# Patient Record
Sex: FEMALE | Race: WHITE | NOT HISPANIC OR LATINO | Employment: OTHER | URBAN - METROPOLITAN AREA
[De-identification: names, ages, dates, MRNs, and addresses within clinical notes are randomized per-mention and may not be internally consistent; named-entity substitution may affect disease eponyms.]

---

## 2017-02-21 ENCOUNTER — OFFICE VISIT (OUTPATIENT)
Dept: ORTHOPEDIC SURGERY | Facility: CLINIC | Age: 62
End: 2017-02-21

## 2017-02-21 DIAGNOSIS — R52 PAIN: Primary | ICD-10-CM

## 2017-02-21 DIAGNOSIS — M17.11 PRIMARY OSTEOARTHRITIS OF RIGHT KNEE: ICD-10-CM

## 2017-02-21 DIAGNOSIS — M17.12 PRIMARY OSTEOARTHRITIS OF LEFT KNEE: ICD-10-CM

## 2017-02-21 PROCEDURE — 99214 OFFICE O/P EST MOD 30 MIN: CPT | Performed by: ORTHOPAEDIC SURGERY

## 2017-02-21 PROCEDURE — 20610 DRAIN/INJ JOINT/BURSA W/O US: CPT | Performed by: ORTHOPAEDIC SURGERY

## 2017-02-21 PROCEDURE — 73562 X-RAY EXAM OF KNEE 3: CPT | Performed by: ORTHOPAEDIC SURGERY

## 2017-02-21 RX ORDER — ERGOCALCIFEROL 1.25 MG/1
CAPSULE ORAL
Refills: 0 | Status: ON HOLD | COMMUNITY
Start: 2017-02-18 | End: 2018-04-11

## 2017-02-21 RX ORDER — ALLOPURINOL 100 MG/1
TABLET ORAL
Refills: 0 | COMMUNITY
Start: 2017-02-18 | End: 2017-05-23

## 2017-02-21 RX ORDER — TRAMADOL HYDROCHLORIDE 50 MG/1
TABLET ORAL
Refills: 0 | Status: ON HOLD | COMMUNITY
Start: 2017-02-10 | End: 2018-04-11

## 2017-02-21 RX ADMIN — TRIAMCINOLONE ACETONIDE 80 MG: 40 INJECTION, SUSPENSION INTRA-ARTICULAR; INTRAMUSCULAR at 16:21

## 2017-02-21 RX ADMIN — BUPIVACAINE HYDROCHLORIDE 4 ML: 5 INJECTION, SOLUTION EPIDURAL; INTRACAUDAL at 16:21

## 2017-02-21 RX ADMIN — LIDOCAINE HYDROCHLORIDE 4 ML: 10 INJECTION, SOLUTION INFILTRATION; PERINEURAL at 16:21

## 2017-02-21 NOTE — PROGRESS NOTES
Subjective:     Patient ID: Nitish Barfield is a 61 y.o. female.    Chief Complaint: Follow-up bilateral knee pain, DJD     History of Present Illness  Nitish Barfield returns to clinic today states that both her knees been causing issues for her recently but her left knee has been more predominant over the last 3 months, she did have prior arthroscopy of the left knee back in 2008.  Has noted that her pain is primarily exacerbated with twisting and deep flexion activities, localized both medial and lateral joint lines left knee as well as anteriorly with stair climbing.  Rates pain as a 9 out of 10, aching in nature, occasional sharp pain.  Mild associated swelling.  Mild improvement with rest, and anti-inflammatory medications.    States the right knee pain has been increasing over last 3-4 weeks, rates as a 7 out of 10, aching in nature.  Exacerbated with prolonged weightbearing and walking as well as stair climbing, minimal improvement with rest and activity modification.  Denies any nallely locking or catching, denies any numbness or tingling bilateral lower extremities.    She has recently been started on methotrexate by her rheumatologist.  Social History     Occupational History   • Not on file.     Social History Main Topics   • Smoking status: Never Smoker   • Smokeless tobacco: Not on file   • Alcohol use Yes   • Drug use: No   • Sexual activity: Not on file      Past Medical History   Diagnosis Date   • Allergy    • Anxiety    • Arthritis    • Esophageal reflux    • Hypertension    • Hypertension      Past Surgical History   Procedure Laterality Date   • Hysterectomy     • Colon surgery     • Knee surgery         Family History   Problem Relation Age of Onset   • Cancer Mother    • Other Father      cardiac failure         Review of Systems   Constitutional: Negative for chills, diaphoresis, fever and unexpected weight change.   HENT: Negative for hearing loss, nosebleeds, sore throat and tinnitus.    Eyes:  Negative for pain and visual disturbance.   Respiratory: Negative for cough, shortness of breath and wheezing.    Cardiovascular: Negative for chest pain and palpitations.   Gastrointestinal: Negative for abdominal pain, diarrhea, nausea and vomiting.   Endocrine: Negative for cold intolerance, heat intolerance and polydipsia.   Genitourinary: Negative for difficulty urinating, dysuria and hematuria.   Musculoskeletal: Positive for arthralgias. Negative for joint swelling and myalgias.   Skin: Negative for rash and wound.   Allergic/Immunologic: Negative for environmental allergies.   Neurological: Negative for dizziness, syncope and numbness.   Hematological: Does not bruise/bleed easily.   Psychiatric/Behavioral: Negative for dysphoric mood and sleep disturbance. The patient is not nervous/anxious.            Objective:  There were no vitals filed for this visit.  There were no vitals filed for this visit.  There is no height or weight on file to calculate BMI.  General: No acute distress.  Resp: normal respiratory effort  Skin: no rashes or wounds; normal turgor  Psych: mood and affect appropriate; recent and remote memory intact       Ortho Exam    Right knee-active range of motion 5-120°, 4+ out of 5 strength on flexion and extension, mild effusion, maximal tenderness palpation along medial joint line and medial patellar facet, positive external compression test, positive [place] exam with pain, no click.  Grade 1A Lachman, negative anterior posterior drawer, stable to varus and valgus stress at 5 and 30°.    Left knee-active range of motion 3-120°, 4+ out of 5 strength on flexion and extension, moderate effusion noted, maximal tenderness palpation along medial and lateral joint lines, moderate tenderness along medial lateral patellar facets with positive active patellar compression test, negative patellar apprehension test.  Negative Mis exam.  Grade 1A Lachman, negative anterior posterior drawer, stable  to varus and valgus stress at 3 and 30 degrees. No left hip pain on logroll extensor exam, negative straight leg raise left lower extremity.  Imaging:  Left Knee X-Ray  Indication: Pain    AP, Lateral, and [place] views    Findings:  No fracture  No bony lesion  Normal soft tissues  Advanced tricompartmental osteoarthritis bilateral knees, primarily noted in the patellofemoral compartment as well as medial compartment on left knee with osteophyte along superior and inferior patella as well as medial tibial plateau.  No evidence of intra-articular loose body.  Overall varus alignment noted.    Compared to Prior office x-rays  Assessment:       1. Pain    2. Primary osteoarthritis of right knee    3. Primary osteoarthritis of left knee          Plan:  ARTEMIO query complete.        Discussed treatment options at length with patient today.  She does not want proceed with any type of surgery at this point time.  She has had moderate improvement with prior cortisone injections.  We also discussed possibility of total knee arthroplasty as well as viscus supplement injections.  We will 6 cortisone injections this time, she is going to continue with her initial trial methotrexate to evaluate for any improvement with this.    Nitish Barfield was in agreement with plan and had all questions answered.     Orders:  Orders Placed This Encounter   Procedures   • Large Joint Arthrocentesis   • XR Knee 3+ View With [place] Left       Medications:  No orders of the defined types were placed in this encounter.      Followup:  Return in about 3 months (around 5/21/2017).          Dragon transcription disclaimer     Much of this encounter note is an electronic transcription/translation of spoken language to printed text. The electronic translation of spoken language may permit erroneous, or at times, nonsensical words or phrases to be inadvertently transcribed. Although I have reviewed the note for such errors, some may still exist.  Large  Joint Arthrocentesis  Date/Time: 2/21/2017 4:21 PM  Consent given by: patient  Site marked: site marked  Timeout: Immediately prior to procedure a time out was called to verify the correct patient, procedure, equipment, support staff and site/side marked as required   Supporting Documentation  Indications: pain   Procedure Details  Location: knee - Knee joint: bilateral.  Preparation: Patient was prepped and draped in the usual sterile fashion  Needle size: 22 G  Approach: superior  Medications administered: 4 mL bupivacaine (PF) 0.5 %; 4 mL lidocaine 1 %; 80 mg triamcinolone acetonide 40 MG/ML  Patient tolerance: patient tolerated the procedure well with no immediate complications

## 2017-02-27 RX ORDER — LIDOCAINE HYDROCHLORIDE 10 MG/ML
4 INJECTION, SOLUTION INFILTRATION; PERINEURAL
Status: COMPLETED | OUTPATIENT
Start: 2017-02-21 | End: 2017-02-21

## 2017-02-27 RX ORDER — TRIAMCINOLONE ACETONIDE 40 MG/ML
80 INJECTION, SUSPENSION INTRA-ARTICULAR; INTRAMUSCULAR
Status: COMPLETED | OUTPATIENT
Start: 2017-02-21 | End: 2017-02-21

## 2017-02-27 RX ORDER — BUPIVACAINE HYDROCHLORIDE 5 MG/ML
4 INJECTION, SOLUTION EPIDURAL; INTRACAUDAL
Status: COMPLETED | OUTPATIENT
Start: 2017-02-21 | End: 2017-02-21

## 2017-05-23 ENCOUNTER — OFFICE VISIT (OUTPATIENT)
Dept: ORTHOPEDIC SURGERY | Facility: CLINIC | Age: 62
End: 2017-05-23

## 2017-05-23 DIAGNOSIS — M17.11 PRIMARY OSTEOARTHRITIS OF RIGHT KNEE: Primary | ICD-10-CM

## 2017-05-23 DIAGNOSIS — M17.12 PRIMARY OSTEOARTHRITIS OF LEFT KNEE: ICD-10-CM

## 2017-05-23 PROCEDURE — 20610 DRAIN/INJ JOINT/BURSA W/O US: CPT | Performed by: ORTHOPAEDIC SURGERY

## 2017-05-23 PROCEDURE — 99213 OFFICE O/P EST LOW 20 MIN: CPT | Performed by: ORTHOPAEDIC SURGERY

## 2017-05-23 RX ORDER — SULFASALAZINE 500 MG/1
TABLET ORAL
Refills: 0 | COMMUNITY
Start: 2017-05-09 | End: 2017-08-23

## 2017-05-23 RX ADMIN — TRIAMCINOLONE ACETONIDE 80 MG: 40 INJECTION, SUSPENSION INTRA-ARTICULAR; INTRAMUSCULAR at 15:47

## 2017-05-23 RX ADMIN — LIDOCAINE HYDROCHLORIDE 4 ML: 10 INJECTION, SOLUTION EPIDURAL; INFILTRATION; INTRACAUDAL; PERINEURAL at 15:47

## 2017-05-23 RX ADMIN — BUPIVACAINE HYDROCHLORIDE 4 ML: 5 INJECTION, SOLUTION EPIDURAL; INTRACAUDAL at 15:47

## 2017-05-30 RX ORDER — TRIAMCINOLONE ACETONIDE 40 MG/ML
80 INJECTION, SUSPENSION INTRA-ARTICULAR; INTRAMUSCULAR
Status: COMPLETED | OUTPATIENT
Start: 2017-05-23 | End: 2017-05-23

## 2017-05-30 RX ORDER — BUPIVACAINE HYDROCHLORIDE 5 MG/ML
4 INJECTION, SOLUTION EPIDURAL; INTRACAUDAL
Status: COMPLETED | OUTPATIENT
Start: 2017-05-23 | End: 2017-05-23

## 2017-05-30 RX ORDER — LIDOCAINE HYDROCHLORIDE 10 MG/ML
4 INJECTION, SOLUTION EPIDURAL; INFILTRATION; INTRACAUDAL; PERINEURAL
Status: COMPLETED | OUTPATIENT
Start: 2017-05-23 | End: 2017-05-23

## 2017-08-23 ENCOUNTER — OFFICE VISIT (OUTPATIENT)
Dept: ORTHOPEDIC SURGERY | Facility: CLINIC | Age: 62
End: 2017-08-23

## 2017-08-23 DIAGNOSIS — M17.12 PRIMARY OSTEOARTHRITIS OF LEFT KNEE: ICD-10-CM

## 2017-08-23 DIAGNOSIS — M17.11 PRIMARY OSTEOARTHRITIS OF RIGHT KNEE: Primary | ICD-10-CM

## 2017-08-23 PROCEDURE — 99213 OFFICE O/P EST LOW 20 MIN: CPT | Performed by: ORTHOPAEDIC SURGERY

## 2017-08-23 PROCEDURE — 20610 DRAIN/INJ JOINT/BURSA W/O US: CPT | Performed by: ORTHOPAEDIC SURGERY

## 2017-08-23 RX ORDER — AZILSARTAN KAMEDOXOMIL 80 MG/1
80 TABLET ORAL EVERY MORNING
Refills: 3 | COMMUNITY
Start: 2017-07-08

## 2017-08-23 RX ORDER — CHLORTHALIDONE 25 MG/1
TABLET ORAL
Refills: 3 | COMMUNITY
Start: 2017-06-07 | End: 2017-08-23

## 2017-08-23 RX ORDER — ALLOPURINOL 100 MG/1
TABLET ORAL
Refills: 0 | COMMUNITY
Start: 2017-06-19 | End: 2018-04-13 | Stop reason: HOSPADM

## 2017-08-23 RX ORDER — LEFLUNOMIDE 20 MG/1
TABLET ORAL
Refills: 0 | Status: ON HOLD | COMMUNITY
Start: 2017-08-12 | End: 2018-04-11

## 2017-08-23 RX ORDER — DIFLUNISAL 500 MG/1
500 TABLET, FILM COATED ORAL EVERY 12 HOURS SCHEDULED
Refills: 0 | Status: ON HOLD | COMMUNITY
Start: 2017-08-21 | End: 2018-04-11

## 2017-08-23 RX ADMIN — TRIAMCINOLONE ACETONIDE 80 MG: 40 INJECTION, SUSPENSION INTRA-ARTICULAR; INTRAMUSCULAR at 15:59

## 2017-08-23 RX ADMIN — BUPIVACAINE HYDROCHLORIDE 4 ML: 5 INJECTION, SOLUTION EPIDURAL; INTRACAUDAL at 15:59

## 2017-08-23 RX ADMIN — LIDOCAINE HYDROCHLORIDE 4 ML: 10 INJECTION, SOLUTION INFILTRATION; PERINEURAL at 15:59

## 2017-09-04 RX ORDER — BUPIVACAINE HYDROCHLORIDE 5 MG/ML
4 INJECTION, SOLUTION EPIDURAL; INTRACAUDAL
Status: COMPLETED | OUTPATIENT
Start: 2017-08-23 | End: 2017-08-23

## 2017-09-04 RX ORDER — LIDOCAINE HYDROCHLORIDE 10 MG/ML
4 INJECTION, SOLUTION INFILTRATION; PERINEURAL
Status: COMPLETED | OUTPATIENT
Start: 2017-08-23 | End: 2017-08-23

## 2017-09-04 RX ORDER — TRIAMCINOLONE ACETONIDE 40 MG/ML
80 INJECTION, SUSPENSION INTRA-ARTICULAR; INTRAMUSCULAR
Status: COMPLETED | OUTPATIENT
Start: 2017-08-23 | End: 2017-08-23

## 2017-11-17 ENCOUNTER — OFFICE VISIT (OUTPATIENT)
Dept: ORTHOPEDIC SURGERY | Facility: CLINIC | Age: 62
End: 2017-11-17

## 2017-11-17 DIAGNOSIS — M17.12 PRIMARY OSTEOARTHRITIS OF LEFT KNEE: ICD-10-CM

## 2017-11-17 DIAGNOSIS — M17.11 PRIMARY OSTEOARTHRITIS OF RIGHT KNEE: Primary | ICD-10-CM

## 2017-11-17 PROCEDURE — 20610 DRAIN/INJ JOINT/BURSA W/O US: CPT | Performed by: ORTHOPAEDIC SURGERY

## 2017-11-17 PROCEDURE — 99213 OFFICE O/P EST LOW 20 MIN: CPT | Performed by: ORTHOPAEDIC SURGERY

## 2017-11-17 RX ORDER — LIDOCAINE HYDROCHLORIDE 10 MG/ML
4 INJECTION, SOLUTION EPIDURAL; INFILTRATION; INTRACAUDAL; PERINEURAL
Status: COMPLETED | OUTPATIENT
Start: 2017-11-17 | End: 2017-11-17

## 2017-11-17 RX ORDER — TRIAMCINOLONE ACETONIDE 40 MG/ML
80 INJECTION, SUSPENSION INTRA-ARTICULAR; INTRAMUSCULAR
Status: COMPLETED | OUTPATIENT
Start: 2017-11-17 | End: 2017-11-17

## 2017-11-17 RX ORDER — BUPIVACAINE HYDROCHLORIDE 5 MG/ML
4 INJECTION, SOLUTION EPIDURAL; INTRACAUDAL
Status: COMPLETED | OUTPATIENT
Start: 2017-11-17 | End: 2017-11-17

## 2017-11-17 RX ADMIN — LIDOCAINE HYDROCHLORIDE 4 ML: 10 INJECTION, SOLUTION EPIDURAL; INFILTRATION; INTRACAUDAL; PERINEURAL at 08:39

## 2017-11-17 RX ADMIN — BUPIVACAINE HYDROCHLORIDE 4 ML: 5 INJECTION, SOLUTION EPIDURAL; INTRACAUDAL at 08:39

## 2017-11-17 RX ADMIN — TRIAMCINOLONE ACETONIDE 80 MG: 40 INJECTION, SUSPENSION INTRA-ARTICULAR; INTRAMUSCULAR at 08:39

## 2017-11-17 NOTE — PROGRESS NOTES
Subjective:     Patient ID: Nitish Barfield is a 62 y.o. female.    Chief Complaint  Follow-up bilateral knee pain, DJD  History of Present Illness  Nitish Barfield returns to clinic today for evaluation of bilateral knees, states that injections and continue work well but wear off after approximately 2-1/2 months, notes residual pain now over anterior medial joint line bilateral knee, left worse than right, aching in nature, rates pain as 8-9 out of 10.  She has not had as much swelling over the last 3-4 weeks.  Mild improvement with rest and soft tissue massage as well as over-the-counter anti-inflammatory medications.  Denies numbness or tingling, denies any significant radiation of pain.     Social History     Occupational History   • Not on file.     Social History Main Topics   • Smoking status: Never Smoker   • Smokeless tobacco: Not on file   • Alcohol use Yes   • Drug use: No   • Sexual activity: Not on file      Past Medical History:   Diagnosis Date   • Allergy    • Anxiety    • Arthritis    • Esophageal reflux    • Hypertension    • Hypertension      Past Surgical History:   Procedure Laterality Date   • COLON SURGERY     • HYSTERECTOMY     • KNEE SURGERY         Family History   Problem Relation Age of Onset   • Cancer Mother    • Other Father      cardiac failure         Review of Systems   Constitutional: Negative for chills, diaphoresis, fever and unexpected weight change.   HENT: Negative for hearing loss, nosebleeds, sore throat and tinnitus.    Eyes: Negative for pain and visual disturbance.   Respiratory: Negative for cough, shortness of breath and wheezing.    Cardiovascular: Negative for chest pain and palpitations.   Gastrointestinal: Negative for abdominal pain, diarrhea, nausea and vomiting.   Endocrine: Negative for cold intolerance, heat intolerance and polydipsia.   Genitourinary: Negative for difficulty urinating, dysuria and hematuria.   Musculoskeletal: Positive for arthralgias. Negative for  joint swelling and myalgias.   Skin: Negative for rash and wound.   Allergic/Immunologic: Negative for environmental allergies.   Neurological: Negative for dizziness, syncope and numbness.   Hematological: Does not bruise/bleed easily.   Psychiatric/Behavioral: Negative for dysphoric mood and sleep disturbance. The patient is not nervous/anxious.    All other systems reviewed and are negative.          Objective:  There were no vitals filed for this visit.  There were no vitals filed for this visit.  There is no height or weight on file to calculate BMI.  General: No acute distress.  Resp: normal respiratory effort  Skin: no rashes or wounds; normal turgor  Psych: mood and affect appropriate; recent and remote memory intact         Ortho Exam    Right knee-active range of motion 3-120°, 4+ out of 5 strength on flexion and extension, moderate effusion, maximal tenderness palpation along medial joint line and medial patellar facet, positive active patellar compression test, positive Mis exam with pain, no click. Stable to varus and valgus stress at 5 and 30°.      Left knee-active range of motion 3-120°, 4+ out of 5 strength on flexion and extension, moderate effusion noted, maximal tenderness palpation along medial and lateral joint lines, moderate tenderness along medial lateral patellar facets with positive active patellar compression test. Negative Mis exam. Stable to varus and valgus stress at 3 and 30 degrees.   Imaging:    Assessment:       1. Primary osteoarthritis of right knee    2. Primary osteoarthritis of left knee          Plan:  Large Joint Arthrocentesis  Date/Time: 11/17/2017 8:39 AM  Consent given by: patient  Site marked: site marked  Timeout: Immediately prior to procedure a time out was called to verify the correct patient, procedure, equipment, support staff and site/side marked as required   Supporting Documentation  Indications: pain   Procedure Details  Location: knee - Knee joint:  BILATERAL KNEE.  Preparation: Patient was prepped and draped in the usual sterile fashion  Needle size: 22 G  Approach: superior  Medications administered: 4 mL lidocaine PF 1% 1 %; 4 mL bupivacaine (PF) 0.5 %; 80 mg triamcinolone acetonide 40 MG/ML  Patient tolerance: patient tolerated the procedure well with no immediate complications          ARTEMIO query complete.          Discussed treatment options at length with patient at today's visit. Reviewed options for viscous supplement injections, repeat cortisone injections, and total knee arthroplasty as well as physical therapy.  Patient will continue work on home exercises and weight loss at home.  Given prior success she decides today she would like to proceed with repeat intra-articular cortisone injections.    Nitish Barfield was in agreement with plan and had all questions answered.     Orders:  Orders Placed This Encounter   Procedures   • Large Joint Arthrocentesis       Medications:  No orders of the defined types were placed in this encounter.      Followup:  Return in about 3 months (around 2/17/2018).    Nitish was seen today for pain, follow-up, pain and follow-up.    Diagnoses and all orders for this visit:    Primary osteoarthritis of right knee    Primary osteoarthritis of left knee    Other orders  -     Large Joint Arthrocentesis        Dragon transcription disclaimer     Much of this encounter note is an electronic transcription/translation of spoken language to printed text. The electronic translation of spoken language may permit erroneous, or at times, nonsensical words or phrases to be inadvertently transcribed. Although I have reviewed the note for such errors, some may still exist.

## 2018-02-16 ENCOUNTER — OFFICE VISIT (OUTPATIENT)
Dept: ORTHOPEDIC SURGERY | Facility: CLINIC | Age: 63
End: 2018-02-16

## 2018-02-16 DIAGNOSIS — M17.11 PRIMARY OSTEOARTHRITIS OF RIGHT KNEE: Primary | ICD-10-CM

## 2018-02-16 DIAGNOSIS — M17.12 PRIMARY OSTEOARTHRITIS OF LEFT KNEE: ICD-10-CM

## 2018-02-16 PROCEDURE — 99214 OFFICE O/P EST MOD 30 MIN: CPT | Performed by: ORTHOPAEDIC SURGERY

## 2018-02-16 PROCEDURE — 20610 DRAIN/INJ JOINT/BURSA W/O US: CPT | Performed by: ORTHOPAEDIC SURGERY

## 2018-02-16 RX ORDER — CHLORTHALIDONE 25 MG/1
25 TABLET ORAL EVERY MORNING
Refills: 0 | COMMUNITY
Start: 2018-01-21 | End: 2018-10-02 | Stop reason: ALTCHOICE

## 2018-02-16 RX ADMIN — TRIAMCINOLONE ACETONIDE 80 MG: 40 INJECTION, SUSPENSION INTRA-ARTICULAR; INTRAMUSCULAR at 08:38

## 2018-02-16 RX ADMIN — LIDOCAINE HYDROCHLORIDE 8 ML: 10 INJECTION, SOLUTION EPIDURAL; INFILTRATION; INTRACAUDAL; PERINEURAL at 08:38

## 2018-02-16 NOTE — PROGRESS NOTES
Subjective:     Patient ID: Nitish Barfield is a 62 y.o. female.    Chief Complaint:  Follow-up bilateral knee pain, DJD  History of Present Illness  Nitish Barfield returns to clinic today for evaluation of bilateral knee pain, states that most recent injections did help her particularly in the right knee for least 2-1/2 months, but only noted about 4-6 weeks of improvement for her left knee following injections in November.  She does note approximately 90% relief of the pain with the injections but only for limited period of time.  Has had significant recurrence of pain at this point time particularly on the left knee greater than the right, rates pain as a 9 out of 10 on the left and a 7 out of 10 on the right, localizes to the medial and anterior aspects of both knees.  Moderate associated swelling, minimal improvement with rest and anti-inflammatory medications, exacerbation with prolonged weightbearing, walking, standing, stair climbing activities with associated crepitus on range of motion noted.  Denies any significant radiation of pain, denies numbness or tingling.     Social History     Occupational History   • Not on file.     Social History Main Topics   • Smoking status: Never Smoker   • Smokeless tobacco: Not on file   • Alcohol use Yes   • Drug use: No   • Sexual activity: Not on file      Past Medical History:   Diagnosis Date   • Allergy    • Anxiety    • Arthritis    • Esophageal reflux    • Hypertension    • Hypertension      Past Surgical History:   Procedure Laterality Date   • COLON SURGERY     • HYSTERECTOMY     • KNEE SURGERY         Family History   Problem Relation Age of Onset   • Cancer Mother    • Other Father      cardiac failure         Review of Systems   Constitutional: Negative for chills, diaphoresis, fever and unexpected weight change.   HENT: Negative for hearing loss, nosebleeds, sore throat and tinnitus.    Eyes: Negative for pain and visual disturbance.   Respiratory: Negative for  cough, shortness of breath and wheezing.    Cardiovascular: Negative for chest pain and palpitations.   Gastrointestinal: Negative for abdominal pain, diarrhea, nausea and vomiting.   Endocrine: Negative for cold intolerance, heat intolerance and polydipsia.   Genitourinary: Negative for difficulty urinating, dysuria and hematuria.   Musculoskeletal: Positive for arthralgias and joint swelling. Negative for myalgias.   Skin: Negative for rash and wound.   Allergic/Immunologic: Negative for environmental allergies.   Neurological: Positive for numbness. Negative for dizziness and syncope.   Hematological: Does not bruise/bleed easily.   Psychiatric/Behavioral: Negative for dysphoric mood and sleep disturbance. The patient is not nervous/anxious.    All other systems reviewed and are negative.          Objective:  There were no vitals filed for this visit.  There were no vitals filed for this visit.  There is no height or weight on file to calculate BMI.  General: No acute distress.  Resp: normal respiratory effort  Skin: no rashes or wounds; normal turgor  Psych: mood and affect appropriate; recent and remote memory intact         Ortho Exam    Right knee-active range of motion 3-125°, 4+ out of 5 strength on flexion and extension, moderate effusion, maximal tenderness palpation along medial joint line and medial patellar facet, positive active patellar compression test, positive Mis exam with pain, no click. Stable to varus and valgus stress at 5 and 30°.      Left knee-active range of motion 5-120°, 4+ out of 5 strength on flexion and extension, moderate effusion noted, maximal tenderness palpation along medial and lateral joint lines, moderate tenderness along medial lateral patellar facets with positive active patellar compression test. Negative Mis exam. Stable to varus and valgus stress at 3 and 30 degrees.     Imaging:    Assessment:       1. Primary osteoarthritis of right knee    2. Primary  osteoarthritis of left knee          Plan:  ARTEMIO query complete.          Discussed treatment options at length with patient at today's visit.  Given significant recurrence of pain at this point time in the acute nature of her pain at today's visit, she elected to proceed with repeat intra-articular injections today.  However she does want plan on proceeding with total knee arthroplasty on the left side given the significant increase in her pain as well as decreasing efficacy of intra-articular injections.  She has failed other conservative treatment options including but not limited to rest, activity modification, home exercise program, weight loss through dieting, over-the-counter sleeve, and intra-articular injections.    I reviewed anatomy and a model of a total knee arthroplasty, as well as typical postoperative recovery of 6-12 months before maxiaml recovery, and possible need for rehabilitation stay after hospitalization. We also discussed risks, benefits, and alternatives of procedure with risks including but not limited to neurovascular damage, bleeding, infection, malalignment, chronic pian, failure of implants, osteolysis, loosening of implants, loss of motion, weakness, stiffness, instability, DVT, pulmonary embolus, death, stroke, complex regional pain syndrome, myocardial infarction, and need for additional procedures. Patient understood all these and had all questions answered before consenting to the procedure. No guarantees were given in regards to results from the surgery. We will have patient medically optimized by their primary care physician and plan on proceeding with surgery at next available date.     Patient denies any history of DVT or pulmonary embolus, denies any history of cardiac issues.    Nitish Barfield was in agreement with plan and had all questions answered.     Orders:  Orders Placed This Encounter   Procedures   • Large Joint Arthrocentesis   • MRSA Screen Culture - Swab, Parvezes    • XR chest 2 vw   • Basic metabolic panel   • Protime-INR   • APTT   • Urinalysis With / Culture If Indicated - Urine, Clean Catch   • Consult Primary Care Physician for Medical Clearance   • Follow anesthesia standing orders.   • Orthopedic Discharge Planning for PT & Case Management - Inpatient With Post-Op Day 2 or 3 Discharge   • Provide instructions to patient regarding NPO status   • Clorhexidine skin prep   • ECG 12 Lead   • Type and screen   • CBC and Differential       Medications:  No orders of the defined types were placed in this encounter.      Followup:  No Follow-up on file.    Nitish was seen today for follow-up, numbness, edema, pain, follow-up, numbness, edema and pain.    Diagnoses and all orders for this visit:    Primary osteoarthritis of right knee  -     Large Joint Arthrocentesis    Primary osteoarthritis of left knee  -     Large Joint Arthrocentesis  -     Case Request; Standing  -     Consult Primary Care Physician for Medical Clearance  -     CBC and Differential; Future  -     Basic metabolic panel; Future  -     Protime-INR; Future  -     APTT; Future  -     MRSA Screen Culture - Swab, Nares  -     Type and screen; Future  -     Urinalysis With / Culture If Indicated - Urine, Clean Catch; Future  -     ECG 12 Lead; Future  -     XR chest 2 vw; Future  -     acetaminophen (TYLENOL) tablet 975 mg; Take 3 tablets by mouth 1 (One) Time.  -     meloxicam (MOBIC) tablet 15 mg; Take 2 tablets by mouth 1 (One) Time.  -     pregabalin (LYRICA) capsule 150 mg; Take 6 capsules by mouth 1 (One) Time.  -     oxyCODONE (oxyCONTIN) 12 hr tablet 10 mg; Take 1 tablet by mouth 1 (One) Time.  -     bupivacaine liposome (EXPAREL) 1.3 % injection 266 mg; Inject 20 mL as directed 1 (One) Time.  -     pantoprazole (PROTONIX) EC tablet 40 mg; Take 2 tablets by mouth 1 (One) Time.  -     Case Request    Other orders  -     Inpatient Admission; Standing  -     Follow anesthesia standing orders.  -      Orthopedic Discharge Planning for PT & Case Management - Inpatient With Post-Op Day 2 or 3 Discharge  -     Provide instructions to patient regarding NPO status  -     Clorhexidine skin prep  -     Follow anesthesia standing orders.; Standing  -     Verify NPO Status; Standing  -     SCD (sequential compression device)- to be placed on patient in Pre-op; Standing  -     Clip operative site; Standing  -     Obtain informed consent (if not collected inpatient or PAT); Standing  -     Type & Screen; Standing  -     Inpatient Consult to Hospitalist; Standing        Dictated utilizing Xeroon dictation   Large Joint Arthrocentesis  Date/Time: 2/16/2018 8:38 AM  Consent given by: patient  Site marked: site marked  Timeout: Immediately prior to procedure a time out was called to verify the correct patient, procedure, equipment, support staff and site/side marked as required   Supporting Documentation  Indications: pain   Procedure Details  Location: knee - Knee joint: bilateral.  Preparation: Patient was prepped and draped in the usual sterile fashion  Needle size: 22 G  Approach: anterolateral  Medications administered: 8 mL lidocaine PF 1% 1 %; 80 mg triamcinolone acetonide 40 MG/ML  Patient tolerance: patient tolerated the procedure well with no immediate complications

## 2018-02-21 RX ORDER — LIDOCAINE HYDROCHLORIDE 10 MG/ML
8 INJECTION, SOLUTION EPIDURAL; INFILTRATION; INTRACAUDAL; PERINEURAL
Status: COMPLETED | OUTPATIENT
Start: 2018-02-16 | End: 2018-02-16

## 2018-02-21 RX ORDER — PREGABALIN 25 MG/1
150 CAPSULE ORAL ONCE
Status: CANCELLED | OUTPATIENT
Start: 2018-02-21 | End: 2018-02-21

## 2018-02-21 RX ORDER — MELOXICAM 7.5 MG/1
15 TABLET ORAL ONCE
Status: CANCELLED | OUTPATIENT
Start: 2018-02-21 | End: 2018-02-21

## 2018-02-21 RX ORDER — OXYCODONE HCL 10 MG/1
10 TABLET, FILM COATED, EXTENDED RELEASE ORAL ONCE
Status: CANCELLED | OUTPATIENT
Start: 2018-02-21 | End: 2018-02-21

## 2018-02-21 RX ORDER — TRIAMCINOLONE ACETONIDE 40 MG/ML
80 INJECTION, SUSPENSION INTRA-ARTICULAR; INTRAMUSCULAR
Status: COMPLETED | OUTPATIENT
Start: 2018-02-16 | End: 2018-02-16

## 2018-02-21 RX ORDER — PANTOPRAZOLE SODIUM 20 MG/1
40 TABLET, DELAYED RELEASE ORAL ONCE
Status: CANCELLED | OUTPATIENT
Start: 2018-02-21 | End: 2018-02-21

## 2018-02-21 RX ORDER — ACETAMINOPHEN 325 MG/1
1000 TABLET ORAL ONCE
Status: CANCELLED | OUTPATIENT
Start: 2018-02-21 | End: 2018-02-21

## 2018-03-12 ENCOUNTER — PREP FOR SURGERY (OUTPATIENT)
Dept: OTHER | Facility: HOSPITAL | Age: 63
End: 2018-03-12

## 2018-03-12 DIAGNOSIS — M17.12 PRIMARY OSTEOARTHRITIS OF LEFT KNEE: Primary | ICD-10-CM

## 2018-03-30 ENCOUNTER — OFFICE VISIT (OUTPATIENT)
Dept: ORTHOPEDIC SURGERY | Facility: CLINIC | Age: 63
End: 2018-03-30

## 2018-03-30 DIAGNOSIS — M17.12 PRIMARY OSTEOARTHRITIS OF LEFT KNEE: Primary | ICD-10-CM

## 2018-03-30 PROCEDURE — S0260 H&P FOR SURGERY: HCPCS | Performed by: ORTHOPAEDIC SURGERY

## 2018-03-30 PROCEDURE — 73562 X-RAY EXAM OF KNEE 3: CPT | Performed by: ORTHOPAEDIC SURGERY

## 2018-03-30 RX ORDER — SILVER SULFADIAZINE 1 %
CREAM (GRAM) TOPICAL
Refills: 0 | Status: ON HOLD | COMMUNITY
Start: 2018-03-28 | End: 2018-04-11

## 2018-03-30 RX ORDER — LEFLUNOMIDE 10 MG/1
10 TABLET ORAL EVERY MORNING
Refills: 0 | COMMUNITY
Start: 2018-03-13 | End: 2018-04-13 | Stop reason: HOSPADM

## 2018-03-30 ASSESSMENT — KOOS JR
KOOS JR SCORE: 24.875
KOOS JR SCORE: 24

## 2018-04-03 ENCOUNTER — HOSPITAL ENCOUNTER (OUTPATIENT)
Dept: GENERAL RADIOLOGY | Facility: HOSPITAL | Age: 63
Discharge: HOME | End: 2018-04-03
Admitting: ORTHOPAEDIC SURGERY

## 2018-04-03 ENCOUNTER — APPOINTMENT (OUTPATIENT)
Dept: PREADMISSION TESTING | Facility: HOSPITAL | Age: 63
End: 2018-04-03

## 2018-04-03 VITALS
OXYGEN SATURATION: 96 % | SYSTOLIC BLOOD PRESSURE: 149 MMHG | RESPIRATION RATE: 16 BRPM | HEART RATE: 88 BPM | WEIGHT: 243.3 LBS | BODY MASS INDEX: 36.87 KG/M2 | DIASTOLIC BLOOD PRESSURE: 69 MMHG | HEIGHT: 68 IN

## 2018-04-03 DIAGNOSIS — M17.11 PRIMARY OSTEOARTHRITIS OF RIGHT KNEE: Primary | ICD-10-CM

## 2018-04-03 DIAGNOSIS — M17.12 PRIMARY OSTEOARTHRITIS OF LEFT KNEE: ICD-10-CM

## 2018-04-03 LAB
ABO GROUP BLD: NORMAL
ABO GROUP BLD: NORMAL
ANION GAP SERPL CALCULATED.3IONS-SCNC: 11.6 MMOL/L
APTT PPP: 30.8 SECONDS (ref 24.3–38.1)
BACTERIA UR QL AUTO: ABNORMAL /HPF
BASOPHILS # BLD AUTO: 0.07 10*3/MM3 (ref 0–0.2)
BASOPHILS NFR BLD AUTO: 0.7 % (ref 0–2)
BILIRUB UR QL STRIP: NEGATIVE
BLD GP AB SCN SERPL QL: NEGATIVE
BUN BLD-MCNC: 10 MG/DL (ref 8–23)
BUN/CREAT SERPL: 10 (ref 7–25)
CALCIUM SPEC-SCNC: 8.8 MG/DL (ref 8.8–10.5)
CHLORIDE SERPL-SCNC: 99 MMOL/L (ref 98–107)
CLARITY UR: CLEAR
CO2 SERPL-SCNC: 31.4 MMOL/L (ref 22–29)
COLOR UR: YELLOW
CREAT BLD-MCNC: 1 MG/DL (ref 0.57–1)
DEPRECATED RDW RBC AUTO: 55.8 FL (ref 37–54)
EOSINOPHIL # BLD AUTO: 0.23 10*3/MM3 (ref 0.1–0.3)
EOSINOPHIL NFR BLD AUTO: 2.4 % (ref 0–4)
ERYTHROCYTE [DISTWIDTH] IN BLOOD BY AUTOMATED COUNT: 19 % (ref 11.5–14.5)
GFR SERPL CREATININE-BSD FRML MDRD: 56 ML/MIN/1.73
GLUCOSE BLD-MCNC: 91 MG/DL (ref 65–99)
GLUCOSE UR STRIP-MCNC: NEGATIVE MG/DL
HCT VFR BLD AUTO: 37.2 % (ref 37–47)
HGB BLD-MCNC: 11.3 G/DL (ref 12–16)
HGB UR QL STRIP.AUTO: NEGATIVE
HYALINE CASTS UR QL AUTO: ABNORMAL /LPF
IMM GRANULOCYTES # BLD: 0.03 10*3/MM3 (ref 0–0.03)
IMM GRANULOCYTES NFR BLD: 0.3 % (ref 0–0.5)
INR PPP: 1.12 (ref 0.9–1.1)
KETONES UR QL STRIP: NEGATIVE
LEUKOCYTE ESTERASE UR QL STRIP.AUTO: ABNORMAL
LYMPHOCYTES # BLD AUTO: 1.78 10*3/MM3 (ref 0.6–4.8)
LYMPHOCYTES NFR BLD AUTO: 18.5 % (ref 20–45)
MCH RBC QN AUTO: 25.1 PG (ref 27–31)
MCHC RBC AUTO-ENTMCNC: 30.4 G/DL (ref 31–37)
MCV RBC AUTO: 82.5 FL (ref 81–99)
MONOCYTES # BLD AUTO: 1.07 10*3/MM3 (ref 0–1)
MONOCYTES NFR BLD AUTO: 11.1 % (ref 3–8)
NEUTROPHILS # BLD AUTO: 6.42 10*3/MM3 (ref 1.5–8.3)
NEUTROPHILS NFR BLD AUTO: 67 % (ref 45–70)
NITRITE UR QL STRIP: NEGATIVE
NRBC BLD MANUAL-RTO: 0 /100 WBC (ref 0–0)
PH UR STRIP.AUTO: 5.5 [PH] (ref 4.5–8)
PLATELET # BLD AUTO: 500 10*3/MM3 (ref 140–500)
PMV BLD AUTO: 9.5 FL (ref 7.4–10.4)
POTASSIUM BLD-SCNC: 2.7 MMOL/L (ref 3.5–5.2)
PROT UR QL STRIP: NEGATIVE
PROTHROMBIN TIME: 14.5 SECONDS (ref 12.1–15)
RBC # BLD AUTO: 4.51 10*6/MM3 (ref 4.2–5.4)
RBC # UR: ABNORMAL /HPF
REF LAB TEST METHOD: ABNORMAL
RH BLD: POSITIVE
RH BLD: POSITIVE
SODIUM BLD-SCNC: 142 MMOL/L (ref 136–145)
SP GR UR STRIP: 1.02 (ref 1–1.03)
SQUAMOUS #/AREA URNS HPF: ABNORMAL /HPF
T&S EXPIRATION DATE: NORMAL
UROBILINOGEN UR QL STRIP: ABNORMAL
WBC NRBC COR # BLD: 9.6 10*3/MM3 (ref 4.8–10.8)
WBC UR QL AUTO: ABNORMAL /HPF

## 2018-04-03 PROCEDURE — 81001 URINALYSIS AUTO W/SCOPE: CPT | Performed by: ORTHOPAEDIC SURGERY

## 2018-04-03 PROCEDURE — 87086 URINE CULTURE/COLONY COUNT: CPT | Performed by: ORTHOPAEDIC SURGERY

## 2018-04-03 PROCEDURE — 87081 CULTURE SCREEN ONLY: CPT | Performed by: ORTHOPAEDIC SURGERY

## 2018-04-03 PROCEDURE — 36415 COLL VENOUS BLD VENIPUNCTURE: CPT

## 2018-04-03 PROCEDURE — 86901 BLOOD TYPING SEROLOGIC RH(D): CPT

## 2018-04-03 PROCEDURE — 86850 RBC ANTIBODY SCREEN: CPT | Performed by: ORTHOPAEDIC SURGERY

## 2018-04-03 PROCEDURE — 85730 THROMBOPLASTIN TIME PARTIAL: CPT | Performed by: ORTHOPAEDIC SURGERY

## 2018-04-03 PROCEDURE — 85610 PROTHROMBIN TIME: CPT | Performed by: ORTHOPAEDIC SURGERY

## 2018-04-03 PROCEDURE — 93010 ELECTROCARDIOGRAM REPORT: CPT | Performed by: INTERNAL MEDICINE

## 2018-04-03 PROCEDURE — 71046 X-RAY EXAM CHEST 2 VIEWS: CPT

## 2018-04-03 PROCEDURE — 86900 BLOOD TYPING SEROLOGIC ABO: CPT

## 2018-04-03 PROCEDURE — 86900 BLOOD TYPING SEROLOGIC ABO: CPT | Performed by: ORTHOPAEDIC SURGERY

## 2018-04-03 PROCEDURE — 93005 ELECTROCARDIOGRAM TRACING: CPT

## 2018-04-03 PROCEDURE — 80048 BASIC METABOLIC PNL TOTAL CA: CPT | Performed by: ORTHOPAEDIC SURGERY

## 2018-04-03 PROCEDURE — 86901 BLOOD TYPING SEROLOGIC RH(D): CPT | Performed by: ORTHOPAEDIC SURGERY

## 2018-04-03 PROCEDURE — 85025 COMPLETE CBC W/AUTO DIFF WBC: CPT | Performed by: ORTHOPAEDIC SURGERY

## 2018-04-03 RX ORDER — ALPRAZOLAM 0.5 MG/1
0.5 TABLET ORAL 3 TIMES DAILY PRN
Status: ON HOLD | COMMUNITY
End: 2018-04-11

## 2018-04-03 RX ORDER — ERGOCALCIFEROL 1.25 MG/1
50000 CAPSULE ORAL
COMMUNITY

## 2018-04-03 RX ORDER — TRAMADOL HYDROCHLORIDE 50 MG/1
50 TABLET ORAL EVERY 6 HOURS PRN
COMMUNITY
End: 2018-07-06 | Stop reason: ALTCHOICE

## 2018-04-03 RX ORDER — LANSOPRAZOLE 30 MG/1
30 CAPSULE, DELAYED RELEASE ORAL EVERY EVENING
Status: ON HOLD | COMMUNITY
End: 2018-04-11

## 2018-04-03 RX ORDER — BUSPIRONE HYDROCHLORIDE 10 MG/1
20 TABLET ORAL 2 TIMES DAILY
Status: ON HOLD | COMMUNITY
End: 2018-04-11

## 2018-04-03 RX ORDER — NEBIVOLOL 10 MG/1
10 TABLET ORAL EVERY MORNING
Status: ON HOLD | COMMUNITY
End: 2018-04-11

## 2018-04-03 RX ORDER — ALLOPURINOL 300 MG/1
300 TABLET ORAL NIGHTLY
COMMUNITY
End: 2018-10-02 | Stop reason: ALTCHOICE

## 2018-04-03 NOTE — PAT
PT. HERE FOR PAT VISIT/JOINT CAMP CLASS. LABS, EKG, CXR COMPLETE. PT. HAS RECENTLY SEEN PCP, DR. DARLEEN HARPER, AWAITING LAB RESULTS FOR CLEARANCE.

## 2018-04-04 ENCOUNTER — ANESTHESIA EVENT (OUTPATIENT)
Dept: PERIOP | Facility: HOSPITAL | Age: 63
End: 2018-04-04

## 2018-04-04 LAB — BACTERIA SPEC AEROBE CULT: NO GROWTH

## 2018-04-05 LAB
MRSA SPEC QL CULT: ABNORMAL
MRSA SPEC QL CULT: ABNORMAL

## 2018-04-10 ENCOUNTER — LAB (OUTPATIENT)
Dept: LAB | Facility: HOSPITAL | Age: 63
End: 2018-04-10
Attending: ORTHOPAEDIC SURGERY

## 2018-04-10 DIAGNOSIS — M17.12 PRIMARY OSTEOARTHRITIS OF LEFT KNEE: ICD-10-CM

## 2018-04-10 LAB — POTASSIUM BLD-SCNC: 3.5 MMOL/L (ref 3.5–5.2)

## 2018-04-10 PROCEDURE — 36415 COLL VENOUS BLD VENIPUNCTURE: CPT

## 2018-04-10 PROCEDURE — 84132 ASSAY OF SERUM POTASSIUM: CPT

## 2018-04-11 ENCOUNTER — APPOINTMENT (OUTPATIENT)
Dept: GENERAL RADIOLOGY | Facility: HOSPITAL | Age: 63
End: 2018-04-11

## 2018-04-11 ENCOUNTER — HOSPITAL ENCOUNTER (INPATIENT)
Facility: HOSPITAL | Age: 63
LOS: 2 days | Discharge: HOME HEALTH | End: 2018-04-13
Attending: ORTHOPAEDIC SURGERY | Admitting: ORTHOPAEDIC SURGERY

## 2018-04-11 ENCOUNTER — ANESTHESIA (OUTPATIENT)
Dept: PERIOP | Facility: HOSPITAL | Age: 63
End: 2018-04-11

## 2018-04-11 DIAGNOSIS — Z96.651 STATUS POST TOTAL RIGHT KNEE REPLACEMENT: Primary | ICD-10-CM

## 2018-04-11 DIAGNOSIS — M17.12 PRIMARY OSTEOARTHRITIS OF LEFT KNEE: ICD-10-CM

## 2018-04-11 LAB
GLUCOSE BLDC GLUCOMTR-MCNC: 92 MG/DL (ref 70–130)
POTASSIUM BLD-SCNC: 3.7 MMOL/L (ref 3.5–5.2)

## 2018-04-11 PROCEDURE — 27447 TOTAL KNEE ARTHROPLASTY: CPT | Performed by: SPECIALIST/TECHNOLOGIST, OTHER

## 2018-04-11 PROCEDURE — 82962 GLUCOSE BLOOD TEST: CPT

## 2018-04-11 PROCEDURE — 94799 UNLISTED PULMONARY SVC/PX: CPT

## 2018-04-11 PROCEDURE — C1776 JOINT DEVICE (IMPLANTABLE): HCPCS | Performed by: ORTHOPAEDIC SURGERY

## 2018-04-11 PROCEDURE — 25010000002 ROPIVACAINE PER 1 MG: Performed by: NURSE ANESTHETIST, CERTIFIED REGISTERED

## 2018-04-11 PROCEDURE — 27447 TOTAL KNEE ARTHROPLASTY: CPT | Performed by: ORTHOPAEDIC SURGERY

## 2018-04-11 PROCEDURE — 25010000002 VANCOMYCIN 750 MG RECONSTITUTED SOLUTION 1 EACH VIAL: Performed by: ORTHOPAEDIC SURGERY

## 2018-04-11 PROCEDURE — L1830 KO IMMOB CANVAS LONG PRE OTS: HCPCS | Performed by: ORTHOPAEDIC SURGERY

## 2018-04-11 PROCEDURE — 25010000002 ONDANSETRON PER 1 MG: Performed by: NURSE ANESTHETIST, CERTIFIED REGISTERED

## 2018-04-11 PROCEDURE — C1713 ANCHOR/SCREW BN/BN,TIS/BN: HCPCS | Performed by: ORTHOPAEDIC SURGERY

## 2018-04-11 PROCEDURE — 25010000002 MIDAZOLAM PER 1 MG: Performed by: NURSE ANESTHETIST, CERTIFIED REGISTERED

## 2018-04-11 PROCEDURE — 0396T: CPT | Performed by: ORTHOPAEDIC SURGERY

## 2018-04-11 PROCEDURE — 84132 ASSAY OF SERUM POTASSIUM: CPT | Performed by: NURSE ANESTHETIST, CERTIFIED REGISTERED

## 2018-04-11 PROCEDURE — 0SRD0JZ REPLACEMENT OF LEFT KNEE JOINT WITH SYNTHETIC SUBSTITUTE, OPEN APPROACH: ICD-10-PCS | Performed by: ORTHOPAEDIC SURGERY

## 2018-04-11 PROCEDURE — 25010000002 FENTANYL CITRATE (PF) 100 MCG/2ML SOLUTION: Performed by: NURSE ANESTHETIST, CERTIFIED REGISTERED

## 2018-04-11 PROCEDURE — 25010000002 VANCOMYCIN PER 500 MG: Performed by: ORTHOPAEDIC SURGERY

## 2018-04-11 PROCEDURE — 99252 IP/OBS CONSLTJ NEW/EST SF 35: CPT | Performed by: HOSPITALIST

## 2018-04-11 PROCEDURE — 25010000002 PROPOFOL 10 MG/ML EMULSION: Performed by: NURSE ANESTHETIST, CERTIFIED REGISTERED

## 2018-04-11 PROCEDURE — 73560 X-RAY EXAM OF KNEE 1 OR 2: CPT

## 2018-04-11 DEVICE — CAP TOTL KN CMT PREMIUM: Type: IMPLANTABLE DEVICE | Site: PATELLA | Status: FUNCTIONAL

## 2018-04-11 DEVICE — CAP GUIDE PSI/SIGNATURE/I-ASSIST: Type: IMPLANTABLE DEVICE | Site: PATELLA | Status: FUNCTIONAL

## 2018-04-11 DEVICE — STEM TIB/KN PERSONA CMT 5D SZE LT: Type: IMPLANTABLE DEVICE | Site: KNEE | Status: FUNCTIONAL

## 2018-04-11 DEVICE — COMP FEM/KN PERSONA CR CMT COCR STD SZ8 LT: Type: IMPLANTABLE DEVICE | Site: KNEE | Status: FUNCTIONAL

## 2018-04-11 DEVICE — IMPLANTABLE DEVICE: Type: IMPLANTABLE DEVICE | Site: KNEE | Status: FUNCTIONAL

## 2018-04-11 DEVICE — IMPLANTABLE DEVICE: Type: IMPLANTABLE DEVICE | Site: PATELLA | Status: FUNCTIONAL

## 2018-04-11 DEVICE — ART/SRF KN PERSONA/VE PS EF 8TO11 11MM LT: Type: IMPLANTABLE DEVICE | Site: KNEE | Status: FUNCTIONAL

## 2018-04-11 DEVICE — CAP BEAR KN VE UPCHRG: Type: IMPLANTABLE DEVICE | Status: FUNCTIONAL

## 2018-04-11 RX ORDER — ONDANSETRON 2 MG/ML
4 INJECTION INTRAMUSCULAR; INTRAVENOUS ONCE AS NEEDED
Status: COMPLETED | OUTPATIENT
Start: 2018-04-11 | End: 2018-04-11

## 2018-04-11 RX ORDER — FENTANYL CITRATE 50 UG/ML
50 INJECTION, SOLUTION INTRAMUSCULAR; INTRAVENOUS
Status: DISCONTINUED | OUTPATIENT
Start: 2018-04-11 | End: 2018-04-11 | Stop reason: HOSPADM

## 2018-04-11 RX ORDER — MORPHINE SULFATE 10 MG/ML
4 INJECTION INTRAMUSCULAR; INTRAVENOUS; SUBCUTANEOUS
Status: DISCONTINUED | OUTPATIENT
Start: 2018-04-11 | End: 2018-04-12

## 2018-04-11 RX ORDER — SODIUM CHLORIDE 9 MG/ML
100 INJECTION, SOLUTION INTRAVENOUS CONTINUOUS
Status: DISCONTINUED | OUTPATIENT
Start: 2018-04-11 | End: 2018-04-11

## 2018-04-11 RX ORDER — ONDANSETRON 2 MG/ML
4 INJECTION INTRAMUSCULAR; INTRAVENOUS EVERY 6 HOURS PRN
Status: DISCONTINUED | OUTPATIENT
Start: 2018-04-11 | End: 2018-04-13 | Stop reason: HOSPADM

## 2018-04-11 RX ORDER — SODIUM CHLORIDE 9 MG/ML
40 INJECTION, SOLUTION INTRAVENOUS AS NEEDED
Status: DISCONTINUED | OUTPATIENT
Start: 2018-04-11 | End: 2018-04-11 | Stop reason: HOSPADM

## 2018-04-11 RX ORDER — FAMOTIDINE 10 MG/ML
20 INJECTION, SOLUTION INTRAVENOUS
Status: DISCONTINUED | OUTPATIENT
Start: 2018-04-11 | End: 2018-04-11 | Stop reason: HOSPADM

## 2018-04-11 RX ORDER — ONDANSETRON 4 MG/1
4 TABLET, FILM COATED ORAL EVERY 6 HOURS PRN
Status: DISCONTINUED | OUTPATIENT
Start: 2018-04-11 | End: 2018-04-13 | Stop reason: HOSPADM

## 2018-04-11 RX ORDER — BUPIVACAINE HYDROCHLORIDE 5 MG/ML
INJECTION, SOLUTION EPIDURAL; INTRACAUDAL AS NEEDED
Status: DISCONTINUED | OUTPATIENT
Start: 2018-04-11 | End: 2018-04-11 | Stop reason: SURG

## 2018-04-11 RX ORDER — ONDANSETRON 2 MG/ML
4 INJECTION INTRAMUSCULAR; INTRAVENOUS ONCE AS NEEDED
Status: DISCONTINUED | OUTPATIENT
Start: 2018-04-11 | End: 2018-04-11 | Stop reason: HOSPADM

## 2018-04-11 RX ORDER — OXYCODONE HYDROCHLORIDE 5 MG/1
10 TABLET ORAL EVERY 4 HOURS PRN
Status: DISCONTINUED | OUTPATIENT
Start: 2018-04-11 | End: 2018-04-13 | Stop reason: HOSPADM

## 2018-04-11 RX ORDER — LORAZEPAM 2 MG/ML
1 INJECTION INTRAMUSCULAR
Status: DISCONTINUED | OUTPATIENT
Start: 2018-04-11 | End: 2018-04-11 | Stop reason: HOSPADM

## 2018-04-11 RX ORDER — TRAZODONE HYDROCHLORIDE 50 MG/1
50 TABLET ORAL NIGHTLY PRN
Status: DISCONTINUED | OUTPATIENT
Start: 2018-04-11 | End: 2018-04-11

## 2018-04-11 RX ORDER — ASPIRIN 325 MG
325 TABLET, DELAYED RELEASE (ENTERIC COATED) ORAL EVERY 12 HOURS SCHEDULED
Status: DISCONTINUED | OUTPATIENT
Start: 2018-04-12 | End: 2018-04-13 | Stop reason: HOSPADM

## 2018-04-11 RX ORDER — SODIUM CHLORIDE, SODIUM LACTATE, POTASSIUM CHLORIDE, CALCIUM CHLORIDE 600; 310; 30; 20 MG/100ML; MG/100ML; MG/100ML; MG/100ML
9 INJECTION, SOLUTION INTRAVENOUS CONTINUOUS
Status: DISCONTINUED | OUTPATIENT
Start: 2018-04-11 | End: 2018-04-11

## 2018-04-11 RX ORDER — ROPIVACAINE HYDROCHLORIDE 5 MG/ML
INJECTION, SOLUTION EPIDURAL; INFILTRATION; PERINEURAL AS NEEDED
Status: DISCONTINUED | OUTPATIENT
Start: 2018-04-11 | End: 2018-04-11 | Stop reason: SURG

## 2018-04-11 RX ORDER — NALOXONE HCL 0.4 MG/ML
0.4 VIAL (ML) INJECTION
Status: DISCONTINUED | OUTPATIENT
Start: 2018-04-11 | End: 2018-04-13 | Stop reason: HOSPADM

## 2018-04-11 RX ORDER — DOCUSATE SODIUM 100 MG/1
100 CAPSULE, LIQUID FILLED ORAL 2 TIMES DAILY PRN
Status: DISCONTINUED | OUTPATIENT
Start: 2018-04-11 | End: 2018-04-13 | Stop reason: HOSPADM

## 2018-04-11 RX ORDER — OXYCODONE HYDROCHLORIDE 5 MG/1
5 TABLET ORAL EVERY 4 HOURS PRN
Status: DISCONTINUED | OUTPATIENT
Start: 2018-04-11 | End: 2018-04-13 | Stop reason: HOSPADM

## 2018-04-11 RX ORDER — POTASSIUM CHLORIDE 750 MG/1
10 TABLET, EXTENDED RELEASE ORAL DAILY
Status: ON HOLD | COMMUNITY
End: 2021-07-27 | Stop reason: SDUPTHER

## 2018-04-11 RX ORDER — ALLOPURINOL 100 MG/1
100 TABLET ORAL DAILY
Status: DISCONTINUED | OUTPATIENT
Start: 2018-04-11 | End: 2018-04-13 | Stop reason: HOSPADM

## 2018-04-11 RX ORDER — SODIUM CHLORIDE 0.9 % (FLUSH) 0.9 %
1-10 SYRINGE (ML) INJECTION AS NEEDED
Status: DISCONTINUED | OUTPATIENT
Start: 2018-04-11 | End: 2018-04-11 | Stop reason: HOSPADM

## 2018-04-11 RX ORDER — ONDANSETRON 4 MG/1
4 TABLET, ORALLY DISINTEGRATING ORAL EVERY 6 HOURS PRN
Status: DISCONTINUED | OUTPATIENT
Start: 2018-04-11 | End: 2018-04-13 | Stop reason: HOSPADM

## 2018-04-11 RX ORDER — CHLORTHALIDONE 25 MG/1
25 TABLET ORAL EVERY MORNING
Status: DISCONTINUED | OUTPATIENT
Start: 2018-04-12 | End: 2018-04-11

## 2018-04-11 RX ORDER — KETAMINE HYDROCHLORIDE 100 MG/ML
INJECTION INTRAMUSCULAR; INTRAVENOUS AS NEEDED
Status: DISCONTINUED | OUTPATIENT
Start: 2018-04-11 | End: 2018-04-11 | Stop reason: SURG

## 2018-04-11 RX ORDER — ALLOPURINOL 300 MG/1
300 TABLET ORAL NIGHTLY
Status: DISCONTINUED | OUTPATIENT
Start: 2018-04-11 | End: 2018-04-11

## 2018-04-11 RX ORDER — PREGABALIN 75 MG/1
150 CAPSULE ORAL ONCE
Status: COMPLETED | OUTPATIENT
Start: 2018-04-11 | End: 2018-04-11

## 2018-04-11 RX ORDER — ACETAMINOPHEN 500 MG
1000 TABLET ORAL ONCE
Status: COMPLETED | OUTPATIENT
Start: 2018-04-11 | End: 2018-04-11

## 2018-04-11 RX ORDER — SODIUM CHLORIDE, SODIUM LACTATE, POTASSIUM CHLORIDE, CALCIUM CHLORIDE 600; 310; 30; 20 MG/100ML; MG/100ML; MG/100ML; MG/100ML
100 INJECTION, SOLUTION INTRAVENOUS CONTINUOUS
Status: DISCONTINUED | OUTPATIENT
Start: 2018-04-11 | End: 2018-04-11

## 2018-04-11 RX ORDER — PROPOFOL 10 MG/ML
VIAL (ML) INTRAVENOUS AS NEEDED
Status: DISCONTINUED | OUTPATIENT
Start: 2018-04-11 | End: 2018-04-11 | Stop reason: SURG

## 2018-04-11 RX ORDER — MELOXICAM 7.5 MG/1
15 TABLET ORAL ONCE
Status: COMPLETED | OUTPATIENT
Start: 2018-04-11 | End: 2018-04-11

## 2018-04-11 RX ORDER — PANTOPRAZOLE SODIUM 40 MG/1
40 TABLET, DELAYED RELEASE ORAL EVERY MORNING
Status: DISCONTINUED | OUTPATIENT
Start: 2018-04-12 | End: 2018-04-13 | Stop reason: HOSPADM

## 2018-04-11 RX ORDER — OXYCODONE HCL 10 MG/1
10 TABLET, FILM COATED, EXTENDED RELEASE ORAL ONCE
Status: COMPLETED | OUTPATIENT
Start: 2018-04-11 | End: 2018-04-11

## 2018-04-11 RX ORDER — PANTOPRAZOLE SODIUM 20 MG/1
40 TABLET, DELAYED RELEASE ORAL ONCE
Status: COMPLETED | OUTPATIENT
Start: 2018-04-11 | End: 2018-04-11

## 2018-04-11 RX ORDER — LIDOCAINE HYDROCHLORIDE 10 MG/ML
0.5 INJECTION, SOLUTION EPIDURAL; INFILTRATION; INTRACAUDAL; PERINEURAL ONCE AS NEEDED
Status: DISCONTINUED | OUTPATIENT
Start: 2018-04-11 | End: 2018-04-11 | Stop reason: HOSPADM

## 2018-04-11 RX ORDER — NEBIVOLOL 5 MG/1
10 TABLET ORAL DAILY
Status: DISCONTINUED | OUTPATIENT
Start: 2018-04-11 | End: 2018-04-13 | Stop reason: HOSPADM

## 2018-04-11 RX ORDER — SENNA AND DOCUSATE SODIUM 50; 8.6 MG/1; MG/1
2 TABLET, FILM COATED ORAL 2 TIMES DAILY PRN
Status: DISCONTINUED | OUTPATIENT
Start: 2018-04-11 | End: 2018-04-13 | Stop reason: HOSPADM

## 2018-04-11 RX ORDER — PROPOFOL 10 MG/ML
VIAL (ML) INTRAVENOUS CONTINUOUS PRN
Status: DISCONTINUED | OUTPATIENT
Start: 2018-04-11 | End: 2018-04-11 | Stop reason: SURG

## 2018-04-11 RX ORDER — MAGNESIUM HYDROXIDE 1200 MG/15ML
LIQUID ORAL AS NEEDED
Status: DISCONTINUED | OUTPATIENT
Start: 2018-04-11 | End: 2018-04-11 | Stop reason: HOSPADM

## 2018-04-11 RX ORDER — MIDAZOLAM HYDROCHLORIDE 1 MG/ML
1 INJECTION INTRAMUSCULAR; INTRAVENOUS
Status: DISCONTINUED | OUTPATIENT
Start: 2018-04-11 | End: 2018-04-11 | Stop reason: HOSPADM

## 2018-04-11 RX ORDER — BISACODYL 10 MG
10 SUPPOSITORY, RECTAL RECTAL DAILY PRN
Status: DISCONTINUED | OUTPATIENT
Start: 2018-04-11 | End: 2018-04-13 | Stop reason: HOSPADM

## 2018-04-11 RX ORDER — BISACODYL 5 MG/1
10 TABLET, DELAYED RELEASE ORAL DAILY PRN
Status: DISCONTINUED | OUTPATIENT
Start: 2018-04-11 | End: 2018-04-13 | Stop reason: HOSPADM

## 2018-04-11 RX ORDER — MORPHINE SULFATE 10 MG/ML
6 INJECTION INTRAMUSCULAR; INTRAVENOUS; SUBCUTANEOUS
Status: DISCONTINUED | OUTPATIENT
Start: 2018-04-11 | End: 2018-04-12

## 2018-04-11 RX ORDER — MEPERIDINE HYDROCHLORIDE 25 MG/ML
12.5 INJECTION INTRAMUSCULAR; INTRAVENOUS; SUBCUTANEOUS
Status: DISCONTINUED | OUTPATIENT
Start: 2018-04-11 | End: 2018-04-11 | Stop reason: HOSPADM

## 2018-04-11 RX ORDER — MIDAZOLAM HYDROCHLORIDE 1 MG/ML
2 INJECTION INTRAMUSCULAR; INTRAVENOUS
Status: DISCONTINUED | OUTPATIENT
Start: 2018-04-11 | End: 2018-04-11 | Stop reason: HOSPADM

## 2018-04-11 RX ORDER — SODIUM CHLORIDE 9 MG/ML
INJECTION, SOLUTION INTRAVENOUS AS NEEDED
Status: DISCONTINUED | OUTPATIENT
Start: 2018-04-11 | End: 2018-04-11 | Stop reason: HOSPADM

## 2018-04-11 RX ORDER — POTASSIUM CHLORIDE 20 MEQ/1
20 TABLET, EXTENDED RELEASE ORAL DAILY
Status: DISCONTINUED | OUTPATIENT
Start: 2018-04-12 | End: 2018-04-13 | Stop reason: HOSPADM

## 2018-04-11 RX ORDER — ALPRAZOLAM 0.25 MG/1
0.5 TABLET ORAL 3 TIMES DAILY PRN
Status: DISCONTINUED | OUTPATIENT
Start: 2018-04-11 | End: 2018-04-13 | Stop reason: HOSPADM

## 2018-04-11 RX ADMIN — VANCOMYCIN HYDROCHLORIDE 2000 MG: 1 INJECTION, POWDER, LYOPHILIZED, FOR SOLUTION INTRAVENOUS at 07:10

## 2018-04-11 RX ADMIN — OXYCODONE HYDROCHLORIDE 10 MG: 5 TABLET ORAL at 22:19

## 2018-04-11 RX ADMIN — SODIUM CHLORIDE, POTASSIUM CHLORIDE, SODIUM LACTATE AND CALCIUM CHLORIDE: 600; 310; 30; 20 INJECTION, SOLUTION INTRAVENOUS at 10:01

## 2018-04-11 RX ADMIN — PROPOFOL 50 MCG/KG/MIN: 10 INJECTION, EMULSION INTRAVENOUS at 08:26

## 2018-04-11 RX ADMIN — ONDANSETRON 4 MG: 2 INJECTION INTRAMUSCULAR; INTRAVENOUS at 07:49

## 2018-04-11 RX ADMIN — OXYCODONE HYDROCHLORIDE 5 MG: 5 TABLET ORAL at 18:36

## 2018-04-11 RX ADMIN — KETAMINE HYDROCHLORIDE 133 MG: 100 INJECTION INTRAMUSCULAR; INTRAVENOUS at 10:35

## 2018-04-11 RX ADMIN — ALLOPURINOL 100 MG: 100 TABLET ORAL at 14:37

## 2018-04-11 RX ADMIN — SODIUM CHLORIDE 100 ML/HR: 9 INJECTION, SOLUTION INTRAVENOUS at 14:35

## 2018-04-11 RX ADMIN — FENTANYL CITRATE 50 MCG: 50 INJECTION, SOLUTION INTRAMUSCULAR; INTRAVENOUS at 08:10

## 2018-04-11 RX ADMIN — MIDAZOLAM HYDROCHLORIDE 2 MG: 1 INJECTION, SOLUTION INTRAMUSCULAR; INTRAVENOUS at 07:49

## 2018-04-11 RX ADMIN — SODIUM CHLORIDE, POTASSIUM CHLORIDE, SODIUM LACTATE AND CALCIUM CHLORIDE 9 ML/HR: 600; 310; 30; 20 INJECTION, SOLUTION INTRAVENOUS at 07:13

## 2018-04-11 RX ADMIN — MIDAZOLAM HYDROCHLORIDE 2 MG: 1 INJECTION, SOLUTION INTRAMUSCULAR; INTRAVENOUS at 08:10

## 2018-04-11 RX ADMIN — SODIUM CHLORIDE 1000 MG: 9 INJECTION, SOLUTION INTRAVENOUS at 08:43

## 2018-04-11 RX ADMIN — PROPOFOL 30 MG: 10 INJECTION, EMULSION INTRAVENOUS at 08:31

## 2018-04-11 RX ADMIN — BUSPIRONE HYDROCHLORIDE 20 MG: 5 TABLET ORAL at 22:20

## 2018-04-11 RX ADMIN — OXYCODONE HYDROCHLORIDE 10 MG: 10 TABLET, FILM COATED, EXTENDED RELEASE ORAL at 06:53

## 2018-04-11 RX ADMIN — ROPIVACAINE HYDROCHLORIDE 20 ML: 5 INJECTION, SOLUTION EPIDURAL; INFILTRATION; PERINEURAL at 07:50

## 2018-04-11 RX ADMIN — VANCOMYCIN HYDROCHLORIDE 1750 MG: 1 INJECTION, POWDER, LYOPHILIZED, FOR SOLUTION INTRAVENOUS at 18:36

## 2018-04-11 RX ADMIN — PANTOPRAZOLE SODIUM 40 MG: 20 TABLET, DELAYED RELEASE ORAL at 06:53

## 2018-04-11 RX ADMIN — BUPIVACAINE HYDROCHLORIDE 3 ML: 5 INJECTION, SOLUTION EPIDURAL; INTRACAUDAL; PERINEURAL at 08:08

## 2018-04-11 RX ADMIN — MELOXICAM 15 MG: 7.5 TABLET ORAL at 06:53

## 2018-04-11 RX ADMIN — FAMOTIDINE 20 MG: 10 INJECTION, SOLUTION INTRAVENOUS at 07:50

## 2018-04-11 RX ADMIN — FENTANYL CITRATE 50 MCG: 50 INJECTION, SOLUTION INTRAMUSCULAR; INTRAVENOUS at 08:00

## 2018-04-11 RX ADMIN — ACETAMINOPHEN 1000 MG: 500 TABLET, FILM COATED ORAL at 06:52

## 2018-04-11 RX ADMIN — PREGABALIN 150 MG: 75 CAPSULE ORAL at 06:53

## 2018-04-11 RX ADMIN — SODIUM CHLORIDE 1000 MG: 9 INJECTION, SOLUTION INTRAVENOUS at 10:42

## 2018-04-11 NOTE — H&P (VIEW-ONLY)
History & Physical       Patient: Nitish Barfield    YOB: 1955    Medical Record Number: [id number]    Surgeon:  Dr. Costa Zuñiga    Chief Complaints:   Chief Complaint   Patient presents with   • Left Knee - Follow-up, Pain, Edema, Numbness       Subjective:  This problem is not new to this examiner.     History of Present Illness: 63 y.o. female presents with   Chief Complaint   Patient presents with   • Left Knee - Follow-up, Pain, Edema, Numbness   . Onset of symptoms was years ago and has been progressively worsening despite more conservative treatment measures.  Symptoms are associated with ability to move, exercise, and perform activities of daily living.  Symptoms are aggravated by weight bearing and ROM necessary for activities of daily living.   Symptoms improve with rest, ice and elevation only minimally.      Allergies:   Allergies   Allergen Reactions   • Amoxicillin-Pot Clavulanate Nausea Only and Nausea And Vomiting   • Methotrexate Diarrhea, Nausea And Vomiting and Other (See Comments)     Blisters, hair loss  Blisters, hair loss  Blisters, hair loss       Medications:   Home Medications:  Current Outpatient Prescriptions on File Prior to Visit   Medication Sig   • allopurinol (ZYLOPRIM) 100 MG tablet take 1 tablet by mouth once daily   • ALPRAZolam (XANAX) 0.5 MG tablet take 1 or 2 tablets by mouth every 8 hours if needed   • busPIRone (BUSPAR) 10 MG tablet take 2 tablets by mouth twice a day   • BYSTOLIC 10 MG tablet Take 1 tablet daily   • chlorthalidone (HYGROTON) 25 MG tablet    • diflunisal (DOLOBID) 500 MG tablet tablet    • EDARBI 80 MG tablet tablet    • lansoprazole (PREVACID) 30 MG capsule take 1 capsule by mouth once daily   • traMADol (ULTRAM) 50 MG tablet take 1 tablet by mouth every 6 hours if needed for pain   • vitamin D (ERGOCALCIFEROL) 70124 UNITS capsule capsule take 1 capsule by mouth  TWICE A WEEK   • Vortioxetine HBr (TRINTELLIX) 20 MG tablet take 1 tablet by  mouth daily   • BRINTELLIX 20 MG tablet Take 1 tablet daily   • leflunomide (ARAVA) 20 MG tablet      No current facility-administered medications on file prior to visit.      Current Medications:  Scheduled Meds:  Continuous Infusions:  No current facility-administered medications for this visit.   PRN Meds:.    I have reviewed the patient's medical history in detail and updated the computerized patient record.  Review and summarization of old records include:    Past Medical History:   Diagnosis Date   • Allergy    • Anxiety    • Arthritis    • Esophageal reflux    • Hypertension    • Hypertension         Past Surgical History:   Procedure Laterality Date   • COLON SURGERY     • HYSTERECTOMY     • KNEE SURGERY          Social History     Occupational History   • Not on file.     Social History Main Topics   • Smoking status: Never Smoker   • Smokeless tobacco: Not on file   • Alcohol use Yes   • Drug use: No   • Sexual activity: Not on file    Social History     Social History Narrative   • No narrative on file        Family History   Problem Relation Age of Onset   • Cancer Mother    • Other Father      cardiac failure       ROS: 14 point review of systems was performed and was negative except for documented findings in HPI and today's encounter.     Allergies:   Allergies   Allergen Reactions   • Amoxicillin-Pot Clavulanate Nausea Only and Nausea And Vomiting   • Methotrexate Diarrhea, Nausea And Vomiting and Other (See Comments)     Blisters, hair loss  Blisters, hair loss  Blisters, hair loss     Constitutional:  Denies fever, shaking or chills   Eyes:  Denies change in visual acuity   HENT:  Denies nasal congestion or sore throat   Respiratory:  Denies cough or shortness of breath   Cardiovascular:  Denies chest pain or severe LE edema   GI:  Denies abdominal pain, nausea, vomiting, bloody stools or diarrhea   Musculoskeletal:  Denies numbness tingling or loss of motor function except as outlined above in  history of present illness.  : Denies painful urination or hematuria  Integument:  Denies rash, lesion or ulceration   Neurologic:  Denies headache or focal weakness  Endocrine:  Denies lymphadenopathy  Psych:  Denies confusion or change in mental status   Hem:  Denies active bleeding    Physical Exam: 63 y.o. female  There is no height or weight on file to calculate BMI.  There were no vitals filed for this visit.  Vital signs reviewed.   General Appearance:    Alert, cooperative, in no acute distress                  Eyes: conjunctiva clear  ENT: external ears and nose atraumatic  CV: no peripheral edema  Resp: normal respiratory effort  Skin: no rashes or wounds; normal turgor  Psych: mood and affect appropriate  Lymph: no nodes appreciated  Neuro: gross sensation intact  Vascular:  Palpable peripheral pulse in noted extremity  Musculoskeletal Extremities: Left knee-active range of motion 5-120°, 4+ out of 5 strength on flexion and extension, moderate effusion noted, maximal tenderness palpation along medial and lateral joint lines, moderate tenderness along medial lateral patellar facets with positive active patellar compression test. Negative Mis exam. Stable to varus and valgus stress at 5 and 30 degrees.       Diagnostic Tests:  No visits with results within 2 Week(s) from this visit.   Latest known visit with results is:   Results Encounter on 08/14/2016   Component Date Value Ref Range Status   • WBC 08/16/2016 15.61* 4.80 - 10.80 10*3/mm3 Final   • RBC 08/16/2016 4.72  4.20 - 5.40 10*6/mm3 Final   • Hemoglobin 08/16/2016 14.3  12.0 - 16.0 g/dL Final   • Hematocrit 08/16/2016 44.0  37.0 - 47.0 % Final   • MCV 08/16/2016 93.2  81.0 - 99.0 fL Final   • MCH 08/16/2016 30.3  27.0 - 31.0 pg Final   • MCHC 08/16/2016 32.5  31.0 - 37.0 g/dL Final   • RDW 08/16/2016 13.9  11.5 - 14.5 % Final   • Platelets 08/16/2016 479  140 - 500 10*3/mm3 Final   • Neutrophil Rel % 08/16/2016 71.5* 45.0 - 70.0 % Final   •  Lymphocyte Rel % 08/16/2016 18.6* 20.0 - 45.0 % Final   • Monocyte Rel % 08/16/2016 8.4* 3.0 - 8.0 % Final   • Eosinophil Rel % 08/16/2016 0.4  0.0 - 4.0 % Final   • Basophil Rel % 08/16/2016 0.3  0.0 - 2.0 % Final   • Neutrophils Absolute 08/16/2016 11.17* 1.50 - 8.30 10*3/mm3 Final   • Lymphocytes Absolute 08/16/2016 2.90  0.60 - 4.80 10*3/mm3 Final   • Monocytes Absolute 08/16/2016 1.31* 0.00 - 1.00 10*3/mm3 Final   • Eosinophils Absolute 08/16/2016 0.06* 0.10 - 0.30 10*3/mm3 Final   • Basophils Absolute 08/16/2016 0.04  0.00 - 0.20 10*3/mm3 Final   • Immature Granulocyte Rel % 08/16/2016 0.8* 0.0 - 0.5 % Final   • Immature Grans Absolute 08/16/2016 0.13* 0.00 - 0.03 10*3/mm3 Final   • nRBC 08/16/2016 0.0  0.0 - 0.0 /100 WBC Final   • Glucose 08/16/2016 93  65 - 99 mg/dL Final   • BUN 08/16/2016 29* 8 - 23 mg/dL Final   • Creatinine 08/16/2016 1.27* 0.57 - 1.00 mg/dL Final   • eGFR Non African Am 08/16/2016 42* >60 mL/min/1.73 Final   • eGFR African Am 08/16/2016 52* >60 mL/min/1.73 Final   • BUN/Creatinine Ratio 08/16/2016 22.8  7.0 - 25.0 Final   • Sodium 08/16/2016 142  136 - 145 mmol/L Final   • Potassium 08/16/2016 4.0  3.5 - 5.2 mmol/L Final   • Chloride 08/16/2016 99  98 - 107 mmol/L Final   • Total CO2 08/16/2016 29.3* 22.0 - 29.0 mmol/L Final   • Calcium 08/16/2016 9.3  8.8 - 10.5 mg/dL Final   • Total Protein 08/16/2016 6.5  6.0 - 8.5 g/dL Final   • Albumin 08/16/2016 3.80  3.50 - 5.20 g/dL Final   • Globulin 08/16/2016 2.7  gm/dL Final   • A/G Ratio 08/16/2016 1.4  g/dL Final   • Total Bilirubin 08/16/2016 0.5  0.2 - 1.2 mg/dL Final   • Alkaline Phosphatase 08/16/2016 69  40 - 129 U/L Final   • AST (SGOT) 08/16/2016 11  5 - 32 U/L Final   • ALT (SGPT) 08/16/2016 13  5 - 33 U/L Final   • Hemoglobin A1C 08/16/2016 5.70* 4.80 - 5.60 % Final   • LDL-P 08/16/2016 1672* <1,000 nmol/L Final    Comment:                           Low                   < 1000                            Moderate         1000 -  1299                            Borderline-High  1300 - 1599                            High             1600 - 2000                            Very High             > 2000     • LDL Cholesterol  08/16/2016 130* 0 - 99 mg/dL Final    Comment:                           Optimal               <  100                            Above optimal     100 -  129                            Borderline        130 -  159                            High              160 -  189                            Very high             >  189  LDL-C is inaccurate if patient is non-fasting.     • HDL-C 08/16/2016 62  >39 mg/dL Final   • Triglycerides 08/16/2016 154* 0 - 149 mg/dL Final   • Total Cholesterol 08/16/2016 223* 100 - 199 mg/dL Final   • HDL-P (Total) 08/16/2016 34.7  >=30.5 umol/L Final   • Small LDL-P 08/16/2016 665* <=527 nmol/L Final   • LDL Size 08/16/2016 21.3  >20.5 nm Final    Comment:  ----------------------------------------------------------                   ** INTERPRETATIVE INFORMATION**                   PARTICLE CONCENTRATION AND SIZE                      <--Lower CVD Risk   Higher CVD Risk-->    LDL AND HDL PARTICLES   Percentile in Reference Population    HDL-P (total)        High     75th    50th    25th   Low                         >34.9    34.9    30.5    26.7   <26.7    Small LDL-P          Low      25th    50th    75th   High                         <117     117     527     839    >839    LDL Size   <-Large (Pattern A)->    <-Small (Pattern B)->                      23.0    20.6           20.5      19.0   ----------------------------------------------------------  Small LDL-P and LDL Size are associated with CVD risk, but not after  LDL-P is taken into account.  These assays were developed and their performance characteristics  determined by [employer]. These assays have not been cleared by the  [place] Food and Drug Administration. The clinical utility o                           f these  laboratory values have  not been fully established.     • T3, Free 08/16/2016 3.2  2.0 - 4.4 pg/mL Final   • TSH 08/16/2016 1.490  0.270 - 4.200 mIU/mL Final     Left Knee X-Ray from office today  Indication: Pain    AP, Lateral, and Marienthal views    Findings:  Advanced tricompartmental DJD with osteophytes along medial and patellofemoral compartment, no evidence of loose body.Varus alignment.     Compared to prior office xrays        Assessment:  Patient Active Problem List   Diagnosis   • Anxiety   • Depression   • Gastroesophageal reflux disease with esophagitis   • Generalized anxiety disorder   • Hyperlipidemia   • Hypertension   • Impaired glucose tolerance   • Renal insufficiency   • Primary osteoarthritis of right knee   • Primary osteoarthritis of left knee       Plan:  I reviewed anatomy of a total joint arthroplasty in laymen's terms, as well as typical postoperative recovery and possibly 6-12 months for maximal recovery, and possible need for rehabilitation stay after hospitalization. We also discussed risks, benefits, alternatives, and limitations of procedure with risks including but not limited to neurovascular damage, bleeding, infection, malalignment, chronic pian, failure of implants, osteolysis, loosening of implants, loss of motion, weakness, stiffness, instability, DVT, pulmonary embolus, death, stroke, complex regional pain syndrome, myocardial infarction, and need for additional procedures. No guarantees were given regarding results of surgery.      Nitish Barfield was given the opportunity to ask and have all questions answered today.  The patient voiced understanding of the risks, benefits, and alternative forms of treatment that were discussed and the patient consents to proceed with surgery.     Patient's blood clot history is negative.    Plan for DVT prophylaxis is asa    Patient's MRSA infection history is negative.    Patient's skin infection history is negative.    Discharge Plan: POD 2-3 to home    Date:  3/30/2018  Costa Zuñiga MD      Dictated utilizing Dragon dictation

## 2018-04-11 NOTE — PLAN OF CARE
Problem: Fall Risk (Adult)  Goal: Identify Related Risk Factors and Signs and Symptoms  Outcome: Outcome(s) achieved Date Met: 04/11/18

## 2018-04-11 NOTE — ANESTHESIA PROCEDURE NOTES
Peripheral Block    Patient location during procedure: pre-op  Reason for block: at surgeon's request and post-op pain management  Performed by  CRNA: DEYSI WATTS  Preanesthetic Checklist  Completed: patient identified, site marked, surgical consent, pre-op evaluation, timeout performed, IV checked, risks and benefits discussed and monitors and equipment checked  Prep:  Sterile barriers:cap, gloves and mask  Prep: ChloraPrep  Patient monitoring: continuous pulse oximetry, blood pressure monitoring and EKG  Procedure  Sedation:yes    Guidance:ultrasound guided  ULTRASOUND INTERPRETATION. Using ultrasound guidance a 21 G gauge needle was placed in close proximity to the nerve, at which point, under ultrasound guidance anesthetic was injected in the area of the nerve and spread of the anesthesia was seen on ultrasound in close proximity thereto.  There were no abnormalities seen on ultrasound; a digital image was taken; and the patient tolerated the procedure with no complications. Images:still images obtained    Block Type:adductor canal block  Injection Technique:single-shot  Needle Type:echogenic  Needle Gauge:21 G  Resistance on Injection: none  Medications  Local Injected:ropivacaine 0.5% Local Amount Injected:20mL  Post Assessment  Injection Assessment: negative aspiration for heme, no paresthesia on injection and incremental injection  Patient Tolerance:comfortable throughout block  Complications:no  Additional Notes  1% lido used for local infiltration of skin.  Easy and effective X 1 attempt.  VSS throughout procedure.

## 2018-04-11 NOTE — ANESTHESIA POSTPROCEDURE EVALUATION
Patient: Nitish Barfield    Procedure Summary     Date:  04/11/18 Room / Location:   [facility] 3 / [facility]    Anesthesia Start:  0818 Anesthesia Stop:  1057    Procedure:  TOTAL KNEE ARTHROPLASTY AND ALL ASSOCIATED PROCEDURES (Left Knee) Diagnosis:       Primary osteoarthritis of left knee      (Primary osteoarthritis of left knee [M17.12])    Surgeon:  Costa Zuñiga MD Provider:  Dung Shin CRNA    Anesthesia Type:  regional, spinal ASA Status:  2          Anesthesia Type: regional, spinal  Last vitals  BP   121/73 (04/11/18 1220)   Temp   98 °F (36.7 °C) (04/11/18 1125)   Pulse   77 (04/11/18 1220)   Resp   12 (04/11/18 1220)     SpO2   99 % (04/11/18 1220)     Post Anesthesia Care and Evaluation    Patient location during evaluation: PACU  Patient participation: complete - patient participated  Level of consciousness: awake  Pain score: 0  Pain management: adequate  Airway patency: patent  Anesthetic complications: No anesthetic complications  PONV Status: none  Cardiovascular status: acceptable  Respiratory status: acceptable  Hydration status: acceptable

## 2018-04-11 NOTE — INTERVAL H&P NOTE
H&P reviewed. The patient was examined and there are no changes to the H&P.     Vitals:    04/11/18 0630   BP: 162/99   BP Location: Left arm   Patient Position: Sitting   Pulse: 96   Resp: 16   Temp: 97.7 °F (36.5 °C)   TempSrc: Oral   SpO2: 96%   Weight: 110 kg (243 lb 8 oz)

## 2018-04-11 NOTE — ANESTHESIA PROCEDURE NOTES
Spinal Block    Patient location during procedure: pre-op  Performed By  CRNA: DEYSI WATTS  Preanesthetic Checklist  Completed: patient identified, site marked, surgical consent, pre-op evaluation, timeout performed, IV checked, risks and benefits discussed and monitors and equipment checked  Spinal Block Prep:  Patient Position:sitting  Sterile Tech:cap, gloves, mask and sterile barriers  Prep:Chloraprep  Patient Monitoring:blood pressure monitoring, continuous pulse oximetry and EKG  Spinal Block Procedure  Approach:midline  Guidance:landmark technique and palpation technique  Location:L3-L4  Needle Type:Sprotte  Needle Gauge:24 G  Placement of Spinal needle event:cerebrospinal fluid aspirated  Paresthesia: no  Fluid Appearance:clear  Post Assessment  Patient Tolerance:patient tolerated the procedure well with no apparent complications  Complications no  Additional Notes  1% lido infiltrated skin.  No paresthesias.  VSS

## 2018-04-11 NOTE — ANESTHESIA PREPROCEDURE EVALUATION
Anesthesia Evaluation     Patient summary reviewed   no history of anesthetic complications:  NPO Solid Status: > 8 hours  NPO Liquid Status: > 4 hours           Airway   Mallampati: II  TM distance: >3 FB  Neck ROM: full  No difficulty expected  Dental          Pulmonary - negative pulmonary ROS and normal exam    breath sounds clear to auscultation  Cardiovascular - normal exam  Exercise tolerance: poor (<4 METS)    Patient on routine beta blocker and Beta blocker given within 24 hours of surgery  Rhythm: regular  Rate: normal    (+) hypertension well controlled,     ROS comment: Activity reduced d/t pain knee      Neuro/Psych- negative ROS  GI/Hepatic/Renal/Endo    (+) obesity,  GERD well controlled,      Musculoskeletal     Abdominal  - normal exam   Substance History - negative use     OB/GYN          Other   (+) arthritis (??RA fingers and feet)                     Anesthesia Plan    ASA 2     regional and spinal   (ACB)  intravenous induction   Anesthetic plan and risks discussed with patient.  Use of blood products discussed with patient  Consented to blood products.

## 2018-04-11 NOTE — CONSULTS
[facility] HOSPITALIST     Violeta Hammond MD    REASON FOR CONSULTATION: Hypertension    CONSULT REQUESTED BY: Dr. Zuñiga    HISTORY OF PRESENT ILLNESS:    64 y/o female with Hypertension, GERD, Anxiety, Seasonal allergies and RA presented today to undergo left TKA secondary to OA/DJD of the left knee. The hospitalist service has been consulted for management of the patient's hypertension. The patient is very drowsy still from anesthesia at the time of my exam, her  is at bedside and provides most of her Hx (in addition to review of records). The  states the patient has had problems with arthritis in he knees for years and ultimately need knee replacements for both but decided to do the left first. He notes the patient was in her usual state of health prior to surgery. He is not aware that she has had any recent illness, he notes she has not c/o any respiratory symptoms, F/C or N/V.       Past Medical History:   Diagnosis Date   • Allergy    • Anxiety    • Arthritis     RA, FINGERS, TOES   • Esophageal reflux    • GERD (gastroesophageal reflux disease)    • Hypertension    • Hypertension    • Left knee pain     scheduled for sx     Past Surgical History:   Procedure Laterality Date   • COLONOSCOPY     • ENDOSCOPY     • HYSTERECTOMY      OPEN   • KNEE SURGERY Left     ARTHROSCOPY   • WISDOM TOOTH EXTRACTION       Family History   Problem Relation Age of Onset   • Cancer Mother    • Other Father      cardiac failure     Social History   Substance Use Topics   • Smoking status: Never Smoker   • Smokeless tobacco: Never Used   • Alcohol use Yes      Comment: NO DRINKING SINCE FEB., WAS DRINKING DAILY     Prescriptions Prior to Admission   Medication Sig Dispense Refill Last Dose   • allopurinol (ZYLOPRIM) 100 MG tablet take 1 tablet by mouth once daily  0 Past Week at Unknown time   • allopurinol (ZYLOPRIM) 300 MG tablet Take 300 mg by mouth Every Night.   Past Week at Unknown time   •  ALPRAZolam (XANAX) 0.5 MG tablet take 1 or 2 tablets by mouth every 8 hours if needed 100 tablet 0 4/11/2018 at 0400   • BYSTOLIC 10 MG tablet Take 1 tablet daily 30 tablet 6 4/11/2018 at 040   • EDARBI 80 MG tablet tablet Take 80 mg by mouth Every Morning.  3 4/11/2018 at 0400   • lansoprazole (PREVACID) 30 MG capsule take 1 capsule by mouth once daily 30 capsule 6 4/11/2018 at 0400   • leflunomide (ARAVA) 10 MG tablet Take 10 mg by mouth Every Morning.  0 Past Month at Unknown time   • silver sulfadiazine (SILVADENE, SSD) 1 % cream Apply 1 application topically 2 (Two) Times a Day.   Past Week at Unknown time   • traMADol (ULTRAM) 50 MG tablet Take 50 mg by mouth Every 6 (Six) Hours As Needed for Moderate Pain .   4/10/2018 at 2100   • Vortioxetine HBr (TRINTELLIX) 20 MG tablet Take 20 mg by mouth Every Morning.   Past Week at Unknown time   • BRINTELLIX 20 MG tablet Take 1 tablet daily 30 tablet 6 4/4/2018   • busPIRone (BUSPAR) 10 MG tablet take 2 tablets by mouth twice a day 120 tablet 6 4/9/2018   • chlorthalidone (HYGROTON) 25 MG tablet Take 25 mg by mouth Every Morning.  0 More than a month at Unknown time   • diflunisal (DOLOBID) 500 MG tablet tablet 500 mg Every 12 (Twelve) Hours.  0 4/4/2018   • vitamin D (ERGOCALCIFEROL) 91499 units capsule capsule Take 50,000 Units by mouth 2 (Two) Times a Week.   4/4/2018     Allergies:  Amoxicillin-pot clavulanate and Methotrexate    REVIEW OF SYSTEMS:  Please see the above history of present illness for pertinent positives and negatives.  ROS was limited to the husbands report as the patient was to drowsy post-op to provide me with any Hx.    Vital Signs  Temp:  [96.5 °F (35.8 °C)-98.6 °F (37 °C)] 97.8 °F (36.6 °C)  Heart Rate:  [] 71  Resp:  [10-18] 14  BP: ()/(57-99) 114/65  Oxygen Therapy  SpO2: 98 %  Pulse Oximetry Type: Continuous  Device (Oxygen Therapy): nasal cannula  Flow (L/min): 2  ETCO2 (mmHg): 38 mmHg  Body mass index is 37.03 kg/m².  "    Flowsheet Rows    Flowsheet Row First Filed Value   Admission Height 172.7 cm (67.99\") Documented at 04/11/2018 1300   Admission Weight 110 kg (243 lb 8 oz) Documented at 04/11/2018 0630             Physical Exam:  Physical Exam   Constitutional: Patient appears well-developed, obese and in no acute distress   HEENT:   Head: Normocephalic and atraumatic.   Eyes:  Pupils are equal, round, and reactive to light. EOM are intact. Sclera are anicteric and non-injected.  Mouth and Throat: Patient has moist mucous membranes. Oropharynx is clear of any erythema or exudate.     Neck: Neck supple. No JVD noted. No thyromegaly present. No lymphadenopathy present.  Cardiovascular: Regular rate, regular rhythm, S1 normal and S2 normal.  Exam reveals no gallop and no friction rub.  No murmur heard.  Pulmonary/Chest: Lungs are clear to auscultation bilaterally. No respiratory distress. No wheezes. No rhonchi. No rales.   Abdominal: Obese. Soft. Bowel sounds are normal. No mass. There is no hepatosplenomegaly. There is no tenderness.   Extremities: No edema noted. Pulses are palpable in all 4 extremities. Dressing and Ice in place to LLE.  Neurological: Patient is very drowsy at the time of my exam. She will open here eyes briefly but falls back to sleep. She was able to shake her head yes/no to a couple of questions.  Skin: Skin is warm. No rash noted. Nails show no clubbing.  No cyanosis or erythema.     Results Review:    I reviewed the patient's new clinical results.  Lab Results (most recent)     Procedure Component Value Units Date/Time    Potassium [[id number]]  (Normal) Collected:  04/11/18 0655    Specimen:  Blood Updated:  04/11/18 0732     Potassium 3.7 mmol/L     POC Glucose Once [[id number]]  (Normal) Collected:  04/11/18 0710    Specimen:  Blood Updated:  04/11/18 0716     Glucose 92 mg/dL     Narrative:       Meter: [id number] : [id number] Marcin Hummel RN          Imaging Results (most recent)     Procedure " Component Value Units Date/Time    XR Knee 1 or 2 View Left [[id number]] Collected:  04/11/18 1149     Updated:  04/11/18 1151    Narrative:       POSTOP RT/LT KNEE SERIES, 4/11/2018:     HISTORY:  Postop knee arthroplasty surgery.     TECHNIQUE:  AP and crosstable lateral radiographs of the left knee were obtained  portably following surgery.     FINDINGS:  Total knee arthroplasty components are well-positioned postoperatively.  No visible fracture or dislocation.       Impression:       Satisfactory postoperative appearance following left total knee  arthroplasty.     This report was finalized on 4/11/2018 11:49 AM by Dr. Prashant Andrews MD.             ECG/EMG Results (most recent)     None            Assessment/Plan     1. Hypertension: Patient's BP is WNL post-op. Will continue her home Bystolic but hold her chlorthalidone and Edarbi for now. Will check BMP in AM.    2. GERD: Patient continued on PPI (protonix substituted for home prevacid).    3. Anxiety and Depression: Continue patient home dose Buspar, Xanax PRN and Trintellix. (Patient no longer takes Brintellix).    4. Seasonal allergies: No acute issues per husbands report and patient on no chronic medications for this.    5. RA: Patient's home Arava has been on hold 2 weeks prior to surgery and per patient she is supposed to hold one week after surgery. No acute issues.    6. Gout: Patient quit taking her Allopurinol when she stopped her Arava (because she wasn't sure if it was ok to take prior to surgery). Will resume. No acute issues.    7. OA/DJD of left knee s/p left TKA: Management per Dr. Zuñiga. Patient on ASA post-op for DVT prophy. To drowsy to work with PT/OT today. Pain meds per ortho post-op.    I discussed the patients findings and my recommendations with patient and  at bedside.     Kareen Louis MD  04/11/18  7:43 PM

## 2018-04-11 NOTE — SIGNIFICANT NOTE
04/11/18 1355   Rehab Time/Intention   Evaluation Not Performed other (see comments)  (Patient very lethargic, unable to keep eyes open during conversation.  Will evaluate in AM.)   Rehab Treatment   Discipline occupational therapist   Recommendation   OT - Next Appointment 04/12/18

## 2018-04-11 NOTE — SIGNIFICANT NOTE
04/11/18 1340   Rehab Time/Intention   Evaluation Not Performed other (see comments)  (Attempted initial evaluation. Patient too lethargic. Unable to keep eyes open or remain alert. Notified RN. History obtained from spouse. Will check back tomorrow. )   Rehab Treatment   Discipline physical therapist

## 2018-04-11 NOTE — PLAN OF CARE
Problem: Skin Injury Risk (Adult)  Goal: Identify Related Risk Factors and Signs and Symptoms  Outcome: Outcome(s) achieved Date Met: 04/11/18

## 2018-04-12 LAB
ANION GAP SERPL CALCULATED.3IONS-SCNC: 12.8 MMOL/L
BASOPHILS # BLD AUTO: 0.1 10*3/MM3 (ref 0–0.2)
BASOPHILS NFR BLD AUTO: 0.7 % (ref 0–2)
BUN BLD-MCNC: 7 MG/DL (ref 8–23)
BUN/CREAT SERPL: 8.5 (ref 7–25)
CALCIUM SPEC-SCNC: 8.6 MG/DL (ref 8.8–10.5)
CHLORIDE SERPL-SCNC: 99 MMOL/L (ref 98–107)
CO2 SERPL-SCNC: 28.2 MMOL/L (ref 22–29)
CREAT BLD-MCNC: 0.82 MG/DL (ref 0.57–1)
DEPRECATED RDW RBC AUTO: 59.5 FL (ref 37–54)
EOSINOPHIL # BLD AUTO: 0.22 10*3/MM3 (ref 0.1–0.3)
EOSINOPHIL NFR BLD AUTO: 1.5 % (ref 0–4)
ERYTHROCYTE [DISTWIDTH] IN BLOOD BY AUTOMATED COUNT: 20.2 % (ref 11.5–14.5)
GFR SERPL CREATININE-BSD FRML MDRD: 70 ML/MIN/1.73
GLUCOSE BLD-MCNC: 120 MG/DL (ref 65–99)
HCT VFR BLD AUTO: 40.4 % (ref 37–47)
HGB BLD-MCNC: 12.1 G/DL (ref 12–16)
IMM GRANULOCYTES # BLD: 0.06 10*3/MM3 (ref 0–0.03)
IMM GRANULOCYTES NFR BLD: 0.4 % (ref 0–0.5)
LYMPHOCYTES # BLD AUTO: 2.61 10*3/MM3 (ref 0.6–4.8)
LYMPHOCYTES NFR BLD AUTO: 17.4 % (ref 20–45)
MCH RBC QN AUTO: 24.9 PG (ref 27–31)
MCHC RBC AUTO-ENTMCNC: 30 G/DL (ref 31–37)
MCV RBC AUTO: 83.1 FL (ref 81–99)
MONOCYTES # BLD AUTO: 1.56 10*3/MM3 (ref 0–1)
MONOCYTES NFR BLD AUTO: 10.4 % (ref 3–8)
NEUTROPHILS # BLD AUTO: 10.49 10*3/MM3 (ref 1.5–8.3)
NEUTROPHILS NFR BLD AUTO: 69.6 % (ref 45–70)
NRBC BLD MANUAL-RTO: 0 /100 WBC (ref 0–0)
PLATELET # BLD AUTO: 492 10*3/MM3 (ref 140–500)
PMV BLD AUTO: 9.5 FL (ref 7.4–10.4)
POTASSIUM BLD-SCNC: 3.7 MMOL/L (ref 3.5–5.2)
RBC # BLD AUTO: 4.86 10*6/MM3 (ref 4.2–5.4)
SODIUM BLD-SCNC: 140 MMOL/L (ref 136–145)
WBC NRBC COR # BLD: 15.04 10*3/MM3 (ref 4.8–10.8)

## 2018-04-12 PROCEDURE — 97161 PT EVAL LOW COMPLEX 20 MIN: CPT

## 2018-04-12 PROCEDURE — 25010000002 MORPHINE PER 10 MG: Performed by: ORTHOPAEDIC SURGERY

## 2018-04-12 PROCEDURE — 85025 COMPLETE CBC W/AUTO DIFF WBC: CPT | Performed by: ORTHOPAEDIC SURGERY

## 2018-04-12 PROCEDURE — 97165 OT EVAL LOW COMPLEX 30 MIN: CPT

## 2018-04-12 PROCEDURE — 80048 BASIC METABOLIC PNL TOTAL CA: CPT | Performed by: ORTHOPAEDIC SURGERY

## 2018-04-12 PROCEDURE — 94799 UNLISTED PULMONARY SVC/PX: CPT

## 2018-04-12 PROCEDURE — 99232 SBSQ HOSP IP/OBS MODERATE 35: CPT | Performed by: HOSPITALIST

## 2018-04-12 RX ORDER — ACETAMINOPHEN 500 MG
1000 TABLET ORAL 4 TIMES DAILY PRN
Status: DISCONTINUED | OUTPATIENT
Start: 2018-04-12 | End: 2018-04-13 | Stop reason: HOSPADM

## 2018-04-12 RX ORDER — LOSARTAN POTASSIUM 50 MG/1
50 TABLET ORAL
Status: DISCONTINUED | OUTPATIENT
Start: 2018-04-12 | End: 2018-04-13 | Stop reason: HOSPADM

## 2018-04-12 RX ADMIN — MORPHINE SULFATE 4 MG: 10 INJECTION, SOLUTION INTRAMUSCULAR; INTRAVENOUS at 04:53

## 2018-04-12 RX ADMIN — PANTOPRAZOLE SODIUM 40 MG: 40 TABLET, DELAYED RELEASE ORAL at 06:38

## 2018-04-12 RX ADMIN — OXYCODONE HYDROCHLORIDE 10 MG: 5 TABLET ORAL at 22:05

## 2018-04-12 RX ADMIN — LOSARTAN POTASSIUM 50 MG: 50 TABLET ORAL at 13:32

## 2018-04-12 RX ADMIN — OXYCODONE HYDROCHLORIDE 10 MG: 5 TABLET ORAL at 13:32

## 2018-04-12 RX ADMIN — POTASSIUM CHLORIDE 20 MEQ: 1500 TABLET, EXTENDED RELEASE ORAL at 08:26

## 2018-04-12 RX ADMIN — ASPIRIN 325 MG: 325 TABLET, DELAYED RELEASE ORAL at 22:09

## 2018-04-12 RX ADMIN — ASPIRIN 325 MG: 325 TABLET, DELAYED RELEASE ORAL at 08:26

## 2018-04-12 RX ADMIN — MORPHINE SULFATE 4 MG: 10 INJECTION, SOLUTION INTRAMUSCULAR; INTRAVENOUS at 08:28

## 2018-04-12 RX ADMIN — NEBIVOLOL HYDROCHLORIDE 10 MG: 5 TABLET ORAL at 08:26

## 2018-04-12 RX ADMIN — OXYCODONE HYDROCHLORIDE 10 MG: 5 TABLET ORAL at 09:33

## 2018-04-12 RX ADMIN — ALLOPURINOL 100 MG: 100 TABLET ORAL at 08:26

## 2018-04-12 RX ADMIN — ALPRAZOLAM 0.5 MG: 0.25 TABLET ORAL at 06:38

## 2018-04-12 RX ADMIN — BUSPIRONE HYDROCHLORIDE 20 MG: 5 TABLET ORAL at 22:09

## 2018-04-12 RX ADMIN — OXYCODONE HYDROCHLORIDE 10 MG: 5 TABLET ORAL at 03:35

## 2018-04-12 RX ADMIN — OXYCODONE HYDROCHLORIDE 10 MG: 5 TABLET ORAL at 17:39

## 2018-04-12 RX ADMIN — ALPRAZOLAM 0.5 MG: 0.25 TABLET ORAL at 22:06

## 2018-04-12 RX ADMIN — BUSPIRONE HYDROCHLORIDE 20 MG: 5 TABLET ORAL at 08:26

## 2018-04-12 NOTE — PLAN OF CARE
Problem: Patient Care Overview  Goal: Plan of Care Review  Outcome: Ongoing (interventions implemented as appropriate)   04/12/18 0008   Coping/Psychosocial   Plan of Care Reviewed With patient   Plan of Care Review   Progress improving   OTHER   Outcome Summary VSS. movement and sensation returned with moderate to severe pain. states 4-5 is tolerable.     Goal: Discharge Needs Assessment  Outcome: Ongoing (interventions implemented as appropriate)      Problem: Fall Risk (Adult)  Goal: Absence of Fall  Outcome: Ongoing (interventions implemented as appropriate)      Problem: Skin Injury Risk (Adult)  Goal: Skin Health and Integrity  Outcome: Ongoing (interventions implemented as appropriate)

## 2018-04-12 NOTE — PLAN OF CARE
Problem: Patient Care Overview  Goal: Plan of Care Review   04/12/18 1117   Coping/Psychosocial   Plan of Care Reviewed With patient   OTHER   Outcome Summary PT Eval Complete: patient performs supine to sit transfer with min A, sit to/from stand transfers with CGA and gait x 25 feet with CGA and use of RW. Initiated LE HEP. Brace donned for mobility. Patient would benefit from skilled PT services to address deficits in functional mobility and LE strength/ROM Plan to see patient 2x/day. Patient prefers home with home health PT vs outpatient PT services.

## 2018-04-12 NOTE — PROGRESS NOTES
POD# 1 s/p right TKA    Subjective:Patient states she is doing fairly well, denies fevers chills or sweats overnight, denies any residual numbness or tingling to operative extremity. No calf pain or swelling.    Objective:  Vitals:    04/12/18 0623 04/12/18 0654 04/12/18 0755 04/12/18 1135   BP: 173/92 147/85     BP Location: Left arm Right arm     Patient Position: Lying Sitting     Pulse: 102 93 104 96   Resp: 22      Temp: 98.3 °F (36.8 °C)      TempSrc: Oral      SpO2: 97%  91% 95%   Weight:       Height:             Results from last 7 days  Lab Units 04/12/18  0907   WBC 10*3/mm3 15.04*   HEMOGLOBIN g/dL 12.1   HEMATOCRIT % 40.4   PLATELETS 10*3/mm3 492         Results from last 7 days  Lab Units 04/12/18  0907 04/11/18  0655 04/10/18  1535   SODIUM mmol/L 140  --   --    POTASSIUM mmol/L 3.7 3.7 3.5   CHLORIDE mmol/L 99  --   --    CO2 mmol/L 28.2  --   --    BUN mg/dL 7*  --   --    CREATININE mg/dL 0.82  --   --    GLUCOSE mg/dL 120*  --   --    CALCIUM mg/dL 8.6*  --   --        Exam:    Right knee- incision clean, dry, intact   Flex and extend toes and ankle   No calf pain, negative Homans sign   Positive sensation light touch right foot   Brisk cap refill all digits, palpable dorsalis pedis pulse    Impression: s/p right TKA    Plan:  1. PT/OT- weight-bear as tolerated, ambulation, range of motion  2. Pain control- oxycodone, Tylenol, Lyrica  3. DVT prophylaxis- aspirin twice a day  4. Wound care- Prineo dressing to remain intact until follow-up in office in 2 weeks  5. Disposition- home with home health once medically stable and cleared by PT    I have reviewed the notes, assessments, and/or procedures performed by Paulette Jefferson NP, I concur with her documentation of Nitish Barfield.

## 2018-04-12 NOTE — PROGRESS NOTES
"Hospitalist Team      Patient Care Team:  Violeta Hammond MD as PCP - General  Violeta Hammond MD as PCP - Family Medicine        Chief Complaint:  F/U HTN    Subjective    Interval History and ROS:     Patient notes her pain was fairly well controlled this AM until she worked with PT/OT and states her pain is now a 10/10 and she's ok \"if we just chop her leg off\". The nurse had just brought the patient pain medication as I was leaving the room. She denies any F/C or N/V. She denies any SOA or CP. She is afebrile. BP is elevated this AM.       Objective    Vital Signs  Temp:  [97.5 °F (36.4 °C)-98.6 °F (37 °C)] 98.3 °F (36.8 °C)  Heart Rate:  [] 96  Resp:  [14-22] 22  BP: (114-173)/(54-92) 147/85  Oxygen Therapy  SpO2: 95 %  Pulse Oximetry Type: Continuous  Device (Oxygen Therapy): room air    Flowsheet Rows    Flowsheet Row First Filed Value   Admission Height 172.7 cm (67.99\") Documented at 04/11/2018 1300   Admission Weight 110 kg (243 lb 8 oz) Documented at 04/11/2018 0630            Physical Exam:  Constitutional: Patient appears well-developed, obese and moderately uncomfortable secondary to pain.   HEENT:   Head: Normocephalic and atraumatic.   Eyes:  Pupils are equal, round, and reactive to light. EOM are intact. Sclera are anicteric and non-injected.  Mouth and Throat: Patient has moist mucous membranes.      Cardiovascular: Regular rate, regular rhythm.  Exam reveals no gallop and no friction rub.  No murmur heard.  Pulmonary/Chest: Lungs are clear to auscultation bilaterally. No respiratory distress. No wheezes. No rhonchi. No rales.   Abdominal: Obese. Soft. Bowel sounds are normal. There is no tenderness.   Extremities: Trace LLE edema below bandage. Pulses are palpable in all 4 extremities. Dressing and Ice in place to LLE.  Neurological: Patient is alert and oriented to person, place and time.   Skin: Skin is warm. No rash noted. Nails show no clubbing.  No cyanosis or erythema.    Results Review:  "    I reviewed the patient's new clinical results.    Lab Results (last 24 hours)     Procedure Component Value Units Date/Time    Basic Metabolic Panel [[id number]]  (Abnormal) Collected:  04/12/18 0907    Specimen:  Blood Updated:  04/12/18 0930     Glucose 120 (H) mg/dL      BUN 7 (L) mg/dL      Creatinine 0.82 mg/dL      Sodium 140 mmol/L      Potassium 3.7 mmol/L      Chloride 99 mmol/L      CO2 28.2 mmol/L      Calcium 8.6 (L) mg/dL      eGFR Non African Amer 70 mL/min/1.73      BUN/Creatinine Ratio 8.5     Anion Gap 12.8 mmol/L     Narrative:       GFR Normal >60  Chronic Kidney Disease <60  Kidney Failure <15    CBC & Differential [[id number]] Collected:  04/12/18 0907    Specimen:  Blood Updated:  04/12/18 0914    Narrative:       The following orders were created for panel order CBC & Differential.  Procedure                               Abnormality         Status                     ---------                               -----------         ------                     CBC Auto Differential[[id number]]        Abnormal            Final result                 Please view results for these tests on the individual orders.    CBC Auto Differential [[id number]]  (Abnormal) Collected:  04/12/18 0907    Specimen:  Blood Updated:  04/12/18 0914     WBC 15.04 (H) 10*3/mm3      RBC 4.86 10*6/mm3      Hemoglobin 12.1 g/dL      Hematocrit 40.4 %      MCV 83.1 fL      MCH 24.9 (L) pg      MCHC 30.0 (L) g/dL      RDW 20.2 (H) %      RDW-SD 59.5 (H) fl      MPV 9.5 fL      Platelets 492 10*3/mm3      Neutrophil % 69.6 %      Lymphocyte % 17.4 (L) %      Monocyte % 10.4 (H) %      Eosinophil % 1.5 %      Basophil % 0.7 %      Immature Grans % 0.4 %      Neutrophils, Absolute 10.49 (H) 10*3/mm3      Lymphocytes, Absolute 2.61 10*3/mm3      Monocytes, Absolute 1.56 (H) 10*3/mm3      Eosinophils, Absolute 0.22 10*3/mm3      Basophils, Absolute 0.10 10*3/mm3      Immature Grans, Absolute 0.06 (H) 10*3/mm3      nRBC 0.0 /100 WBC            Imaging Results (last 24 hours)     ** No results found for the last 24 hours. **        ECG/EMG Results (most recent)     None          Medication Review:   I have reviewed the patient's current medication list    Current Facility-Administered Medications:   •  acetaminophen (TYLENOL) tablet 1,000 mg, 1,000 mg, Oral, 4x Daily PRN, YUDITH Abel  •  allopurinol (ZYLOPRIM) tablet 100 mg, 100 mg, Oral, Daily, Costa Zuñiga MD, 100 mg at 04/12/18 0826  •  ALPRAZolam (XANAX) tablet 0.5 mg, 0.5 mg, Oral, TID PRN, Costa Zuñiga MD, 0.5 mg at 04/12/18 0638  •  aspirin EC tablet 325 mg, 325 mg, Oral, Q12H, Costa Zuñiga MD, 325 mg at 04/12/18 0826  •  bisacodyl (DULCOLAX) EC tablet 10 mg, 10 mg, Oral, Daily PRN, Costa Zuñiga MD  •  bisacodyl (DULCOLAX) suppository 10 mg, 10 mg, Rectal, Daily PRN, Costa Zuñiga MD  •  busPIRone (BUSPAR) tablet 20 mg, 20 mg, Oral, BID, Costa Zuñiga MD, 20 mg at 04/12/18 0826  •  docusate sodium (COLACE) capsule 100 mg, 100 mg, Oral, BID PRN, Costa Zuñiga MD  •  losartan (COZAAR) tablet 50 mg, 50 mg, Oral, Q24H, Kareen Louis MD, 50 mg at 04/12/18 1332  •  magnesium hydroxide (MILK OF MAGNESIA) 400 MG/5ML suspension 30 mL, 30 mL, Oral, Daily PRN, Costa Zuñiga MD  •  [DISCONTINUED] Morphine injection 4 mg, 4 mg, Intravenous, Q2H PRN, 4 mg at 04/12/18 0828 **AND** naloxone (NARCAN) injection 0.4 mg, 0.4 mg, Intravenous, Q5 Min PRN, Costa Zuñiga MD  •  [DISCONTINUED] Morphine injection 6 mg, 6 mg, Intravenous, Q2H PRN **AND** naloxone (NARCAN) injection 0.4 mg, 0.4 mg, Intravenous, Q5 Min PRN, Costa Zuñiga MD  •  nebivolol (BYSTOLIC) tablet 10 mg, 10 mg, Oral, Daily, Costa Zuñiga MD, 10 mg at 04/12/18 0826  •  ondansetron (ZOFRAN) tablet 4 mg, 4 mg, Oral, Q6H PRN **OR** ondansetron ODT (ZOFRAN-ODT) disintegrating tablet 4 mg, 4 mg, Oral, Q6H PRN **OR** ondansetron (ZOFRAN) injection 4 mg, 4 mg,  Intravenous, Q6H PRN, Costa Zuñiga MD  •  oxyCODONE (ROXICODONE) immediate release tablet 10 mg, 10 mg, Oral, Q4H PRN, Costa Zuñiga MD, 10 mg at 04/12/18 1332  •  oxyCODONE (ROXICODONE) immediate release tablet 5 mg, 5 mg, Oral, Q4H PRN, Costa Zuñiga MD, 5 mg at 04/11/18 1836  •  pantoprazole (PROTONIX) EC tablet 40 mg, 40 mg, Oral, QAM, Costa Zuñiga MD, 40 mg at 04/12/18 0638  •  potassium chloride (K-DUR,KLOR-CON) CR tablet 20 mEq, 20 mEq, Oral, Daily, Kareen Louis MD, 20 mEq at 04/12/18 0826  •  sennosides-docusate sodium (SENOKOT-S) 8.6-50 MG tablet 2 tablet, 2 tablet, Oral, BID PRN, Costa Zuñiga MD  •  Vortioxetine HBr tablet 20 mg, 20 mg, Oral, Daily, Kareen Louis MD      Assessment/Plan     1. Leukocytosis: Likely reactive secondary to surgery and pain. Patient afebrile. Monitor.    2. Hypertension: Patient's BP is elevated today on home Bystolic. Her chlorthalidone and Edarbi were held last PM. BMP this AM with electrolytes and renal function ok. Will resume equivalent to home Edarbi (will start Losartan 50mg daily) as the patient is not able to get her home medications currently (I spoke with pharmacy to get appropriate equivalent dosing recommendation). Continue to hold chlorthalidone and monitor.     3. GERD: Patient on PPI (protonix substituted for home prevacid). No acute issues here.     4. Anxiety and Depression: On home dose Buspar, Xanax PRN and Trintellix. (Patient no longer takes Brintellix).     5. Seasonal allergies: No acute issues currently and patient on no chronic medications for this.     6. RA: Patient's home Arava has been on hold 2 weeks prior to surgery and per patient she is supposed to hold one week after surgery. No acute issues.     7. Gout: On home dose allopurinol at 100 mg daily.  No acute issues here.     8. OA/DJD of left knee s/p left TKA: Management per Dr. Zuñiga. Patient on ASA post-op for DVT prophy. Pain,  patient notes as, severe when I saw her but she had just completed PT/OT and was getting ready to be given more pain medication. On pain meds per Ortho.    Plan for disposition:  Home with  per ortho.    Kareen Louis MD  04/12/18  2:25 PM

## 2018-04-12 NOTE — THERAPY EVALUATION
Acute Care - Occupational Therapy Initial Evaluation   [place]     Patient Name: Nitish Barfield  : 1955  MRN: [id number]  Today's Date: 2018  Onset of Illness/Injury or Date of Surgery: 18  Date of Referral to OT: 18  Referring Physician: Yogesh    Admit Date: 2018       ICD-10-CM ICD-9-CM   1. Status post total right knee replacement Z96.651 V43.65   2. Primary osteoarthritis of left knee M17.12 715.16     Patient Active Problem List   Diagnosis   • Anxiety   • Depression   • Gastroesophageal reflux disease with esophagitis   • Generalized anxiety disorder   • Hyperlipidemia   • Hypertension   • Impaired glucose tolerance   • Renal insufficiency   • Primary osteoarthritis of right knee   • Primary osteoarthritis of left knee     Past Medical History:   Diagnosis Date   • Allergy    • Anxiety    • Arthritis     RA, FINGERS, TOES   • Esophageal reflux    • GERD (gastroesophageal reflux disease)    • Hypertension    • Hypertension    • Left knee pain     scheduled for sx     Past Surgical History:   Procedure Laterality Date   • COLONOSCOPY     • ENDOSCOPY     • HYSTERECTOMY      OPEN   • KNEE SURGERY Left     ARTHROSCOPY   • WISDOM TOOTH EXTRACTION            OT ASSESSMENT FLOWSHEET (last 72 hours)      Occupational Therapy Evaluation     Row Name 18 1003 18 0856                OT Evaluation Time/Intention    Subjective Information  -- complains of;pain  -JJ       Document Type  -- evaluation  -JJ       Mode of Treatment  -- occupational therapy  -JJ       Patient Effort  -- adequate  -JJ       Symptoms Noted During/After Treatment  -- increased pain  -JJ       Comment  -- notified Rn of increased pain and request for pain pills  -JJ          General Information    Patient Profile Reviewed?  -- yes  -JJ       Onset of Illness/Injury or Date of Surgery  -- 18  -CHERRIE       Referring Physician  -- Yogesh MarieCHERRIE       Patient Observations  -- alert;agree to therapy  -JJ        Patient/Family Observations  -- pt supine in bed, spouse in room, agreed to evaluation  -JJ       Prior Level of Function  -- independent:;ADL's;transfer;all household mobility;driving  -JJ       Equipment Currently Used at Home  -- shower chair   access to [facility]  -JJ       Pertinent History of Current Functional Problem  -- pt with chronic R knee pain,  now sp L TKA  -JJ       Existing Precautions/Restrictions  -- fall   brace worn when up with staff  -JJ       Risks Reviewed  -- patient:;spouse/S.O.:;increased discomfort  -JJ       Benefits Reviewed  -- patient:;spouse/S.O.:;improve function;increase independence  -JJ       Barriers to Rehab  -- none identified  -JJ          Resource/Environmental Concerns    Current Living Arrangements  -- home/apartment/condo  -       Resource/Environmental Concerns  -- none  -JJ          Home Main Entrance    Number of Stairs, Main Entrance  -- five  -JJ       Stair Railings, Main Entrance  -- railings on both sides of stairs  -JJ          Cognitive Assessment/Intervention- PT/OT    Orientation Status (Cognition)  -- oriented x 4  -JJ       Follows Commands (Cognition) WFL  -JJ  --       Personal Safety Interventions gait belt;nonskid shoes/slippers when out of bed  -JJ  --          Bed Mobility Assessment/Treatment    Bed Mobility Assessment/Treatment supine-sit  -JJ  --       Supine-Sit [place] (Bed Mobility) minimum assist (75% patient effort);verbal cues  -JJ  --       Bed Mobility, Safety Issues decreased use of legs for bridging/pushing  -JJ  --       Assistive Device (Bed Mobility) bed rails;head of bed elevated  -JJ  --       Comment (Bed Mobility) pt requires assist for L LE  -JJ  --          Functional Mobility    Functional Mobility- Ind. Level contact guard assist;verbal cues required  -JJ  --       Functional Mobility- Device rolling walker  -JJ  --       Functional Mobility-Distance (Feet) --   in room  -JJ  --          Transfer Assessment/Treatment     Transfer Assessment/Treatment sit-stand transfer;stand-sit transfer  -  --       Comment (Transfers) pt requires cues for hand placement  -JJ  --       Sit-Stand [place] (Transfers) contact guard;verbal cues  -JJ  --       Stand-Sit [place] (Transfers) contact guard  -JJ  --          Sit-Stand Transfer    Assistive Device (Sit-Stand Transfers) walker, front-wheeled  -JJ  --          Stand-Sit Transfer    Assistive Device (Stand-Sit Transfers) walker, front-wheeled  -JJ  --          Bathing Assessment/Intervention    Bathing [place] Level lower body;minimum assist (75% patient effort)  -JJ  --          Lower Body Dressing Assessment/Training    Lower Body Dressing [place] Level minimum assist (75% patient effort);lower body dressing skills  -J  --          General ROM    GENERAL ROM COMMENTS B UE AROm WFL  -J  --          General Assessment (Manual Muscle Testing)    Comment, General Manual Muscle Testing (MMT) Assessment B UE MMT [facility]  -  --          Positioning and Restraints    Pre-Treatment Position in bed  -J  --       Post Treatment Position chair  -JJ  --       In Chair notified nsg;reclined;call light within reach;encouraged to call for assist;with family/caregiver  -JJ  --          Pain Scale: Numbers Pre/Post-Treatment    Pain Scale: Numbers, Pretreatment 8/10  -JJ  --       Pain Scale: Numbers, Post-Treatment 10/10  -JJ  --       Pain Location - Side Left  -J  --       Pain Location knee  -JJ  --       Pain Intervention(s) Repositioned;Medication (See MAR);Cold applied  -J  --          Wound 04/11/18 1022 Left knee incision    Wound - Properties Group Date first assessed: 04/11/18  -AA Time first assessed: 1022  -AA Side: Left  -AA Location: knee  -AA Type: incision  -AA       Plan of Care Review    Plan of Care Reviewed With patient  -RE  --          Clinical Impression (OT)    Date of Referral to OT 04/12/18  -JJ  --       OT Diagnosis decreased adl sp knee sx  -JJ  --        Prognosis (OT Eval) good  -JJ  --       Patient/Family Goals Statement (OT Eval) pt and spouse state home with family and home health therapy  -JJ  --       Criteria for Skilled Therapeutic Interventions Met (OT Eval) yes;treatment indicated  -JJ  --       Rehab Potential (OT Eval) good, to achieve stated therapy goals  -JJ  --       Therapy Frequency (OT Eval) --   x 1 additional visits  -JJ  --       Predicted Duration of Therapy Intervention (OT Eval) x 1 additional visit  -JJ  --       Care Plan Review (OT) evaluation/treatment results reviewed;risks/benefits reviewed  -JJ  --       Anticipated Equipment Needs at Discharge (OT) --   pt may beneift from long handled ae  -JJ  --       Anticipated Discharge Disposition (OT) --   home with family and home health  -JJ  --          Planned OT Interventions    Planned Therapy Interventions (OT Eval) adaptive equipment training;BADL retraining  -JJ  --       Adaptive Equipment Training x  -JJ  --       BADL Retraining x  -JJ  --          Dressing Goal 1 (OT)    Activity/Assistive Device (Dressing Goal 1, OT) long handled shoe horn;reacher;sock-aid  -JJ  --       [place]/Cues Needed (Dressing Goal 1, OT) supervision required;verbal cues required  -JJ  --       Time Frame (Dressing Goal 1, OT) 2 days  -JJ  --       Progress/Outcome (Dressing Goal 1, OT) --   new goal  -JJ  --          Patient Education Goal (OT)    Activity (Patient Education Goal, OT) pt will be I with long handled ae for lb adls  -JJ  --       [place]/Cues/Accuracy (Memory Goal 2, OT) demonstrates adequately  -JJ  --       Time Frame (Patient Education Goal, OT) 2 days  -JJ  --       Progress/Outcome (Patient Education Goal, OT) --   new goal  -JJ  --          Living Environment    Home Accessibility  -- stairs to enter home  -JJ         User Key  (r) = Recorded By, (t) = Taken By, (c) = Cosigned By    Initials Name Effective Dates    ZOILA Spear RN 06/16/16 -     JCHERRIE [place]  Ernesto, CECELIAR 06/22/16 -            Occupational Therapy Education     Title: PT OT SLP Therapies (Done)     Topic: Occupational Therapy (Done)     Point: ADL training (Done)     Description: Instruct learner(s) on proper safety adaptation and remediation techniques during self care or transfers.   Instruct in proper use of assistive devices.   Learning Progress Summary     Learner Status Readiness Method Response Comment Documented by    Patient Done Acceptance E VU,Bed HQ pt educated on benefits of activity, safety with functional transfers and mobility and adls  04/12/18 1158          Point: Body mechanics (Done)     Description: Instruct learner(s) on proper positioning and spine alignment during self-care, functional mobility activities and/or exercises.   Learning Progress Summary     Learner Status Readiness Method Response Comment Documented by    Patient Done Acceptance E VU,Bed HQ pt educated on benefits of activity, safety with functional transfers and mobility and adls  04/12/18 2421                      User Key     Initials Effective Dates Name Provider Type Discipline     06/22/16 -  Selena Orozco, OTR Occupational Therapist OT                  OT Recommendation and Plan  Outcome Summary/Treatment Plan (OT)  Anticipated Equipment Needs at Discharge (OT):  (pt may beneift from long handled ae)  Anticipated Discharge Disposition (OT):  (home with family and home health)  Planned Therapy Interventions (OT Eval): adaptive equipment training, BADL retraining  Therapy Frequency (OT Eval):  (x 1 additional visits)  Plan of Care Review  Plan of Care Reviewed With: patient  Plan of Care Reviewed With: patient  Outcome Summary: OT evaluation completed. pt cga- min assist for functional transfers with rolling walker. pt min assist for lb adls. pt  would beneift from skilled ot services to address ae education and adl retraining x 1 additional visit. pt will be seen by x 1 additional visit with  anticipated discharge home with family and home health services          Outcome Measures     Row Name 04/12/18 0856 04/12/18 0855          How much help from another person do you currently need...    Turning from your back to your side while in flat bed without using bedrails?  -- 3  -BP     Moving from lying on back to sitting on the side of a flat bed without bedrails?  -- 3  -BP     Moving to and from a bed to a chair (including a wheelchair)?  -- 3  -BP     Standing up from a chair using your arms (e.g., wheelchair, bedside chair)?  -- 3  -BP     Climbing 3-5 steps with a railing?  -- 2  -BP     To walk in hospital room?  -- 3  -BP     AM-PAC 6 Clicks Score  -- 17  -BP        How much help from another is currently needed...    Putting on and taking off regular lower body clothing? 3  -JJ  --     Bathing (including washing, rinsing, and drying) 3  -JJ  --     Toileting (which includes using toilet bed pan or urinal) 3  -JJ  --     Putting on and taking off regular upper body clothing 4  -JJ  --     Taking care of personal grooming (such as brushing teeth) 4  -JJ  --     Eating meals 4  -JJ  --     Score 21  -JJ  --        Functional Assessment    Outcome Measure Options AM-PAC 6 Clicks Daily Activity (OT)  -JJ AM-PAC 6 Clicks Basic Mobility (PT)  -BP       User Key  (r) = Recorded By, (t) = Taken By, (c) = Cosigned By    Initials Name Provider Type    JCHERRIE Orozco OTR Occupational Therapist    BP Zakiya Henning PT Physical Therapist          Time Calculation:   OT Start Time: 0855    Therapy Charges for Today     Code Description Service Date Service Provider Modifiers Qty    [id number]  OT EVAL LOW COMPLEXITY 2 4/12/2018 Selena Orozco OTR GO 1               Selena Orozco OTR  4/12/2018

## 2018-04-12 NOTE — PLAN OF CARE
Problem: Patient Care Overview  Goal: Plan of Care Review  Outcome: Ongoing (interventions implemented as appropriate)      Problem: Fall Risk (Adult)  Goal: Absence of Fall  Outcome: Ongoing (interventions implemented as appropriate)      Problem: Skin Injury Risk (Adult)  Goal: Skin Health and Integrity  Outcome: Ongoing (interventions implemented as appropriate)      Problem: Pain, Acute (Adult)  Goal: Identify Related Risk Factors and Signs and Symptoms  Outcome: Ongoing (interventions implemented as appropriate)    Goal: Acceptable Pain Control/Comfort Level  Outcome: Ongoing (interventions implemented as appropriate)

## 2018-04-12 NOTE — NURSING NOTE
Continued Stay Note   [place]     Patient Name: Nitish Barfield  MRN: [id number]  Today's Date: 4/12/2018    Admit Date: 4/11/2018          Discharge Plan     Row Name 04/12/18 1207       Plan    Plan Comments Patient says she is not sure if she can borrow a bedside commode.  Per request ordered bsc and rolling walker from [facility].   will continue to follow.     Row Name 04/12/18 1004       Plan    Plan Plan is home with home health.    Patient/Family in Agreement with Plan yes    Plan Comments Patient in agreement with speaking to  with  at bedside.  She has been indpendent with ADLS, still driving and working full time.   and daughter available to assist at home as needed.  She does not have a Living Will and at this time declines information.  She fills scripts at [facility] in [place] and has no issues getting her medicines.   After rewiewing home health list per request referral made to [facility] with Yina.  Patient has no medical equipment, home oxygen, nebulizer or CPAP/BIPAP.  She thinks she can borrow a bedside commode; they will let  know.  Per P.T. staff patient will need a rolling walker; per patient's request will order from [facility].  Verified face sheet information.   will continue to follow.               Discharge Codes    No documentation.           Stephanie Hughes RN

## 2018-04-12 NOTE — PLAN OF CARE
Problem: Patient Care Overview  Goal: Plan of Care Review   04/12/18 4395   Coping/Psychosocial   Plan of Care Reviewed With patient   OTHER   Outcome Summary OT evaluation completed. pt cga- min assist for functional transfers with rolling walker. pt min assist for lb adls. pt would beneift from skilled ot services to address ae education and adl retraining x 1 additional visit. pt will be seen by x 1 additional visit with anticipated discharge home with family and home health services

## 2018-04-12 NOTE — PROGRESS NOTES
Patient: Nitish Barfield  Procedure(s):  TOTAL KNEE ARTHROPLASTY AND ALL ASSOCIATED PROCEDURES  Anesthesia type: [unfilled]    Patient location: [facility] Surgical Floor  Last vitals:   Vitals:    04/12/18 0755   BP:    Pulse: 104   Resp:    Temp:    SpO2: 91%     Level of consciousness: awake, alert and oriented    Post-anesthesia pain: adequate analgesia  Airway patency: patent  Respiratory: unassisted  Cardiovascular: stable and blood pressure at baseline  Hydration: euvolemic    Anesthetic complications: no

## 2018-04-12 NOTE — PLAN OF CARE
Problem: Patient Care Overview  Goal: Discharge Needs Assessment  Outcome: Ongoing (interventions implemented as appropriate)   04/12/18 1003 04/12/18 1012   Discharge Needs Assessment   Readmission Within the Last 30 Days no previous admission in last 30 days --    Concerns to be Addressed adjustment to diagnosis/illness --    Patient/Family Anticipates Transition to home --    Patient/Family Anticipated Services at Transition home health care --    Transportation Concerns car, none --    Transportation Anticipated family or friend will provide --    Discharge Coordination/Progress --  Patient in agreement with speaking to  with  at bedside. She has been indpendent with ADLS, still driving and working full time.  and daughter available to assist at home as needed. She does not have a Living Will and at this time declines information. She fills scripts at [facility] in [place] and has no issues getting her medicines. After rewiewing home health list per request referral made to [facility] with Yina. Patient has no medical equipment, home oxygen, nebulizer or CPAP/BIPAP. She thinks she can borrow a bedside commode; they will let  know. Per P.T. staff patient will need a rolling walker; per patient's request will order from [facility]. Verified face sheet information.  will continue to follow.    Disability   Equipment Currently Used at Home none --

## 2018-04-12 NOTE — NURSING NOTE
Discharge Planning Assessment   [facility]     Patient Name: Nitish Barfield  MRN: [id number]  Today's Date: 4/12/2018    Admit Date: 4/11/2018          Discharge Needs Assessment     Row Name 04/12/18 1003       Living Environment    Lives With spouse    Current Living Arrangements home/apartment/condo    Primary Care Provided by self    Provides Primary Care For no one    Family Caregiver if Needed child(ev), adult;spouse    Quality of Family Relationships supportive    Able to Return to Prior Arrangements yes       Resource/Environmental Concerns    Resource/Environmental Concerns none    Transportation Concerns car, none       Transition Planning    Patient/Family Anticipates Transition to home    Patient/Family Anticipated Services at Transition home health care    Transportation Anticipated family or friend will provide       Discharge Needs Assessment    Readmission Within the Last 30 Days no previous admission in last 30 days    Concerns to be Addressed adjustment to diagnosis/illness    Equipment Currently Used at Home none            Discharge Plan     Row Name 04/12/18 1004       Plan    Plan Plan is home with home health.    Patient/Family in Agreement with Plan yes    Plan Comments Patient in agreement with speaking to  with  at bedside.  She has been indpendent with ADLS, still driving and working full time.   and daughter available to assist at home as needed.  She does not have a Living Will and at this time declines information.  She fills scripts at [facility] in [place] and has no issues getting her medicines.   After rewiewing home health list per request referral made to [facility] with Yina.  Patient has no medical equipment, home oxygen, nebulizer or CPAP/BIPAP.  She thinks she can borrow a bedside commode; they will let  know.  Per P.T. staff patient will need a rolling walker; per patient's request will order from [facility].  Verified  face sheet information.   will continue to follow.         Destination     No service coordination in this encounter.      Durable Medical Equipment     No service coordination in this encounter.      Dialysis/Infusion     No service coordination in this encounter.      Home Medical Care     No service coordination in this encounter.      Social Care     No service coordination in this encounter.                Demographic Summary     Row Name 04/12/18 1002       General Information    Admission Type inpatient    Arrived From home    Referral Source admission list    Preferred Language English     Used During This Interaction no       Contact Information    Permission Granted to Share Info With             Functional Status    No documentation.           Psychosocial    No documentation.           Abuse/Neglect    No documentation.           Legal    No documentation.           Substance Abuse    No documentation.           Patient Forms    No documentation.         Stephanie Hughes RN

## 2018-04-12 NOTE — THERAPY EVALUATION
Acute Care - Physical Therapy Initial Evaluation   [place]     Patient Name: Nitish Barfield  : 1955  MRN: [id number]  Today's Date: 2018   Onset of Illness/Injury or Date of Surgery: 18  Date of Referral to PT: 18  Referring Physician: Dr. Zuñiga      Admit Date: 2018    Visit Dx:     ICD-10-CM ICD-9-CM   1. Primary osteoarthritis of left knee M17.12 715.16     Patient Active Problem List   Diagnosis   • Anxiety   • Depression   • Gastroesophageal reflux disease with esophagitis   • Generalized anxiety disorder   • Hyperlipidemia   • Hypertension   • Impaired glucose tolerance   • Renal insufficiency   • Primary osteoarthritis of right knee   • Primary osteoarthritis of left knee     Past Medical History:   Diagnosis Date   • Allergy    • Anxiety    • Arthritis     RA, FINGERS, TOES   • Esophageal reflux    • GERD (gastroesophageal reflux disease)    • Hypertension    • Hypertension    • Left knee pain     scheduled for sx     Past Surgical History:   Procedure Laterality Date   • COLONOSCOPY     • ENDOSCOPY     • HYSTERECTOMY      OPEN   • KNEE SURGERY Left     ARTHROSCOPY   • WISDOM TOOTH EXTRACTION          PT ASSESSMENT (last 12 hours)      Physical Therapy Evaluation     Row Name 18 0855          PT Evaluation Time/Intention    Subjective Information complains of;pain  -BP     Document Type evaluation  -BP     Mode of Treatment physical therapy  -BP     Patient Effort adequate  -BP     Symptoms Noted During/After Treatment increased pain  -BP     Comment Notified RN of increased pain.   -BP     Row Name 18 0855          General Information    Patient Profile Reviewed? yes  -BP     Onset of Illness/Injury or Date of Surgery 18  -BP     Referring Physician Dr. Zuñiga  -BP     Patient Observations agree to therapy  -BP     Patient/Family Observations Patient supine in bed with HOB elevated.  present. In no acute distress. Agrees to evaluation   -BP     Prior  Level of Function independent:;gait;transfer;bed mobility;ADL's;home management;driving;work  -BP     Equipment Currently Used at Home shower chair   Access to BSC  -BP     Pertinent History of Current Functional Problem Patient admitted to hospital following L TKA secondary   -BP     Existing Precautions/Restrictions fall   brace on when up with staff  -BP     Risks Reviewed patient:;spouse/S.O.:;LOB;increased discomfort  -BP     Benefits Reviewed patient:;spouse/S.O.:;improve function;increase independence;increase strength  -BP     Barriers to Rehab none identified  -BP     Row Name 04/12/18 0855          Relationship/Environment    Lives With spouse  -BP     Row Name 04/12/18 0855          Resource/Environmental Concerns    Current Living Arrangements home/apartment/condo  -BP     Row Name 04/12/18 0855          Home Main Entrance    Number of Stairs, Main Entrance five  -BP     Stair Railings, Main Entrance railings on both sides of stairs  -BP     Row Name 04/12/18 0855          Cognitive Assessment/Interventions    Additional Documentation Cognitive Assessment/Intervention (Group)  -BP     Row Name 04/12/18 0855          Cognitive Assessment/Intervention- PT/OT    Orientation Status (Cognition) oriented x 4  -BP     Follows Commands (Cognition) WNL  -BP     Personal Safety Interventions gait belt;nonskid shoes/slippers when out of bed  -BP     Row Name 04/12/18 0855          Safety Issues, Functional Mobility    Safety Issues Affecting Function (Mobility) positioning of assistive device  -BP     Row Name 04/12/18 0855          Bed Mobility Assessment/Treatment    Bed Mobility Assessment/Treatment supine-sit  -BP     Supine-Sit [place] (Bed Mobility) minimum assist (75% patient effort);verbal cues  -BP     Bed Mobility, Safety Issues decreased use of legs for bridging/pushing  -BP     Assistive Device (Bed Mobility) bed rails;head of bed elevated  -BP     Comment (Bed Mobility) Patient requires min A for  L LE out of bed.   -     Row Name 04/12/18 0855          Transfer Assessment/Treatment    Transfer Assessment/Treatment sit-stand transfer;stand-sit transfer  -BP     Comment (Transfers) Patient requires verbal cues for hand placement with each transfer   -BP     Sit-Stand [place] (Transfers) contact guard;verbal cues  -BP     Stand-Sit [place] (Transfers) contact guard;verbal cues  -     Row Name 04/12/18 0855          Sit-Stand Transfer    Assistive Device (Sit-Stand Transfers) walker, front-wheeled  -BP     Row Name 04/12/18 0855          Stand-Sit Transfer    Assistive Device (Stand-Sit Transfers) walker, front-wheeled  -BP     Row Name 04/12/18 0855          Gait/Stairs Assessment/Training    [place] Level (Gait) contact guard;verbal cues  -     Assistive Device (Gait) walker, front-wheeled  -     Distance in Feet (Gait) 25  -BP     Pattern (Gait) swing-to  -BP     Deviations/Abnormal Patterns (Gait) base of support, narrow;lisa decreased  -BP     Bilateral Gait Deviations forward flexed posture  -BP     Left Sided Gait Deviations heel strike decreased  -BP     Right Sided Gait Deviations --   decreased step length   -BP     Comment (Gait/Stairs) Patient requires verbal cues for upright posture, AD placement and improved step length on the R.   -     Row Name 04/12/18 0855          General ROM    GENERAL ROM COMMENTS R LE AROM WFL. L ankle ROM WFL. L knee ROM testing deferred   -[facility] Name 04/12/18 0855          General Assessment (Manual Muscle Testing)    Comment, General Manual Muscle Testing (MMT) Assessment R LE gross MMT 4+/5. L LE gross MMT deferred  -[facility] Name 04/12/18 0855          Motor Assessment/Intervention    Additional Documentation Therapeutic Exercise (Group);Therapeutic Exercise Interventions (Group)  -[facility] Name 04/12/18 0855          Therapeutic Exercise    Lower Extremity (Therapeutic Exercise) heel slides, left;quad sets, left;SLR (straight leg  raise), left   L hip ABD/ADD, L ankle pumps   -BP     Position (Therapeutic Exercise) seated  -BP     Sets/Reps (Therapeutic Exercise) 10 reps   -BP     Row Name 04/12/18 0855          Sensory Assessment/Intervention    Sensory General Assessment --   denies numbness and tingling in B LE's   -BP     Row Name 04/12/18 0855          Pain Assessment    Additional Documentation Pain Scale: Numbers Pre/Post-Treatment (Group)  -BP     Row Name 04/12/18 0855          Pain Scale: Numbers Pre/Post-Treatment    Pain Scale: Numbers, Pretreatment 8/10  -BP     Pain Scale: Numbers, Post-Treatment 10/10  -BP     Pain Location - Side Left  -BP     Pain Location knee  -BP     Pain Intervention(s) Repositioned;Medication (See MAR);Cold applied   pre medicated for treatment   -BP     Row Name             Wound 04/11/18 1022 Left knee incision    Wound - Properties Group Date first assessed: 04/11/18  -AA Time first assessed: 1022  -AA Side: Left  -AA Location: knee  -AA Type: incision  -AA    Row Name 04/12/18 0855          Plan of Care Review    Plan of Care Reviewed With patient;spouse  -BP     Row Name 04/12/18 0855          Physical Therapy Clinical Impression    Date of Referral to PT 04/11/18  -BP     PT Diagnosis (PT Clinical Impression) Decreased functional mobility   -BP     Criteria for Skilled Interventions Met (PT Clinical Impression) yes;treatment indicated  -BP     Rehab Potential (PT Clinical Summary) good, to achieve stated therapy goals  -BP     Predicted Duration of Therapy (PT) 2 days   -BP     Care Plan Review (PT) evaluation/treatment results reviewed;care plan/treatment goals reviewed  -BP     Care Plan Review, Other Participant (PT Clinical Impression) spouse  -BP     Patient/Family Concerns, Anticipated Discharge Disposition (PT) Patient states she will have help at home upon return home. She states she wants to start with home health PT vs outpatient initially upon return home   -BP     Row Name 04/12/18 0845           Vital Signs    Pre SpO2 (%) 100  -BP     O2 Delivery Pre Treatment supplemental O2   2L O2/NC  -BP     Intra SpO2 (%) 96  -BP     O2 Delivery Intra Treatment room air  -BP     Post SpO2 (%) 96  -BP     O2 Delivery Post Treatment room air  -BP     Row Name 04/12/18 0855          Physical Therapy Goals    Bed Mobility Goal Selection (PT) bed mobility, PT goal 1  -BP     Transfer Goal Selection (PT) transfer, PT goal 1  -BP     Gait Training Goal Selection (PT) gait training, PT goal 1  -BP     Stairs Goal Selection (PT) stairs, PT goal 1  -BP     Additional Documentation Stairs Goal Selection (PT) (Row)  -BP     Row Name 04/12/18 0855          Bed Mobility Goal 1 (PT)    Activity/Assistive Device (Bed Mobility Goal 1, PT) bed mobility activities, all  -BP     [place]/Cues Needed (Bed Mobility Goal 1, PT) supervision required  -BP     Time Frame (Bed Mobility Goal 1, PT) 3 days  -BP     Progress/Outcomes (Bed Mobility Goal 1, PT) other (see comments)   established  -BP     Row Name 04/12/18 0819          Transfer Goal 1 (PT)    Activity/Assistive Device (Transfer Goal 1, PT) sit-to-stand/stand-to-sit;walker, rolling  -BP     [place]/Cues Needed (Transfer Goal 1, PT) supervision required  -BP     Time Frame (Transfer Goal 1, PT) 3 days  -BP     Progress/Outcome (Transfer Goal 1, PT) other (see comments)   established  -BP     Row Name 04/12/18 0855          Gait Training Goal 1 (PT)    Activity/Assistive Device (Gait Training Goal 1, PT) gait (walking locomotion);walker, rolling  -BP     [place] (Gait Training Goal 1, PT) supervision required  -BP     Distance (Gait Goal 1, PT) 100  -BP     Time Frame (Gait Training Goal 1, PT) 3 days  -BP     Progress/Outcome (Gait Training Goal 1, PT) other (see comments)   established   -BP     Row Name 04/12/18 0855          Stairs Goal 1 (PT)    Activity/Assistive Device (Stairs Goal 1, PT) ascending stairs;descending stairs;using handrail   -BP     [place] Level/Cues Needed (Stairs Goal 1, PT) supervision required  -BP     Number of Stairs (Stairs Goal 1, PT) 5   2 handrails   -BP     Time Frame (Stairs Goal 1, PT) 3 days  -BP     Progress/Outcome (Stairs Goal 1, PT) other (see comments)   established   -BP     Row Name 04/12/18 0855          Patient Education Goal (PT)    Activity (Patient Education Goal, PT) written LE HEP   -BP     [place]/Cues/Accuracy (Memory Goal 2, PT) demonstrates adequately;verbalizes understanding  -BP     Time Frame (Patient Education Goal, PT) 3 days  -BP     Progress/Outcome (Patient Education Goal, PT) other (see comments)   established   -BP     Row Name 04/12/18 0855          Positioning and Restraints    Pre-Treatment Position in bed  -BP     Post Treatment Position chair  -BP     In Chair notified nsg;reclined;call light within reach;encouraged to call for assist;with family/caregiver  -BP     Row Name 04/12/18 0803          Living Environment    Home Accessibility stairs to enter home  -BP       User Key  (r) = Recorded By, (t) = Taken By, (c) = Cosigned By    Initials Name Provider Type    AA Chio Spear, LUCIO Registered Nurse     Zakiya Henning, PT Physical Therapist          Physical Therapy Education     Title: PT OT SLP Therapies (Done)     Topic: Physical Therapy (Done)     Point: Mobility training (Done)    Learning Progress Summary     Learner Status Readiness Method Response Comment Documented by    Patient Done Acceptance E [facility] 04/12/18 1117          Point: Home exercise program (Done)    Learning Progress Summary     Learner Status Readiness Method Response Comment Documented by    Patient Done Acceptance E [facility] 04/12/18 1117                      User Key     Initials Effective Dates Name Provider Type Discipline     04/03/18 -  Zakiya Henning, PT Physical Therapist PT                PT Recommendation and Plan  Anticipated Discharge Disposition (PT): home with home health care (Pt  refuses home with outpatient initially )  Planned Therapy Interventions (PT Eval): bed mobility training, gait training, home exercise program, stair training, strengthening, transfer training, patient/family education  Therapy Frequency (PT Clinical Impression): 2 times/day  Outcome Summary/Treatment Plan (PT)  Anticipated Equipment Needs at Discharge (PT): front wheeled walker  Anticipated Discharge Disposition (PT): home with home health care (Pt refuses home with outpatient initially )  Patient/Family Concerns, Anticipated Discharge Disposition (PT): Patient states she will have help at home upon return home. She states she wants to start with home health PT vs outpatient initially upon return home   Plan of Care Reviewed With: patient  Outcome Summary: PT Eval Complete: patient performs supine to sit transfer with min A, sit to/from stand transfers with CGA and gait x 25 feet with CGA and use of RW. Initiated LE HEP. Brace donned for mobility. Patient would benefit from skilled PT services to address deficits in functional mobility and LE strength/ROM Plan to see patient 2x/day. Patient prefers home with home health PT vs outpatient PT services.           Outcome Measures     Row Name 04/12/18 0855             How much help from another person do you currently need...    Turning from your back to your side while in flat bed without using bedrails? 3  -BP      Moving from lying on back to sitting on the side of a flat bed without bedrails? 3  -BP      Moving to and from a bed to a chair (including a wheelchair)? 3  -BP      Standing up from a chair using your arms (e.g., wheelchair, bedside chair)? 3  -BP      Climbing 3-5 steps with a railing? 2  -BP      To walk in hospital room? 3  -BP      AM-PAC 6 Clicks Score 17  -BP         Functional Assessment    Outcome Measure Options AM-PAC 6 Clicks Basic Mobility (PT)  -BP        User Key  (r) = Recorded By, (t) = Taken By, (c) = Cosigned By    Initials Name  Provider Type    BP Zakiya Henning, PT Physical Therapist           Time Calculation:         PT Charges     Row Name 04/12/18 1119             Time Calculation    Start Time 0855  -BP      Stop Time 0930  -BP      Time Calculation (min) 35 min  -BP      PT Received On 04/12/18  -BP      PT - Next Appointment 04/12/18  -BP        User Key  (r) = Recorded By, (t) = Taken By, (c) = Cosigned By    Initials Name Provider Type    BP Zakiya Henning, PT Physical Therapist          Therapy Charges for Today     Code Description Service Date Service Provider Modifiers Qty    92604708691 HC PT EVAL LOW COMPLEXITY 3 4/12/2018 Zakiya Henning, PT GP 1          PT G-Codes  Outcome Measure Options: AM-PAC 6 Clicks Basic Mobility (PT)      Zakiya Henning PT  4/12/2018

## 2018-04-13 VITALS
BODY MASS INDEX: 36.9 KG/M2 | DIASTOLIC BLOOD PRESSURE: 91 MMHG | RESPIRATION RATE: 18 BRPM | TEMPERATURE: 98.4 F | HEIGHT: 68 IN | WEIGHT: 243.5 LBS | HEART RATE: 82 BPM | SYSTOLIC BLOOD PRESSURE: 155 MMHG | OXYGEN SATURATION: 95 %

## 2018-04-13 LAB
BASOPHILS # BLD AUTO: 0.07 10*3/MM3 (ref 0–0.2)
BASOPHILS NFR BLD AUTO: 0.4 % (ref 0–2)
DEPRECATED RDW RBC AUTO: 56 FL (ref 37–54)
EOSINOPHIL # BLD AUTO: 0.15 10*3/MM3 (ref 0.1–0.3)
EOSINOPHIL NFR BLD AUTO: 0.9 % (ref 0–4)
ERYTHROCYTE [DISTWIDTH] IN BLOOD BY AUTOMATED COUNT: 19.4 % (ref 11.5–14.5)
HCT VFR BLD AUTO: 36.5 % (ref 37–47)
HGB BLD-MCNC: 11.4 G/DL (ref 12–16)
IMM GRANULOCYTES # BLD: 0.07 10*3/MM3 (ref 0–0.03)
IMM GRANULOCYTES NFR BLD: 0.4 % (ref 0–0.5)
LYMPHOCYTES # BLD AUTO: 2.58 10*3/MM3 (ref 0.6–4.8)
LYMPHOCYTES NFR BLD AUTO: 15.7 % (ref 20–45)
MCH RBC QN AUTO: 25.2 PG (ref 27–31)
MCHC RBC AUTO-ENTMCNC: 31.2 G/DL (ref 31–37)
MCV RBC AUTO: 80.6 FL (ref 81–99)
MONOCYTES # BLD AUTO: 2.02 10*3/MM3 (ref 0–1)
MONOCYTES NFR BLD AUTO: 12.3 % (ref 3–8)
NEUTROPHILS # BLD AUTO: 11.53 10*3/MM3 (ref 1.5–8.3)
NEUTROPHILS NFR BLD AUTO: 70.3 % (ref 45–70)
NRBC BLD MANUAL-RTO: 0 /100 WBC (ref 0–0)
PLATELET # BLD AUTO: 475 10*3/MM3 (ref 140–500)
PMV BLD AUTO: 9.8 FL (ref 7.4–10.4)
RBC # BLD AUTO: 4.53 10*6/MM3 (ref 4.2–5.4)
WBC NRBC COR # BLD: 16.42 10*3/MM3 (ref 4.8–10.8)

## 2018-04-13 PROCEDURE — 97116 GAIT TRAINING THERAPY: CPT

## 2018-04-13 PROCEDURE — 99232 SBSQ HOSP IP/OBS MODERATE 35: CPT | Performed by: INTERNAL MEDICINE

## 2018-04-13 PROCEDURE — 94799 UNLISTED PULMONARY SVC/PX: CPT

## 2018-04-13 PROCEDURE — 85025 COMPLETE CBC W/AUTO DIFF WBC: CPT | Performed by: HOSPITALIST

## 2018-04-13 PROCEDURE — 97535 SELF CARE MNGMENT TRAINING: CPT

## 2018-04-13 PROCEDURE — 97110 THERAPEUTIC EXERCISES: CPT

## 2018-04-13 RX ORDER — SENNA AND DOCUSATE SODIUM 50; 8.6 MG/1; MG/1
2 TABLET, FILM COATED ORAL 2 TIMES DAILY PRN
Qty: 40 TABLET | Refills: 0 | Status: SHIPPED | OUTPATIENT
Start: 2018-04-13 | End: 2018-04-23

## 2018-04-13 RX ORDER — OXYCODONE HYDROCHLORIDE AND ACETAMINOPHEN 5; 325 MG/1; MG/1
TABLET ORAL
Qty: 80 TABLET | Refills: 0
Start: 2018-04-13 | End: 2018-04-23 | Stop reason: SDUPTHER

## 2018-04-13 RX ORDER — GABAPENTIN 300 MG/1
300 CAPSULE ORAL 3 TIMES DAILY
Qty: 42 CAPSULE | Refills: 0 | Status: SHIPPED | OUTPATIENT
Start: 2018-04-13 | End: 2018-04-23 | Stop reason: SDUPTHER

## 2018-04-13 RX ADMIN — PANTOPRAZOLE SODIUM 40 MG: 40 TABLET, DELAYED RELEASE ORAL at 06:54

## 2018-04-13 RX ADMIN — NEBIVOLOL HYDROCHLORIDE 10 MG: 5 TABLET ORAL at 08:33

## 2018-04-13 RX ADMIN — ALLOPURINOL 100 MG: 100 TABLET ORAL at 08:46

## 2018-04-13 RX ADMIN — POTASSIUM CHLORIDE 20 MEQ: 1500 TABLET, EXTENDED RELEASE ORAL at 08:33

## 2018-04-13 RX ADMIN — OXYCODONE HYDROCHLORIDE 5 MG: 5 TABLET ORAL at 05:25

## 2018-04-13 RX ADMIN — ALPRAZOLAM 0.5 MG: 0.25 TABLET ORAL at 08:46

## 2018-04-13 RX ADMIN — ACETAMINOPHEN 1000 MG: 500 TABLET, FILM COATED ORAL at 08:46

## 2018-04-13 RX ADMIN — ASPIRIN 325 MG: 325 TABLET, DELAYED RELEASE ORAL at 08:33

## 2018-04-13 RX ADMIN — OXYCODONE HYDROCHLORIDE 5 MG: 5 TABLET ORAL at 09:58

## 2018-04-13 RX ADMIN — LOSARTAN POTASSIUM 50 MG: 50 TABLET ORAL at 08:33

## 2018-04-13 RX ADMIN — BUSPIRONE HYDROCHLORIDE 20 MG: 5 TABLET ORAL at 08:33

## 2018-04-13 RX ADMIN — OXYCODONE HYDROCHLORIDE 5 MG: 5 TABLET ORAL at 14:08

## 2018-04-13 NOTE — PLAN OF CARE
Problem: Patient Care Overview  Goal: Plan of Care Review  Outcome: Ongoing (interventions implemented as appropriate)   04/13/18 0611   Coping/Psychosocial   Plan of Care Reviewed With patient     Goal: Individualization and Mutuality  Outcome: Ongoing (interventions implemented as appropriate)    Goal: Discharge Needs Assessment  Outcome: Ongoing (interventions implemented as appropriate)    Goal: Interprofessional Rounds/Family Conf  Outcome: Ongoing (interventions implemented as appropriate)      Problem: Fall Risk (Adult)  Goal: Absence of Fall  Outcome: Ongoing (interventions implemented as appropriate)      Problem: Skin Injury Risk (Adult)  Goal: Skin Health and Integrity  Outcome: Ongoing (interventions implemented as appropriate)      Problem: Pain, Acute (Adult)  Goal: Identify Related Risk Factors and Signs and Symptoms  Outcome: Ongoing (interventions implemented as appropriate)    Goal: Acceptable Pain Control/Comfort Level  Outcome: Ongoing (interventions implemented as appropriate)

## 2018-04-13 NOTE — PROGRESS NOTES
"SERVICE: [facility] HOSPITALIST    CONSULTANTS:  Medicine  Ortho - primary    CHIEF COMPLAINT: OA of Rt Knee s/p TKA    SUBJECTIVE:  Doing well, no complaints. Only question is on if she can continue to take tramadol at home.    Denies any infectious signs, (cough, sputums, dysuria, urinary frequency, fever/chills). POS for Seasonal allergy symptoms of nasal congestion    OBJECTIVE:    /91   Pulse 82   Temp 98.4 °F (36.9 °C)   Resp 18   Ht 172.7 cm (67.99\")   Wt 110 kg (243 lb 8 oz)   SpO2 95%   BMI 37.03 kg/m²     MEDS/LABS REVIEWED AND ORDERED      allopurinol 100 mg Oral Daily   aspirin 325 mg Oral Q12H   busPIRone 20 mg Oral BID   losartan 50 mg Oral Q24H   nebivolol 10 mg Oral Daily   pantoprazole 40 mg Oral QAM   potassium chloride 20 mEq Oral Daily   Vortioxetine HBr 20 mg Oral Daily       Physical Exam   Constitutional: She appears well-developed.   HENT:   Post nasal drip, no maxillary, ethmoid sinus tenderness  B/l tonsillar enlargement     Neck: Normal range of motion. No tracheal deviation present.   STable chronic enlarged smooth tonsillar lypmh nodes   Cardiovascular: Normal rate, regular rhythm and normal heart sounds.    Pulmonary/Chest: Effort normal and breath sounds normal.   Abdominal: Soft. Bowel sounds are normal. She exhibits no distension.   Lymphadenopathy:     She has cervical adenopathy.   Neurological: She is alert.   Skin: Skin is warm and dry.   Vitals reviewed.      LAB/DIAGNOSTICS:    Lab Results (last 24 hours)     Procedure Component Value Units Date/Time    CBC & Differential [[id number]] Collected:  04/13/18 0344    Specimen:  Blood Updated:  04/13/18 0418    Narrative:       The following orders were created for panel order CBC & Differential.  Procedure                               Abnormality         Status                     ---------                               -----------         ------                     CBC Auto Differential[[id number]]    "     Abnormal            Final result                 Please view results for these tests on the individual orders.    CBC Auto Differential [[id number]]  (Abnormal) Collected:  04/13/18 0344    Specimen:  Blood Updated:  04/13/18 0418     WBC 16.42 (H) 10*3/mm3      RBC 4.53 10*6/mm3      Hemoglobin 11.4 (L) g/dL      Hematocrit 36.5 (L) %      MCV 80.6 (L) fL      MCH 25.2 (L) pg      MCHC 31.2 g/dL      RDW 19.4 (H) %      RDW-SD 56.0 (H) fl      MPV 9.8 fL      Platelets 475 10*3/mm3      Neutrophil % 70.3 (H) %      Lymphocyte % 15.7 (L) %      Monocyte % 12.3 (H) %      Eosinophil % 0.9 %      Basophil % 0.4 %      Immature Grans % 0.4 %      Neutrophils, Absolute 11.53 (H) 10*3/mm3      Lymphocytes, Absolute 2.58 10*3/mm3      Monocytes, Absolute 2.02 (H) 10*3/mm3      Eosinophils, Absolute 0.15 10*3/mm3      Basophils, Absolute 0.07 10*3/mm3      Immature Grans, Absolute 0.07 (H) 10*3/mm3      nRBC 0.0 /100 WBC         No orders to display        Xr Knee 1 Or 2 View Left    Result Date: 4/11/2018  Satisfactory postoperative appearance following left total knee arthroplasty.  This report was finalized on 4/11/2018 11:49 AM by Dr. Prashant Andrews MD.          ASSESSMENT/PLAN:    Post surgical Leukocytosis - No infectious signs    Seasonal allergies - pt declined flonase    HTN - Moderately uncontrolled, pt home on her home medications, states was well controlled prior to admission    RA - Hold LEF for 1 wk after surgery, pt understands, and will follow up with her rheumatologist    Rt TKA - per ortho, including pain control.

## 2018-04-13 NOTE — NURSING NOTE
Continued Stay Note  WYATT [facility]     Patient Name: Nitish Barfield  MRN: [id number]  Today's Date: 4/13/2018    Admit Date: 4/11/2018          Discharge Plan     Row Name 04/13/18 1100       Plan    Plan Comments Patient in agreement with speaking to  with step daughter and  at bedside.  She has received her walker from [facility] and family has taken it home.  Per patient bedside commode to be delivered to home this afternoon.  Yina [facility] aware of anticipated discharge home today.    available for any other discharge needs.               Discharge Codes    No documentation.           Stephanie Hughes RN

## 2018-04-13 NOTE — THERAPY TREATMENT NOTE
"Acute Care - Physical Therapy Treatment Note  WYATT Newberry     Patient Name: Nitish Barfield  : 1955  MRN: [id number]  Today's Date: 2018  Onset of Illness/Injury or Date of Surgery: 18  Date of Referral to PT: 18  Referring Physician: Yogesh    Admit Date: 2018    Visit Dx:    ICD-10-CM ICD-9-CM   1. Status post total right knee replacement Z96.651 V43.65   2. Primary osteoarthritis of left knee M17.12 715.16     Patient Active Problem List   Diagnosis   • Anxiety   • Depression   • Gastroesophageal reflux disease with esophagitis   • Generalized anxiety disorder   • Hyperlipidemia   • Hypertension   • Impaired glucose tolerance   • Renal insufficiency   • Primary osteoarthritis of right knee   • Primary osteoarthritis of left knee       Therapy Treatment          Rehabilitation Treatment Summary     Row Name 18 0940 18 0900          Treatment Time/Intention    Discipline occupational therapist  -EN physical therapist  -BP     Document Type therapy note (daily note)  -EN therapy note (daily note)  -BP     Subjective Information complains of;pain  -EN complains of;pain   states \"I want to get out of here\"   -BP     Mode of Treatment occupational therapy  -EN physical therapy  -BP     Patient/Family Observations Patient reclined in chair, ice on right knee.  Spouse at side.  Agreed to treatment.  -EN Patient supine in bed with HOB elevated.  present. Agrees to treatment   -BP     Care Plan Review care plan/treatment goals reviewed  -EN care plan/treatment goals reviewed  -BP     Care Plan Review, Other Participant(s) spouse  -EN spouse  -BP     Patient Effort  -- good  -BP     Comment Patient reported she is being discharged today and will have home health.  Stated spouse will be able to assist .  -EN  --     Existing Precautions/Restrictions  -- fall  -BP     Treatment Considerations/Comments  -- Patient states \"I want to go home, I think I will do better at home.\"   " -BP     Recorded by [EN] Hoda Weiner, OTR 04/13/18 1103 04/13/18 1103 [BP] Zakiya Henning, PT 04/13/18 1123 04/13/18 1123     Row Name 04/13/18 0900             Cognitive Assessment/Intervention    Additional Documentation Cognitive Assessment/Intervention (Group)  -BP      Recorded by [BP] Zakiya Henning, PT 04/13/18 1123 04/13/18 1123      Row Name 04/13/18 0900             Cognitive Assessment/Intervention- PT/OT    Personal Safety Interventions nonskid shoes/slippers when out of bed;gait belt  -BP      Recorded by [BP] Zakiya Henning, PT 04/13/18 1123 04/13/18 1123      Row Name 04/13/18 0900             Safety Issues, Functional Mobility    Safety Issues Affecting Function (Mobility) positioning of assistive device  -BP      Recorded by [BP] Zakiya Henning, PT 04/13/18 1123 04/13/18 1123      Row Name 04/13/18 0900             Bed Mobility Assessment/Treatment    Supine-Sit [place] (Bed Mobility) contact guard  -BP      Assistive Device (Bed Mobility) bed rails;head of bed elevated  -BP      Comment (Bed Mobility) Patient requires extended time to complete transfer.   -BP      Recorded by [BP] Zkaiya Henning, PT 04/13/18 1123 04/13/18 1123      Row Name 04/13/18 0900 04/13/18 0800          Transfer Assessment/Treatment    Transfer Assessment/Treatment sit-stand transfer;stand-sit transfer;toilet transfer  -BP  --     Comment (Transfers) Patient requires verbal cues for hand placement initially however able to correct and demonstrate carry over with in session   -BP  --     Sit-Stand [place] (Transfers) contact guard;stand by assist;verbal cues  -BP  --     Stand-Sit [place] (Transfers) contact guard;verbal cues  -BP  --     [place] Level (Toilet Transfer) contact guard  -BP minimum assist (75% patient effort);verbal cues  -KM     Assistive Device (Toilet Transfer) commode, bedside without drop arms;walker, front-wheeled  -BP walker, front-wheeled  -KM     Recorded by [BP]  Zakiya Henning, PT 04/13/18 1123 04/13/18 1123 [KM] Sharona Jovel, PTA 04/13/18 0808 04/13/18 0808     Row Name 04/13/18 0900             Sit-Stand Transfer    Assistive Device (Sit-Stand Transfers) walker, front-wheeled  -BP      Recorded by [BP] Zakiya Henning, PT 04/13/18 1123 04/13/18 1123      Row Name 04/13/18 0900             Stand-Sit Transfer    Assistive Device (Stand-Sit Transfers) walker, front-wheeled  -BP      Recorded by [BP] Zakiya Henning, PT 04/13/18 1123 04/13/18 1123      Row Name 04/13/18 0900 04/13/18 0800          Toilet Transfer    Type (Toilet Transfer) sit-stand;stand-sit  -BP sit-stand;stand-sit  -KM     Recorded by [BP] Zakiya Henning, PT 04/13/18 1123 04/13/18 1123 [KM] Sharona Jovel, PTA 04/13/18 0808 04/13/18 0808     Row Name 04/13/18 0900             Gait/Stairs Assessment/Training    [place] Level (Gait) contact guard;verbal cues  -BP      Assistive Device (Gait) walker, front-wheeled  -BP      Distance in Feet (Gait) 60  -BP      Pattern (Gait) step-to  -BP      Deviations/Abnormal Patterns (Gait) antalgic  -BP      Bilateral Gait Deviations forward flexed posture  -BP      Left Sided Gait Deviations heel strike decreased  -BP      Right Sided Gait Deviations --   Decreased step length   -BP      [place] Level (Stairs) contact guard;verbal cues;2 person assist  -BP      Handrail Location (Stairs) both sides  -BP      Number of Steps (Stairs) 5  -BP      Ascending Technique (Stairs) step-to-step  -BP      Descending Technique (Stairs) step-to-step  -BP      Comment (Gait/Stairs) Patient requires verbal cues for upright posture and improved step length on the right. Intermittent verbal cues required for improved distance to AD. No LOB noted during gait training. Patient able to perform stair training with CGA however requires verbal cues for sequencing.   -BP      Recorded by [BP] Zakiya Henning, PT 04/13/18 1123 04/13/18 1123      Row Name 04/13/18 0940              Lower Body Dressing Assessment/Training    Comment (Lower Body Dressing) Patient issued reacher and educated on use for lower body dressing.  Patient declined other equipment.  Patient reported she will have spouse assist if needed.  Educated patient on home safety including removal of throw rugs.  -EN      Recorded by [EN] Hoda Weiner, OTR 04/13/18 1103 04/13/18 1103      Row Name 04/13/18 0900             Motor Skills Assessment/Interventions    Additional Documentation Therapeutic Exercise Interventions (Group);Therapeutic Exercise (Group)  -BP      Recorded by [BP] Zakiya Henning, PT 04/13/18 1123 04/13/18 1123      Row Name 04/13/18 0900             Therapeutic Exercise    Lower Extremity (Therapeutic Exercise) SLR (straight leg raise), left;SAQ (short arc quad), left;quad sets, left;LAQ (long arc quad), left;heel slides, left;marching while seated   L hip ABD/ADD   -BP      Position (Therapeutic Exercise) seated  -BP      Sets/Reps (Therapeutic Exercise) 10 reps.   -BP      Comment (Therapeutic Exercise) Provided and reviewed written LE HEP.   -BP      Recorded by [BP] Zakiya Henning, PT 04/13/18 1123 04/13/18 1123      Row Name 04/13/18 0940 04/13/18 0900          Positioning and Restraints    Pre-Treatment Position sitting in chair/recliner  -EN in bed  -BP     Post Treatment Position chair  -EN chair  -BP     In Bed call light within reach;encouraged to call for assist  -EN  --     In Chair  -- notified nsg;reclined;call light within reach;encouraged to call for assist;with family/caregiver  -BP     Recorded by [EN] Hoda Weiner, OTR 04/13/18 1103 04/13/18 1103 [BP] Zakiya Henning, PT 04/13/18 1123 04/13/18 1123     Row Name 04/13/18 0940 04/13/18 0900          Pain Scale: Numbers Pre/Post-Treatment    Pain Scale: Numbers, Pretreatment 7/10  -EN 7/10  -BP     Pain Scale: Numbers, Post-Treatment 7/10  -EN 7/10  -BP     Pain Location - Side  -- Left  -BP     Pain Location  -- knee  -BP      Pain Intervention(s) Repositioned;Cold applied  -EN Repositioned;Cold applied;Medication (See MAR)   Notified RN  -BP     Recorded by [EN] Hoda Weiner, OTR 04/13/18 1103 04/13/18 1103 [BP] Zakiya Henning, PT 04/13/18 1123 04/13/18 1123     Row Name                Wound 04/11/18 1022 Left knee incision    Wound - Properties Group Date first assessed: 04/11/18 [AA] Time first assessed: 1022 [AA] Side: Left [AA] Location: knee [AA] Type: incision [AA] Recorded by:  [AA] Chio Spear RN 04/11/18 1022 04/11/18 1022    Row Name 04/13/18 0900             Plan of Care Review    Plan of Care Reviewed With patient;spouse  -BP      Recorded by [BP] Zakiya Henning, PT 04/13/18 1123 04/13/18 1123      Row Name 04/13/18 0900             Outcome Summary/Treatment Plan (PT)    Daily Summary of Progress (PT) progress toward functional goals is good  -BP      Barriers to Overall Progress (PT) Pain  -BP      Plan for Continued Treatment (PT) Plan to see patient again today. Patient wants to return home today. Will assess for consistency of functional performance today. Will notify RN of patient performance this afternoon for possible discharge home today.   -BP      Anticipated Equipment Needs at Discharge (PT) front wheeled walker  -BP      Anticipated Discharge Disposition (PT) home with home health care   home with assist from family   -BP      Recorded by [BP] Zakiya Henning, PT 04/13/18 1123 04/13/18 1123        User Key  (r) = Recorded By, (t) = Taken By, (c) = Cosigned By    Initials Name Effective Dates Discipline    EN Hoda Weiner, OTR 06/22/16 -  OT    AA Chio Spear RN 06/16/16 -  Nurse    BP Zakiya Henning, PT 04/03/18 -  PT    KM Sharona Jovel, PTA 04/08/18 -  PT          Wound 04/11/18 1022 Left knee incision (Active)   Dressing Appearance dry;intact;no drainage 4/12/2018  8:20 PM   Closure HERMILO 4/12/2018  8:20 PM             Physical Therapy Education     Title: PT OT SLP Therapies  (Done)     Topic: Physical Therapy (Done)     Point: Mobility training (Done)    Learning Progress Summary     Learner Status Readiness Method Response Comment Documented by    Patient Done Acceptance E VU  BP 04/13/18 1123     Done Acceptance E VU,NR HEP x 10 reps completed. Proper technique for transfer KM 04/13/18 0802     Done Acceptance E VU  BP 04/12/18 1117    Significant Other Done Acceptance E VU   04/13/18 1123          Point: Home exercise program (Done)    Learning Progress Summary     Learner Status Readiness Method Response Comment Documented by    Patient Done Acceptance E VU  BP 04/13/18 1123     Done Acceptance E VU,NR HEP x 10 reps completed. Proper technique for transfer KM 04/13/18 0802     Done Acceptance E VU  BP 04/12/18 1117    Significant Other Done Acceptance E VU   04/13/18 1123                      User Key     Initials Effective Dates Name Provider Type Discipline     04/03/18 -  Zakiya Henning, PT Physical Therapist PT     04/08/18 -  Sharona Jovel, PTA Physical Therapy Assistant PT                    PT Recommendation and Plan  Anticipated Discharge Disposition (PT): home with home health care (home with assist from family )  Planned Therapy Interventions (PT Eval): bed mobility training, gait training, home exercise program, stair training, strengthening, transfer training, patient/family education  Therapy Frequency (PT Clinical Impression): 2 times/day  Outcome Summary/Treatment Plan (PT)  Daily Summary of Progress (PT): progress toward functional goals is good  Barriers to Overall Progress (PT): Pain  Plan for Continued Treatment (PT): Plan to see patient again today. Patient wants to return home today. Will assess for consistency of functional performance today. Will notify RN of patient performance this afternoon for possible discharge home today.   Anticipated Equipment Needs at Discharge (PT): front wheeled walker  Anticipated Discharge Disposition (PT): home with  home health care (home with assist from family )  Patient/Family Concerns, Anticipated Discharge Disposition (PT): Patient states she will have help at home upon return home. She states she wants to start with home health PT vs outpatient initially upon return home   Plan of Care Reviewed With: patient  Progress: improving  Outcome Summary: PT: Patient performs supine to sit transfer with CGA, sit to/from stand transfers with CGA and gait x 100 feet with one seated rest break with CGA and use of RW. Patient ascends/descends 5 stairs with two handrails with CGA x 2. She requires verbal cues for improved gait mechanics and intermittent cues for safety with use of AD. Provided and reviewed LE HEP. Patient insistent she wants to return home today. Will see patient this afternoon to assess for consistency of functional performance. Continue to recommend home with home health at discharge.           Outcome Measures     Row Name 04/13/18 0900 04/12/18 1315 04/12/18 0856       How much help from another person do you currently need...    Turning from your back to your side while in flat bed without using bedrails? 3  -BP 3  -KM  --    Moving from lying on back to sitting on the side of a flat bed without bedrails? 3  -BP 3  -KM  --    Moving to and from a bed to a chair (including a wheelchair)? 3  -BP 3  -KM  --    Standing up from a chair using your arms (e.g., wheelchair, bedside chair)? 3  -BP 2  -KM  --    Climbing 3-5 steps with a railing? 3  -BP 2  -KM  --    To walk in hospital room? 3  -BP 3  -KM  --    AM-PAC 6 Clicks Score 18  -BP 16  -KM  --       How much help from another is currently needed...    Putting on and taking off regular lower body clothing?  --  -- 3  -JJ    Bathing (including washing, rinsing, and drying)  --  -- 3  -JJ    Toileting (which includes using toilet bed pan or urinal)  --  -- 3  -JJ    Putting on and taking off regular upper body clothing  --  -- 4  -JJ    Taking care of personal  grooming (such as brushing teeth)  --  -- 4  -JJ    Eating meals  --  -- 4  -JJ    Score  --  -- 21  -JJ       Functional Assessment    Outcome Measure Options AM-PAC 6 Clicks Basic Mobility (PT)  -BP AM-PAC 6 Clicks Basic Mobility (PT)  -KM AM-PAC 6 Clicks Daily Activity (OT)  -JJ    Row Name 04/12/18 0855             How much help from another person do you currently need...    Turning from your back to your side while in flat bed without using bedrails? 3  -BP      Moving from lying on back to sitting on the side of a flat bed without bedrails? 3  -BP      Moving to and from a bed to a chair (including a wheelchair)? 3  -BP      Standing up from a chair using your arms (e.g., wheelchair, bedside chair)? 3  -BP      Climbing 3-5 steps with a railing? 2  -BP      To walk in hospital room? 3  -BP      AM-PAC 6 Clicks Score 17  -BP         Functional Assessment    Outcome Measure Options AM-PAC 6 Clicks Basic Mobility (PT)  -BP        User Key  (r) = Recorded By, (t) = Taken By, (c) = Cosigned By    Initials Name Provider Type     Selena Orozco, OTR Occupational Therapist    BP Zakiya Henning, PT Physical Therapist    LILLIAM Jovel, SURYA Physical Therapy Assistant           Time Calculation:         PT Charges     Row Name 04/13/18 1126 04/13/18 0808          Time Calculation    Start Time 0900  -BP 1315  -KM     Stop Time 0935  -BP 1340  -KM     Time Calculation (min) 35 min  -BP 25 min  -KM     PT Received On 04/13/18  -BP  --     PT - Next Appointment 04/13/18  -BP  --       User Key  (r) = Recorded By, (t) = Taken By, (c) = Cosigned By    Initials Name Provider Type    BP Zakiya Henning, PT Physical Therapist    LILLIAM Jovel, SURYA Physical Therapy Assistant          Therapy Charges for Today     Code Description Service Date Service Provider Modifiers Qty    74281121129 HC PT EVAL LOW COMPLEXITY 3 4/12/2018 Zakiya Henning, PT GP 1    24356321014 HC PT THER SUPP EA 15 MIN 4/13/2018 Zakiya  Juvencio, PT GP 1    97528090229 HC PT THER PROC EA 15 MIN 4/13/2018 Zakiya Henning, PT GP 1    50466473944 HC GAIT TRAINING EA 15 MIN 4/13/2018 Zakiya Henning, PT GP 1          PT G-Codes  Outcome Measure Options: AM-PAC 6 Clicks Basic Mobility (PT)    Zakiya Henning, PT  4/13/2018

## 2018-04-13 NOTE — THERAPY TREATMENT NOTE
Acute Care - Physical Therapy Treatment Note   [facility]     Patient Name: Nitish Barfield  : 1955  MRN: [id number]  Today's Date: 2018  Onset of Illness/Injury or Date of Surgery: 18  Date of Referral to PT: 18  Referring Physician: Yogesh    Admit Date: 2018    Visit Dx:    ICD-10-CM ICD-9-CM   1. Status post total right knee replacement Z96.651 V43.65   2. Primary osteoarthritis of left knee M17.12 715.16     Patient Active Problem List   Diagnosis   • Anxiety   • Depression   • Gastroesophageal reflux disease with esophagitis   • Generalized anxiety disorder   • Hyperlipidemia   • Hypertension   • Impaired glucose tolerance   • Renal insufficiency   • Primary osteoarthritis of right knee   • Primary osteoarthritis of left knee       Therapy Treatment          Rehabilitation Treatment Summary     Row Name 18 0800             Transfer Assessment/Treatment    [place] Level (Toilet Transfer) minimum assist (75% patient effort);verbal cues  -KM      Assistive Device (Toilet Transfer) walker, front-wheeled  -KM      Recorded by [KM] Sharona Jovel PTA 18 0808 18 0808      Row Name 18 0800             Toilet Transfer    Type (Toilet Transfer) sit-stand;stand-sit  -KM      Recorded by [LILLIAM] Sharona Jovel PTA 18 0808 18 0808      Row Name                Wound 18 1022 Left knee incision    Wound - Properties Group Date first assessed: 18 [AA] Time first assessed: 102 [AA] Side: Left [AA] Location: knee [AA] Type: incision [AA] Recorded by:  [AA] Chio Spear RN 18 1022 18 1022      User Key  (r) = Recorded By, (t) = Taken By, (c) = Cosigned By    Initials Name Effective Dates Discipline    AA Chio Spear RN 16 -  Nurse    LILLIAM Jovel PTA 18 -  PT          Wound 18 1022 Left knee incision (Active)   Dressing Appearance dry;intact;no drainage 2018  8:20 PM   Closure HERMILO 2018  8:20 PM              Physical Therapy Education     Title: PT OT SLP Therapies (Done)     Topic: Physical Therapy (Done)     Point: Mobility training (Done)    Learning Progress Summary     Learner Status Readiness Method Response Comment Documented by    Patient Done Acceptance E VU,NR HEP x 10 reps completed. Proper technique for transfer KM 04/13/18 0802     Done Acceptance E VU   04/12/18 1117          Point: Home exercise program (Done)    Learning Progress Summary     Learner Status Readiness Method Response Comment Documented by    Patient Done Acceptance E VU,NR HEP x 10 reps completed. Proper technique for transfer KM 04/13/18 0802     Done Acceptance E VU   04/12/18 1117                      User Key     Initials Effective Dates Name Provider Type Discipline     04/03/18 -  Zakiya Henning, PT Physical Therapist PT     04/08/18 -  Sharona Jovel, SURYA Physical Therapy Assistant PT                    PT Recommendation and Plan     Outcome Summary: Patients  present in the room. Upon arrival patient was lying sidelying with knees bent. Patient Min A with supine<>sit.. Mod A with sit<>stand transfers and had brace on when up. patient demonstrated forward trunk lean with limited weight on L.LE. Patient ambulated a total of 20 feet within room to use the restroom. Limited ambulation performed due to current cognition status. HEP x 10 reps performed supine. Patient able to complete SLR x 3 with CGA . Notified nsg that she can be up without the brace. Informed patient to continue to complete HEP and to lay with L.LE straight.          Outcome Measures     Row Name 04/12/18 1315 04/12/18 0856 04/12/18 0855       How much help from another person do you currently need...    Turning from your back to your side while in flat bed without using bedrails? 3  -KM  -- 3  -BP    Moving from lying on back to sitting on the side of a flat bed without bedrails? 3  -KM  -- 3  -BP    Moving to and from a bed to a chair  (including a wheelchair)? 3  -KM  -- 3  -BP    Standing up from a chair using your arms (e.g., wheelchair, bedside chair)? 2  -KM  -- 3  -BP    Climbing 3-5 steps with a railing? 2  -KM  -- 2  -BP    To walk in hospital room? 3  -KM  -- 3  -BP    AM-PAC 6 Clicks Score 16  -KM  -- 17  -BP       How much help from another is currently needed...    Putting on and taking off regular lower body clothing?  -- 3  -JJ  --    Bathing (including washing, rinsing, and drying)  -- 3  -JJ  --    Toileting (which includes using toilet bed pan or urinal)  -- 3  -JJ  --    Putting on and taking off regular upper body clothing  -- 4  -JJ  --    Taking care of personal grooming (such as brushing teeth)  -- 4  -JJ  --    Eating meals  -- 4  -JJ  --    Score  -- 21  -JJ  --       Functional Assessment    Outcome Measure Options AM-PAC 6 Clicks Basic Mobility (PT)  -KM AM-PAC 6 Clicks Daily Activity (OT)  -J AM-PAC 6 Clicks Basic Mobility (PT)  -BP      User Key  (r) = Recorded By, (t) = Taken By, (c) = Cosigned By    Initials Name Provider Type     Selena Orozco, OTR Occupational Therapist    BP Zakiya Henning, PT Physical Therapist     Sharona Jovel PTA Physical Therapy Assistant           Time Calculation:         PT Charges     Row Name 04/13/18 0808             Time Calculation    Start Time 1315  -KM      Stop Time 1340  -KM      Time Calculation (min) 25 min  -KM        User Key  (r) = Recorded By, (t) = Taken By, (c) = Cosigned By    Initials Name Provider Type     Sharona Jovel PTA Physical Therapy Assistant          Therapy Charges for Today     Code Description Service Date Service Provider Modifiers Qty    46247795900 HC GAIT TRAINING EA 15 MIN 4/13/2018 Sharona Jovel PTA GP 1          PT G-Codes  Outcome Measure Options: AM-PAC 6 Clicks Basic Mobility (PT)    Sharona Jovel PTA  4/13/2018

## 2018-04-13 NOTE — DISCHARGE SUMMARY
Orthopedic Discharge Summary      Patient: Nitish Barfield      YOB: 1955    Medical Record Number: [id number]    Attending Physician: Costa Zuñiga MD  Consulting Physician(s):   Date of Admission: 4/11/2018  6:08 AM  Date of Discharge: 04/13/2018      Patient Active Problem List   Diagnosis   • Anxiety   • Depression   • Gastroesophageal reflux disease with esophagitis   • Generalized anxiety disorder   • Hyperlipidemia   • Hypertension   • Impaired glucose tolerance   • Renal insufficiency   • Primary osteoarthritis of right knee   • Primary osteoarthritis of left knee     Status Post: TOTAL KNEE ARTHROPLASTY AND ALL ASSOCIATED PROCEDURES      Allergies   Allergen Reactions   • Amoxicillin-Pot Clavulanate Nausea Only and Nausea And Vomiting   • Methotrexate Diarrhea, Nausea And Vomiting and Other (See Comments)     Blisters, hair loss  Blisters, hair loss  Blisters, hair loss       Current Medications:   Nitish Barfield   Home Medication Instructions LINDA:[id number]    Printed on:04/13/18 9679   Medication Information                      allopurinol (ZYLOPRIM) 300 MG tablet  Take 300 mg by mouth Every Night.             ALPRAZolam (XANAX) 0.5 MG tablet  take 1 or 2 tablets by mouth every 8 hours if needed             busPIRone (BUSPAR) 10 MG tablet  take 2 tablets by mouth twice a day             BYSTOLIC 10 MG tablet  Take 1 tablet daily             chlorthalidone (HYGROTON) 25 MG tablet  Take 25 mg by mouth Every Morning.             EDARBI 80 MG tablet tablet  Take 80 mg by mouth Every Morning.             gabapentin (NEURONTIN) 300 MG capsule  Take 1 capsule by mouth 3 (Three) Times a Day.             lansoprazole (PREVACID) 30 MG capsule  take 1 capsule by mouth once daily             oxyCODONE-acetaminophen (PERCOCET) 5-325 MG per tablet  1-2 tablets every 6 hours, prn moderate pain             potassium chloride (K-DUR,KLOR-CON) 10 MEQ CR tablet  Take 20 mEq by mouth Daily.              sennosides-docusate sodium (SENOKOT-S) 8.6-50 MG tablet  Take 2 tablets by mouth 2 (Two) Times a Day As Needed for Constipation for up to 10 days.             silver sulfadiazine (SILVADENE, SSD) 1 % cream  Apply 1 application topically 2 (Two) Times a Day.             traMADol (ULTRAM) 50 MG tablet  Take 50 mg by mouth Every 6 (Six) Hours As Needed for Moderate Pain .             vitamin D (ERGOCALCIFEROL) 99994 units capsule capsule  Take 50,000 Units by mouth 2 (Two) Times a Week.             Vortioxetine HBr (TRINTELLIX) 20 MG tablet  Take 20 mg by mouth Every Morning.                     Past Medical History:   Diagnosis Date   • Allergy    • Anxiety    • Arthritis     RA, FINGERS, TOES   • Esophageal reflux    • GERD (gastroesophageal reflux disease)    • Hypertension    • Hypertension    • Left knee pain     scheduled for sx     Past Surgical History:   Procedure Laterality Date   • COLONOSCOPY     • ENDOSCOPY     • HYSTERECTOMY      OPEN   • KNEE SURGERY Left     ARTHROSCOPY   • TOTAL KNEE ARTHROPLASTY Left 4/11/2018    Procedure: TOTAL KNEE ARTHROPLASTY AND ALL ASSOCIATED PROCEDURES;  Surgeon: Costa Zuñiga MD;  Location: [facility];  Service: Orthopedics   • WISDOM TOOTH EXTRACTION       Social History     Occupational History   • Not on file.     Social History Main Topics   • Smoking status: Never Smoker   • Smokeless tobacco: Never Used   • Alcohol use Yes      Comment: NO DRINKING SINCE FEB., WAS DRINKING DAILY   • Drug use: No   • Sexual activity: Defer      Social History     Social History Narrative   • No narrative on file     Family History   Problem Relation Age of Onset   • Cancer Mother    • Other Father      cardiac failure         Physical Exam: 63 y.o. female  General Appearance:    Alert, cooperative, in no acute distress                      Vitals:    04/12/18 1947 04/13/18 0022 04/13/18 0656 04/13/18 1200   BP: 151/89 142/82 155/91    BP Location: Right arm      Patient Position:  Lying      Pulse: 90 85 82    Resp: 18 18 18    Temp: 98.6 °F (37 °C) 98.6 °F (37 °C) 98.4 °F (36.9 °C)    TempSrc: Oral      SpO2: 94% 93% 95% 95%   Weight:       Height:            Head:    Normocephalic, without obvious abnormality, atraumatic   Eyes:            Lids and lashes normal, conjunctivae and sclerae normal, no   icterus, no pallor, corneas clear, PERRLA   Ears:    Ears appear intact with no abnormalities noted   Throat:   No oral lesions, no thrush, oral mucosa moist   Neck:   No adenopathy, supple, trachea midline, no thyromegaly, no    carotid bruit, no JVD   Back:     No kyphosis present, no scoliosis present, no skin lesions,       erythema or scars, no tenderness to percussion or                   palpation,   range of motion normal   Lungs:     Clear to auscultation,respirations regular, even and                   unlabored    Heart:    Regular rhythm and normal rate, normal S1 and S2, no            murmur, no gallop, no rub, no click   Chest Wall:    No abnormalities observed   Abdomen:     Normal bowel sounds, no masses, no organomegaly, soft        non-tender, non-distended, no guarding, no rebound                 tenderness   Rectal:     Deferred   Extremities:   Incision intact without signs or symptoms of infection.               Neurovascular status remains intact to operative extremity.      Moves all extremities well, no edema, no cyanosis, no              redness   Pulses:   Pulses palpable and equal bilaterally   Skin:   No bleeding, bruising or rash   Lymph nodes:   No palpable adenopathy   Neurologic:   Cranial nerves 2 - 12 grossly intact, sensation intact, DTR        present and equal bilaterally           Hospital Course:  63 y.o. female admitted to [facility] to services of Costa Zuñiga MD with Primary osteoarthritis of left knee [M17.12]  Primary osteoarthritis of right knee [M17.11] on 4/11/2018 and underwent TOTAL KNEE ARTHROPLASTY AND ALL ASSOCIATED  PROCEDURES  Per Costa Zuñiga MD. Antibiotic and VTE prophylaxis were per SCIP protocols. Post-operatively the patient transferred to the post-operative floor where the patient underwent mobilization therapy that included active as well as passive ROM exercises. Opioids were titrated to achieve appropriate pain management to allow for participation in mobilization exercises. Vital signs are now stable. The incision is intact without signs or symptoms of infection. Operative extremity neurovascular status remains intact.   Appropriate education re: incision care, activity levels, medications, and follow up visits was completed and all questions were answered. The patient is now deemed stable for discharge to Home.      DIAGNOSTIC TESTS:     Admission on 04/11/2018   Component Date Value Ref Range Status   • Potassium 04/11/2018 3.7  3.5 - 5.2 mmol/L Final   • Glucose 04/11/2018 92  70 - 130 mg/dL Final   • Glucose 04/12/2018 120* 65 - 99 mg/dL Final   • BUN 04/12/2018 7* 8 - 23 mg/dL Final   • Creatinine 04/12/2018 0.82  0.57 - 1.00 mg/dL Final   • Sodium 04/12/2018 140  136 - 145 mmol/L Final   • Potassium 04/12/2018 3.7  3.5 - 5.2 mmol/L Final   • Chloride 04/12/2018 99  98 - 107 mmol/L Final   • CO2 04/12/2018 28.2  22.0 - 29.0 mmol/L Final   • Calcium 04/12/2018 8.6* 8.8 - 10.5 mg/dL Final   • eGFR Non African Amer 04/12/2018 70  >60 mL/min/1.73 Final   • BUN/Creatinine Ratio 04/12/2018 8.5  7.0 - 25.0 Final   • Anion Gap 04/12/2018 12.8  mmol/L Final   • WBC 04/12/2018 15.04* 4.80 - 10.80 10*3/mm3 Final   • RBC 04/12/2018 4.86  4.20 - 5.40 10*6/mm3 Final   • Hemoglobin 04/12/2018 12.1  12.0 - 16.0 g/dL Final   • Hematocrit 04/12/2018 40.4  37.0 - 47.0 % Final   • MCV 04/12/2018 83.1  81.0 - 99.0 fL Final   • MCH 04/12/2018 24.9* 27.0 - 31.0 pg Final   • MCHC 04/12/2018 30.0* 31.0 - 37.0 g/dL Final   • RDW 04/12/2018 20.2* 11.5 - 14.5 % Final   • RDW-SD 04/12/2018 59.5* 37.0 - 54.0 fl Final   • MPV  04/12/2018 9.5  7.4 - 10.4 fL Final   • Platelets 04/12/2018 492  140 - 500 10*3/mm3 Final   • Neutrophil % 04/12/2018 69.6  45.0 - 70.0 % Final   • Lymphocyte % 04/12/2018 17.4* 20.0 - 45.0 % Final   • Monocyte % 04/12/2018 10.4* 3.0 - 8.0 % Final   • Eosinophil % 04/12/2018 1.5  0.0 - 4.0 % Final   • Basophil % 04/12/2018 0.7  0.0 - 2.0 % Final   • Immature Grans % 04/12/2018 0.4  0.0 - 0.5 % Final   • Neutrophils, Absolute 04/12/2018 10.49* 1.50 - 8.30 10*3/mm3 Final   • Lymphocytes, Absolute 04/12/2018 2.61  0.60 - 4.80 10*3/mm3 Final   • Monocytes, Absolute 04/12/2018 1.56* 0.00 - 1.00 10*3/mm3 Final   • Eosinophils, Absolute 04/12/2018 0.22  0.10 - 0.30 10*3/mm3 Final   • Basophils, Absolute 04/12/2018 0.10  0.00 - 0.20 10*3/mm3 Final   • Immature Grans, Absolute 04/12/2018 0.06* 0.00 - 0.03 10*3/mm3 Final   • nRBC 04/12/2018 0.0  0.0 - 0.0 /100 WBC Final   • WBC 04/13/2018 16.42* 4.80 - 10.80 10*3/mm3 Final   • RBC 04/13/2018 4.53  4.20 - 5.40 10*6/mm3 Final   • Hemoglobin 04/13/2018 11.4* 12.0 - 16.0 g/dL Final   • Hematocrit 04/13/2018 36.5* 37.0 - 47.0 % Final   • MCV 04/13/2018 80.6* 81.0 - 99.0 fL Final   • MCH 04/13/2018 25.2* 27.0 - 31.0 pg Final   • MCHC 04/13/2018 31.2  31.0 - 37.0 g/dL Final   • RDW 04/13/2018 19.4* 11.5 - 14.5 % Final   • RDW-SD 04/13/2018 56.0* 37.0 - 54.0 fl Final   • MPV 04/13/2018 9.8  7.4 - 10.4 fL Final   • Platelets 04/13/2018 475  140 - 500 10*3/mm3 Final   • Neutrophil % 04/13/2018 70.3* 45.0 - 70.0 % Final   • Lymphocyte % 04/13/2018 15.7* 20.0 - 45.0 % Final   • Monocyte % 04/13/2018 12.3* 3.0 - 8.0 % Final   • Eosinophil % 04/13/2018 0.9  0.0 - 4.0 % Final   • Basophil % 04/13/2018 0.4  0.0 - 2.0 % Final   • Immature Grans % 04/13/2018 0.4  0.0 - 0.5 % Final   • Neutrophils, Absolute 04/13/2018 11.53* 1.50 - 8.30 10*3/mm3 Final   • Lymphocytes, Absolute 04/13/2018 2.58  0.60 - 4.80 10*3/mm3 Final   • Monocytes, Absolute 04/13/2018 2.02* 0.00 - 1.00 10*3/mm3 Final   •  Eosinophils, Absolute 04/13/2018 0.15  0.10 - 0.30 10*3/mm3 Final   • Basophils, Absolute 04/13/2018 0.07  0.00 - 0.20 10*3/mm3 Final   • Immature Grans, Absolute 04/13/2018 0.07* 0.00 - 0.03 10*3/mm3 Final   • nRBC 04/13/2018 0.0  0.0 - 0.0 /100 WBC Final       No results found.    Discharge and Follow up Instructions:   Please see discharge instructions       Date: 4/13/2018    YUDITH Kinney      Time: Discharge less than 30 min     I have reviewed the notes, assessments, and/or procedures performed by Paulette Jefferson NP, I concur with her documentation of Nitish Barfield.

## 2018-04-13 NOTE — DISCHARGE INSTRUCTIONS
Total Knee Replacement Discharge Instructions:    I. ACTIVITIES:  1. Exercises:  ? Complete exercise program as taught by the hospital physical therapist 2 times per day  ? Exercise program will be advanced by the physical therapist  ? During the day be up ambulating every 2 hours (while awake) for short distances  ? Complete the ankle pump exercises at least 10 times per hour (while awake)  ? Elevate legs most of the day the first week post operatively and thereafter elevate legs when in bed and for at least 30 minutes during the day. Caution must be taken to avoid pillow placement under the bend of the knee as this can lead to flexion contractures of the knee.  ? Use cold packs 20-30 minutes approximately 5 times per day. This should be done before and after completing your exercises and at any time you are experiencing pain/ stiffness in your operative extremity.  ? Apply 6 inch ace wraps to operative extremity from ankle to groin. Please keep in place at all times except when bathing.      2. Activities of Daily Living:  ? No tub baths, hot tubs, or swimming pools for 4 weeks  ? May shower on post-operative day #3- Do not scrub or rub around the incision or mesh. No soap or alcohol to incision, just let water run over wound.         II. RESTRICTIONS  ? Do not cross legs or kneel  ? Your surgeon will discuss with you when you will be able to drive again.  ? Weight bearing as tolerated  ? First week stay inside on even terrain. May go up and down stairs one stair at a time utilizing the hand rail  ? After one week, you may venture outside.     III. PRECAUTIONS:  ? Everyone that comes near you should wash their hands  ? No elective dental, genital-urinary, or colon procedures or surgical procedures for 12 weeks after surgery unless absolutely necessary.  ?  If dental work or surgical procedure is deemed absolutely necessary, you will need to contact your surgeon as you will need to take antibiotics 1 hour prior to  any dental work (including teeth cleanings).  ? Please discuss with your surgeon prophylactic antibiotics as the length of time this intervention will be necessary for you varies with each patient’s health history and situation.  ? Avoid sick people. If you must be around someone who is ill, they should wear a mask.  ? Avoid visits to the Emergency Room or Urgent Care unless you are having a life              threatening event.     IV. INCISION CARE:  ? Wash your hands prior to dressing changes  ? Incision and knee should be covered with an ACE wrap daily for compression. No creams or ointments to the incision  ? Do not touch or pick at the incision  ? Check incision every day and notify surgeon immediately if any of the following signs or symptoms are noted:  o Increase in redness  o Increase in swelling around the incision and of the entire extremity  o Increase in pain  o Drainage oozing from the incision  o Pulling apart of the edges of the incision  o Increase in overall body temperature (greater than 100.5 degrees)  ? You will be given further instructions on removal of the glue and mesh over your incision at your first follow up visit at the office.     V. MEDICATIONS:   1. Anticoagulants: You will be discharged on an anticoagulant, typically either Aspirin or Eliquis. This is a prophylactic medication that helps prevent blood clots during your post-operative period. The type and length of dosage varies based on your individual needs, procedure performed, and surgeon’s preference.  ? While taking the anticoagulant, you should avoid taking any additional aspirin, ibuprofen (Advil or Motrin), Aleve (Naprosyn) or other non-steroidal anti-inflammatory medications.   ? Notify surgeon immediately if any nallely bleeding is noted in the urine, stool, emesis, or from the nose or the incision. Blood in the stool will often appear as black rather than red. Blood in urine may appear as pink. Blood in emesis may appear as  brown/black like coffee grounds.  ? You will need to apply pressure for longer periods of time to any cuts or abrasions to stop bleeding  ? Avoid alcohol while taking anticoagulants    2. Stool Softeners: You will be at greater risk of constipation after surgery due to being less mobile and the pain medications.   ? Take stool softeners as instructed by your surgeon while on pain medications. Over the counter Colace 100 mg 1-2 capsules twice daily.   ? If stools become too loose or too frequent, please decreases the dosage or stop the stool softener.  ? If constipation occurs despite use of stool softeners, you are to continue the stool softeners and add a laxative (Milk of Magnesia 1 ounce daily as needed)  ? Drink plenty of fluids, and eat fruits and vegetables during your recovery time    3. Pain Medications utilized after surgery are narcotics and the law requires that the following information be given to all patients that are prescribed narcotics:  ? CLASSIFICATION: Pain medications are called Opioids and are narcotics  ? LEGALITIES: It is illegal to share narcotics with others and to drive within 24 hours of taking narcotics  ? POTENTIAL SIDE EFFECTS: Potential side effects of opioids include: nausea, vomiting, itching, dizziness, drowsiness, dry mouth, constipation, and difficulty urinating.  ? POTENTIAL ADVERSE EFFECTS:   o Opioid tolerance can develop with use of pain medications and this simply means that it requires more and more of the medication to control pain; however, this is seen more in patients that use opioids for longer periods of time.  o Opioid dependence can develop with use of Opioids and this simply means that to stop the medication can cause withdrawal symptoms; however, this is seen with patients that use Opioids for longer periods of time.  o Opioid addiction can develop with use of Opioids and the incidence of this is very unlikely in patients who take the medications as ordered and  stop the medications as instructed.  o Opioid overdose can be dangerous, but is unlikely when the medication is taken as ordered and stopped when ordered. It is important not to mix opioids with alcohol or with and type of sedative such as Benadryl as this can lead to over sedation and respiratory difficulty.  ? DOSAGE:   o Pain medications will need to be taken consistently for the first week to decrease pain and promote adequate pain relief and participation in physical therapy.  o After the initial surgical pain begins to resolve, you may begin to decrease the pain medication. By the end of 6 weeks, you should be off of pain medications.  o Refills will not be given by the office during evening hours, on weekends, or after 12 weeks post-op.  o To seek refills on pain medications during the initial 6 week post-operative period, you must call the office 48 hours in advance to request the refill. The office will then notify you when to  the prescription. DO NOT wait until you are out of the medication to request a refill.    VI. FOLLOW-UP VISITS:  ? You will need to follow up in the office with Paulette Jefferson Nurse Practitioner in 2 weeks. Please call  [phone]  to schedule this appointment.  ? You will need to follow up with your primary care physician within 4 weeks.  ? If you have any concerns or suspected complications prior to your follow up visit, please call your surgeons office. Do not wait until your appointment time if you suspect complications. These will need to be addressed in the office promptly.

## 2018-04-13 NOTE — DISCHARGE INSTR - APPOINTMENTS
Violeta Hammond MD  PCP - General  PCP - Family Medicine   Family Medicine   [phone]  [phone] [place] [id number]    Next Steps: Follow up    Instructions: within four weeks after discharge         I will SWITCH the dose or number of times a day I take the medications listed below when I get home from the hospital:    Lantus 100 units/mL subcutaneous solution  -- 25 unit(s) subcutaneous once a day (at bedtime) I will SWITCH the dose or number of times a day I take the medications listed below when I get home from the hospital:  None

## 2018-04-13 NOTE — PLAN OF CARE
Problem: Patient Care Overview  Goal: Plan of Care Review   04/13/18 0803   OTHER   Outcome Summary Patients  present in the room. Upon arrival patient was lying sidelying with knees bent. Patient Min A with supine<>sit.. Mod A with sit<>stand transfers and had brace on when up. patient demonstrated forward trunk lean with limited weight on L.LE. Patient ambulated a total of 20 feet within room to use the restroom. Limited ambulation performed due to current cognition status. HEP x 10 reps performed supine. Patient able to complete SLR x 3 with CGA . Notified nsg that she can be up without the brace. Informed patient to continue to complete HEP and to lay with L.LE straight.

## 2018-04-13 NOTE — PLAN OF CARE
Problem: Patient Care Overview  Goal: Plan of Care Review   04/13/18 1104   OTHER   Outcome Summary OT- Patient issued reacher and educated on use for lower body dressing. Patient declined other long handled equipment and stated spouse available to assist if needed. Patient educated on home safety and reported she will have someone available 24/7.

## 2018-04-13 NOTE — PROGRESS NOTES
POD# 2 s/p right TKA    Subjective:Patient states she has done fairly well overnight, continues to experience pain at right knee, 7/10 denies fevers chills or sweats overnight, denies any residual numbness or tingling to operative extremity.  No calf pain or swelling.    Objective:  Vitals:    04/12/18 1522 04/12/18 1947 04/13/18 0022 04/13/18 0656   BP: 174/91 151/89 142/82 155/91   BP Location: Left arm Right arm     Patient Position: Lying Lying     Pulse: 92 90 85 82   Resp: 18 18 18 18   Temp: 97.7 °F (36.5 °C) 98.6 °F (37 °C) 98.6 °F (37 °C) 98.4 °F (36.9 °C)   TempSrc: Oral Oral     SpO2: 93% 94% 93% 95%   Weight:       Height:             Results from last 7 days  Lab Units 04/13/18  0344 04/12/18  0907   WBC 10*3/mm3 16.42* 15.04*   HEMOGLOBIN g/dL 11.4* 12.1   HEMATOCRIT % 36.5* 40.4   PLATELETS 10*3/mm3 475 492         Results from last 7 days  Lab Units 04/12/18  0907 04/11/18  0655 04/10/18  1535   SODIUM mmol/L 140  --   --    POTASSIUM mmol/L 3.7 3.7 3.5   CHLORIDE mmol/L 99  --   --    CO2 mmol/L 28.2  --   --    BUN mg/dL 7*  --   --    CREATININE mg/dL 0.82  --   --    GLUCOSE mg/dL 120*  --   --    CALCIUM mg/dL 8.6*  --   --        Exam:    Right knee- incision clean, dry, intact   Flex and extend toes and ankle   No calf pain, negative Homans sign   Positive sensation light touch right foot   Brisk cap refill all digits, palpable dorsalis pedis pulse,  moderate edema right foot    Impression: s/p right TKA    Plan:  1. PT/OT- weight-bear as tolerated, ambulation, range of motion  2. Pain control- oxycodone, Tylenol, Lyrica  3. DVT prophylaxis- aspirin twice a day  4. Wound care- Prineo dressing intact  5. Disposition- home with home health once medically stable and cleared by PT     I have reviewed the notes, assessments, and/or procedures performed by Paulette Jefferson NP, I concur with her documentation of Nitish Barfield.

## 2018-04-13 NOTE — PLAN OF CARE
Problem: Patient Care Overview  Goal: Plan of Care Review   04/13/18 8848   Coping/Psychosocial   Plan of Care Reviewed With patient   OTHER   Outcome Summary PT: patient perfromed sit to supine transfer with CGA, sit to/from stand transfer with SBA and gait x 120 feet with SBA and use of RW. Reviewed LE HEP. Patient to be discharged home today with home health. Patient will have family present at all times upon return home. Recommend continued use of RW for mobility

## 2018-04-13 NOTE — PLAN OF CARE
Problem: Patient Care Overview  Goal: Plan of Care Review   04/13/18 1123   Coping/Psychosocial   Plan of Care Reviewed With patient   Plan of Care Review   Progress improving   OTHER   Outcome Summary PT: Patient performs supine to sit transfer with CGA, sit to/from stand transfers with CGA and gait x 100 feet with one seated rest break with CGA and use of RW. Patient ascends/descends 5 stairs with two handrails with CGA x 2. She requires verbal cues for improved gait mechanics and intermittent cues for safety with use of AD. Provided and reviewed LE HEP. Patient insistent she wants to return home today. Will see patient this afternoon to assess for consistency of functional performance. Continue to recommend home with home health at discharge.

## 2018-04-13 NOTE — NURSING NOTE
Pt d/c home via w/c, family at side, pt a/o x 3, no c/o pain or s/s of distress noted at this time, d/c instructions given, pt verbalizes understanding, iv removed earlier, pt tolerated without diff,

## 2018-04-13 NOTE — THERAPY TREATMENT NOTE
Acute Care - Occupational Therapy Treatment Note  [facility]     Patient Name: Nitish Barfield  : 1955  MRN: [id number]  Today's Date: 2018  Onset of Illness/Injury or Date of Surgery: 18  Date of Referral to OT: 18  Referring Physician: Yogesh    Admit Date: 2018       ICD-10-CM ICD-9-CM   1. Status post total right knee replacement Z96.651 V43.65   2. Primary osteoarthritis of left knee M17.12 715.16     Patient Active Problem List   Diagnosis   • Anxiety   • Depression   • Gastroesophageal reflux disease with esophagitis   • Generalized anxiety disorder   • Hyperlipidemia   • Hypertension   • Impaired glucose tolerance   • Renal insufficiency   • Primary osteoarthritis of right knee   • Primary osteoarthritis of left knee     Past Medical History:   Diagnosis Date   • Allergy    • Anxiety    • Arthritis     RA, FINGERS, TOES   • Esophageal reflux    • GERD (gastroesophageal reflux disease)    • Hypertension    • Hypertension    • Left knee pain     scheduled for sx     Past Surgical History:   Procedure Laterality Date   • COLONOSCOPY     • ENDOSCOPY     • HYSTERECTOMY      OPEN   • KNEE SURGERY Left     ARTHROSCOPY   • WISDOM TOOTH EXTRACTION         Therapy Treatment          Rehabilitation Treatment Summary     Row Name 18 0940             Treatment Time/Intention    Discipline occupational therapist  -EN      Document Type therapy note (daily note)  -EN      Subjective Information complains of;pain  -EN      Mode of Treatment occupational therapy  -EN      Patient/Family Observations Patient reclined in chair, ice on right knee.  Spouse at side.  Agreed to treatment.  -EN      Care Plan Review care plan/treatment goals reviewed  -EN      Care Plan Review, Other Participant(s) spouse  -EN      Comment Patient reported she is being discharged today and will have home health.  Stated spouse will be able to assist .  -EN      Recorded by [EN] Hoda Weiner OTR  04/13/18 1103 04/13/18 1103      Row Name 04/13/18 0800             Transfer Assessment/Treatment    [place] Level (Toilet Transfer) minimum assist (75% patient effort);verbal cues  -KM      Assistive Device (Toilet Transfer) walker, front-wheeled  -KM      Recorded by [KM] Sharona Jovel, SURYA 04/13/18 0808 04/13/18 0808      Row Name 04/13/18 0800             Toilet Transfer    Type (Toilet Transfer) sit-stand;stand-sit  -KM      Recorded by [KM] Sharona Jovel, SURYA 04/13/18 0808 04/13/18 0808      Row Name 04/13/18 0940             Lower Body Dressing Assessment/Training    Comment (Lower Body Dressing) Patient issued reacher and educated on use for lower body dressing.  Patient declined other equipment.  Patient reported she will have spouse assist if needed.  Educated patient on home safety including removal of throw rugs.  -EN      Recorded by [EN] Hoda Weiner, OTR 04/13/18 1103 04/13/18 1103      Row Name 04/13/18 0940             Positioning and Restraints    Pre-Treatment Position sitting in chair/recliner  -EN      Post Treatment Position chair  -EN      In Bed call light within reach;encouraged to call for assist  -EN      Recorded by [EN] Hoda Weiner, OTR 04/13/18 1103 04/13/18 1103      Row Name 04/13/18 0940             Pain Scale: Numbers Pre/Post-Treatment    Pain Scale: Numbers, Pretreatment 7/10  -EN      Pain Scale: Numbers, Post-Treatment 7/10  -EN      Pain Intervention(s) Repositioned;Cold applied  -EN      Recorded by [EN] Hoda Weiner, OTR 04/13/18 1103 04/13/18 1103      Row Name                Wound 04/11/18 1022 Left knee incision    Wound - Properties Group Date first assessed: 04/11/18 [AA] Time first assessed: 1022 [AA] Side: Left [AA] Location: knee [AA] Type: incision [AA] Recorded by:  [AA] Chio Spear RN 04/11/18 1022 04/11/18 1022      User Key  (r) = Recorded By, (t) = Taken By, (c) = Cosigned By    Initials Name Effective Dates Discipline    EN  Hoda Weiner, OTR 06/22/16 -  OT    ZOILA Spear, LUCIO 06/16/16 -  Nurse    LILLIAM Jovel PTA 04/08/18 -  PT        Wound 04/11/18 1022 Left knee incision (Active)   Dressing Appearance dry;intact;no drainage 4/12/2018  8:20 PM   Closure HERMILO 4/12/2018  8:20 PM         Occupational Therapy Education     Title: PT OT SLP Therapies (Done)     Topic: Occupational Therapy (Done)     Point: ADL training (Done)     Description: Instruct learner(s) on proper safety adaptation and remediation techniques during self care or transfers.   Instruct in proper use of assistive devices.   Learning Progress Summary     Learner Status Readiness Method Response Comment Documented by    Patient Done Acceptance E VU Patient educated on long handled adaptive equipment for improved ADL independence and home safety. EN 04/13/18 1106     Done Acceptance E VU,NR HEP x 10 reps completed. Proper technique for transfer  04/13/18 0802     Done Acceptance E VU,Bed HQ pt educated on benefits of activity, safety with functional transfers and mobility and adls  04/12/18 1158          Point: Body mechanics (Done)     Description: Instruct learner(s) on proper positioning and spine alignment during self-care, functional mobility activities and/or exercises.   Learning Progress Summary     Learner Status Readiness Method Response Comment Documented by    Patient Done Acceptance E VU,NR HEP x 10 reps completed. Proper technique for transfer  04/13/18 0802     Done Acceptance E VU,Bed HQ pt educated on benefits of activity, safety with functional transfers and mobility and adls  04/12/18 1158                      User Key     Initials Effective Dates Name Provider Type Discipline    EN 06/22/16 -  Hoda Weiner, OTR Occupational Therapist OT     06/22/16 -  Selena Orozco, OTR Occupational Therapist OT     04/08/18 -  Sharona Jovel PTA Physical Therapy Assistant PT                OT Recommendation and Plan      Outcome Summary: OT-  Patient issued reacher and educated on use for lower body dressing.  Patient declined other long handled equipment and stated spouse available to assist if needed.  Patient educated on home safety and reported she will have someone available 24/7.          Outcome Measures     Row Name 04/12/18 1315 04/12/18 0856 04/12/18 0855       How much help from another person do you currently need...    Turning from your back to your side while in flat bed without using bedrails? 3  -KM  -- 3  -BP    Moving from lying on back to sitting on the side of a flat bed without bedrails? 3  -KM  -- 3  -BP    Moving to and from a bed to a chair (including a wheelchair)? 3  -KM  -- 3  -BP    Standing up from a chair using your arms (e.g., wheelchair, bedside chair)? 2  -KM  -- 3  -BP    Climbing 3-5 steps with a railing? 2  -KM  -- 2  -BP    To walk in hospital room? 3  -KM  -- 3  -BP    AM-PAC 6 Clicks Score 16  -KM  -- 17  -BP       How much help from another is currently needed...    Putting on and taking off regular lower body clothing?  -- 3  -JJ  --    Bathing (including washing, rinsing, and drying)  -- 3  -JJ  --    Toileting (which includes using toilet bed pan or urinal)  -- 3  -JJ  --    Putting on and taking off regular upper body clothing  -- 4  -JJ  --    Taking care of personal grooming (such as brushing teeth)  -- 4  -JJ  --    Eating meals  -- 4  -JJ  --    Score  -- 21  -JJ  --       Functional Assessment    Outcome Measure Options AM-PAC 6 Clicks Basic Mobility (PT)  -KM AM-PAC 6 Clicks Daily Activity (OT)  -J AM-PAC 6 Clicks Basic Mobility (PT)  -BP      User Key  (r) = Recorded By, (t) = Taken By, (c) = Cosigned By    Initials Name Provider Type    RE Orozco, OTR Occupational Therapist    BP Zakiya Henning, PT Physical Therapist    KM Sharona Jovel, PTA Physical Therapy Assistant           Time Calculation:         Time Calculation- OT     Row Name 04/13/18 1109              Time Calculation- OT    OT Start Time 0940  -EN      OT Stop Time 0954  -EN      OT Time Calculation (min) 14 min  -EN        User Key  (r) = Recorded By, (t) = Taken By, (c) = Cosigned By    Initials Name Provider Type    CATY Clayton Occupational Therapist           Therapy Charges for Today     Code Description Service Date Service Provider Modifiers Qty    [id number] HC OT SELF CARE/MGMT/TRAIN EA 15 MIN 4/13/2018 CATY Crum GO 1               CATY Summers  4/13/2018

## 2018-04-13 NOTE — THERAPY EVALUATION
"Acute Care - Physical Therapy Treatment Note  [facility]     Patient Name: Nitish Barfield  : 1955  MRN: [id number]  Today's Date: 2018  Onset of Illness/Injury or Date of Surgery: 18  Date of Referral to PT: 18  Referring Physician: Yogesh    Admit Date: 2018    Visit Dx:    ICD-10-CM ICD-9-CM   1. Status post total right knee replacement Z96.651 V43.65   2. Primary osteoarthritis of left knee M17.12 715.16     Patient Active Problem List   Diagnosis   • Anxiety   • Depression   • Gastroesophageal reflux disease with esophagitis   • Generalized anxiety disorder   • Hyperlipidemia   • Hypertension   • Impaired glucose tolerance   • Renal insufficiency   • Primary osteoarthritis of right knee   • Primary osteoarthritis of left knee       Therapy Treatment          Rehabilitation Treatment Summary     Row Name 18 1305 18 0940 18 0900       Treatment Time/Intention    Discipline physical therapist  -BP occupational therapist  -EN physical therapist  -BP    Document Type therapy note (daily note)  -BP therapy note (daily note)  -EN therapy note (daily note)  -BP    Subjective Information complains of;pain  -BP complains of;pain  -EN complains of;pain   states \"I want to get out of here\"   -BP    Mode of Treatment physical therapy  -BP occupational therapy  -EN physical therapy  -BP    Patient/Family Observations Patient reclined in chair, family present. Surgeon present. In no acute distress. Per MD patient to be discharged home today. Patient agrees to treatment.   -BP Patient reclined in chair, ice on right knee.  Spouse at side.  Agreed to treatment.  -EN Patient supine in bed with HOB elevated.  present. Agrees to treatment   -BP    Care Plan Review care plan/treatment goals reviewed  -BP care plan/treatment goals reviewed  -EN care plan/treatment goals reviewed  -BP    Care Plan Review, Other Participant(s)  -- spouse  -EN spouse  -BP    Patient Effort good  -BP " " -- good  -BP    Comment  -- Patient reported she is being discharged today and will have home health.  Stated spouse will be able to assist 24/7.  -EN  --    Existing Precautions/Restrictions fall  -BP  -- fall  -BP    Treatment Considerations/Comments  --  -- Patient states \"I want to go home, I think I will do better at home.\"   -BP    Recorded by [BP] Zakiya Henning, PT 04/13/18 1339 04/13/18 1339 [EN] Hoda Weiner, OTR 04/13/18 1103 04/13/18 1103 [BP] Zakiya Henning, PT 04/13/18 1123 04/13/18 1123    Row Name 04/13/18 0900             Cognitive Assessment/Intervention    Additional Documentation Cognitive Assessment/Intervention (Group)  -BP      Recorded by [BP] Zakiya Henning, PT 04/13/18 1123 04/13/18 1123      Row Name 04/13/18 1305 04/13/18 0900          Cognitive Assessment/Intervention- PT/OT    Personal Safety Interventions gait belt;nonskid shoes/slippers when out of bed  -BP nonskid shoes/slippers when out of bed;gait belt  -BP     Recorded by [BP] Zakiya Henning, PT 04/13/18 1339 04/13/18 1339 [BP] Zakiya Henning, PT 04/13/18 1123 04/13/18 1123     Row Name 04/13/18 0900             Safety Issues, Functional Mobility    Safety Issues Affecting Function (Mobility) positioning of assistive device  -BP      Recorded by [BP] Zakiya Henning, PT 04/13/18 1123 04/13/18 1123      Row Name 04/13/18 1305 04/13/18 0900          Bed Mobility Assessment/Treatment    Supine-Sit [place] (Bed Mobility)  -- contact guard  -BP     Sit-Supine [place] (Bed Mobility) contact guard  -BP  --     Assistive Device (Bed Mobility)  -- bed rails;head of bed elevated  -BP     Comment (Bed Mobility) Patient requried CGA to guide L LE into bed. Patient states she has family to assist as needed upon return home.   -BP Patient requires extended time to complete transfer.   -BP     Recorded by [BP] Zakiya Henning, PT 04/13/18 1339 04/13/18 1339 [BP] Zakiya Henning, PT 04/13/18 1123 04/13/18 1123     " Row Name 04/13/18 1305 04/13/18 0900 04/13/18 0800       Transfer Assessment/Treatment    Transfer Assessment/Treatment sit-stand transfer;stand-sit transfer  -BP sit-stand transfer;stand-sit transfer;toilet transfer  -BP  --    Comment (Transfers) Patient does not required verbal cues for hand placement during transferrs.   -BP Patient requires verbal cues for hand placement initially however able to correct and demonstrate carry over with in session   -BP  --    Sit-Stand [place] (Transfers) stand by assist  -BP contact guard;stand by assist;verbal cues  -BP  --    Stand-Sit [place] (Transfers) stand by assist  -BP contact guard;verbal cues  -BP  --    [place] Level (Toilet Transfer)  -- contact guard  -BP minimum assist (75% patient effort);verbal cues  -KM    Assistive Device (Toilet Transfer)  -- commode, bedside without drop arms;walker, front-wheeled  -BP walker, front-wheeled  -KM    Recorded by [BP] Zakiya Henning, PT 04/13/18 1339 04/13/18 1339 [BP] Zakiya Henning, PT 04/13/18 1123 04/13/18 1123 [KM] Sharona Jovel, PTA 04/13/18 0808 04/13/18 0808    Row Name 04/13/18 1305 04/13/18 0900          Sit-Stand Transfer    Assistive Device (Sit-Stand Transfers) walker, front-wheeled  -BP walker, front-wheeled  -BP     Recorded by [BP] Zakiya Henning, PT 04/13/18 1339 04/13/18 1339 [BP] Zakiya Henning, PT 04/13/18 1123 04/13/18 1123     Row Name 04/13/18 1305 04/13/18 0900          Stand-Sit Transfer    Assistive Device (Stand-Sit Transfers) walker, front-wheeled  -BP walker, front-wheeled  -BP     Recorded by [BP] Zakiya Henning, PT 04/13/18 1339 04/13/18 1339 [BP] Zakiya Henning, PT 04/13/18 1123 04/13/18 1123     Row Name 04/13/18 0900 04/13/18 0800          Toilet Transfer    Type (Toilet Transfer) sit-stand;stand-sit  -BP sit-stand;stand-sit  -KM     Recorded by [BP] Zakiya Henning, PT 04/13/18 1123 04/13/18 1123 [KM] Sharona Jovel, PTA 04/13/18 0808 04/13/18 0808     Row Name  04/13/18 1305 04/13/18 0900          Gait/Stairs Assessment/Training    [place] Level (Gait) stand by assist  -BP contact guard;verbal cues  -BP     Assistive Device (Gait) walker, front-wheeled  -BP walker, front-wheeled  -BP     Distance in Feet (Gait) 120   no seated rest breaks   -BP 60  -BP     Pattern (Gait) swing-to  -BP step-to  -BP     Deviations/Abnormal Patterns (Gait) lisa decreased  -BP antalgic  -BP     Bilateral Gait Deviations forward flexed posture  -BP forward flexed posture  -BP     Left Sided Gait Deviations heel strike decreased  -BP heel strike decreased  -BP     Right Sided Gait Deviations --   decreased step length   -BP --   Decreased step length   -BP     [place] Level (Stairs)  -- contact guard;verbal cues;2 person assist  -BP     Handrail Location (Stairs)  -- both sides  -BP     Number of Steps (Stairs)  -- 5  -BP     Ascending Technique (Stairs)  -- step-to-step  -BP     Descending Technique (Stairs)  -- step-to-step  -BP     Comment (Gait/Stairs) Patient requires verbal cues for upright posture and improved step length   -BP Patient requires verbal cues for upright posture and improved step length on the right. Intermittent verbal cues required for improved distance to AD. No LOB noted during gait training. Patient able to perform stair training with CGA however requires verbal cues for sequencing.   -BP     Recorded by [BP] Zakiya Henning, PT 04/13/18 1339 04/13/18 1339 [BP] Zakiya Henning, PT 04/13/18 1123 04/13/18 1123     Row Name 04/13/18 0940             Lower Body Dressing Assessment/Training    Comment (Lower Body Dressing) Patient issued reacher and educated on use for lower body dressing.  Patient declined other equipment.  Patient reported she will have spouse assist if needed.  Educated patient on home safety including removal of throw rugs.  -EN      Recorded by [EN] Hoda Weiner, OTR 04/13/18 1103 04/13/18 1103      Row Name 04/13/18 0900              Motor Skills Assessment/Interventions    Additional Documentation Therapeutic Exercise Interventions (Group);Therapeutic Exercise (Group)  -BP      Recorded by [BP] Zakiya Henning, PT 04/13/18 1123 04/13/18 1123      Row Name 04/13/18 1305 04/13/18 0900          Therapeutic Exercise    Lower Extremity (Therapeutic Exercise)  -- SLR (straight leg raise), left;SAQ (short arc quad), left;quad sets, left;LAQ (long arc quad), left;heel slides, left;marching while seated   L hip ABD/ADD   -BP     Position (Therapeutic Exercise)  -- seated  -BP     Sets/Reps (Therapeutic Exercise)  -- 10 reps.   -BP     Comment (Therapeutic Exercise) Reviewed LE HEP. Addressed questions regarding specific exercises.   -BP Provided and reviewed written LE HEP.   -BP     Recorded by [BP] Zakiya Henning, PT 04/13/18 1339 04/13/18 1339 [BP] Zakiya Henning, PT 04/13/18 1123 04/13/18 1123     Row Name 04/13/18 1305 04/13/18 0940 04/13/18 0900       Positioning and Restraints    Pre-Treatment Position sitting in chair/recliner  -BP sitting in chair/recliner  -EN in bed  -BP    Post Treatment Position bed  -BP chair  -EN chair  -BP    In Bed notified nsg;supine;call light within reach;encouraged to call for assist;with family/caregiver  -BP call light within reach;encouraged to call for assist  -EN  --    In Chair  --  -- notified nsg;reclined;call light within reach;encouraged to call for assist;with family/caregiver  -BP    Recorded by [BP] Zakiya Henning, PT 04/13/18 1339 04/13/18 1339 [EN] Hoda Weiner, OTR 04/13/18 1103 04/13/18 1103 [BP] Zakiya Henning, PT 04/13/18 1123 04/13/18 1123    Row Name 04/13/18 1305 04/13/18 0940 04/13/18 0900       Pain Scale: Numbers Pre/Post-Treatment    Pain Scale: Numbers, Pretreatment 7/10  -BP 7/10  -EN 7/10  -BP    Pain Scale: Numbers, Post-Treatment 7/10  -BP 7/10  -EN 7/10  -BP    Pain Location - Side Left  -BP  -- Left  -BP    Pain Location knee  -BP  -- knee  -BP    Pain  Intervention(s) Repositioned;Cold applied  -BP Repositioned;Cold applied  -EN Repositioned;Cold applied;Medication (See MAR)   Notified RN  -BP    Recorded by [BP] Zakiya Henning, PT 04/13/18 1339 04/13/18 1339 [EN] Hoda Weiner, OTR 04/13/18 1103 04/13/18 1103 [BP] Zakiya Henning, PT 04/13/18 1123 04/13/18 1123    Row Name                Wound 04/11/18 1022 Left knee incision    Wound - Properties Group Date first assessed: 04/11/18 [AA] Time first assessed: 1022 [AA] Side: Left [AA] Location: knee [AA] Type: incision [AA] Recorded by:  [AA] Chio Spear RN 04/11/18 1022 04/11/18 1022    Row Name 04/13/18 0900             Plan of Care Review    Plan of Care Reviewed With patient;spouse  -BP      Recorded by [BP] Zakiya Henning, PT 04/13/18 1123 04/13/18 1123      Row Name 04/13/18 1305 04/13/18 0900          Outcome Summary/Treatment Plan (PT)    Daily Summary of Progress (PT) progress toward functional goals is good  -BP progress toward functional goals is good  -BP     Barriers to Overall Progress (PT)  -- Pain  -BP     Plan for Continued Treatment (PT)  -- Plan to see patient again today. Patient wants to return home today. Will assess for consistency of functional performance today. Will notify RN of patient performance this afternoon for possible discharge home today.   -BP     Anticipated Equipment Needs at Discharge (PT)  -- front wheeled walker  -BP     Anticipated Discharge Disposition (PT)  -- home with home health care   home with assist from family   -BP     Reason for Discharge (PT Discharge Summary) patient discharged from this facility  -BP  --     Recorded by [BP] Zakiya Henning, PT 04/13/18 1339 04/13/18 1339 [BP] Zakiya Henning, PT 04/13/18 1123 04/13/18 1123       User Key  (r) = Recorded By, (t) = Taken By, (c) = Cosigned By    Initials Name Effective Dates Discipline    EN Hoda Weiner, OTR 06/22/16 -  OT    AA Chio Spear, RN 06/16/16 -  Nurse    BP Sigala  Juvencio, PT 04/03/18 -  PT    KM Sharona Jovel PTA 04/08/18 -  PT          Wound 04/11/18 1022 Left knee incision (Active)   Dressing Appearance dry;intact;no drainage 4/12/2018  8:20 PM   Closure HERMILO 4/12/2018  8:20 PM             Physical Therapy Education     Title: PT OT SLP Therapies (Done)     Topic: Physical Therapy (Done)     Point: Mobility training (Done)    Learning Progress Summary     Learner Status Readiness Method Response Comment Documented by    Patient Done Acceptance E VU  BP 04/13/18 1339     Done Acceptance E VU  BP 04/13/18 1123     Done Acceptance E VU,NR HEP x 10 reps completed. Proper technique for transfer KM 04/13/18 0802     Done Acceptance E VU  BP 04/12/18 1117    Significant Other Done Acceptance E VU  BP 04/13/18 1123          Point: Home exercise program (Done)    Learning Progress Summary     Learner Status Readiness Method Response Comment Documented by    Patient Done Acceptance E VU  BP 04/13/18 1339     Done Acceptance E VU  BP 04/13/18 1123     Done Acceptance E VU,NR HEP x 10 reps completed. Proper technique for transfer KM 04/13/18 0802     Done Acceptance E VU  BP 04/12/18 1117    Significant Other Done Acceptance E VU  BP 04/13/18 1123                      User Key     Initials Effective Dates Name Provider Type Discipline     04/03/18 -  Zakiya Henning, PT Physical Therapist PT     04/08/18 -  Sharona Jovel PTA Physical Therapy Assistant PT                    PT Recommendation and Plan  Anticipated Discharge Disposition (PT): home with home health care  Planned Therapy Interventions (PT Eval): bed mobility training, gait training, home exercise program, stair training, strengthening, transfer training, patient/family education  Therapy Frequency (PT Clinical Impression): 2 times/day  Outcome Summary/Treatment Plan (PT)  Daily Summary of Progress (PT): progress toward functional goals is good  Barriers to Overall Progress (PT): Pain  Plan for Continued Treatment (PT):  Plan to see patient again today. Patient wants to return home today. Will assess for consistency of functional performance today. Will notify RN of patient performance this afternoon for possible discharge home today.   Anticipated Equipment Needs at Discharge (PT): front wheeled walker  Anticipated Discharge Disposition (PT): home with home health care  Patient/Family Concerns, Anticipated Discharge Disposition (PT): Patient states she will have help at home upon return home. She states she wants to start with home health PT vs outpatient initially upon return home   Reason for Discharge (PT Discharge Summary): patient discharged from this facility  Plan of Care Reviewed With: patient  Progress: improving  Outcome Summary: PT: patient perfromed sit to supine transfer with CGA, sit to/from stand transfer with SBA and gait x 120 feet with SBA and use of RW. Reviewed LE HEP. Patient to be discharged home today with home health. Patient will have family present at all times upon return home. Recommend continued use of RW for mobility          Outcome Measures     Row Name 04/13/18 1305 04/13/18 0900 04/12/18 1315       How much help from another person do you currently need...    Turning from your back to your side while in flat bed without using bedrails? 3  -BP 3  -BP 3  -KM    Moving from lying on back to sitting on the side of a flat bed without bedrails? 3  -BP 3  -BP 3  -KM    Moving to and from a bed to a chair (including a wheelchair)? 3  -BP 3  -BP 3  -KM    Standing up from a chair using your arms (e.g., wheelchair, bedside chair)? 3  -BP 3  -BP 2  -KM    Climbing 3-5 steps with a railing? 3  -BP 3  -BP 2  -KM    To walk in hospital room? 3  -BP 3  -BP 3  -KM    AM-PAC 6 Clicks Score 18  -BP 18  -BP 16  -KM       Functional Assessment    Outcome Measure Options AM-PAC 6 Clicks Basic Mobility (PT)  -BP AM-PAC 6 Clicks Basic Mobility (PT)  -BP AM-PAC 6 Clicks Basic Mobility (PT)  -KM    Row Name 04/12/18 0886  04/12/18 0855          How much help from another person do you currently need...    Turning from your back to your side while in flat bed without using bedrails?  -- 3  -BP     Moving from lying on back to sitting on the side of a flat bed without bedrails?  -- 3  -BP     Moving to and from a bed to a chair (including a wheelchair)?  -- 3  -BP     Standing up from a chair using your arms (e.g., wheelchair, bedside chair)?  -- 3  -BP     Climbing 3-5 steps with a railing?  -- 2  -BP     To walk in hospital room?  -- 3  -BP     AM-PAC 6 Clicks Score  -- 17  -BP        How much help from another is currently needed...    Putting on and taking off regular lower body clothing? 3  -JJ  --     Bathing (including washing, rinsing, and drying) 3  -JJ  --     Toileting (which includes using toilet bed pan or urinal) 3  -JJ  --     Putting on and taking off regular upper body clothing 4  -JJ  --     Taking care of personal grooming (such as brushing teeth) 4  -JJ  --     Eating meals 4  -JJ  --     Score 21  -JJ  --        Functional Assessment    Outcome Measure Options AM-PAC 6 Clicks Daily Activity (OT)  -JJ AM-PAC 6 Clicks Basic Mobility (PT)  -BP       User Key  (r) = Recorded By, (t) = Taken By, (c) = Cosigned By    Initials Name Provider Type     Selena Orozco, OTR Occupational Therapist    BP Zakiya Henning, PT Physical Therapist     Sharona Jovel, PTA Physical Therapy Assistant           Time Calculation:         PT Charges     Row Name 04/13/18 1342 04/13/18 1126 04/13/18 0808       Time Calculation    Start Time 1305  -BP 0900  -BP 1315  -KM    Stop Time 1326  -BP 0935  -BP 1340  -KM    Time Calculation (min) 21 min  -BP 35 min  -BP 25 min  -KM    PT Received On 04/13/18  -BP 04/13/18  -BP  --    PT - Next Appointment  -- 04/13/18  -BP  --      User Key  (r) = Recorded By, (t) = Taken By, (c) = Cosigned By    Initials Name Provider Type     Zakiya Henning, PT Physical Therapist    LILLIAM Tabor  SURYA Jovel Physical Therapy Assistant          Therapy Charges for Today     Code Description Service Date Service Provider Modifiers Qty    10334488359 HC PT EVAL LOW COMPLEXITY 3 4/12/2018 Zakiya Henning, PT GP 1    51579400016 HC PT THER SUPP EA 15 MIN 4/13/2018 Zakiya Henning, PT GP 1    86112885949 HC PT THER PROC EA 15 MIN 4/13/2018 Zakiya Henning, PT GP 1    18240880035 HC GAIT TRAINING EA 15 MIN 4/13/2018 Zakiya Henning, PT GP 1    43594459395 HC GAIT TRAINING EA 15 MIN 4/13/2018 Zakiya Henning, PT GP 1          PT G-Codes  Outcome Measure Options: AM-PAC 6 Clicks Basic Mobility (PT)    Zakiya Henning, PT  4/13/2018

## 2018-04-14 NOTE — NURSING NOTE
Case Management Discharge Note    Final Note: discharged home with HH.     Destination     No service coordination in this encounter.      Durable Medical Equipment - Selection Complete     Service Request Status Selected Specialties Address Phone Number Fax Number    [facility] [place] Selected [facility] Services [id number], [facility] [id number] [phone]      Dialysis/Infusion     No service coordination in this encounter.      Home Medical Care - Selection Complete     Service Request Status Selected Specialties Address Phone Number Fax Number    [facility] [place] Selected [facility] Services [address] [place] [address] [address] [phone]      Social Care     No service coordination in this encounter.             Final Discharge Disposition Code: 06 - home with home health care

## 2018-04-23 RX ORDER — OXYCODONE HYDROCHLORIDE AND ACETAMINOPHEN 5; 325 MG/1; MG/1
TABLET ORAL
Qty: 80 TABLET | Refills: 0
Start: 2018-04-23 | End: 2018-04-23 | Stop reason: SDUPTHER

## 2018-04-23 RX ORDER — OXYCODONE HYDROCHLORIDE AND ACETAMINOPHEN 5; 325 MG/1; MG/1
TABLET ORAL
Qty: 80 TABLET | Refills: 0 | Status: SHIPPED | OUTPATIENT
Start: 2018-04-23 | End: 2018-05-02 | Stop reason: SDUPTHER

## 2018-04-23 RX ORDER — GABAPENTIN 300 MG/1
300 CAPSULE ORAL 3 TIMES DAILY
Qty: 42 CAPSULE | Refills: 0 | Status: SHIPPED | OUTPATIENT
Start: 2018-04-23 | End: 2018-05-08 | Stop reason: SDUPTHER

## 2018-04-23 NOTE — TELEPHONE ENCOUNTER
Patient calling for an RX refill request of Percocet- she is taking 2-5/325mg tablets every 6 hours around the clock.    She would also like a refill on her gabapentin.     She is status post TKA Right on 04.11.2018.    RX's are pended for your approval.    Thanks.

## 2018-04-24 NOTE — OP NOTE
Date of Surgery: 4/11/18    Preoperative diagnosis: left knee osteoarthritis    Postoperative diagnosis: left knee osteoarthritis    Procedure:  1.left total knee arthroplasty  2. Intraoperative use of kinetic knee balance sensor for implant stability during total knee arthroplasty    Surgeon: Yogesh Kumar.: [facility]     Estimated blood loss: <500ml    Fluids: per anesthesia    Specimens: None    Complications: None    Drains: None    Tourniquet time: Tourniquet Times:     Inflated: 4/11/2018  8:51 AM     Deflated: 4/11/2018 10:46 AM    Implants used: Rochelle Persona total knee arthroplasty system with a size 35 patella, size E tibia, size 8 standard cruciate retaining femoral component , 11 mm highly cross-linked medial congruent polyethylene insert, antibiotic Palacos cement    Examination under anesthesia: left knee passive range of motion 5-120°, varus alignment, no open wounds lacerations or abrasions over knee, stable to varus and valgus stress at 5 and 30°, 2+ dorsalis pedis pulse.    Indications for procedure: Patient is a pleasant 63 y.o. female who has had significant pain to left knee over the last several years, has failed conservative treatment with intra-articular injections, bracing, home exercise program, activity modification, anti-inflammatory medications. Has had persistent pain limiting activities of daily living and even has had pain at rest. We discussed treatment options and patient wished to proceed with above-mentioned surgery. Was explained details of procedure as well as risks benefits and alternatives as documented on history and physical and had all questions answered. No guarantees were given in regards to results of the surgery.    Details of procedure: Patient was seen, evaluated, and cleared for surgery by anesthesia. Had been medically optimized by primary care physician. Patient was met in the preoperative hold area, operative site was marked, consent was  reviewed, history and physical was updated, and preoperative labs were reviewed. Regional block was placed per anesthesia and patient was taken to the operating room and placed in supine position on a regular OR table. After successful spinal placement per anesthesia, examination under anesthesia was carried out this time. Nonsterile tourniquet was then applied to left lower extremity. Patient was secured to the table with a waist strap and all bony prominences were well-padded. left lower extremity was then sterilely prepped and draped in standard fashion.  Formal timeout was completed including confirmation of history and physical, operative consent, operative site, patient identification number, and preoperative antibiotics administration. left leg was then exsanguinated and tourniquet inflated to 300 mmHg. Procedure was then begun with longitudinal incision over the anterior aspect of the left knee through skin and subcutaneous tissue with 15 blade. Minimal subcutaneous flaps were developed at this point in time, and standard medial parapatellar arthrotomy was then created at this point. Portion of suprapatellar and infrapatellar fat pad were resected this point in time. Medial tibial peel was carried out in order to allow for further exposure the knee. Medial and lateral meniscus were resected this time and ACL was transected.  Patella was then everted and measured with a caliper. Patellar reamer was then used to create initial retropatellar cut followed by completion of cut with a oscillating saw in standard fashion and use of rongeur. Patella was sized as a 35 and patella guard was placed at this time.  Attention was then turned to the distal femur with the knee being brought into a flexed position.  Reference pin for I-Assist navigational system was placed in the distal femur in-line with Whitesides line in retrograde fashion.  Navigational distal femoral cutting block was positioned at this time as well and  calibrated with multiple planes of knee and hip motion carried out to set reference positions for navigation device.  Navigational device was then adjusted to 0 degrees valgus cut on distal femur and 3 degrees flexion cut on distal femur.  Distal femoral cutting block was pinned into position at this site, navigational device removed. Depth of the resection was checked with a sickle and noted to be appropriate. Additional pin was placed for security of the cutting block and oscillating saw was then used to create a distal femoral cut in standard fashion while collateral ligaments were protected with Z retractors. Bone was removed and measured at this time.  Cut validation was completed with navigational device at this point time as well confirming appropriate cut consistent with set parameters as noted above. Pins and cutting block were removed as well. Contents of the notch were then resected including the ACL, PCL, and posterior horns of medial and lateral meniscus. Posterior retractor was then placed and tibia was subluxated anteriorly.   Attention was then turned to the proximal tibia. Additional release of lateral tissues was completed at this time to allow for appropriate visualization of the proximal tibia articular surface. I-Assist navigational device was then pinned in position over the tibial tubercle with 3 pins at this time, external guide was placed in line with the tibial crest and clamped into position over the ankle while proximal guide pins were impacted into the top of the tibia.  Navigational pods were then calibrated with range of motion of the hip and once noted to be appropriate, external guide from tibia was removed.  Adjustments were made to the navigational device to place the cut in neutral varus and 4 degrees posterior slope.  Tibial cutting block resection depth was set with a sickle, block was pinned into position at this time, and alignment assessed with the drop cliff noted to be in  line with tibial crest, medial third of tibial tubercle, and center of the ankle joint.  Oscillating saw was then used to complete the proximal tibial cut while collateral ligaments were protected with Z retractors. Bone fragments were removed and measured.  Tibial cut was then validated with validation device from navigational system and confirmed to be within reasonable position of above-mentioned set parameters for the proximal tibia cut. Knee was brought into full extension with extension gap being checked with spacer block at this time and noted be acceptable with knee brought into full extension, stable medial and lateral collateral stability at full extension.  Navigational G8 was then removed at this point time.   Attention was then returned to the distal femur with a femoral sizing guide being placed, transverse epicondylar axis and Whitesides line being assessed and used to determine appropriate external rotation of 3 degrees.  Femoral sizing guide was secured to the distal femur with 2 screws, sizing caliper was adjusted to the anterior femur and femur was sized at a 8.  2 drill holes were made through the sizing guide which was then removed including 2 screws. Size 8 femoral cutting block was then impacted onto the distal femoral cut surface and pinned into position. Sickle was used to check the anterior cut and there was no evidence of anticipated notching. Collateral ligaments were protected with Z retractors while anterior, posterior, and chamfer cuts were created in standard fashion with oscillating saw. Femoral cutting block was removed at this time as well as bone fragments. Posterior capsule was stripped at this time. Injection of posterior capsule was completed with exparel solution at this time as well. Size 8 femoral trial was placed. Size E tibial baseplate trial with 11 mm polyethylene trial was inserted. Knee was then ranged from 0-125°, good varus valgus stability at full extension and 30°  as well as throughout mid flexion with good AP translation at 90° of flexion noted.  Patella was everted at this point in time, 35 mm patella reaming guide was placed and lug holes were drilled for patella at this point. Patella trial was placed and patella was noted to track in midline with no evidence of subluxation. Knee was ranged from full extension to full flexion and tibial rotation was noted with midline of tibial trial being marked with Bovie electrocautery at the proximal tibia. Femoral and patellar and tibial trials were removed at this point in time and tibia was subluxated anteriorly with posterior retractor. Tibial trial baseplate was placed once again, position confirmed with drop cliff as well as with previous marking for tibial rotation and assessing for bony coverage of implant. Once position of tibial baseplate was noted to be appropriate, 2 pins were placed at this time, and reamer and punch were then used through the tibial baseplate.  All trial components were once again removed at this time and pulsatile lavage was used to thoroughly clean all bony cut surfaces as well as subcutaneous tissue. Final implants were opened on the back table this time. Cement was prepared on the back table and was applied to the cut surfaces of the distal femur, proximal tibia, and patella as well as to the backside of the implants. Tibial component was placed first followed by distal femur followed by patella. Extraneous cement was removed with freer at this time. Once all implants were in position knee was brought into full extension with axial compression to allow for cement to cure fully.  Once cement was completely hardened, trial polyethylene insert was assessed, range of motion of knee from 0-125° was achieved with good varus and valgus stability throughout range of motion and good AP translation at 90° of flexion. Thus a 11 mm polyethylene insert was opened on the back table, inserted and locked in  appropriately into the tibial baseplate. Knee was thoroughly irrigated with a second evaluation for any additional bone fragments or cement particles. Knee was then thoroughly irrigated with Betadine solution followed by pulsatile lavage. Exparel injection was injected into the periosteal and subcutaneous layers at this time.   Attention was then turned to closure of the wound with running self locking #2 suture ×1, 2-0 Vicryls in inverted fashion for deep subcutaneous closure, 2-0 running slef locking strata-fix suture, and Prineo glue and mesh for skin. Wound was dressed with Telfa, 4 x 4 gauze, web roll, ABD pad, Ace wrap, and patient was placed in knee immobilizer and given ice pack. At the end of procedure all lap, needle and sponge counts were correct ×2. Patient had brisk cap refill all digits left lower extremity, compartments are soft and easily compressible at the end of the procedure.    Disposition: Patient was successfully extubated per anesthesia and taken recovery room in stable condition. Will be admitted for pain control and initiation of therapy, weight-bear as tolerated left lower extremity, DVT prophylaxis will be started on postoperative day #1 with ASA. Results of the procedure were discussed immediately postoperatively with patient's family and they had all questions answered at that time.

## 2018-04-27 ENCOUNTER — OFFICE VISIT (OUTPATIENT)
Dept: ORTHOPEDIC SURGERY | Facility: CLINIC | Age: 63
End: 2018-04-27

## 2018-04-27 DIAGNOSIS — Z96.652 S/P TOTAL KNEE ARTHROPLASTY, LEFT: Primary | ICD-10-CM

## 2018-04-27 PROCEDURE — 99024 POSTOP FOLLOW-UP VISIT: CPT | Performed by: ORTHOPAEDIC SURGERY

## 2018-04-27 NOTE — PROGRESS NOTES
CC: s/p left total knee arthroplasty, DOS 4/11/2018  Interval History: Nitish Barfield returns for 2 week postoperative visit.  She is doing well. Pain is controlled with pain medication and is  improving. She denies any wound problem, fevers, or chills. Patient is continuing to work with home health PT. Ambulating with walker. Continuing DVT prophylaxis with use of ASA.     Physical Examination: Left knee was examined   Incision clean and dry     ROM 2-95,  4/5 strength   Stable to varus and valgus stress   Flex/extend ankle and toes   Positive sensation left foot   No calf pain, negative Homans sign bilaterally    Assessment/Plan:  Nitish Barfield is recovering from surgery as expected.  We will transition to outpatient therapy for range of motion, strengthening, and gait normalization.    She is to follow up in clinic in 4 weeks with xrays. Patient had all question answered today. Will continue DVT prophylaxis for an additional 1-2 weeks. Discussed with patient to avoid immersing incision for 4 weeks total after surgery.     Medications:  No orders of the defined types were placed in this encounter.      Costa Zuñiga MD

## 2018-05-02 ENCOUNTER — HOSPITAL ENCOUNTER (OUTPATIENT)
Dept: PHYSICAL THERAPY | Facility: HOSPITAL | Age: 63
Discharge: HOME | End: 2018-05-02
Admitting: PHYSICAL THERAPIST

## 2018-05-02 DIAGNOSIS — Z96.652 S/P TOTAL KNEE ARTHROPLASTY, LEFT: Primary | ICD-10-CM

## 2018-05-02 PROCEDURE — G8979 MOBILITY GOAL STATUS: HCPCS | Performed by: PHYSICAL THERAPIST

## 2018-05-02 PROCEDURE — 97161 PT EVAL LOW COMPLEX 20 MIN: CPT | Performed by: PHYSICAL THERAPIST

## 2018-05-02 PROCEDURE — G8978 MOBILITY CURRENT STATUS: HCPCS | Performed by: PHYSICAL THERAPIST

## 2018-05-02 RX ORDER — OXYCODONE HYDROCHLORIDE AND ACETAMINOPHEN 5; 325 MG/1; MG/1
TABLET ORAL
Qty: 80 TABLET | Refills: 0 | Status: SHIPPED | OUTPATIENT
Start: 2018-05-02 | End: 2018-05-16 | Stop reason: SDUPTHER

## 2018-05-02 NOTE — THERAPY EVALUATION
Outpatient Physical Therapy Ortho Initial Evaluation   Kim Harris     Patient Name: Nitish Barfield  : 1955  MRN: [id number]  Today's Date: 2018      Visit Date: 2018    Patient Active Problem List   Diagnosis   • Anxiety   • Depression   • Gastroesophageal reflux disease with esophagitis   • Generalized anxiety disorder   • Hyperlipidemia   • Hypertension   • Impaired glucose tolerance   • Renal insufficiency   • Primary osteoarthritis of right knee   • Primary osteoarthritis of left knee   • S/P total knee arthroplasty, left        Past Medical History:   Diagnosis Date   • Allergy    • Anxiety    • Arthritis     RA, FINGERS, TOES   • Esophageal reflux    • GERD (gastroesophageal reflux disease)    • Hypertension    • Hypertension    • Left knee pain     scheduled for sx        Past Surgical History:   Procedure Laterality Date   • COLONOSCOPY     • ENDOSCOPY     • HYSTERECTOMY      OPEN   • KNEE SURGERY Left     ARTHROSCOPY   • TOTAL KNEE ARTHROPLASTY Left 2018    Procedure: TOTAL KNEE ARTHROPLASTY AND ALL ASSOCIATED PROCEDURES;  Surgeon: Costa Zuñiga MD;  Location: [facility];  Service: Orthopedics   • WISDOM TOOTH EXTRACTION         Visit Dx:     ICD-10-CM ICD-9-CM   1. S/P total knee arthroplasty, left Z96.652 V43.65             Patient History     Row Name 18 0900             History    Chief Complaint Pain;Difficulty with daily activities;Difficulty Walking  -SP      Type of Pain Knee pain  -SP      Date Current Problem(s) Began 18  -SP      Brief Description of Current Complaint Patient reports that she has RA that effects her hands, ankles and knees.  She reports chronic history of (B) knee pain.  Patient is s/p left TKR 2018 after which she returned home with home health PT.   Patient states she is doing well getting around her home with rolling walker.  She need some assist from spouse for transfers in/out of shower and up stairs to enter home.  She states  that she is trying to decrease pain meds to once day and one at night.  Patient states that she was unable to do some of the standing exercises prescribed by home health PT due to pain in her right (non-surgical) knee.    -SP      Previous treatment for THIS PROBLEM Surgery;Medication  -SP      Surgery Date: 04/11/18  -SP      Patient/Caregiver Goals Return to work;Relieve pain  -SP      Current Tobacco Use no  -SP      Patient's Rating of General Health Good  -SP      Occupation/sports/leisure activities works as  fulltime, desk work/computer work  -SP      How has patient tried to help current problem? pain meds: initially using ice, but has not over the last few days  -SP      What clinical tests have you had for this problem? X-ray  -SP      Are you or can you be pregnant No  -SP         Pain     Pain at Present 4  -SP      Pain at Best 4  -SP      Pain at Worst 5  -SP      Pain Frequency Intermittent  -SP      Pain Description Aching;Tightness  -SP      What Performance Factors Make the Current Problem(s) WORSE? sustained positions, weight bearing activity; end ranges of motion  -SP      What Performance Factors Make the Current Problem(s) BETTER? rest, meds  -SP      Tolerance Time- Standing tolerates standing for short periods of time; cannot tolerate stand for making meals  -SP      Tolerance Time- Sitting able to tolerate but shifts weight frequently  -SP      Tolerance Time- Walking tolerates household distances  -SP      Is your sleep disturbed? Yes  -SP      What position do you sleep in? --   sidelying with pillow b/t knees   -SP      Difficulties at work? currently unable to work  -SP      Difficulties with ADL's? needs assist for stairs and shower transfer; limited tolerance for  weight bearing ADLs and activity  -SP         Fall Risk Assessment    Any falls in the past year: No  -SP         Daily Activities    Primary Language English  -SP      Are you able to read Yes  -SP      Are  you able to write Yes  -SP      How does patient learn best? Listening;Demonstration  -SP      Teaching needs identified Home Exercise Program;Management of Condition  -SP      Patient is concerned about/has problems with Performing home management (household chores, shopping, care of dependents);Performing job responsibilities/community activities (work, school,;Walking;Standing  -SP      Barriers to learning None  -SP      Pt Participated in POC and Goals Yes  -SP         Safety    Are you being hurt, hit, or frightened by anyone at home or in your life? No  -SP      Are you being neglected by a caregiver No  -SP        User Key  (r) = Recorded By, (t) = Taken By, (c) = Cosigned By    Initials Name Provider Type    SP Paulette Ren, PT Physical Therapist                PT Ortho     Row Name 05/02/18 0900       Posture/Observations    Observations Edema;Incision healing  -SP    Posture/Observations Comments dry flaky skin  -SP       Knee Palpation    Medial Joint Line Left:;Tender;Swollen  -SP    Lateral Joint Line Left:;Tender;Swollen  -SP    Quads Left:;Tender;Atrophied  -SP       Patellar Accessory Motions    Superior glide Left:;Hypomobile  -SP    Inferior glide Left:;Hypomobile  -SP    Medial glide Left:   wfl  -SP    Lateral glide Left:   wfl  -SP       Knee (Tibiofemoral) Accessory Motions    Posterior glide of tibia on femur Left:;Hypomobile  -SP       Knee Special Tests    Darrion’s sign (DVT) Left:;Negative  -SP       Left Lower Ext    Lt Knee Extension/Flexion AROM 14 to 101    111 degrees with heel slide with sheet  -SP    Lt Knee Extension/Flexion PROM 10 to 115 degrees  -SP       Left Hip (Manual Muscle Testing)    MMT: Flexion, Left Hip flexion  -SP    MMT, Gross Movement: Left Hip Flexion (3+/5) fair plus  -SP    MMT: ABduction, Left Hip abduction  -SP    MMT, Gross Movement: Left Hip ABduction (3+/5) fair plus  -SP    MMT, Gross Movement: Left Hip ADduction (3+/5) fair plus  -SP       Left  Knee (Manual Muscle Testing)    MMT: Extension, Left Knee extension  -SP    MMT, Gross Movement: Left Knee Extension (3-/5) fair minus  -SP    MMT: Flexion, Left Knee flexion  -SP    MMT, Gross Movement: Left Knee Flexion (3-/5) fair minus  -SP       Left Ankle/Foot (Manual Muscle Testing)    MMT: Plantarflexion, Left Ankle plantarflexion  -SP    MMT, Gross Movement: Left Ankle Plantarflexion (4-/5) good minus  -SP    MMT: Dorsiflexion Left Ankle Muscles dorsiflexion  -SP    MMT, Gross Movement: Left Ankle Dorsiflexion (4-/5) good minus  -SP       Sensation    Additional Comments decreased to light touch lateral aspect of left knee  -SP       Flexibility    Flexibility Tested? Lower Extremity  -SP       Lower Extremity Flexibility    Hamstrings Left:;Moderately limited  -SP    Hip Flexors Left:;Moderately limited  -SP    Quadriceps Left:;Moderately limited  -SP       LLE Quick Girth (cm)    Mid patella 47.2 cm  -SP    Other 1 51 cm   5 cm above mid patella  -SP    Other 2 41 cm   5 cm below midpatella  -SP       Transfers    Sit-Stand Dunn (Transfers) independent  -SP    Stand-Sit Dunn (Transfers) independent  -SP    Comment (Transfers) uses UEs to assist with transfers to stand  -SP       Gait/Stairs Assessment/Training    Dunn Level (Gait) independent  -SP    Assistive Device (Gait) walker, front-wheeled  -SP    Pattern (Gait) swing-through  -SP    Deviations/Abnormal Patterns (Gait) stride length decreased;gait speed decreased;antalgic  -SP    Bilateral Gait Deviations lateral trunk flexion;heel strike decreased   (B) knees flexed throughout gait   -SP    Ascending Technique (Stairs) step-to-step  -SP    Descending Technique (Stairs) step-to-step  -SP    Dunn Level (Ramp) supervision;contact guard   pt reports spouse assist her with stairs  -SP    Comment (Gait/Stairs) uses rails  -SP      User Key  (r) = Recorded By, (t) = Taken By, (c) = Cosigned By    Initials Name Provider Type     ALL Ren, PT Physical Therapist                      Therapy Education  Education Details: Patient instructed in extension positioning and stretching for home, encouraged to avoid propping under her knee and to stretch into knee extension multiple times daily in addition to HEP provided by home health PT  Given: HEP  Program: New  How Provided: Verbal, Written  Provided to: Patient  Level of Understanding: Verbalized           PT OP Goals     Row Name 05/02/18 0900          PT Short Term Goals    STG 1 Patient demonstrates left knee AROM extension 0-5 degrees to improve heel strike  -SP     STG 2 Patient ambulates level surfaces with equal weight bearing (B) with least restrictive device  -SP     STG 3 Patient demonstrates left knee AROM flexion to 120 degrees to aid in tranfers and mobility  -SP        Long Term Goals    LTG 1 Patient demonstrates left LE strength increased by one muscle grade to better tolerate home and community ADLs  -SP     LTG 2 Patient ambulates level and unlevel surfaces with normal gait pattern independently with least restrictive device as able  -SP     LTG 3 Patient report decreased pain with light home and community ADLs to <2/10 at worst  -SP     LTG 4 Patient independent with HEP  -SP        Time Calculation    PT Goal Re-Cert Due Date 06/01/18  -SP       User Key  (r) = Recorded By, (t) = Taken By, (c) = Cosigned By    Initials Name Provider Type    SP Paulette Ren, PT Physical Therapist                PT Assessment/Plan     Row Name 05/02/18 8960          PT Assessment    Functional Limitations Impaired gait;Limitation in home management;Limitations in community activities;Performance in leisure activities;Limitations in functional capacity and performance;Performance in self-care ADL;Performance in work activities  -SP     Impairments Balance;Edema;Endurance;Gait;Pain;Muscle strength;Range of motion  -SP     Assessment Comments Patient with chronic  "history of (B) knee pain.  She is s/p left TKR 4-.  Patient presents with pain, decreased left knee AROM, decreased strength, altered gait and decreased tolerance for home, community and work ADLs  -SP     Please refer to paper survey for additional self-reported information Yes  -SP     Rehab Potential Good  -SP     Patient/caregiver participated in establishment of treatment plan and goals Yes  -SP     Patient would benefit from skilled therapy intervention Yes  -SP        PT Plan    PT Frequency 2x/week  -SP     Predicted Duration of Therapy Intervention (OT Eval) 4 weeks  -SP     Planned CPT's? PT EVAL LOW COMPLEXITY: 59840;PT THER PROC EA 15 MIN: 45139;PT MANUAL THERAPY EA 15 MIN: 71458;PT GAIT TRAINING EA 15 MIN: 82179;PT HOT OR COLD PACK TREAT MCARE;PT ELECTRICAL STIM UNATTEND:   -SP       User Key  (r) = Recorded By, (t) = Taken By, (c) = Cosigned By    Initials Name Provider Type    SP Paulette Ren, PT Physical Therapist                  Exercises     Row Name 05/02/18 1700             Exercise 1    Exercise Name 1 heel slides  -SP      Time 1 6  -SP         Exercise 2    Exercise Name 2 cycle (seat 8)  -SP      Time 2 5  -SP      Additional Comments patient with difficulty maintaining her feet on the pedal  -SP         Exercise 3    Exercise Name 3 heel raises   -SP      Reps 3 10  -SP         Exercise 4    Exercise Name 4 step ups 4\"   -SP      Reps 4 10  -SP         Exercise 5    Exercise Name 5 wall squats  -SP      Reps 5 10  -SP         Exercise 6    Exercise Name 6 SAQ  -SP      Reps 6 20  -SP         Exercise 7    Exercise Name 7 SLR  -SP      Reps 7 20  -SP         Exercise 8    Exercise Name 8 QS towel under knee and ankle  -SP      Reps 8 10  -SP      Time 8 5  -SP         Exercise 9    Exercise Name 9 HS stretch  -SP      Reps 9 5  -SP      Time 9 15 sec  -SP         Exercise 10    Exercise Name 10 heel prop  -SP      Time 10 3 min  -SP        User Key  (r) = Recorded By, " (t) = Taken By, (c) = Cosigned By    Initials Name Provider Type    ALL Ren, PT Physical Therapist           Manual Rx (last 36 hours)      Manual Treatments     Row Name 05/02/18 1700             Manual Rx 1    Manual Rx 1 Location left knee  -SP      Manual Rx 1 Type PROM knee flexion  -SP      Manual Rx 1 Duration 10x 10 sec  -SP         Manual Rx 2    Manual Rx 2 Location left knee  -SP      Manual Rx 2 Type post femoral glide  -SP        User Key  (r) = Recorded By, (t) = Taken By, (c) = Cosigned By    Initials Name Provider Type    ALL Ren, PT Physical Therapist                      Outcome Measure Options: Lower Extremity Functional Scale (LEFS)  Lower Extremity Functional Index  Any of your usual work, housework or school activities: Extreme difficulty or unable to perform activity  Your usual hobbies, recreational or sporting activities: Extreme difficulty or unable to perform activity  Getting into or out of the bath: A little bit of difficulty  Walking between rooms: No difficulty  Putting on your shoes or socks: No difficulty  Squatting: Extreme difficulty or unable to perform activity  Lifting an object, like a bag of groceries from the floor: Extreme difficulty or unable to perform activity  Performing light activities around your home: Extreme difficulty or unable to perform activity  Performing heavy activities around your home: Extreme difficulty or unable to perform activity  Getting into or out of a car: Moderate difficulty  Walking 2 blocks: Extreme difficulty or unable to perform activity  Walking a mile: Extreme difficulty or unable to perform activity  Going up or down 10 stairs (about 1 flight of stairs): A little bit of difficulty  Standing for 1 hour: Extreme difficulty or unable to perform activity  Sitting for 1 hour: No difficulty  Running on even ground: Extreme difficulty or unable to perform activity  Running on uneven ground: Extreme difficulty  or unable to perform activity  Making sharp turns while running fast: Extreme difficulty or unable to perform activity  Hopping: Extreme difficulty or unable to perform activity  Rolling over in bed: No difficulty  Total: 24      Time Calculation:   Start Time: 0900  Stop Time: 1014  Time Calculation (min): 74 min     Therapy Charges for Today     Code Description Service Date Service Provider Modifiers Qty    75030023059 HC PT MOBILITY CURRENT 5/2/2018 Paulette Ren, PT GP, CL 1    91987460005 HC PT MOBILITY PROJECTED 5/2/2018 Paulette Ren, PT GP, CI 1    45636729043 HC PT EVAL LOW COMPLEXITY 3 5/2/2018 Paulette Ren, PT GP 1          PT G-Codes  Outcome Measure Options: Lower Extremity Functional Scale (LEFS)  Mobility: Walking and Moving Around Current Status (): At least 60 percent but less than 80 percent impaired, limited or restricted  Mobility: Walking and Moving Around Goal Status (): At least 1 percent but less than 20 percent impaired, limited or restricted         Paulette Ren, PT  5/2/2018

## 2018-05-07 ENCOUNTER — HOSPITAL ENCOUNTER (OUTPATIENT)
Dept: PHYSICAL THERAPY | Facility: HOSPITAL | Age: 63
Setting detail: THERAPIES SERIES
Discharge: HOME | End: 2018-05-07

## 2018-05-07 DIAGNOSIS — Z96.652 S/P TOTAL KNEE ARTHROPLASTY, LEFT: Primary | ICD-10-CM

## 2018-05-07 PROCEDURE — 97110 THERAPEUTIC EXERCISES: CPT | Performed by: PHYSICAL THERAPIST

## 2018-05-07 NOTE — THERAPY TREATMENT NOTE
Outpatient Physical Therapy Ortho Treatment Note   Kim Harris     Patient Name: Nitish Barfield  : 1955  MRN: [id number]  Today's Date: 2018      Visit Date: 2018    Visit Dx:    ICD-10-CM ICD-9-CM   1. S/P total knee arthroplasty, left Z96.652 V43.65       Patient Active Problem List   Diagnosis   • Anxiety   • Depression   • Gastroesophageal reflux disease with esophagitis   • Generalized anxiety disorder   • Hyperlipidemia   • Hypertension   • Impaired glucose tolerance   • Renal insufficiency   • Primary osteoarthritis of right knee   • Primary osteoarthritis of left knee   • S/P total knee arthroplasty, left        Past Medical History:   Diagnosis Date   • Allergy    • Anxiety    • Arthritis     RA, FINGERS, TOES   • Esophageal reflux    • GERD (gastroesophageal reflux disease)    • Hypertension    • Hypertension    • Left knee pain     scheduled for sx        Past Surgical History:   Procedure Laterality Date   • COLONOSCOPY     • ENDOSCOPY     • HYSTERECTOMY      OPEN   • KNEE SURGERY Left     ARTHROSCOPY   • TOTAL KNEE ARTHROPLASTY Left 2018    Procedure: TOTAL KNEE ARTHROPLASTY AND ALL ASSOCIATED PROCEDURES;  Surgeon: Costa Zuñiga MD;  Location: [facility];  Service: Orthopedics   • WISDOM TOOTH EXTRACTION               PT Ortho     Row Name 18 0900       Subjective Comments    Subjective Comments Patient reports that the tape to her right (nonsurgical knee) was very helpful and she has been walking short distances around her home without the walker, but must use it with fatigue.  Patient's spouse states the patient has not been doing exercises at home  -SP      User Key  (r) = Recorded By, (t) = Taken By, (c) = Cosigned By    Initials Name Provider Type    ALL Ren, PT Physical Therapist                            PT Assessment/Plan     Row Name 18 2565          PT Assessment    Assessment Comments Patient with questionable compliance with HEP.   "Needs encouragment to increase effort.    -SP        PT Plan    PT Plan Comments Continue per POC  -SP       User Key  (r) = Recorded By, (t) = Taken By, (c) = Cosigned By    Initials Name Provider Type    ALL Ren, PT Physical Therapist                Modalities     Row Name 05/07/18 0900             Ice    Patient denies application of Ice Yes  -SP      Patient reports will apply ice at home to involved area Yes  -SP        User Key  (r) = Recorded By, (t) = Taken By, (c) = Cosigned By    Initials Name Provider Type    ALL Ren, PT Physical Therapist                Exercises     Row Name 05/07/18 0900             Subjective Comments    Subjective Comments Patient reports that the tape to her right (nonsurgical knee) was very helpful and she has been walking short distances around her home without the walker, but must use it with fatigue.  Patient's spouse states the patient has not been doing exercises at home  -SP         Exercise 1    Exercise Name 1 heel slides  -SP      Time 1 6  -SP         Exercise 2    Exercise Name 2 cycle (chair behind airdyne)  -SP      Time 2 5  -SP         Exercise 3    Exercise Name 3 heel raises   -SP         Exercise 4    Exercise Name 4 step ups 4\"   -SP      Reps 4 20  -SP         Exercise 5    Exercise Name 5 wall squats  -SP      Reps 5 15  -SP         Exercise 6    Exercise Name 6 SAQ  -SP      Reps 6 20  -SP         Exercise 7    Exercise Name 7 SLR  -SP      Reps 7 20  -SP         Exercise 8    Exercise Name 8 QS towel under knee and ankle  -SP      Reps 8 10  -SP      Time 8 5  -SP         Exercise 9    Exercise Name 9 HS stretch  -SP      Reps 9 5  -SP      Time 9 15 sec  -SP         Exercise 10    Exercise Name 10 heel prop  -SP      Time 10 3 min  -SP        User Key  (r) = Recorded By, (t) = Taken By, (c) = Cosigned By    Initials Name Provider Type    ALL Ren, PT Physical Therapist                        Manual Rx " (last 36 hours)      Manual Treatments     Row Name 05/07/18 0900             Manual Rx 1    Manual Rx 1 Location left knee  -SP      Manual Rx 1 Type PROM knee flexion  -SP      Manual Rx 1 Duration 10x 10 sec  -SP         Manual Rx 2    Manual Rx 2 Location left knee  -SP      Manual Rx 2 Type post femoral glide  -SP        User Key  (r) = Recorded By, (t) = Taken By, (c) = Cosigned By    Initials Name Provider Type    SP Paulette Ren, PT Physical Therapist              Therapy Education  Education Details: Patient educated regarding the importance for adherence to exercises for home              Time Calculation:   Start Time: 0900  Stop Time: 1015  Time Calculation (min): 75 min    Therapy Charges for Today     Code Description Service Date Service Provider Modifiers Qty    62227503231 HC PT THER PROC EA 15 MIN 5/7/2018 Paulette Ren, PT GP 2                    Paulette Ren, PT  5/7/2018

## 2018-05-08 RX ORDER — GABAPENTIN 300 MG/1
300 CAPSULE ORAL 3 TIMES DAILY
Qty: 42 CAPSULE | Refills: 0 | Status: SHIPPED | OUTPATIENT
Start: 2018-05-08 | End: 2018-05-25 | Stop reason: SDUPTHER

## 2018-05-09 ENCOUNTER — HOSPITAL ENCOUNTER (OUTPATIENT)
Dept: PHYSICAL THERAPY | Facility: HOSPITAL | Age: 63
Setting detail: THERAPIES SERIES
Discharge: HOME | End: 2018-05-09

## 2018-05-09 DIAGNOSIS — Z96.652 S/P TOTAL KNEE ARTHROPLASTY, LEFT: Primary | ICD-10-CM

## 2018-05-09 PROCEDURE — 97110 THERAPEUTIC EXERCISES: CPT | Performed by: PHYSICAL THERAPIST

## 2018-05-09 NOTE — THERAPY TREATMENT NOTE
Outpatient Physical Therapy Ortho Treatment Note   Kim Harris     Patient Name: Nitish Barfield  : 1955  MRN: [id number]  Today's Date: 2018      Visit Date: 2018    Visit Dx:    ICD-10-CM ICD-9-CM   1. S/P total knee arthroplasty, left Z96.652 V43.65       Patient Active Problem List   Diagnosis   • Anxiety   • Depression   • Gastroesophageal reflux disease with esophagitis   • Generalized anxiety disorder   • Hyperlipidemia   • Hypertension   • Impaired glucose tolerance   • Renal insufficiency   • Primary osteoarthritis of right knee   • Primary osteoarthritis of left knee   • S/P total knee arthroplasty, left        Past Medical History:   Diagnosis Date   • Allergy    • Anxiety    • Arthritis     RA, FINGERS, TOES   • Esophageal reflux    • GERD (gastroesophageal reflux disease)    • Hypertension    • Hypertension    • Left knee pain     scheduled for sx        Past Surgical History:   Procedure Laterality Date   • COLONOSCOPY     • ENDOSCOPY     • HYSTERECTOMY      OPEN   • KNEE SURGERY Left     ARTHROSCOPY   • TOTAL KNEE ARTHROPLASTY Left 2018    Procedure: TOTAL KNEE ARTHROPLASTY AND ALL ASSOCIATED PROCEDURES;  Surgeon: Costa Zuñiga MD;  Location: [facility];  Service: Orthopedics   • WISDOM TOOTH EXTRACTION               PT Ortho     Row Name 18 09       Subjective Comments    Subjective Comments Patient reports that she is walking around her house without the walker.  Her knee continues to be sore.    -SP       Left Lower Ext    Lt Knee Extension/Flexion PROM 118 PROM  -SP    Row Name 18 09       Subjective Comments    Subjective Comments Patient reports that the tape to her right (nonsurgical knee) was very helpful and she has been walking short distances around her home without the walker, but must use it with fatigue.  Patient's spouse states the patient has not been doing exercises at home  -SP      User Key  (r) = Recorded By, (t) = Taken By, (c) =  "Cosigned By    Initials Name Provider Type    ALL Ren, PT Physical Therapist                            PT Assessment/Plan     Row Name 05/09/18 1214          PT Assessment    Assessment Comments Patient with progressing knee flexion; questionable compliance with extension stretching for home  -SP        PT Plan    PT Plan Comments Continue per POC  -SP       User Key  (r) = Recorded By, (t) = Taken By, (c) = Cosigned By    Initials Name Provider Type    ALL Ren, PT Physical Therapist                    Exercises     Row Name 05/09/18 0900             Subjective Comments    Subjective Comments Patient reports that she is walking around her house without the walker.  Her knee continues to be sore.    -SP         Exercise 1    Exercise Name 1 heel slides  -SP      Time 1 6  -SP         Exercise 2    Exercise Name 2 cycle (chair behind airdyne)  -SP      Time 2 8  -SP         Exercise 3    Exercise Name 3 heel raises   -SP         Exercise 4    Exercise Name 4 step ups 4\"   -SP      Reps 4 25  -SP         Exercise 5    Exercise Name 5 wall squats  -SP      Reps 5 15  -SP         Exercise 6    Exercise Name 6 SAQ  -SP      Reps 6 30  -SP         Exercise 7    Exercise Name 7 SLR  -SP      Reps 7 25  -SP         Exercise 8    Exercise Name 8 QS towel under knee and ankle  -SP      Reps 8 15  -SP      Time 8 5  -SP         Exercise 9    Exercise Name 9 HS stretch  -SP      Reps 9 5  -SP      Time 9 15 sec  -SP         Exercise 10    Exercise Name 10 heel prop  -SP      Time 10 3 min  -SP         Exercise 11    Exercise Name 11 sidelying hip abduction  -SP      Reps 11 25  -SP         Exercise 12    Exercise Name 12 LAQ  -SP      Reps 12 20  -SP        User Key  (r) = Recorded By, (t) = Taken By, (c) = Cosigned By    Initials Name Provider Type    ALL Ren, PT Physical Therapist                        Manual Rx (last 36 hours)      Manual Treatments     Row Name " 05/09/18 0900             Manual Rx 1    Manual Rx 1 Location left knee  -SP      Manual Rx 1 Type PROM knee flexion  -SP      Manual Rx 1 Duration 10x 10 sec  -SP         Manual Rx 2    Manual Rx 2 Location left knee  -SP      Manual Rx 2 Type post femoral glide  -SP        User Key  (r) = Recorded By, (t) = Taken By, (c) = Cosigned By    Initials Name Provider Type    SP Paulette Ren, PT Physical Therapist              Therapy Education  Given: HEP  Program: Reinforced  How Provided: Verbal  Provided to: Patient  Level of Understanding: Verbalized              Time Calculation:   Start Time: 0900  Stop Time: 1016  Time Calculation (min): 76 min    Therapy Charges for Today     Code Description Service Date Service Provider Modifiers Qty    52356280500 HC PT THER PROC EA 15 MIN 5/9/2018 Paulette Rne, PT GP 2                    Paulette Ren, PT  5/9/2018

## 2018-05-14 ENCOUNTER — HOSPITAL ENCOUNTER (OUTPATIENT)
Dept: PHYSICAL THERAPY | Facility: HOSPITAL | Age: 63
Setting detail: THERAPIES SERIES
Discharge: HOME | End: 2018-05-14

## 2018-05-14 DIAGNOSIS — Z96.652 S/P TOTAL KNEE ARTHROPLASTY, LEFT: Primary | ICD-10-CM

## 2018-05-14 PROCEDURE — 97110 THERAPEUTIC EXERCISES: CPT

## 2018-05-14 NOTE — THERAPY TREATMENT NOTE
Outpatient Physical Therapy Ortho Treatment Note   Kim Harris     Patient Name: Nitish Barfield  : 1955  MRN: [id number]  Today's Date: 2018      Visit Date: 2018    Visit Dx:    ICD-10-CM ICD-9-CM   1. S/P total knee arthroplasty, left Z96.652 V43.65       Patient Active Problem List   Diagnosis   • Anxiety   • Depression   • Gastroesophageal reflux disease with esophagitis   • Generalized anxiety disorder   • Hyperlipidemia   • Hypertension   • Impaired glucose tolerance   • Renal insufficiency   • Primary osteoarthritis of right knee   • Primary osteoarthritis of left knee   • S/P total knee arthroplasty, left        Past Medical History:   Diagnosis Date   • Allergy    • Anxiety    • Arthritis     RA, FINGERS, TOES   • Esophageal reflux    • GERD (gastroesophageal reflux disease)    • Hypertension    • Hypertension    • Left knee pain     scheduled for sx        Past Surgical History:   Procedure Laterality Date   • COLONOSCOPY     • ENDOSCOPY     • HYSTERECTOMY      OPEN   • KNEE SURGERY Left     ARTHROSCOPY   • TOTAL KNEE ARTHROPLASTY Left 2018    Procedure: TOTAL KNEE ARTHROPLASTY AND ALL ASSOCIATED PROCEDURES;  Surgeon: Costa Zuñiga MD;  Location: [facility];  Service: Orthopedics   • WISDOM TOOTH EXTRACTION               PT Ortho     Row Name 18 6730       Subjective Comments    Subjective Comments Pt states she does not feel like she is doing any better.  Reports clunking noise.  Pt states she does alot of things at home- walking- but does not do her HEP.  -KM      User Key  (r) = Recorded By, (t) = Taken By, (c) = Cosigned By    Initials Name Provider Type     Malina Bradley PTA Physical Therapy Assistant                            PT Assessment/Plan     Row Name 18 1431          PT Assessment    Functional Limitations Impaired gait;Limitation in home management;Limitations in community activities  -KM     Impairments Balance;Gait;Endurance;Range of  "motion;Muscle strength  -KM     Assessment Comments --   pt states she gets enough ex with her home activities.  Again education provided on benefits of HEP  -KM       User Key  (r) = Recorded By, (t) = Taken By, (c) = Cosigned By    Initials Name Provider Type    LILLIAM Bradley PTA Physical Therapy Assistant                Modalities     Row Name 05/14/18 0954             Moist Heat    MH Applied Yes  -KM      Location left knee  -KM      Rx Minutes 12 mins  -KM      MH Prior to Rx Yes  -KM         Ice    Patient denies application of Ice Yes  -KM      Patient reports will apply ice at home to involved area Yes  -KM        User Key  (r) = Recorded By, (t) = Taken By, (c) = Cosigned By    Initials Name Provider Type    LILLIAM Bradley PTA Physical Therapy Assistant                Exercises     Row Name 05/14/18 0916             Subjective Comments    Subjective Comments Pt states she does not feel like she is doing any better.  Reports clunking noise.  Pt states she does alot of things at home- walking- but does not do her HEP.  -KM         Exercise 1    Exercise Name 1 heel slides  -KM      Time 1 6  -KM         Exercise 2    Exercise Name 2 cycle (chair behind airdyne)  -KM      Time 2 8  -KM         Exercise 3    Exercise Name 3 heel raises   -KM         Exercise 4    Exercise Name 4 step ups 4\"   -KM      Reps 4 25  -KM         Exercise 5    Exercise Name 5 wall squats  -KM      Reps 5 15  -KM         Exercise 6    Exercise Name 6 SAQ  -KM      Reps 6 30  -KM         Exercise 7    Exercise Name 7 SLR  -KM      Reps 7 30  -KM         Exercise 8    Exercise Name 8 QS towel under knee and ankle  -KM      Reps 8 15  -KM      Time 8 5  -KM         Exercise 9    Exercise Name 9 HS stretch  -KM      Reps 9 5  -KM      Time 9 15 sec  -KM         Exercise 10    Exercise Name 10 heel prop  -KM      Time 10 4 minutes  -KM         Exercise 11    Exercise Name 11 sidelying hip abduction  -KM      Reps 11 25  -KM         " Exercise 12    Exercise Name 12 LAQ  -KM      Reps 12 25  -KM        User Key  (r) = Recorded By, (t) = Taken By, (c) = Cosigned By    Initials Name Provider Type    LILLIAM Bradley PTA Physical Therapy Assistant                        Manual Rx (last 36 hours)      Manual Treatments     Row Name 05/14/18 0954             Manual Rx 1    Manual Rx 1 Location left knee  -KM      Manual Rx 1 Type PROM knee flex  -KM      Manual Rx 1 Duration 10 x 10 seconds  -KM        User Key  (r) = Recorded By, (t) = Taken By, (c) = Cosigned By    Initials Name Provider Type    LILLIAM Bradley PTA Physical Therapy Assistant        Started pt with [facility] per PT request (ELDER Ren, PT).  Pt states she needs to have other knee done(surgery) but is not ready yet.  Noted swelling in sudhir feet, especially where her slippers end.  Education provided on shoes/socks that help keep swelling decreased.  Pt states she is not supposed to have salt but continues to use.      Therapy Education  Education Details:  (encouraged pt to perform HEP.  discussed benefits of HEP.  Pt has LE swelling- states she is supposed to watch her diet.  Education on shoes & socks that could help symptoms)  Given: HEP, Symptoms/condition management, Edema management (pt wears slippers- has swelling from where shoe stops .  Education on benefits of performing HEP)  Program: Reinforced  How Provided: Verbal, Demonstration  Provided to: Patient, Caregiver  Level of Understanding: Teach back education performed, Verbalized, Demonstrated (requires cues with some ex- again encouraged pt to perfom HEP)              Time Calculation:   Start Time: 0954  Stop Time: 1117  Time Calculation (min): 83 min    Therapy Charges for Today     Code Description Service Date Service Provider Modifiers Qty    94697238330 HC PT HOT OR COLD PACK TREAT MCARE 5/14/2018 Malina Bradley PTA GP 1    93886906094 HC PT THER PROC EA 15 MIN 5/14/2018 Malina Bradley PTA GP 2                    Malina  GULSHAN Bradley, PTA  5/14/2018

## 2018-05-16 ENCOUNTER — HOSPITAL ENCOUNTER (OUTPATIENT)
Dept: PHYSICAL THERAPY | Facility: HOSPITAL | Age: 63
Setting detail: THERAPIES SERIES
Discharge: HOME | End: 2018-05-16

## 2018-05-16 DIAGNOSIS — Z96.652 S/P TOTAL KNEE ARTHROPLASTY, LEFT: Primary | ICD-10-CM

## 2018-05-16 PROCEDURE — 97110 THERAPEUTIC EXERCISES: CPT | Performed by: PHYSICAL THERAPIST

## 2018-05-16 RX ORDER — OXYCODONE HYDROCHLORIDE AND ACETAMINOPHEN 5; 325 MG/1; MG/1
TABLET ORAL
Qty: 80 TABLET | Refills: 0 | Status: SHIPPED | OUTPATIENT
Start: 2018-05-16 | End: 2018-06-29 | Stop reason: SDUPTHER

## 2018-05-16 NOTE — THERAPY TREATMENT NOTE
Outpatient Physical Therapy Ortho Treatment Note   Kim Harris     Patient Name: Nitish Barfield  : 1955  MRN: [id number]  Today's Date: 2018      Visit Date: 2018    Visit Dx:    ICD-10-CM ICD-9-CM   1. S/P total knee arthroplasty, left Z96.652 V43.65       Patient Active Problem List   Diagnosis   • Anxiety   • Depression   • Gastroesophageal reflux disease with esophagitis   • Generalized anxiety disorder   • Hyperlipidemia   • Hypertension   • Impaired glucose tolerance   • Renal insufficiency   • Primary osteoarthritis of right knee   • Primary osteoarthritis of left knee   • S/P total knee arthroplasty, left        Past Medical History:   Diagnosis Date   • Allergy    • Anxiety    • Arthritis     RA, FINGERS, TOES   • Esophageal reflux    • GERD (gastroesophageal reflux disease)    • Hypertension    • Hypertension    • Left knee pain     scheduled for sx        Past Surgical History:   Procedure Laterality Date   • COLONOSCOPY     • ENDOSCOPY     • HYSTERECTOMY      OPEN   • KNEE SURGERY Left     ARTHROSCOPY   • TOTAL KNEE ARTHROPLASTY Left 2018    Procedure: TOTAL KNEE ARTHROPLASTY AND ALL ASSOCIATED PROCEDURES;  Surgeon: Costa Zuñiga MD;  Location: [facility];  Service: Orthopedics   • WISDOM TOOTH EXTRACTION               PT Ortho     Row Name 18 1200       Left Lower Ext    Lt Knee Extension/Flexion PROM 121 with heel slide  -SP    Row Name 18 0900       Subjective Comments    Subjective Comments Patient reports that left (surgical) knee is doing better and just feels stiff, her right knee continues to cause her a lot of pain.  -SP    Row Name 18 0954       Subjective Comments    Subjective Comments Pt states she does not feel like she is doing any better.  Reports clunking noise.  Pt states she does alot of things at home- walking- but does not do her HEP.  -KM      User Key  (r) = Recorded By, (t) = Taken By, (c) = Cosigned By    Initials Name Provider Type  "   LILLIAM Bradley, PTA Physical Therapy Assistant    SP Paulette Ren, PT Physical Therapist                            PT Assessment/Plan     Row Name 05/16/18 1224 05/16/18 1220       PT Assessment    Assessment Comments  -- Patient tolerates progression of ther-ex without difficulty  -SP       PT Plan    PT Plan Comments Continue per POC  -SP  --      User Key  (r) = Recorded By, (t) = Taken By, (c) = Cosigned By    Initials Name Provider Type    ALL Ren, PT Physical Therapist                Modalities     Row Name 05/16/18 0900             Moist Heat    MH Applied Yes  -SP      Location left knee  -SP      Rx Minutes 12 mins  -SP      MH Prior to Rx Yes  -SP         Ice    Patient denies application of Ice Yes  -SP      Patient reports will apply ice at home to involved area Yes  -SP        User Key  (r) = Recorded By, (t) = Taken By, (c) = Cosigned By    Initials Name Provider Type    ALL Ren, PT Physical Therapist                Exercises     Row Name 05/16/18 0900             Subjective Comments    Subjective Comments Patient reports that left (surgical) knee is doing better and just feels stiff, her right knee continues to cause her a lot of pain.  -SP         Exercise 1    Exercise Name 1 heel slides  -SP      Time 1 7  -SP         Exercise 2    Exercise Name 2 cycle (chair behind airdyne)  -SP      Time 2 8  -SP         Exercise 3    Exercise Name 3 heel raises   -SP         Exercise 4    Exercise Name 4 step ups 4\"   -SP      Reps 4 25  -SP         Exercise 5    Exercise Name 5 wall squats  -SP      Reps 5 20  -SP         Exercise 6    Exercise Name 6 SAQ  -SP      Reps 6 30  -SP         Exercise 7    Exercise Name 7 SLR  -SP      Reps 7 30  -SP         Exercise 8    Exercise Name 8 QS towel under knee and ankle  -SP      Reps 8 15  -SP      Time 8 5  -SP         Exercise 9    Exercise Name 9 HS stretch  -SP      Reps 9 5  -SP      Time 9 15 sec  -SP      "    Exercise 10    Exercise Name 10 heel prop  -SP      Time 10 4 minutes  -SP         Exercise 11    Exercise Name 11 sidelying hip abduction  -SP      Reps 11 25  -SP      Time 11 30  -SP         Exercise 12    Exercise Name 12 LAQ  -SP      Reps 12 30  -SP        User Key  (r) = Recorded By, (t) = Taken By, (c) = Cosigned By    Initials Name Provider Type    SP Paulette Ren, PT Physical Therapist                                            Time Calculation:   Start Time: 0900  Stop Time: 1020  Time Calculation (min): 80 min    Therapy Charges for Today     Code Description Service Date Service Provider Modifiers Qty    31818257435  PT THER PROC EA 15 MIN 5/16/2018 Paulette Ren, PT GP 2                    Paulette Ren, PT  5/16/2018

## 2018-05-21 ENCOUNTER — HOSPITAL ENCOUNTER (OUTPATIENT)
Dept: PHYSICAL THERAPY | Facility: HOSPITAL | Age: 63
Setting detail: THERAPIES SERIES
Discharge: HOME | End: 2018-05-21

## 2018-05-21 PROCEDURE — 97110 THERAPEUTIC EXERCISES: CPT | Performed by: PHYSICAL THERAPIST

## 2018-05-21 NOTE — THERAPY TREATMENT NOTE
Outpatient Physical Therapy Ortho Treatment Note   Kim Harris     Patient Name: Nitish Barfield  : 1955  MRN: [id number]  Today's Date: 2018      Visit Date: 2018    Visit Dx:  No diagnosis found.    Patient Active Problem List   Diagnosis   • Anxiety   • Depression   • Gastroesophageal reflux disease with esophagitis   • Generalized anxiety disorder   • Hyperlipidemia   • Hypertension   • Impaired glucose tolerance   • Renal insufficiency   • Primary osteoarthritis of right knee   • Primary osteoarthritis of left knee   • S/P total knee arthroplasty, left        Past Medical History:   Diagnosis Date   • Allergy    • Anxiety    • Arthritis     RA, FINGERS, TOES   • Esophageal reflux    • GERD (gastroesophageal reflux disease)    • Hypertension    • Hypertension    • Left knee pain     scheduled for sx        Past Surgical History:   Procedure Laterality Date   • COLONOSCOPY     • ENDOSCOPY     • HYSTERECTOMY      OPEN   • KNEE SURGERY Left     ARTHROSCOPY   • TOTAL KNEE ARTHROPLASTY Left 2018    Procedure: TOTAL KNEE ARTHROPLASTY AND ALL ASSOCIATED PROCEDURES;  Surgeon: Costa Zuñiga MD;  Location: [facility];  Service: Orthopedics   • WISDOM TOOTH EXTRACTION               PT Ortho     Row Name 18 1000       Subjective Comments    Subjective Comments Patient reports that she is doing better and is only using the walker after PT because her legs are tired.   She reports that she continues to have difficulty sleeping due to knee discomfort.    -SP      User Key  (r) = Recorded By, (t) = Taken By, (c) = Cosigned By    Initials Name Provider Type    SP Paulette Ren, PT Physical Therapist                            PT Assessment/Plan     Row Name 18 0954          PT Assessment    Assessment Comments Patient with improving gait and decreasing complaints of pain  -SP        PT Plan    PT Plan Comments Continue per POC  -SP       User Key  (r) = Recorded By, (t) = Taken  "By, (c) = Cosigned By    Initials Name Provider Type    SP Paulettewendy LauMarquitaantoine Ren, PT Physical Therapist                Modalities     Row Name 05/21/18 1000             Moist Heat    MH Applied Yes  -SP      Location left knee  -SP      Rx Minutes 12 mins  -SP      MH Prior to Rx Yes  -SP         Ice    Patient denies application of Ice Yes  -SP      Patient reports will apply ice at home to involved area Yes  -SP        User Key  (r) = Recorded By, (t) = Taken By, (c) = Cosigned By    Initials Name Provider Type    SP Paulette Lauantoine Ren, PT Physical Therapist                Exercises     Row Name 05/21/18 1000             Subjective Comments    Subjective Comments Patient reports that she is doing better and is only using the walker after PT because her legs are tired.   She reports that she continues to have difficulty sleeping due to knee discomfort.    -SP         Exercise 1    Exercise Name 1 heel slides  -SP      Time 1 7  -SP         Exercise 2    Exercise Name 2 cycle (chair behind airdyne)  -SP      Time 2 8  -SP         Exercise 3    Exercise Name 3 heel raises   -SP         Exercise 4    Exercise Name 4 step ups 4\"   -SP      Reps 4 25  -SP         Exercise 5    Exercise Name 5 wall squats  -SP      Reps 5 20  -SP         Exercise 6    Exercise Name 6 SAQ  -SP      Reps 6 30  -SP      Additional Comments 3#  -SP         Exercise 7    Exercise Name 7 SLR  -SP      Reps 7 30  -SP         Exercise 8    Exercise Name 8 QS towel under knee and ankle  -SP      Reps 8 15  -SP      Time 8 5  -SP         Exercise 9    Exercise Name 9 HS stretch  -SP      Reps 9 5  -SP      Time 9 15 sec  -SP         Exercise 10    Exercise Name 10 heel prop  -SP      Time 10 4 minutes  -SP         Exercise 11    Exercise Name 11 sidelying hip abduction  -SP      Reps 11 25  -SP      Time 11 30  -SP         Exercise 12    Exercise Name 12 LAQ  -SP      Reps 12 30  -SP      Additional Comments 1#  -SP        User Key  (r) = " Recorded By, (t) = Taken By, (c) = Cosigned By    Initials Name Provider Type    ALL Ren, PT Physical Therapist                        Manual Rx (last 36 hours)      Manual Treatments     Row Name 05/21/18 1000             Manual Rx 1    Manual Rx 1 Location left knee  -SP      Manual Rx 1 Type PROM knee flex  -SP      Manual Rx 1 Duration 10 x 10 seconds  -SP        User Key  (r) = Recorded By, (t) = Taken By, (c) = Cosigned By    Initials Name Provider Type    ALL Ren PT Physical Therapist                             Time Calculation:   Start Time: 1000  Stop Time: 1114  Time Calculation (min): 74 min    Therapy Charges for Today     Code Description Service Date Service Provider Modifiers Qty    61653471071 HC PT HOT OR COLD PACK TREAT MCARE 5/21/2018 Paulette Ren, PT GP 1    94123991504 HC PT THER PROC EA 15 MIN 5/21/2018 Paulette Ren, PT GP 1                    Paulette Ren, PT  5/21/2018

## 2018-05-23 ENCOUNTER — HOSPITAL ENCOUNTER (OUTPATIENT)
Dept: PHYSICAL THERAPY | Facility: HOSPITAL | Age: 63
Setting detail: THERAPIES SERIES
Discharge: HOME | End: 2018-05-23

## 2018-05-23 DIAGNOSIS — Z96.652 S/P TOTAL KNEE ARTHROPLASTY, LEFT: Primary | ICD-10-CM

## 2018-05-23 PROCEDURE — G8978 MOBILITY CURRENT STATUS: HCPCS | Performed by: PHYSICAL THERAPIST

## 2018-05-23 PROCEDURE — 97110 THERAPEUTIC EXERCISES: CPT | Performed by: PHYSICAL THERAPIST

## 2018-05-23 PROCEDURE — G8979 MOBILITY GOAL STATUS: HCPCS | Performed by: PHYSICAL THERAPIST

## 2018-05-25 ENCOUNTER — OFFICE VISIT (OUTPATIENT)
Dept: ORTHOPEDIC SURGERY | Facility: CLINIC | Age: 63
End: 2018-05-25

## 2018-05-25 VITALS — HEIGHT: 68 IN | WEIGHT: 233 LBS | BODY MASS INDEX: 35.31 KG/M2

## 2018-05-25 DIAGNOSIS — Z96.652 S/P TOTAL KNEE ARTHROPLASTY, LEFT: Primary | ICD-10-CM

## 2018-05-25 PROCEDURE — 99024 POSTOP FOLLOW-UP VISIT: CPT | Performed by: ORTHOPAEDIC SURGERY

## 2018-05-25 PROCEDURE — 73562 X-RAY EXAM OF KNEE 3: CPT | Performed by: ORTHOPAEDIC SURGERY

## 2018-05-25 RX ORDER — MELOXICAM 7.5 MG/1
7.5 TABLET ORAL DAILY
Qty: 30 TABLET | Refills: 0 | Status: SHIPPED | OUTPATIENT
Start: 2018-05-25 | End: 2018-06-11 | Stop reason: SDUPTHER

## 2018-05-25 RX ORDER — GABAPENTIN 300 MG/1
300 CAPSULE ORAL 3 TIMES DAILY
Qty: 42 CAPSULE | Refills: 0 | Status: SHIPPED | OUTPATIENT
Start: 2018-05-25 | End: 2018-06-11 | Stop reason: SDUPTHER

## 2018-05-25 RX ORDER — HYDROCODONE BITARTRATE AND ACETAMINOPHEN 5; 325 MG/1; MG/1
1 TABLET ORAL EVERY 6 HOURS PRN
Qty: 60 TABLET | Refills: 0 | Status: SHIPPED | OUTPATIENT
Start: 2018-05-25 | End: 2018-06-11 | Stop reason: SDUPTHER

## 2018-05-25 NOTE — PROGRESS NOTES
CC: s/p left total knee arthroplasty, DOS 4/11/2018  Interval History: Nitish Barfield returns for 6 week postoperative visit.  She is doing well. Pain is controlled with pain medication and is  improving. She denies any wound problem, fevers, or chills. Patient is continuing to work with outpatient PT. Ambulating without cane.     Physical Examination: Left knee was examined   Incision well healed   ROM 0-120,  4+/5 strength   Stable to varus and valgus stress   Flex/extend ankle and toes   Positive sensation left foot   No calf pain, negative Homans sign bilaterally    Radiographic review: Radiographs of the left  knee 3 views, AP, lateral and patella axial views, compared to immediate postop films, indication s/p TKA,  shows that the implant is in good position. There is no evidence of implant loosening or osteolysis, no dislocation or periprosthetic fractures. Overall alignment acceptable at this time.     Assessment/Plan:  Nitish Barfield is recovering from surgery as expected.  We will continue outpatient therapy for range of motion, strengthening, and gait normalization. .  She is to follow up in clinic in 6 weeks with no xrays. Patient had all question answered today. Discussed need for prophylactic antibiotics with dental/endoscopy procedures.     Medications:  New Medications Ordered This Visit   Medications   • gabapentin (NEURONTIN) 300 MG capsule     Sig: Take 1 capsule by mouth 3 (Three) Times a Day.     Dispense:  42 capsule     Refill:  0   • HYDROcodone-acetaminophen (NORCO) 5-325 MG per tablet     Sig: Take 1 tablet by mouth Every 6 (Six) Hours As Needed for Moderate Pain .     Dispense:  60 tablet     Refill:  0   • meloxicam (MOBIC) 7.5 MG tablet     Sig: Take 1 tablet by mouth Daily.     Dispense:  30 tablet     Refill:  0       Costa Zuñiga MD  5/25/2018

## 2018-06-06 ENCOUNTER — TELEPHONE (OUTPATIENT)
Dept: ORTHOPEDIC SURGERY | Facility: CLINIC | Age: 63
End: 2018-06-06

## 2018-06-06 NOTE — TELEPHONE ENCOUNTER
Ms. Barfield called today because she is still having some soreness in her post-op knee.  Said that she was concerned because she didn't think she would have any pain at this point.  I informed her that since she is just around 7 weeks out that soreness at this point is still normal.  She works at a desk job, I advised her to try and get up and move around during the day, do some of the exercises she learned in PT.  She was told to call back if pain increases or if she notices any redness or swelling.

## 2018-06-11 RX ORDER — MELOXICAM 7.5 MG/1
7.5 TABLET ORAL DAILY
Qty: 30 TABLET | Refills: 0 | Status: SHIPPED | OUTPATIENT
Start: 2018-06-11 | End: 2018-10-02 | Stop reason: ALTCHOICE

## 2018-06-11 RX ORDER — GABAPENTIN 300 MG/1
300 CAPSULE ORAL 3 TIMES DAILY
Qty: 42 CAPSULE | Refills: 0 | Status: SHIPPED | OUTPATIENT
Start: 2018-06-11 | End: 2018-10-02 | Stop reason: ALTCHOICE

## 2018-06-11 RX ORDER — HYDROCODONE BITARTRATE AND ACETAMINOPHEN 5; 325 MG/1; MG/1
1 TABLET ORAL EVERY 6 HOURS PRN
Qty: 60 TABLET | Refills: 0 | Status: SHIPPED | OUTPATIENT
Start: 2018-06-11 | End: 2018-07-06 | Stop reason: ALTCHOICE

## 2018-06-28 ENCOUNTER — TELEPHONE (OUTPATIENT)
Dept: ORTHOPEDIC SURGERY | Facility: CLINIC | Age: 63
End: 2018-06-28

## 2018-06-29 RX ORDER — OXYCODONE HYDROCHLORIDE AND ACETAMINOPHEN 5; 325 MG/1; MG/1
TABLET ORAL
Qty: 80 TABLET | Refills: 0 | Status: SHIPPED | OUTPATIENT
Start: 2018-06-29 | End: 2018-07-06 | Stop reason: SDUPTHER

## 2018-07-06 ENCOUNTER — OFFICE VISIT (OUTPATIENT)
Dept: ORTHOPEDIC SURGERY | Facility: CLINIC | Age: 63
End: 2018-07-06

## 2018-07-06 VITALS — BODY MASS INDEX: 36.83 KG/M2 | WEIGHT: 243 LBS | HEIGHT: 68 IN

## 2018-07-06 DIAGNOSIS — M17.11 PRIMARY OSTEOARTHRITIS OF RIGHT KNEE: ICD-10-CM

## 2018-07-06 DIAGNOSIS — Z96.652 S/P TOTAL KNEE ARTHROPLASTY, LEFT: Primary | ICD-10-CM

## 2018-07-06 PROBLEM — M17.12 PRIMARY OSTEOARTHRITIS OF LEFT KNEE: Status: RESOLVED | Noted: 2017-02-21 | Resolved: 2018-07-06

## 2018-07-06 PROCEDURE — 20610 DRAIN/INJ JOINT/BURSA W/O US: CPT | Performed by: ORTHOPAEDIC SURGERY

## 2018-07-06 PROCEDURE — 99213 OFFICE O/P EST LOW 20 MIN: CPT | Performed by: ORTHOPAEDIC SURGERY

## 2018-07-06 RX ORDER — OXYCODONE HYDROCHLORIDE AND ACETAMINOPHEN 5; 325 MG/1; MG/1
TABLET ORAL
Qty: 60 TABLET | Refills: 0 | Status: SHIPPED | OUTPATIENT
Start: 2018-07-06 | End: 2018-10-02 | Stop reason: ALTCHOICE

## 2018-07-06 RX ADMIN — TRIAMCINOLONE ACETONIDE 80 MG: 40 INJECTION, SUSPENSION INTRA-ARTICULAR; INTRAMUSCULAR at 10:14

## 2018-07-06 RX ADMIN — LIDOCAINE HYDROCHLORIDE 8 ML: 10 INJECTION, SOLUTION EPIDURAL; INFILTRATION; INTRACAUDAL; PERINEURAL at 10:14

## 2018-07-06 NOTE — PROGRESS NOTES
Large Joint Arthrocentesis  Date/Time: 7/6/2018 10:14 AM  Consent given by: patient  Site marked: site marked  Timeout: Immediately prior to procedure a time out was called to verify the correct patient, procedure, equipment, support staff and site/side marked as required   Supporting Documentation  Indications: pain   Procedure Details  Location: knee - R knee  Preparation: Patient was prepped and draped in the usual sterile fashion  Needle size: 22 G  Approach: anterolateral  Medications administered: 8 mL lidocaine PF 1% 1 %; 80 mg triamcinolone acetonide 40 MG/ML  Patient tolerance: patient tolerated the procedure well with no immediate complications        Subjective:     Patient ID: Nitish Barfield is a 63 y.o. female.    Chief Complaint:  Follow-up status post left total knee arthroplasty, 4/11/2018  Follow-up right knee pain, DJD  History of Present Illness  Nitish Barfield returns to clinic today for evaluation of left knee, states she is doing better in regards to range of motion and strength, still has some mild anterior knee pain particularly with prolonged activities and getting up from a seated position, rates as a 1-2 out of 10.  No redness or swelling, no warmth over the knee, no fevers chills or sweats.    Right knee is starting to cause increasing levels of pain however over the medial and anterior aspect of the knee, rates as a 7-8 out of 10 in aching in nature, exacerbated with prolonged standing and walking activities, minimal improvement with rest, anti-inflammatory medications, soft tissue massage.  Moderate swelling does wax and wane, denies associated numbness or tingling, mild radiation along medial aspect of the leg.     Social History     Occupational History   • Not on file.     Social History Main Topics   • Smoking status: Never Smoker   • Smokeless tobacco: Never Used   • Alcohol use Yes      Comment: NO DRINKING SINCE FEB., WAS DRINKING DAILY   • Drug use: No   • Sexual activity: Defer     "  Past Medical History:   Diagnosis Date   • Allergy    • Anxiety    • Arthritis     RA, FINGERS, TOES   • Esophageal reflux    • GERD (gastroesophageal reflux disease)    • Hypertension    • Hypertension    • Left knee pain     scheduled for sx     Past Surgical History:   Procedure Laterality Date   • COLONOSCOPY     • ENDOSCOPY     • HYSTERECTOMY      OPEN   • KNEE SURGERY Left     ARTHROSCOPY   • TOTAL KNEE ARTHROPLASTY Left 4/11/2018    Procedure: TOTAL KNEE ARTHROPLASTY AND ALL ASSOCIATED PROCEDURES;  Surgeon: Costa Zuñiga MD;  Location: [facility];  Service: Orthopedics   • WISDOM TOOTH EXTRACTION         Family History   Problem Relation Age of Onset   • Cancer Mother    • Other Father         cardiac failure         Review of Systems        Objective:  Vitals:    07/06/18 0936   Weight: 110 kg (243 lb)   Height: 172.7 cm (68\")     1    07/06/18 0936   Weight: 110 kg (243 lb)     Body mass index is 36.95 kg/m².  General: No acute distress.  Resp: normal respiratory effort  Skin: no rashes or wounds; normal turgor  Psych: mood and affect appropriate; recent and remote memory intact         Ortho Exam    Left knee-active range of motion 0-125°, 4+ out of 5 strength on flexion and extension, anterior incision well-healed, minimal soft tissue swelling noted, no redness or warmth appreciated.  Stable to varus and valgus stress at 0 and 30°.    Right knee-active range of motion 3-120°, 4+ out of 5 strength on flexion and extension, moderate effusion noted, maximal tenderness palpation along medial joint line, mild tenderness along medial lateral patellar facet with positive active patellar compression test, overall varus alignment, stable to varus and valgus stress at 3 and 30°.    Imaging:    Assessment:       1. S/P total knee arthroplasty, left    2. Primary osteoarthritis of right knee          Plan:          Discussed treatment options at length with patient at today's visit. Patient is progressing well " with left knee, she is having some superficial sensory type pain, prescription anti-inflammatory cream written today to try to help with this.  Refill given on pain medication, told her this would be the last refill like to give her at this point in time.    Discussed options regards to right knee, she does want to proceed with total knee arthroplasty but doesn't want to do so just yet, was to continue rehabilitation in left knee at this time.  She was to proceed with intra-articular injection, continue home exercises for strengthening and range of motion.    Nitish Barfield was in agreement with plan and had all questions answered.     Orders:  Orders Placed This Encounter   Procedures   • Large Joint Arthrocentesis       Medications:  New Medications Ordered This Visit   Medications   • oxyCODONE-acetaminophen (PERCOCET) 5-325 MG per tablet     Si-2 tablets every 6 hours, prn moderate pain     Dispense:  60 tablet     Refill:  0       Followup:  Return in about 6 weeks (around 2018).    Nitish was seen today for follow-up, pain and pain.    Diagnoses and all orders for this visit:    S/P total knee arthroplasty, left    Primary osteoarthritis of right knee  -     Large Joint Arthrocentesis    Other orders  -     oxyCODONE-acetaminophen (PERCOCET) 5-325 MG per tablet; 1-2 tablets every 6 hours, prn moderate pain          Dictated utilizing Dragon dictation

## 2018-07-13 RX ORDER — LIDOCAINE HYDROCHLORIDE 10 MG/ML
8 INJECTION, SOLUTION EPIDURAL; INFILTRATION; INTRACAUDAL; PERINEURAL
Status: COMPLETED | OUTPATIENT
Start: 2018-07-06 | End: 2018-07-06

## 2018-07-13 RX ORDER — TRIAMCINOLONE ACETONIDE 40 MG/ML
80 INJECTION, SUSPENSION INTRA-ARTICULAR; INTRAMUSCULAR
Status: COMPLETED | OUTPATIENT
Start: 2018-07-06 | End: 2018-07-06

## 2018-08-14 ENCOUNTER — OFFICE VISIT (OUTPATIENT)
Dept: ORTHOPEDIC SURGERY | Facility: CLINIC | Age: 63
End: 2018-08-14

## 2018-08-14 VITALS — HEIGHT: 68 IN | BODY MASS INDEX: 35.16 KG/M2 | WEIGHT: 232 LBS

## 2018-08-14 DIAGNOSIS — Z96.652 S/P TOTAL KNEE ARTHROPLASTY, LEFT: Primary | ICD-10-CM

## 2018-08-14 DIAGNOSIS — M17.11 PRIMARY OSTEOARTHRITIS OF RIGHT KNEE: ICD-10-CM

## 2018-08-14 PROCEDURE — 99212 OFFICE O/P EST SF 10 MIN: CPT | Performed by: ORTHOPAEDIC SURGERY

## 2018-08-14 PROCEDURE — 73562 X-RAY EXAM OF KNEE 3: CPT | Performed by: ORTHOPAEDIC SURGERY

## 2018-08-14 NOTE — PROGRESS NOTES
Subjective:     Patient ID: Nitish Barfield is a 63 y.o. female.    Chief Complaint:  Follow-up status post left total knee arthroplasty, 4/11/2018  Follow-up right knee pain, DJD  History of Present Illness  Nitish Barfield returns to clinic today for evaluation of left knee, doing very well this point time, she is still noting improvements in regards to endurance but still does have some limitations in regards to the amount of activity that she can do before she starts having some aching pain.  Still localizes majority the pain to the anterior knee, does improved with soft tissue massage and use of topical anti-inflammatory cream.  Right knee is doing well this point time with minimal irritation of pain rates as a 1-2 out of 10, improved since injection.     Social History     Occupational History   • Not on file.     Social History Main Topics   • Smoking status: Never Smoker   • Smokeless tobacco: Never Used   • Alcohol use Yes      Comment: NO DRINKING SINCE FEB., WAS DRINKING DAILY   • Drug use: No   • Sexual activity: Defer      Past Medical History:   Diagnosis Date   • Allergy    • Anxiety    • Arthritis     RA, FINGERS, TOES   • Esophageal reflux    • GERD (gastroesophageal reflux disease)    • Hypertension    • Hypertension    • Left knee pain     scheduled for sx     Past Surgical History:   Procedure Laterality Date   • COLONOSCOPY     • ENDOSCOPY     • HYSTERECTOMY      OPEN   • KNEE SURGERY Left     ARTHROSCOPY   • TOTAL KNEE ARTHROPLASTY Left 4/11/2018    Procedure: TOTAL KNEE ARTHROPLASTY AND ALL ASSOCIATED PROCEDURES;  Surgeon: Costa Zuñiga MD;  Location: [facility];  Service: Orthopedics   • WISDOM TOOTH EXTRACTION         Family History   Problem Relation Age of Onset   • Cancer Mother    • Other Father         cardiac failure         Review of Systems   Constitutional: Negative for chills, diaphoresis, fever and unexpected weight change.   HENT: Negative for hearing loss, nosebleeds, sore throat  "and tinnitus.    Eyes: Negative for pain and visual disturbance.   Respiratory: Negative for cough, shortness of breath and wheezing.    Cardiovascular: Negative for chest pain and palpitations.   Gastrointestinal: Negative for abdominal pain, diarrhea, nausea and vomiting.   Endocrine: Negative for cold intolerance, heat intolerance and polydipsia.   Genitourinary: Negative for difficulty urinating, dysuria and hematuria.   Musculoskeletal: Positive for arthralgias and myalgias. Negative for joint swelling.   Skin: Negative for rash and wound.   Allergic/Immunologic: Negative for environmental allergies.   Neurological: Negative for dizziness, syncope and numbness.   Hematological: Does not bruise/bleed easily.   Psychiatric/Behavioral: Negative for dysphoric mood and sleep disturbance. The patient is not nervous/anxious.            Objective:  Vitals:    08/14/18 0801   Weight: 105 kg (232 lb)   Height: 172.7 cm (68\")     1    08/14/18  0801   Weight: 105 kg (232 lb)     Body mass index is 35.28 kg/m².  General: No acute distress.  Resp: normal respiratory effort  Skin: no rashes or wounds; normal turgor  Psych: mood and affect appropriate; recent and remote memory intact         Ortho Exam    Left knee-  Incision well-healed, no erythema or fluctuance, minimal effusion noted, mild tenderness palpation anteriorly over soft tissues.  Active range of motion 0-130°, 4 out of 5 strength on extension, 4+ out of 5 strength on flexion.  Stable to varus and valgus stress at 0 and 30°.    Right knee-active range of motion 2-125°, 4+ out of 5 strength on flexion and extension, minimal effusion, mild tenderness along medial joint line, moderate tenderness along medial lateral patellar facet with positive active patellar compression test.    Imaging:  3 view x-ray left knee from today's visit, ordered and reviewed by me, indication status post total knee arthroplasty, compared to prior postoperative x-rays from office.  " Implant appears to be in stable position with no evidence of loosening or ostial lysis, no evidence of periprosthetic fracture.    Assessment:       1. S/P total knee arthroplasty, left    2. Primary osteoarthritis of right knee          Plan:          Discussed treatment options at length with patient at today's visit. Overall patient is doing well, did encourage her to continue with soft tissue massage, use of topical anti-inflammatory cream, use of knee sleeve.  Encouraged her to increase her level of exercise and strengthening in order to increase her endurance with activities.  Follow-up in 4-6 weeks, she is considering proceeding with right total knee arthroplasty in the late fall.    Nitish Barfield was in agreement with plan and had all questions answered.     Orders:  Orders Placed This Encounter   Procedures   • XR Knee 3 View Left       Medications:  No orders of the defined types were placed in this encounter.      Followup:  Return in about 6 weeks (around 9/25/2018).    Nitish was seen today for follow-up.    Diagnoses and all orders for this visit:    S/P total knee arthroplasty, left  -     XR Knee 3 View Left    Primary osteoarthritis of right knee          Dictated utilizing Dragon dictation

## 2018-10-02 ENCOUNTER — OFFICE VISIT (OUTPATIENT)
Dept: ORTHOPEDIC SURGERY | Facility: CLINIC | Age: 63
End: 2018-10-02

## 2018-10-02 VITALS — HEIGHT: 68 IN | BODY MASS INDEX: 35.01 KG/M2 | WEIGHT: 231 LBS

## 2018-10-02 DIAGNOSIS — M17.11 PRIMARY OSTEOARTHRITIS OF RIGHT KNEE: Primary | ICD-10-CM

## 2018-10-02 PROCEDURE — 99214 OFFICE O/P EST MOD 30 MIN: CPT | Performed by: ORTHOPAEDIC SURGERY

## 2018-10-02 PROCEDURE — 73562 X-RAY EXAM OF KNEE 3: CPT | Performed by: ORTHOPAEDIC SURGERY

## 2018-10-02 RX ORDER — MELOXICAM 7.5 MG/1
15 TABLET ORAL ONCE
Status: CANCELLED | OUTPATIENT
Start: 2018-10-02 | End: 2018-10-02

## 2018-10-02 RX ORDER — TRAMADOL HYDROCHLORIDE 50 MG/1
50 TABLET ORAL
COMMUNITY
Start: 2018-10-01 | End: 2018-10-18 | Stop reason: HOSPADM

## 2018-10-02 RX ORDER — CHLORTHALIDONE 50 MG/1
50 TABLET ORAL DAILY
Refills: 4 | COMMUNITY
Start: 2018-09-17 | End: 2021-09-22

## 2018-10-02 RX ORDER — ACETAMINOPHEN 325 MG/1
1000 TABLET ORAL ONCE
Status: CANCELLED | OUTPATIENT
Start: 2018-10-02 | End: 2018-10-02

## 2018-10-02 RX ORDER — LEFLUNOMIDE 10 MG/1
10 TABLET ORAL DAILY
Refills: 0 | COMMUNITY
Start: 2018-09-17 | End: 2020-08-11 | Stop reason: ALTCHOICE

## 2018-10-02 RX ORDER — ALLOPURINOL 100 MG/1
100 TABLET ORAL DAILY
Refills: 0 | COMMUNITY
Start: 2018-07-16

## 2018-10-02 RX ORDER — OXYCODONE HCL 10 MG/1
10 TABLET, FILM COATED, EXTENDED RELEASE ORAL ONCE
Status: CANCELLED | OUTPATIENT
Start: 2018-10-02 | End: 2018-10-02

## 2018-10-02 NOTE — PROGRESS NOTES
Subjective:     Patient ID: Nitish Barfield is a 63 y.o. female.    Chief Complaint:  Follow-up right knee pain, DJD  History of Present Illness  Nitish Barfield returns to clinic today for evaluation of right knee pain, states that she did have approximately 6 weeks of improvement with prior intra-articular injection but since then her pain is recurred, localized primarily blastic of her knee, rates as 8-9 out of 10 in aching in nature, exacerbated with prolonged weightbearing, walking, standing, deep flexion activities and stair climbing.  Notes associated crepitus on range of motion and with her activities as well as moderate swelling.  Denies any numbness or tingling right lower extremity, mild radiation of pain down the medial aspects the right leg.  Denies any associated hip or groin pain.     Social History     Occupational History   • Not on file.     Social History Main Topics   • Smoking status: Never Smoker   • Smokeless tobacco: Never Used   • Alcohol use Yes      Comment: NO DRINKING SINCE FEB., WAS DRINKING DAILY   • Drug use: No   • Sexual activity: Defer      Past Medical History:   Diagnosis Date   • Allergy    • Anxiety    • Arthritis     RA, FINGERS, TOES   • Esophageal reflux    • GERD (gastroesophageal reflux disease)    • Hypertension    • Hypertension    • Left knee pain     scheduled for sx     Past Surgical History:   Procedure Laterality Date   • COLONOSCOPY     • ENDOSCOPY     • HYSTERECTOMY      OPEN   • KNEE SURGERY Left     ARTHROSCOPY   • TOTAL KNEE ARTHROPLASTY Left 4/11/2018    Procedure: TOTAL KNEE ARTHROPLASTY AND ALL ASSOCIATED PROCEDURES;  Surgeon: Costa Zuñiga MD;  Location: [facility];  Service: Orthopedics   • WISDOM TOOTH EXTRACTION         Family History   Problem Relation Age of Onset   • Cancer Mother    • Other Father         cardiac failure         Review of Systems   Constitutional: Negative for chills, diaphoresis, fever and unexpected weight change.   HENT: Negative  "for hearing loss, nosebleeds, sore throat and tinnitus.    Eyes: Negative for pain and visual disturbance.   Respiratory: Negative for cough, shortness of breath and wheezing.    Cardiovascular: Negative for chest pain and palpitations.   Gastrointestinal: Negative for abdominal pain, diarrhea, nausea and vomiting.   Endocrine: Negative for cold intolerance, heat intolerance and polydipsia.   Genitourinary: Negative for difficulty urinating, dysuria and hematuria.   Musculoskeletal: Positive for arthralgias, joint swelling and myalgias.   Skin: Negative for rash and wound.   Allergic/Immunologic: Negative for environmental allergies.   Neurological: Negative for dizziness, syncope and numbness.   Hematological: Does not bruise/bleed easily.   Psychiatric/Behavioral: Negative for dysphoric mood and sleep disturbance. The patient is not nervous/anxious.            Objective:  Vitals:    10/02/18 0809   Weight: 105 kg (231 lb)   Height: 172.7 cm (68\")     1    10/02/18  0809   Weight: 105 kg (231 lb)     Body mass index is 35.12 kg/m².  General: No acute distress.  Resp: normal respiratory effort  Skin: no rashes or wounds; normal turgor  Psych: mood and affect appropriate; recent and remote memory intact          Ortho Exam     Right knee-active range of motion 5-120°, 4+ out of 5 strength on flexion and extension, moderate effusion noted, maximal tenderness along medial joint line with positive Mis exam, overall varus alignment noted, crepitus on active and passive range of motion.  Positive active patellar compression test.  Stable to varus and valgus stress at 5 and 30.  No hip pain on logroll or Stinchfield exam.  Positive sensation light touch all discretions right foot symmetric to the left, brisk cap refill all digits, 2+ dorsalis pedis pulse.  Imaging:   Right Knee X-Ray  Indication: Pain    AP, Lateral, and [employer] views    Findings:  No fracture  No bony lesion  Normal soft tissues  Advanced " tricompartmental osteoarthritis with bone-on-bone articulation medial compartment and marginal osteophytes appreciated both medial and lateral compartment as well as patellofemoral compartment    No prior studies were available for comparison.    Assessment:        1. Primary osteoarthritis of right knee           Plan:          Discussed treatment options at length with patient at today's visit.  Given failure conservative treatments including intra-articular injections, home exercise program, weight loss activities, activity modification, and anti-inflammatory medications, patient will like she with right total knee arthroplasty this time.    I reviewed anatomy and a model of a total knee arthroplasty, as well as typical postoperative recovery of 6-12 months before maxiaml recovery, and possible need for rehabilitation stay after hospitalization. We also discussed risks, benefits, and alternatives of procedure with risks including but not limited to neurovascular damage, bleeding, infection, malalignment, chronic pian, failure of implants, osteolysis, loosening of implants, loss of motion, weakness, stiffness, instability, DVT, pulmonary embolus, death, stroke, complex regional pain syndrome, myocardial infarction, and need for additional procedures. Patient understood all these and had all questions answered before consenting to the procedure. No guarantees were given in regards to results from the surgery. We will have patient medically optimized by their primary care physician and plan on proceeding with surgery at next available date.     Patient denies any history of DVT or pulmonary embolus, no cardiac history.  She does not have diabetes.    Nitish Barfield was in agreement with plan and had all questions answered.     Orders:  Orders Placed This Encounter   Procedures   • MRSA Screen Culture - Swab, Nares   • XR Knee 3 View Right   • Urinalysis With Culture If Indicated - Urine, Clean Catch   • Basic  metabolic panel   • Protime-INR   • APTT   • Consult Primary Care Physician for Medical Clearance   • Follow anesthesia standing orders.   • Provide instructions to patient regarding NPO status   • Clorhexidine skin prep   • Care Order / Instruction for all Female Patients   • ECG 12 Lead   • CBC and Differential       Medications:  No orders of the defined types were placed in this encounter.      Followup:  No Follow-up on file.    Nitish was seen today for pain, follow-up and edema.    Diagnoses and all orders for this visit:    Primary osteoarthritis of right knee  -     XR Knee 3 View Right  -     Case Request; Standing  -     Consult Primary Care Physician for Medical Clearance  -     Urinalysis With Culture If Indicated - Urine, Clean Catch; Future  -     acetaminophen (TYLENOL) tablet 975 mg; Take 3 tablets by mouth 1 (One) Time.  -     meloxicam (MOBIC) tablet 15 mg; Take 2 tablets by mouth 1 (One) Time.  -     oxyCODONE (oxyCONTIN) 12 hr tablet 10 mg; Take 1 tablet by mouth 1 (One) Time.  -     bupivacaine liposome (EXPAREL) 1.3 % injection 266 mg; Inject 20 mL as directed 1 (One) Time.  -     CBC and Differential; Future  -     Basic metabolic panel; Future  -     Protime-INR; Future  -     APTT; Future  -     MRSA Screen Culture - Swab, Nares; Future  -     ECG 12 Lead; Future  -     vancomycin (VANCOCIN) 1,500 mg in sodium chloride 0.9 % 250 mL IVPB; Infuse 1,500 mg into a venous catheter 1 (One) Time.  -     Case Request    Other orders  -     Follow anesthesia standing orders.  -     Provide instructions to patient regarding NPO status  -     Clorhexidine skin prep  -     Care Order / Instruction for all Female Patients  -     Follow anesthesia standing orders.; Standing  -     Verify NPO Status; Standing  -     SCD (sequential compression device)- to be placed on patient in Pre-op; Standing  -     Clip operative site; Standing  -     Obtain informed consent (if not collected inpatient or PAT);  Standing  -     Type & Screen; Standing  -     Inpatient Consult to Hospitalist; Standing  -     Initiate Observation Status; Standing          Dictated utilizing Dragon dictation

## 2018-10-11 ENCOUNTER — OFFICE VISIT (OUTPATIENT)
Dept: ORTHOPEDIC SURGERY | Facility: CLINIC | Age: 63
End: 2018-10-11

## 2018-10-11 ENCOUNTER — APPOINTMENT (OUTPATIENT)
Dept: PREADMISSION TESTING | Facility: HOSPITAL | Age: 63
End: 2018-10-11

## 2018-10-11 VITALS
DIASTOLIC BLOOD PRESSURE: 74 MMHG | BODY MASS INDEX: 35.34 KG/M2 | RESPIRATION RATE: 16 BRPM | SYSTOLIC BLOOD PRESSURE: 153 MMHG | WEIGHT: 233.2 LBS | OXYGEN SATURATION: 98 % | HEIGHT: 68 IN | HEART RATE: 90 BPM

## 2018-10-11 DIAGNOSIS — M17.11 PRIMARY OSTEOARTHRITIS OF RIGHT KNEE: ICD-10-CM

## 2018-10-11 DIAGNOSIS — M17.11 PRIMARY OSTEOARTHRITIS OF RIGHT KNEE: Primary | ICD-10-CM

## 2018-10-11 LAB
ABO GROUP BLD: NORMAL
ANION GAP SERPL CALCULATED.3IONS-SCNC: 12.6 MMOL/L
APTT PPP: 25.8 SECONDS (ref 24.3–38.1)
BASOPHILS # BLD AUTO: 0.07 10*3/MM3 (ref 0–0.2)
BASOPHILS NFR BLD AUTO: 0.8 % (ref 0–2)
BILIRUB UR QL STRIP: NEGATIVE
BLD GP AB SCN SERPL QL: NEGATIVE
BUN BLD-MCNC: 26 MG/DL (ref 8–23)
BUN/CREAT SERPL: 21.7 (ref 7–25)
CALCIUM SPEC-SCNC: 8.6 MG/DL (ref 8.8–10.5)
CHLORIDE SERPL-SCNC: 100 MMOL/L (ref 98–107)
CLARITY UR: CLEAR
CO2 SERPL-SCNC: 27.4 MMOL/L (ref 22–29)
COLOR UR: YELLOW
CREAT BLD-MCNC: 1.2 MG/DL (ref 0.57–1)
DEPRECATED RDW RBC AUTO: 53.6 FL (ref 37–54)
EOSINOPHIL # BLD AUTO: 0.13 10*3/MM3 (ref 0.1–0.3)
EOSINOPHIL NFR BLD AUTO: 1.5 % (ref 0–4)
ERYTHROCYTE [DISTWIDTH] IN BLOOD BY AUTOMATED COUNT: 16.6 % (ref 11.5–14.5)
GFR SERPL CREATININE-BSD FRML MDRD: 45 ML/MIN/1.73
GLUCOSE BLD-MCNC: 122 MG/DL (ref 65–99)
GLUCOSE UR STRIP-MCNC: NEGATIVE MG/DL
HCT VFR BLD AUTO: 41.5 % (ref 37–47)
HGB BLD-MCNC: 12.7 G/DL (ref 12–16)
HGB UR QL STRIP.AUTO: NEGATIVE
IMM GRANULOCYTES # BLD: 0.03 10*3/MM3 (ref 0–0.03)
IMM GRANULOCYTES NFR BLD: 0.3 % (ref 0–0.5)
INR PPP: 1.06 (ref 0.9–1.1)
KETONES UR QL STRIP: NEGATIVE
LEUKOCYTE ESTERASE UR QL STRIP.AUTO: NEGATIVE
LYMPHOCYTES # BLD AUTO: 1.98 10*3/MM3 (ref 0.6–4.8)
LYMPHOCYTES NFR BLD AUTO: 22.2 % (ref 20–45)
MCH RBC QN AUTO: 26.9 PG (ref 27–31)
MCHC RBC AUTO-ENTMCNC: 30.6 G/DL (ref 31–37)
MCV RBC AUTO: 87.9 FL (ref 81–99)
MONOCYTES # BLD AUTO: 0.82 10*3/MM3 (ref 0–1)
MONOCYTES NFR BLD AUTO: 9.2 % (ref 3–8)
NEUTROPHILS # BLD AUTO: 5.9 10*3/MM3 (ref 1.5–8.3)
NEUTROPHILS NFR BLD AUTO: 66 % (ref 45–70)
NITRITE UR QL STRIP: NEGATIVE
NRBC BLD MANUAL-RTO: 0 /100 WBC (ref 0–0)
PH UR STRIP.AUTO: 5.5 [PH] (ref 4.5–8)
PLATELET # BLD AUTO: 373 10*3/MM3 (ref 140–500)
PMV BLD AUTO: 9.7 FL (ref 7.4–10.4)
POTASSIUM BLD-SCNC: 3.8 MMOL/L (ref 3.5–5.2)
PROT UR QL STRIP: NEGATIVE
PROTHROMBIN TIME: 13.8 SECONDS (ref 12.1–15)
RBC # BLD AUTO: 4.72 10*6/MM3 (ref 4.2–5.4)
RH BLD: POSITIVE
SODIUM BLD-SCNC: 140 MMOL/L (ref 136–145)
SP GR UR STRIP: 1.01 (ref 1–1.03)
T&S EXPIRATION DATE: NORMAL
UROBILINOGEN UR QL STRIP: NORMAL
WBC NRBC COR # BLD: 8.93 10*3/MM3 (ref 4.8–10.8)

## 2018-10-11 PROCEDURE — 93010 ELECTROCARDIOGRAM REPORT: CPT | Performed by: INTERNAL MEDICINE

## 2018-10-11 PROCEDURE — 93005 ELECTROCARDIOGRAM TRACING: CPT

## 2018-10-11 PROCEDURE — 36415 COLL VENOUS BLD VENIPUNCTURE: CPT

## 2018-10-11 PROCEDURE — 87081 CULTURE SCREEN ONLY: CPT | Performed by: ORTHOPAEDIC SURGERY

## 2018-10-11 PROCEDURE — 85025 COMPLETE CBC W/AUTO DIFF WBC: CPT | Performed by: ORTHOPAEDIC SURGERY

## 2018-10-11 PROCEDURE — 86901 BLOOD TYPING SEROLOGIC RH(D): CPT | Performed by: ORTHOPAEDIC SURGERY

## 2018-10-11 PROCEDURE — 80048 BASIC METABOLIC PNL TOTAL CA: CPT | Performed by: ORTHOPAEDIC SURGERY

## 2018-10-11 PROCEDURE — 81003 URINALYSIS AUTO W/O SCOPE: CPT | Performed by: ORTHOPAEDIC SURGERY

## 2018-10-11 PROCEDURE — 85730 THROMBOPLASTIN TIME PARTIAL: CPT | Performed by: ORTHOPAEDIC SURGERY

## 2018-10-11 PROCEDURE — 86900 BLOOD TYPING SEROLOGIC ABO: CPT | Performed by: ORTHOPAEDIC SURGERY

## 2018-10-11 PROCEDURE — 99024 POSTOP FOLLOW-UP VISIT: CPT | Performed by: ORTHOPAEDIC SURGERY

## 2018-10-11 PROCEDURE — 85610 PROTHROMBIN TIME: CPT | Performed by: ORTHOPAEDIC SURGERY

## 2018-10-11 PROCEDURE — 86850 RBC ANTIBODY SCREEN: CPT | Performed by: ORTHOPAEDIC SURGERY

## 2018-10-11 RX ORDER — BUSPIRONE HYDROCHLORIDE 10 MG/1
20 TABLET ORAL 2 TIMES DAILY
COMMUNITY
End: 2021-09-22

## 2018-10-11 RX ORDER — LANSOPRAZOLE 30 MG/1
30 CAPSULE, DELAYED RELEASE ORAL DAILY
COMMUNITY

## 2018-10-11 RX ORDER — ALPRAZOLAM 0.5 MG/1
0.5 TABLET ORAL EVERY 8 HOURS PRN
COMMUNITY

## 2018-10-11 RX ORDER — NEBIVOLOL 10 MG/1
10 TABLET ORAL DAILY
COMMUNITY

## 2018-10-11 ASSESSMENT — KOOS JR
KOOS JR SCORE: 39.625
KOOS JR SCORE: 19

## 2018-10-11 NOTE — PROGRESS NOTES
Cc: f/u right knee pain, DJD    Patient presented to clinic today for preoperative history and physical visit in anticipation of upcoming scheduled right total knee arthroplasty.    I reviewed anatomy and a model of a total knee arthroplasty, as well as typical postoperative recovery of 6-12 months before maxiaml recovery, and possible need for rehabilitation stay after hospitalization. We also discussed risks, benefits, and alternatives of procedure with risks including but not limited to neurovascular damage, bleeding, infection, malalignment, chronic pian, failure of implants, osteolysis, loosening of implants, loss of motion, weakness, stiffness, instability, DVT, pulmonary embolus, death, stroke, complex regional pain syndrome, myocardial infarction, and need for additional procedures. Patient understood all these and had all questions answered before consenting to the procedure. No guarantees were given in regards to results from the surgery.  Patient has been medically optimize by his primary care physician.    Postoperative DVT prophylaxis will be with aspirin     I have given patient prescription today as well for Bactroban for nasal swabs     All remaining questions answered today.

## 2018-10-11 NOTE — H&P
History & Physical       Patient: Nitish Barfield    YOB: 1955    Medical Record Number: [id number]    Surgeon:  Dr. Costa Zuñiga    Chief Complaints:   Chief Complaint   Patient presents with   • Right Knee - Pain, Follow-up       Subjective:  This problem is not new to this examiner.     History of Present Illness: 63 y.o. female presents with   Chief Complaint   Patient presents with   • Right Knee - Pain, Follow-up   . Onset of symptoms was years ago and has been progressively worsening despite more conservative treatment measures.  Symptoms are associated with ability to move, exercise, and perform activities of daily living.  Symptoms are aggravated by weight bearing and ROM necessary for activities of daily living.   Symptoms improve with rest, ice and elevation only minimally.      Allergies:   Allergies   Allergen Reactions   • Augmentin [Amoxicillin-Pot Clavulanate] Nausea Only and Nausea And Vomiting   • Methotrexate Diarrhea, Nausea And Vomiting and Other (See Comments)     Other reaction(s): Stomach Pain  Blisters, hair loss  Blisters, hair loss  Blisters, hair loss       Medications:   Home Medications:  Current Outpatient Prescriptions on File Prior to Visit   Medication Sig   • allopurinol (ZYLOPRIM) 100 MG tablet Take 100 mg by mouth Daily.   • chlorthalidone (HYGROTEN) 50 MG tablet Take 50 mg by mouth Daily.   • EDARBI 80 MG tablet tablet Take 80 mg by mouth Every Morning.   • leflunomide (ARAVA) 10 MG tablet Take 10 mg by mouth Daily.   • potassium chloride (K-DUR,KLOR-CON) 10 MEQ CR tablet Take 20 mEq by mouth Daily.   • traMADol (ULTRAM) 50 MG tablet Take 50 mg by mouth.   • vitamin D (ERGOCALCIFEROL) 18640 units capsule capsule Take 50,000 Units by mouth 2 (Two) Times a Week.   • Vortioxetine HBr (TRINTELLIX) 20 MG tablet Take 20 mg by mouth Every Morning.   • [DISCONTINUED] ALPRAZolam (XANAX) 0.5 MG tablet take 1 or 2 tablets by mouth every 8 hours if needed   • [DISCONTINUED]  busPIRone (BUSPAR) 10 MG tablet take 2 tablets by mouth twice a day   • [DISCONTINUED] BYSTOLIC 10 MG tablet Take 1 tablet daily   • [DISCONTINUED] lansoprazole (PREVACID) 30 MG capsule take 1 capsule by mouth once daily     No current facility-administered medications on file prior to visit.      Current Medications:  Scheduled Meds:  Continuous Infusions:  No current facility-administered medications for this visit.   PRN Meds:.    I have reviewed the patient's medical history in detail and updated the computerized patient record.  Review and summarization of old records include:    Past Medical History:   Diagnosis Date   • Allergy    • Anxiety    • Arthritis     RA, FINGERS, TOES   • Bleeding esophageal ulcer     history of   • Esophageal reflux    • GERD (gastroesophageal reflux disease)    • History of transfusion    • Hypertension    • Hypertension    • Osteoarthritis of right knee     sched TKA        Past Surgical History:   Procedure Laterality Date   • COLONOSCOPY     • ENDOSCOPY     • ESOPHAGOSCOPY / EGD      cauterize esophageal bleeding ulcers   • HYSTERECTOMY      OPEN   • KNEE SURGERY Left     ARTHROSCOPY   • TOTAL KNEE ARTHROPLASTY Left 4/11/2018    Procedure: TOTAL KNEE ARTHROPLASTY AND ALL ASSOCIATED PROCEDURES;  Surgeon: Costa Zuñiga MD;  Location: [facility];  Service: Orthopedics   • WISDOM TOOTH EXTRACTION          Social History     Occupational History   • Not on file.     Social History Main Topics   • Smoking status: Never Smoker   • Smokeless tobacco: Never Used   • Alcohol use Yes      Comment: glass of rum nightly   • Drug use: No   • Sexual activity: Defer    Social History     Social History Narrative   • No narrative on file        Family History   Problem Relation Age of Onset   • Cancer Mother         bone   • Other Father         cardiac failure   • Heart disease Father    • Malig Hyperthermia Neg Hx        ROS: 14 point review of systems was performed and was negative except  for documented findings in HPI and today's encounter.     Allergies:   Allergies   Allergen Reactions   • Augmentin [Amoxicillin-Pot Clavulanate] Nausea Only and Nausea And Vomiting   • Methotrexate Diarrhea, Nausea And Vomiting and Other (See Comments)     Other reaction(s): Stomach Pain  Blisters, hair loss  Blisters, hair loss  Blisters, hair loss     Constitutional:  Denies fever, shaking or chills   Eyes:  Denies change in visual acuity   HENT:  Denies nasal congestion or sore throat   Respiratory:  Denies cough or shortness of breath   Cardiovascular:  Denies chest pain or severe LE edema   GI:  Denies abdominal pain, nausea, vomiting, bloody stools or diarrhea   Musculoskeletal:  Denies numbness tingling or loss of motor function except as outlined above in history of present illness.  : Denies painful urination or hematuria  Integument:  Denies rash, lesion or ulceration   Neurologic:  Denies headache or focal weakness  Endocrine:  Denies lymphadenopathy  Psych:  Denies confusion or change in mental status   Hem:  Denies active bleeding    Physical Exam: 63 y.o. female  There is no height or weight on file to calculate BMI.  There were no vitals filed for this visit.  Vital signs reviewed.   General Appearance:    Alert, cooperative, in no acute distress                  Eyes: conjunctiva clear  ENT: external ears and nose atraumatic  CV: no peripheral edema  Resp: normal respiratory effort  Skin: no rashes or wounds; normal turgor  Psych: mood and affect appropriate  Lymph: no nodes appreciated  Neuro: gross sensation intact  Vascular:  Palpable peripheral pulse in noted extremity  Musculoskeletal Extremities: Right knee-active range of motion 5-120°, 4+ out of 5 strength on flexion and extension, moderate effusion noted, maximal tenderness along medial joint line with positive Mis exam, overall varus alignment noted, crepitus on active and passive range of motion.  Positive active patellar  compression test.  Stable to varus and valgus stress at 5 and 30.  No hip pain on logroll or [facility] exam.  Positive sensation light touch all discretions right foot symmetric to the left, brisk cap refill all digits, 2+ dorsalis pedis pulse.    Debilities/Disabilities Identified: None      Diagnostic Tests:  Appointment on 10/11/2018   Component Date Value Ref Range Status   • Color, UA 10/11/2018 Yellow  Yellow, Straw Final   • Appearance, UA 10/11/2018 Clear  Clear Final   • pH, UA 10/11/2018 5.5  4.5 - 8.0 Final   • Specific Gravity, UA 10/11/2018 1.010  1.003 - 1.030 Final   • Glucose, UA 10/11/2018 Negative  Negative Final   • Ketones, UA 10/11/2018 Negative  Negative, 80 mg/dL (3+), >=160 mg/dL (4+) Final   • Bilirubin, UA 10/11/2018 Negative  Negative Final   • Blood, UA 10/11/2018 Negative  Negative Final   • Protein, UA 10/11/2018 Negative  Negative Final   • Leuk Esterase, UA 10/11/2018 Negative  Negative Final   • Nitrite, UA 10/11/2018 Negative  Negative Final   • Urobilinogen, UA 10/11/2018 0.2 E.U./dL  0.2 - 1.0 E.U./dL Final   • Glucose 10/11/2018 122* 65 - 99 mg/dL Final   • BUN 10/11/2018 26* 8 - 23 mg/dL Final   • Creatinine 10/11/2018 1.20* 0.57 - 1.00 mg/dL Final   • Sodium 10/11/2018 140  136 - 145 mmol/L Final   • Potassium 10/11/2018 3.8  3.5 - 5.2 mmol/L Final   • Chloride 10/11/2018 100  98 - 107 mmol/L Final   • CO2 10/11/2018 27.4  22.0 - 29.0 mmol/L Final   • Calcium 10/11/2018 8.6* 8.8 - 10.5 mg/dL Final   • eGFR Non African Amer 10/11/2018 45* >60 mL/min/1.73 Final   • BUN/Creatinine Ratio 10/11/2018 21.7  7.0 - 25.0 Final   • Anion Gap 10/11/2018 12.6  mmol/L Final   • Protime 10/11/2018 13.8  12.1 - 15.0 Seconds Final   • INR 10/11/2018 1.06  0.90 - 1.10 Final   • PTT 10/11/2018 25.8  24.3 - 38.1 seconds Final   • WBC 10/11/2018 8.93  4.80 - 10.80 10*3/mm3 Final   • RBC 10/11/2018 4.72  4.20 - 5.40 10*6/mm3 Final   • Hemoglobin 10/11/2018 12.7  12.0 - 16.0 g/dL Final   •  Hematocrit 10/11/2018 41.5  37.0 - 47.0 % Final   • MCV 10/11/2018 87.9  81.0 - 99.0 fL Final   • MCH 10/11/2018 26.9* 27.0 - 31.0 pg Final   • MCHC 10/11/2018 30.6* 31.0 - 37.0 g/dL Final   • RDW 10/11/2018 16.6* 11.5 - 14.5 % Final   • RDW-SD 10/11/2018 53.6  37.0 - 54.0 fl Final   • MPV 10/11/2018 9.7  7.4 - 10.4 fL Final   • Platelets 10/11/2018 373  140 - 500 10*3/mm3 Final   • Neutrophil % 10/11/2018 66.0  45.0 - 70.0 % Final   • Lymphocyte % 10/11/2018 22.2  20.0 - 45.0 % Final   • Monocyte % 10/11/2018 9.2* 3.0 - 8.0 % Final   • Eosinophil % 10/11/2018 1.5  0.0 - 4.0 % Final   • Basophil % 10/11/2018 0.8  0.0 - 2.0 % Final   • Immature Grans % 10/11/2018 0.3  0.0 - 0.5 % Final   • Neutrophils, Absolute 10/11/2018 5.90  1.50 - 8.30 10*3/mm3 Final   • Lymphocytes, Absolute 10/11/2018 1.98  0.60 - 4.80 10*3/mm3 Final   • Monocytes, Absolute 10/11/2018 0.82  0.00 - 1.00 10*3/mm3 Final   • Eosinophils, Absolute 10/11/2018 0.13  0.10 - 0.30 10*3/mm3 Final   • Basophils, Absolute 10/11/2018 0.07  0.00 - 0.20 10*3/mm3 Final   • Immature Grans, Absolute 10/11/2018 0.03  0.00 - 0.03 10*3/mm3 Final   • nRBC 10/11/2018 0.0  0.0 - 0.0 /100 WBC Final     No results found.  Assessment:  Patient Active Problem List   Diagnosis   • Anxiety   • Depression   • Gastroesophageal reflux disease with esophagitis   • Generalized anxiety disorder   • Hyperlipidemia   • Hypertension   • Impaired glucose tolerance   • Renal insufficiency   • Primary osteoarthritis of right knee   • S/P total knee arthroplasty, left       Plan:  I reviewed anatomy of a total joint arthroplasty in laymen's terms, as well as typical postoperative recovery and possibly 6-12 months for maximal recovery, and possible need for rehabilitation stay after hospitalization. We also discussed risks, benefits, alternatives, and limitations of procedure with risks including but not limited to neurovascular damage, bleeding, infection, malalignment, chronic pian,  failure of implants, osteolysis, loosening of implants, loss of motion, weakness, stiffness, instability, DVT, pulmonary embolus, death, stroke, complex regional pain syndrome, myocardial infarction, and need for additional procedures. No guarantees were given regarding results of surgery.      Nitish Barfield was given the opportunity to ask and have all questions answered today.  The patient voiced understanding of the risks, benefits, and alternative forms of treatment that were discussed and the patient consents to proceed with surgery.     Patient's blood clot history is negative.    Plan for DVT prophylaxis is asa    Patient's MRSA infection history is negative.    Patient's skin infection history is negative.    Discharge Plan: POD 2-3 to home    Date: 10/11/2018    Dictated utilizing Dragon dictation

## 2018-10-11 NOTE — DISCHARGE INSTRUCTIONS
PRE-ADMISSION TESTING INSTRUCTIONS FOR TOTAL JOINT PATIENTS    Take these medications the morning of surgery with a small sip of water: Buspar, Trintellix, Edarbi, and Bystolic      No aspirin, advil, aleve, ibuprofen, naproxen, diet pills, decongestants, or vitamin/herbal supplements for a week prior to your surgery.    Do not take any insulin or diabetes medications the morning of surgery.      Start your Bactroban ointment on _____10/12/2018______.  You will need to apply the ointment with a clean Q-tip 3 times a day in both sides of your nose for 5 days and the morning of surgery.    General Instructions:    • Do not eat solid food after midnight the night before surgery.  No gum, mints, or hard candy after midnight the night before surgery.  • You may drink clear liquids the day of surgery up until 2 hours before your arrival time.  • Clear liquids are liquids you can see through. Nothing RED in color.    Plain water    Sports drinks  Sodas     Gelatin (Jell-O)  Fruit juices without pulp such as white grape juice and apple juice  Popsicles that contain no fruit or yogurt  Tea or coffee (no cream or milk added)    • It is beneficial for you to have a clear drink that contains carbohydrates just before you leave your house and before your fasting time begins.  We suggest a 20 ounce bottle of Gatorade or Powerade for non-diabetic patients or a 20 ounce bottle of G2 or Powerade Zero for diabetic patients.     • Patients who avoid smoking, chewing tobacco and alcohol for 4 weeks prior to surgery have a reduced risk of post-operative complications.  If at all possible, quit smoking as many days before surgery as you can.    • Do not smoke, use chewing tobacco or drink alcohol after midnight the day of surgery.    • Bring your C-PAP/ BI-PAP machine if you use one.  • Wear clean comfortable clothes and socks.  • Do not wear contact lenses, lotion, deodorant, or make-up.  Bring a case for your glasses if applicable.    • You may brush your teeth the morning of surgery.  • You may wear dentures/partials, do not put adhesive/glue on them.  • Bring crutches or walker if applicable.  • Leave all other jewelry and valuables at home.  • NOTIFY YOUR SURGEON IF YOU BECOME ILL, HAVE A FEVER, PRODUCTIVE COUGH, OR CANNOT BE HERE THE DAY OF SURGERY.      Preventing a Surgical Site Infection:    • Shower the night before and on the morning of surgery using the chlorhexidine soap you were given.  Use a clean washcloth with the soap.  Place clean sheets on your bed after showering the night before surgery. Do not use the CHG soap on your hair, face, or private areas. Wash your body gently for five (5) minutes. Do not scrub your skin.  Dry with a clean towel and dress in clean clothing.    • Do not shave the surgical area for 10 days-2 weeks prior to surgery  because the razor can irritate skin and make it easier to develop an infection.  • Make sure you, your family, and all healthcare providers clean their hands with soap and water or an alcohol based hand  before caring for you or your wound.      Day of surgery:    Your surgeon’s office will advise you of your arrival time for the day of surgery.    Upon arrival, a Pre-op nurse and Anesthesia provider will review your health history, obtain vital signs, and answer questions you may have.  The only belongings needed at this time will be your home medications and if applicable your C-PAP/BI-PAP machine.  If you are staying overnight your family can leave the rest of your belongings in the car and bring them to your room later.  A Pre-op nurse will start an IV and you may receive medication in preparation for surgery, including something to help you relax.  Your family will be able to see you in the Pre-op area.  While you are in surgery your family should notify the waiting room  if they leave the waiting room area and provide a contact phone number.    If you have any  questions, you can call the Pre-Admission Department at [phone] or your surgeon's office.    Please be aware that surgery does come with discomfort.  We want to make every effort to control your discomfort so please discuss any uncontrolled symptoms with your nurse.   Your doctor will most likely have prescribed pain medications.     You may have bruising or discomfort from the tourniquet used in surgery.     Please leave all luggage in the car the morning of surgery.  You will be transported to your hospital room following the recovery period.  Your family can get your luggage at that time.

## 2018-10-12 ENCOUNTER — PREP FOR SURGERY (OUTPATIENT)
Dept: OTHER | Facility: HOSPITAL | Age: 63
End: 2018-10-12

## 2018-10-12 DIAGNOSIS — M17.11 PRIMARY OSTEOARTHRITIS OF RIGHT KNEE: Primary | ICD-10-CM

## 2018-10-13 LAB — MRSA SPEC QL CULT: NORMAL

## 2018-10-15 ENCOUNTER — ANESTHESIA EVENT (OUTPATIENT)
Dept: PERIOP | Facility: HOSPITAL | Age: 63
End: 2018-10-15

## 2018-10-17 ENCOUNTER — APPOINTMENT (OUTPATIENT)
Dept: GENERAL RADIOLOGY | Facility: HOSPITAL | Age: 63
End: 2018-10-17

## 2018-10-17 ENCOUNTER — ANESTHESIA (OUTPATIENT)
Dept: PERIOP | Facility: HOSPITAL | Age: 63
End: 2018-10-17

## 2018-10-17 ENCOUNTER — HOSPITAL ENCOUNTER (OUTPATIENT)
Facility: HOSPITAL | Age: 63
Discharge: HOME | End: 2018-10-18
Attending: ORTHOPAEDIC SURGERY | Admitting: NURSE ANESTHETIST, CERTIFIED REGISTERED

## 2018-10-17 DIAGNOSIS — Z96.651 STATUS POST TOTAL RIGHT KNEE REPLACEMENT: Primary | ICD-10-CM

## 2018-10-17 DIAGNOSIS — M17.11 PRIMARY OSTEOARTHRITIS OF RIGHT KNEE: ICD-10-CM

## 2018-10-17 LAB
ANION GAP SERPL CALCULATED.3IONS-SCNC: 11.8 MMOL/L
BUN BLD-MCNC: 26 MG/DL (ref 8–23)
BUN/CREAT SERPL: 13.8 (ref 7–25)
CALCIUM SPEC-SCNC: 8.9 MG/DL (ref 8.8–10.5)
CHLORIDE SERPL-SCNC: 100 MMOL/L (ref 98–107)
CO2 SERPL-SCNC: 26.2 MMOL/L (ref 22–29)
CREAT BLD-MCNC: 1.89 MG/DL (ref 0.57–1)
GFR SERPL CREATININE-BSD FRML MDRD: 27 ML/MIN/1.73
GLUCOSE BLD-MCNC: 176 MG/DL (ref 65–99)
POTASSIUM BLD-SCNC: 3.3 MMOL/L (ref 3.5–5.2)
POTASSIUM BLD-SCNC: 4.2 MMOL/L (ref 3.5–5.2)
SODIUM BLD-SCNC: 138 MMOL/L (ref 136–145)

## 2018-10-17 PROCEDURE — G0378 HOSPITAL OBSERVATION PER HR: HCPCS

## 2018-10-17 PROCEDURE — 25010000002 MIDAZOLAM PER 1 MG: Performed by: NURSE ANESTHETIST, CERTIFIED REGISTERED

## 2018-10-17 PROCEDURE — 25010000002 PROPOFOL 10 MG/ML EMULSION: Performed by: NURSE ANESTHETIST, CERTIFIED REGISTERED

## 2018-10-17 PROCEDURE — 25010000002 ONDANSETRON PER 1 MG: Performed by: NURSE ANESTHETIST, CERTIFIED REGISTERED

## 2018-10-17 PROCEDURE — 25010000002 VANCOMYCIN 1 G RECONSTITUTED SOLUTION 1 EACH VIAL: Performed by: ORTHOPAEDIC SURGERY

## 2018-10-17 PROCEDURE — 25010000002 VANCOMYCIN 1 G RECONSTITUTED SOLUTION: Performed by: ORTHOPAEDIC SURGERY

## 2018-10-17 PROCEDURE — 73560 X-RAY EXAM OF KNEE 1 OR 2: CPT

## 2018-10-17 PROCEDURE — 99242 OFF/OP CONSLTJ NEW/EST SF 20: CPT | Performed by: INTERNAL MEDICINE

## 2018-10-17 PROCEDURE — 25010000002 ROPIVACAINE PER 1 MG: Performed by: NURSE ANESTHETIST, CERTIFIED REGISTERED

## 2018-10-17 PROCEDURE — 84132 ASSAY OF SERUM POTASSIUM: CPT | Performed by: ORTHOPAEDIC SURGERY

## 2018-10-17 PROCEDURE — 94799 UNLISTED PULMONARY SVC/PX: CPT

## 2018-10-17 PROCEDURE — L1830 KO IMMOB CANVAS LONG PRE OTS: HCPCS | Performed by: ORTHOPAEDIC SURGERY

## 2018-10-17 PROCEDURE — C1713 ANCHOR/SCREW BN/BN,TIS/BN: HCPCS | Performed by: ORTHOPAEDIC SURGERY

## 2018-10-17 PROCEDURE — C1776 JOINT DEVICE (IMPLANTABLE): HCPCS | Performed by: ORTHOPAEDIC SURGERY

## 2018-10-17 PROCEDURE — 25010000002 VANCOMYCIN PER 500 MG: Performed by: ORTHOPAEDIC SURGERY

## 2018-10-17 PROCEDURE — G8978 MOBILITY CURRENT STATUS: HCPCS

## 2018-10-17 PROCEDURE — 0396T: CPT | Performed by: ORTHOPAEDIC SURGERY

## 2018-10-17 PROCEDURE — 97165 OT EVAL LOW COMPLEX 30 MIN: CPT

## 2018-10-17 PROCEDURE — 80048 BASIC METABOLIC PNL TOTAL CA: CPT | Performed by: INTERNAL MEDICINE

## 2018-10-17 PROCEDURE — 27447 TOTAL KNEE ARTHROPLASTY: CPT | Performed by: ORTHOPAEDIC SURGERY

## 2018-10-17 PROCEDURE — 25010000002 DEXAMETHASONE PER 1 MG: Performed by: NURSE ANESTHETIST, CERTIFIED REGISTERED

## 2018-10-17 PROCEDURE — 97161 PT EVAL LOW COMPLEX 20 MIN: CPT

## 2018-10-17 PROCEDURE — G8979 MOBILITY GOAL STATUS: HCPCS

## 2018-10-17 DEVICE — CMT BONE PALACOS RPLSG W/GENT HI/VISC 1X40: Type: IMPLANTABLE DEVICE | Site: KNEE | Status: FUNCTIONAL

## 2018-10-17 DEVICE — CAP BEAR KN VE UPCHRG: Type: IMPLANTABLE DEVICE | Site: KNEE | Status: FUNCTIONAL

## 2018-10-17 DEVICE — CAP PAT KN VE/TM UPCHRG: Type: IMPLANTABLE DEVICE | Site: KNEE | Status: FUNCTIONAL

## 2018-10-17 DEVICE — CAP TOTL KN CMT PREMIUM: Type: IMPLANTABLE DEVICE | Site: KNEE | Status: FUNCTIONAL

## 2018-10-17 DEVICE — STEM TIB/KN PERSONA CMT 5D SZE RT: Type: IMPLANTABLE DEVICE | Site: KNEE | Status: FUNCTIONAL

## 2018-10-17 DEVICE — COMP FEM/KN PERSONA CR CMT NRW SZ9 RT: Type: IMPLANTABLE DEVICE | Site: KNEE | Status: FUNCTIONAL

## 2018-10-17 DEVICE — CAP GUIDE PSI/SIGNATURE/I-ASSIST: Type: IMPLANTABLE DEVICE | Site: KNEE | Status: FUNCTIONAL

## 2018-10-17 DEVICE — PAT KN PERSONA VE CRS/LNK CMT 8.5X32MM: Type: IMPLANTABLE DEVICE | Site: KNEE | Status: FUNCTIONAL

## 2018-10-17 DEVICE — ART/SRF KN PERSONA/VE MC EF 8TO11 11MM RT: Type: IMPLANTABLE DEVICE | Site: KNEE | Status: FUNCTIONAL

## 2018-10-17 RX ORDER — PREGABALIN 75 MG/1
75 CAPSULE ORAL EVERY 12 HOURS SCHEDULED
Status: DISCONTINUED | OUTPATIENT
Start: 2018-10-17 | End: 2018-10-18 | Stop reason: HOSPADM

## 2018-10-17 RX ORDER — ACETAMINOPHEN 500 MG
1000 TABLET ORAL 4 TIMES DAILY
Status: DISCONTINUED | OUTPATIENT
Start: 2018-10-17 | End: 2018-10-18 | Stop reason: HOSPADM

## 2018-10-17 RX ORDER — ONDANSETRON 4 MG/1
4 TABLET, FILM COATED ORAL EVERY 6 HOURS PRN
Status: DISCONTINUED | OUTPATIENT
Start: 2018-10-17 | End: 2018-10-18 | Stop reason: HOSPADM

## 2018-10-17 RX ORDER — LIDOCAINE HYDROCHLORIDE 10 MG/ML
0.5 INJECTION, SOLUTION EPIDURAL; INFILTRATION; INTRACAUDAL; PERINEURAL ONCE AS NEEDED
Status: COMPLETED | OUTPATIENT
Start: 2018-10-17 | End: 2018-10-17

## 2018-10-17 RX ORDER — SODIUM CHLORIDE, SODIUM LACTATE, POTASSIUM CHLORIDE, CALCIUM CHLORIDE 600; 310; 30; 20 MG/100ML; MG/100ML; MG/100ML; MG/100ML
9 INJECTION, SOLUTION INTRAVENOUS CONTINUOUS
Status: DISCONTINUED | OUTPATIENT
Start: 2018-10-17 | End: 2018-10-17

## 2018-10-17 RX ORDER — MELOXICAM 7.5 MG/1
15 TABLET ORAL ONCE
Status: COMPLETED | OUTPATIENT
Start: 2018-10-17 | End: 2018-10-17

## 2018-10-17 RX ORDER — ONDANSETRON 2 MG/ML
4 INJECTION INTRAMUSCULAR; INTRAVENOUS EVERY 6 HOURS PRN
Status: DISCONTINUED | OUTPATIENT
Start: 2018-10-17 | End: 2018-10-18 | Stop reason: HOSPADM

## 2018-10-17 RX ORDER — PROPOFOL 10 MG/ML
VIAL (ML) INTRAVENOUS AS NEEDED
Status: DISCONTINUED | OUTPATIENT
Start: 2018-10-17 | End: 2018-10-17 | Stop reason: SURG

## 2018-10-17 RX ORDER — BISACODYL 10 MG
10 SUPPOSITORY, RECTAL RECTAL DAILY PRN
Status: DISCONTINUED | OUTPATIENT
Start: 2018-10-17 | End: 2018-10-18 | Stop reason: HOSPADM

## 2018-10-17 RX ORDER — OXYCODONE HYDROCHLORIDE 5 MG/1
5 TABLET ORAL EVERY 4 HOURS PRN
Status: DISCONTINUED | OUTPATIENT
Start: 2018-10-17 | End: 2018-10-18 | Stop reason: HOSPADM

## 2018-10-17 RX ORDER — SODIUM CHLORIDE 9 MG/ML
40 INJECTION, SOLUTION INTRAVENOUS AS NEEDED
Status: DISCONTINUED | OUTPATIENT
Start: 2018-10-17 | End: 2018-10-17 | Stop reason: HOSPADM

## 2018-10-17 RX ORDER — MIDAZOLAM HYDROCHLORIDE 1 MG/ML
2 INJECTION INTRAMUSCULAR; INTRAVENOUS
Status: DISCONTINUED | OUTPATIENT
Start: 2018-10-17 | End: 2018-10-17 | Stop reason: HOSPADM

## 2018-10-17 RX ORDER — DOCUSATE SODIUM 100 MG/1
100 CAPSULE, LIQUID FILLED ORAL 2 TIMES DAILY
Status: DISCONTINUED | OUTPATIENT
Start: 2018-10-17 | End: 2018-10-18 | Stop reason: HOSPADM

## 2018-10-17 RX ORDER — SODIUM CHLORIDE 0.9 % (FLUSH) 0.9 %
1-10 SYRINGE (ML) INJECTION AS NEEDED
Status: DISCONTINUED | OUTPATIENT
Start: 2018-10-17 | End: 2018-10-17 | Stop reason: HOSPADM

## 2018-10-17 RX ORDER — POTASSIUM CHLORIDE 20 MEQ/1
20 TABLET, EXTENDED RELEASE ORAL DAILY
Status: DISCONTINUED | OUTPATIENT
Start: 2018-10-17 | End: 2018-10-18 | Stop reason: HOSPADM

## 2018-10-17 RX ORDER — BUPIVACAINE HYDROCHLORIDE 5 MG/ML
INJECTION, SOLUTION EPIDURAL; INTRACAUDAL AS NEEDED
Status: DISCONTINUED | OUTPATIENT
Start: 2018-10-17 | End: 2018-10-17 | Stop reason: SURG

## 2018-10-17 RX ORDER — PROPOFOL 10 MG/ML
VIAL (ML) INTRAVENOUS CONTINUOUS PRN
Status: DISCONTINUED | OUTPATIENT
Start: 2018-10-17 | End: 2018-10-17 | Stop reason: SURG

## 2018-10-17 RX ORDER — ONDANSETRON 2 MG/ML
4 INJECTION INTRAMUSCULAR; INTRAVENOUS ONCE AS NEEDED
Status: COMPLETED | OUTPATIENT
Start: 2018-10-17 | End: 2018-10-17

## 2018-10-17 RX ORDER — NALOXONE HCL 0.4 MG/ML
0.4 VIAL (ML) INJECTION
Status: DISCONTINUED | OUTPATIENT
Start: 2018-10-17 | End: 2018-10-18 | Stop reason: HOSPADM

## 2018-10-17 RX ORDER — ONDANSETRON 4 MG/1
4 TABLET, ORALLY DISINTEGRATING ORAL EVERY 6 HOURS PRN
Status: DISCONTINUED | OUTPATIENT
Start: 2018-10-17 | End: 2018-10-18 | Stop reason: HOSPADM

## 2018-10-17 RX ORDER — MIDAZOLAM HYDROCHLORIDE 1 MG/ML
1 INJECTION INTRAMUSCULAR; INTRAVENOUS
Status: DISCONTINUED | OUTPATIENT
Start: 2018-10-17 | End: 2018-10-17 | Stop reason: HOSPADM

## 2018-10-17 RX ORDER — ROPIVACAINE HYDROCHLORIDE 2 MG/ML
INJECTION, SOLUTION EPIDURAL; INFILTRATION; PERINEURAL AS NEEDED
Status: DISCONTINUED | OUTPATIENT
Start: 2018-10-17 | End: 2018-10-17 | Stop reason: SURG

## 2018-10-17 RX ORDER — SENNA AND DOCUSATE SODIUM 50; 8.6 MG/1; MG/1
2 TABLET, FILM COATED ORAL 2 TIMES DAILY PRN
Status: DISCONTINUED | OUTPATIENT
Start: 2018-10-17 | End: 2018-10-18 | Stop reason: HOSPADM

## 2018-10-17 RX ORDER — OXYCODONE HYDROCHLORIDE 5 MG/1
10 TABLET ORAL EVERY 4 HOURS PRN
Status: DISCONTINUED | OUTPATIENT
Start: 2018-10-17 | End: 2018-10-18 | Stop reason: HOSPADM

## 2018-10-17 RX ORDER — ALLOPURINOL 100 MG/1
100 TABLET ORAL DAILY
Status: DISCONTINUED | OUTPATIENT
Start: 2018-10-17 | End: 2018-10-18 | Stop reason: HOSPADM

## 2018-10-17 RX ORDER — SODIUM CHLORIDE 0.9 % (FLUSH) 0.9 %
3 SYRINGE (ML) INJECTION EVERY 12 HOURS SCHEDULED
Status: DISCONTINUED | OUTPATIENT
Start: 2018-10-17 | End: 2018-10-17 | Stop reason: HOSPADM

## 2018-10-17 RX ORDER — DEXAMETHASONE SODIUM PHOSPHATE 4 MG/ML
2 INJECTION, SOLUTION INTRA-ARTICULAR; INTRALESIONAL; INTRAMUSCULAR; INTRAVENOUS; SOFT TISSUE ONCE AS NEEDED
Status: COMPLETED | OUTPATIENT
Start: 2018-10-17 | End: 2018-10-17

## 2018-10-17 RX ORDER — ONDANSETRON 2 MG/ML
4 INJECTION INTRAMUSCULAR; INTRAVENOUS ONCE AS NEEDED
Status: DISCONTINUED | OUTPATIENT
Start: 2018-10-17 | End: 2018-10-17 | Stop reason: HOSPADM

## 2018-10-17 RX ORDER — LIDOCAINE HYDROCHLORIDE 10 MG/ML
0.5 INJECTION, SOLUTION EPIDURAL; INFILTRATION; INTRACAUDAL; PERINEURAL ONCE AS NEEDED
Status: DISCONTINUED | OUTPATIENT
Start: 2018-10-17 | End: 2018-10-17 | Stop reason: HOSPADM

## 2018-10-17 RX ORDER — DOCUSATE SODIUM 100 MG/1
100 CAPSULE, LIQUID FILLED ORAL 2 TIMES DAILY PRN
Status: DISCONTINUED | OUTPATIENT
Start: 2018-10-17 | End: 2018-10-18 | Stop reason: HOSPADM

## 2018-10-17 RX ORDER — SODIUM CHLORIDE 9 MG/ML
100 INJECTION, SOLUTION INTRAVENOUS CONTINUOUS
Status: DISCONTINUED | OUTPATIENT
Start: 2018-10-17 | End: 2018-10-18 | Stop reason: HOSPADM

## 2018-10-17 RX ORDER — NEBIVOLOL 5 MG/1
10 TABLET ORAL DAILY
Status: DISCONTINUED | OUTPATIENT
Start: 2018-10-17 | End: 2018-10-18 | Stop reason: HOSPADM

## 2018-10-17 RX ORDER — ASPIRIN 325 MG
325 TABLET, DELAYED RELEASE (ENTERIC COATED) ORAL EVERY 12 HOURS SCHEDULED
Status: DISCONTINUED | OUTPATIENT
Start: 2018-10-18 | End: 2018-10-18 | Stop reason: HOSPADM

## 2018-10-17 RX ORDER — CHLORTHALIDONE 25 MG/1
50 TABLET ORAL DAILY
Status: DISCONTINUED | OUTPATIENT
Start: 2018-10-17 | End: 2018-10-18 | Stop reason: HOSPADM

## 2018-10-17 RX ORDER — VANCOMYCIN HYDROCHLORIDE 1 G/20ML
INJECTION, POWDER, LYOPHILIZED, FOR SOLUTION INTRAVENOUS AS NEEDED
Status: DISCONTINUED | OUTPATIENT
Start: 2018-10-17 | End: 2018-10-17 | Stop reason: HOSPADM

## 2018-10-17 RX ORDER — MORPHINE SULFATE 10 MG/ML
6 INJECTION INTRAMUSCULAR; INTRAVENOUS; SUBCUTANEOUS
Status: DISCONTINUED | OUTPATIENT
Start: 2018-10-17 | End: 2018-10-18 | Stop reason: HOSPADM

## 2018-10-17 RX ORDER — MELATONIN
5000 DAILY
Status: DISCONTINUED | OUTPATIENT
Start: 2018-10-17 | End: 2018-10-18 | Stop reason: HOSPADM

## 2018-10-17 RX ORDER — PANTOPRAZOLE SODIUM 40 MG/1
40 TABLET, DELAYED RELEASE ORAL EVERY MORNING
Status: DISCONTINUED | OUTPATIENT
Start: 2018-10-17 | End: 2018-10-18 | Stop reason: HOSPADM

## 2018-10-17 RX ORDER — ALPRAZOLAM 0.25 MG/1
0.5 TABLET ORAL EVERY 8 HOURS PRN
Status: DISCONTINUED | OUTPATIENT
Start: 2018-10-17 | End: 2018-10-18 | Stop reason: HOSPADM

## 2018-10-17 RX ORDER — SODIUM CHLORIDE, SODIUM LACTATE, POTASSIUM CHLORIDE, CALCIUM CHLORIDE 600; 310; 30; 20 MG/100ML; MG/100ML; MG/100ML; MG/100ML
100 INJECTION, SOLUTION INTRAVENOUS CONTINUOUS
Status: DISCONTINUED | OUTPATIENT
Start: 2018-10-17 | End: 2018-10-18 | Stop reason: HOSPADM

## 2018-10-17 RX ORDER — OXYCODONE HCL 10 MG/1
10 TABLET, FILM COATED, EXTENDED RELEASE ORAL ONCE
Status: COMPLETED | OUTPATIENT
Start: 2018-10-17 | End: 2018-10-17

## 2018-10-17 RX ORDER — ACETAMINOPHEN 500 MG
1000 TABLET ORAL ONCE
Status: COMPLETED | OUTPATIENT
Start: 2018-10-17 | End: 2018-10-17

## 2018-10-17 RX ORDER — BISACODYL 5 MG/1
10 TABLET, DELAYED RELEASE ORAL DAILY PRN
Status: DISCONTINUED | OUTPATIENT
Start: 2018-10-17 | End: 2018-10-18 | Stop reason: HOSPADM

## 2018-10-17 RX ORDER — MAGNESIUM HYDROXIDE 1200 MG/15ML
LIQUID ORAL AS NEEDED
Status: DISCONTINUED | OUTPATIENT
Start: 2018-10-17 | End: 2018-10-17 | Stop reason: HOSPADM

## 2018-10-17 RX ORDER — MORPHINE SULFATE 10 MG/ML
4 INJECTION INTRAMUSCULAR; INTRAVENOUS; SUBCUTANEOUS
Status: DISCONTINUED | OUTPATIENT
Start: 2018-10-17 | End: 2018-10-18 | Stop reason: HOSPADM

## 2018-10-17 RX ORDER — MELOXICAM 7.5 MG/1
15 TABLET ORAL DAILY
Status: DISCONTINUED | OUTPATIENT
Start: 2018-10-17 | End: 2018-10-18 | Stop reason: HOSPADM

## 2018-10-17 RX ORDER — SODIUM CHLORIDE 9 MG/ML
INJECTION, SOLUTION INTRAVENOUS AS NEEDED
Status: DISCONTINUED | OUTPATIENT
Start: 2018-10-17 | End: 2018-10-17 | Stop reason: HOSPADM

## 2018-10-17 RX ADMIN — LIDOCAINE HYDROCHLORIDE 0.5 ML: 10 INJECTION, SOLUTION EPIDURAL; INFILTRATION; INTRACAUDAL; PERINEURAL at 06:26

## 2018-10-17 RX ADMIN — ACETAMINOPHEN 1000 MG: 500 TABLET, FILM COATED ORAL at 06:25

## 2018-10-17 RX ADMIN — MELOXICAM 15 MG: 7.5 TABLET ORAL at 12:05

## 2018-10-17 RX ADMIN — ACETAMINOPHEN 1000 MG: 500 TABLET, FILM COATED ORAL at 12:06

## 2018-10-17 RX ADMIN — SODIUM CHLORIDE, POTASSIUM CHLORIDE, SODIUM LACTATE AND CALCIUM CHLORIDE: 600; 310; 30; 20 INJECTION, SOLUTION INTRAVENOUS at 07:10

## 2018-10-17 RX ADMIN — POTASSIUM CHLORIDE 20 MEQ: 1500 TABLET, EXTENDED RELEASE ORAL at 11:57

## 2018-10-17 RX ADMIN — OXYCODONE HYDROCHLORIDE 10 MG: 10 TABLET, FILM COATED, EXTENDED RELEASE ORAL at 06:25

## 2018-10-17 RX ADMIN — PROPOFOL 50 MCG/KG/MIN: 10 INJECTION, EMULSION INTRAVENOUS at 07:50

## 2018-10-17 RX ADMIN — BUSPIRONE HYDROCHLORIDE 20 MG: 15 TABLET ORAL at 20:43

## 2018-10-17 RX ADMIN — ACETAMINOPHEN 1000 MG: 500 TABLET, FILM COATED ORAL at 20:40

## 2018-10-17 RX ADMIN — SODIUM CHLORIDE 1000 MG: 9 INJECTION, SOLUTION INTRAVENOUS at 07:54

## 2018-10-17 RX ADMIN — VANCOMYCIN HYDROCHLORIDE 1500 MG: 1 INJECTION, POWDER, LYOPHILIZED, FOR SOLUTION INTRAVENOUS at 19:54

## 2018-10-17 RX ADMIN — PANTOPRAZOLE SODIUM 40 MG: 40 TABLET, DELAYED RELEASE ORAL at 11:57

## 2018-10-17 RX ADMIN — ALPRAZOLAM 0.5 MG: 0.25 TABLET ORAL at 20:41

## 2018-10-17 RX ADMIN — MIDAZOLAM HYDROCHLORIDE 1 MG: 1 INJECTION, SOLUTION INTRAMUSCULAR; INTRAVENOUS at 07:05

## 2018-10-17 RX ADMIN — ONDANSETRON 4 MG: 2 INJECTION, SOLUTION INTRAMUSCULAR; INTRAVENOUS at 06:56

## 2018-10-17 RX ADMIN — PREGABALIN 75 MG: 75 CAPSULE ORAL at 12:06

## 2018-10-17 RX ADMIN — PROPOFOL 80 MG: 10 INJECTION, EMULSION INTRAVENOUS at 08:17

## 2018-10-17 RX ADMIN — ROPIVACAINE HYDROCHLORIDE 20 ML: 2 INJECTION, SOLUTION EPIDURAL; INFILTRATION at 07:11

## 2018-10-17 RX ADMIN — OXYCODONE HYDROCHLORIDE 10 MG: 5 TABLET ORAL at 11:57

## 2018-10-17 RX ADMIN — OXYCODONE HYDROCHLORIDE 10 MG: 5 TABLET ORAL at 20:41

## 2018-10-17 RX ADMIN — BUPIVACAINE HYDROCHLORIDE 2.5 ML: 5 INJECTION, SOLUTION EPIDURAL; INTRACAUDAL; PERINEURAL at 07:32

## 2018-10-17 RX ADMIN — DEXAMETHASONE SODIUM PHOSPHATE 2 MG: 4 INJECTION, SOLUTION INTRAMUSCULAR; INTRAVENOUS at 06:56

## 2018-10-17 RX ADMIN — MIDAZOLAM HYDROCHLORIDE 2 MG: 1 INJECTION, SOLUTION INTRAMUSCULAR; INTRAVENOUS at 06:56

## 2018-10-17 RX ADMIN — VANCOMYCIN HYDROCHLORIDE 1500 MG: 500 INJECTION, POWDER, LYOPHILIZED, FOR SOLUTION INTRAVENOUS at 06:26

## 2018-10-17 RX ADMIN — MELOXICAM 15 MG: 7.5 TABLET ORAL at 06:24

## 2018-10-17 RX ADMIN — OXYCODONE HYDROCHLORIDE 10 MG: 5 TABLET ORAL at 16:38

## 2018-10-17 RX ADMIN — SODIUM CHLORIDE 1000 MG: 9 INJECTION, SOLUTION INTRAVENOUS at 09:31

## 2018-10-17 RX ADMIN — SODIUM CHLORIDE, POTASSIUM CHLORIDE, SODIUM LACTATE AND CALCIUM CHLORIDE 9 ML/HR: 600; 310; 30; 20 INJECTION, SOLUTION INTRAVENOUS at 06:26

## 2018-10-17 RX ADMIN — PREGABALIN 75 MG: 75 CAPSULE ORAL at 20:40

## 2018-10-17 RX ADMIN — ALLOPURINOL 100 MG: 100 TABLET ORAL at 11:57

## 2018-10-17 RX ADMIN — VITAMIN D, TAB 1000IU (100/BT) 5000 UNITS: 25 TAB at 12:06

## 2018-10-17 RX ADMIN — ACETAMINOPHEN 1000 MG: 500 TABLET, FILM COATED ORAL at 18:45

## 2018-10-17 NOTE — THERAPY DISCHARGE NOTE
Acute Care - Occupational Therapy Initial Eval/Discharge   Kim Harris     Patient Name: Nitish Barfield  : 1955  MRN: [id number]  Today's Date: 10/17/2018  Onset of Illness/Injury or Date of Surgery: 10/17/18  Date of Referral to OT: 10/17/18  Referring Physician: Dr Zuñiga      Admit Date: 10/17/2018       ICD-10-CM ICD-9-CM   1. Primary osteoarthritis of right knee M17.11 715.16     Patient Active Problem List   Diagnosis   • Anxiety   • Depression   • Gastroesophageal reflux disease with esophagitis   • Generalized anxiety disorder   • Hyperlipidemia   • Hypertension   • Impaired glucose tolerance   • Renal insufficiency   • Primary osteoarthritis of right knee   • S/P total knee arthroplasty, left     Past Medical History:   Diagnosis Date   • Allergy    • Anxiety    • Arthritis     RA, FINGERS, TOES   • Bleeding esophageal ulcer     history of   • Esophageal reflux    • GERD (gastroesophageal reflux disease)    • History of transfusion    • Hypertension    • Hypertension    • Osteoarthritis of right knee     sched TKA     Past Surgical History:   Procedure Laterality Date   • COLONOSCOPY     • ENDOSCOPY     • ESOPHAGOSCOPY / EGD      cauterize esophageal bleeding ulcers   • HYSTERECTOMY      OPEN   • KNEE SURGERY Left     ARTHROSCOPY   • TOTAL KNEE ARTHROPLASTY Left 2018    Procedure: TOTAL KNEE ARTHROPLASTY AND ALL ASSOCIATED PROCEDURES;  Surgeon: Costa Zuñiga MD;  Location: [facility];  Service: Orthopedics   • WISDOM TOOTH EXTRACTION            OT ASSESSMENT FLOWSHEET (last 72 hours)      Occupational Therapy Evaluation     Row Name 10/17/18 1337                   OT Evaluation Time/Intention    Subjective Information complains of;numbness  -SD (r) OA (t) SD (c)        Document Type evaluation  -SD (r) OA (t) SD (c)        Mode of Treatment occupational therapy  -SD (r) OA (t) SD (c)        Patient Effort good  -SD (r) OA (t) SD (c)           General Information    Patient Profile Reviewed?  yes  -SD (r) OA (t) SD (c)        Onset of Illness/Injury or Date of Surgery 10/17/18  -SD (r) OA (t) SD (c)        Referring Physician Dr Zuñiga  -SD (r) OA (t) SD (c)        Patient Observations alert;cooperative;agree to therapy  -SD (r) OA (t) SD (c)        Patient/Family Observations Pt supine in bed; family present. Agreeable to therapy.   -SD (r) OA (t) SD (c)        Prior Level of Function independent:;all household mobility;community mobility;ADL's;home management  -SD (r) OA (t) SD (c)        Equipment Currently Used at Home shower chair;walker, rolling;commode, bedside  -SD (r) OA (t) SD (c)        Pertinent History of Current Functional Problem Pt with worsening pain/difficulty with functional use of R knee. Pt s/p R TKA. Pt has hx of recent L TKA. Pt was independent with all mobility, ADLs, and IADLs prior to admission and reports she had no difficulty/concerns following previous TKA. Pt states she does not use AD for community mobility but intermittently uses a RW at home, when needed.   -SD (r) OA (t) SD (c)        Existing Precautions/Restrictions fall;brace worn when out of bed  -SD (r) OA (t) SD (c)        Risks Reviewed patient and family:;increased discomfort  -SD (r) OA (t) SD (c)        Benefits Reviewed patient and family:;improve function;increase independence;increase strength;increase balance  -SD (r) OA (t) SD (c)        Barriers to Rehab none identified  -SD (r) OA (t) SD (c)           Relationship/Environment    Primary Source of Support/Comfort spouse  -SD (r) OA (t) SD (c)        Lives With spouse  -SD (r) OA (t) SD (c)           Resource/Environmental Concerns    Current Living Arrangements home/apartment/condo   1 story home  -SD (r) OA (t) SD (c)           Home Main Entrance    Number of Stairs, Main Entrance five  -SD (r) OA (t) SD (c)        Stair Railings, Main Entrance railings on both sides of stairs  -SD (r) OA (t) SD (c)        Stairs Comment, Main Entrance Pt reports she has  2 BARRETT, then a landing, and 3 steps to main floor.   -SD (r) OA (t) SD (c)           Cognitive Assessment/Intervention- PT/OT    Orientation Status (Cognition) oriented x 4  -SD (r) OA (t) SD (c)        Follows Commands (Cognition) WNL  -SD (r) OA (t) SD (c)        Safety Deficit (Cognitive) impulsivity  -SD (r) OA (t) SD (c)        Personal Safety Interventions gait belt;nonskid shoes/slippers when out of bed;supervised activity  -SD (r) OA (t) SD (c)           Safety Issues, Functional Mobility    Safety Issues Affecting Function (Mobility) impulsivity  -SD (r) OA (t) SD (c)        Comment, Safety Issues/Impairments (Mobility) Pt requires significant cueing for safety awareness  -SD (r) OA (t) SD (c)           Mobility Assessment/Treatment    Extremity Weight-bearing Status right lower extremity  -SD (r) OA (t) SD (c)        Right Lower Extremity (Weight-bearing Status) weight-bearing as tolerated (WBAT)  -SD (r) OA (t) SD (c)           Bed Mobility Assessment/Treatment    Bed Mobility Assessment/Treatment supine-sit  -SD (r) OA (t) SD (c)        Supine-Sit [place] (Bed Mobility) supervision;verbal cues  -SD (r) OA (t) SD (c)        Assistive Device (Bed Mobility) bed rails;head of bed elevated  -SD (r) OA (t) SD (c)           Transfer Assessment/Treatment    Transfer Assessment/Treatment sit-stand transfer;stand-sit transfer  -SD (r) OA (t) SD (c)           Sit-Stand Transfer    Sit-Stand [place] (Transfers) contact guard;verbal cues  -SD (r) OA (t) SD (c)        Assistive Device (Sit-Stand Transfers) walker, front-wheeled  -SD (r) OA (t) SD (c)           Stand-Sit Transfer    Stand-Sit [place] (Transfers) contact guard;verbal cues  -SD (r) OA (t) SD (c)        Assistive Device (Stand-Sit Transfers) walker, front-wheeled  -SD (r) OA (t) SD (c)           Bathing Assessment/Intervention    Bathing [place] Level bathing skills;minimum assist (75% patient effort)  -SD (r) OA (t) SD (c)         Comment (Bathing) Anticipate increased independence following improved sensation.  -SD (r) OA (t) SD (c)           Upper Body Dressing Assessment/Training    Upper Body Dressing [place] Level independent  -SD (r) OA (t) SD (c)           Lower Body Dressing Assessment/Training    Lower Body Dressing [place] Level lower body dressing skills;supervision;minimum assist (75% patient effort)  -SD (r) OA (t) SD (c)        Comment (Lower Body Dressing) Pt able to reach down to ankle level and don brief over knee immobilizer with SBA. Min A required for increased safety until stability/sensation has improved.   -SD (r) OA (t) SD (c)           General ROM    GENERAL ROM COMMENTS B UE WFL; Pt reports chronic R shoulder issues but has normal ROM  -SD (r) OA (t) SD (c)           MMT (Manual Muscle Testing)    General MMT Comments BUE 5/5  -SD (r) OA (t) SD (c)           Sensory Assessment/Intervention    Sensory General Assessment --   Pt reports general numbness following anesthesia  -SD (r) OA (t) SD (c)           Positioning and Restraints    Pre-Treatment Position in bed  -SD (r) OA (t) SD (c)        Post Treatment Position chair  -SD (r) OA (t) SD (c)        In Chair reclined;call light within reach;encouraged to call for assist;with family/caregiver;with PT  -SD (r) OA (t) SD (c)           Pain Assessment    Additional Documentation --   Pt reports no pain.  -SD (r) OA (t) SD (c)           Wound 10/17/18 0851 Right knee incision    Wound - Properties Group Date first assessed: 10/17/18  -DANNY Time first assessed: 0851  -DANNY Side: Right  -DANNY Location: knee  -DANNY Type: incision  -DANNY       [REMOVED] Wound 04/11/18 1022 Left knee incision    Wound - Properties Group Date first assessed: 04/11/18  -AA Time first assessed: 1022  -AA Side: Left  -AA Location: knee  -AA Type: incision  -AA Resolution Date: 10/17/18  -AW Resolution Time: 1116  -AW       Plan of Care Review    Plan of Care Reviewed With patient;family  -SD (r)  OA (t) SD (c)           Clinical Impression (OT)    Date of Referral to OT 10/17/18  -SD (r) OA (t) SD (c)        OT Diagnosis R TKA  -SD (r) OA (t) SD (c)        Functional Level at Time of Evaluation (OT Eval) Pt requires CGA for functional mobility/transfer activity for increased safety. Pt able to reach down to ankle level and don brief over knee immobilizer with SBA. Pt reports recent hx of L TKA and states she had no difficulities following previous TKA and does not anticipate any concerns regarding the management of daily tasks upon discharge to home. Pt reports no concerns and demonstrates no functional concerns regarding ADL management. Pt states she will have assist in home as needed.   -SD (r) OA (t) SD (c)        Patient/Family Goals Statement (OT Eval) Return to home and work as soon as possible  -SD (r) OA (t) SD (c)        Criteria for Skilled Therapeutic Interventions Met (OT Eval) no problems identified which require skilled intervention;other (see comments)   Pt reports no concerns for management of daily tasks  -SD (r) OA (t) SD (c)        Anticipated Discharge Disposition (OT) home with assist;home with OP services   outpatient physical therapy   -SD (r) OA (t) SD (c)           Discharge Summary, OT Eval    Reason for Discharge (OT Discharge Summary) no further needs identified  -SD (r) OA (t) SD (c)        Transfer to Another Level of Care or Facility (OT Discharge Summary) patient to receive therapy via outpatient clinic   outpatient physical therapy   -SD (r) OA (t) SD (c)           Living Environment    Home Accessibility stairs to enter home  -SD (r) OA (t) SD (c)          User Key  (r) = Recorded By, (t) = Taken By, (c) = Cosigned By    Initials Name Effective Dates    Mckenzie Pineda RN 06/16/16 -     Chio Lane RN 06/16/16 -     SD Maury Flanagan, OTR 06/22/16 -     Vanessa Pollack RN 06/16/16 -     OA Kerry Umana, OT Student 05/08/17 -           Occupational Therapy  Education     Title: PT OT SLP Therapies (Active)     Topic: Occupational Therapy (Active)     Point: ADL training (Resolved)     Description: Instruct learner(s) on proper safety adaptation and remediation techniques during self care or transfers.   Instruct in proper use of assistive devices.   Learning Progress Summary     Learner Status Readiness Method Response Comment Documented by    Patient Done Acceptance E VU Education provided on OT services and home strategies for increased safety with ADL tasks. Pt verbalizes understanding and reports no concerns for ADL managment following discharge to home. OA 10/17/18 1435    Family Done Acceptance E VU Education provided on OT services and home strategies for increased safety with ADL tasks. Pt verbalizes understanding and reports no concerns for ADL managment following discharge to home. OA 10/17/18 1435                      User Key     Initials Effective Dates Name Provider Type Discipline    OA 05/08/17 -  Kerry Umana OT Student OT Student OT                OT Recommendation and Plan  Outcome Summary/Treatment Plan (OT)  Anticipated Discharge Disposition (OT): home with assist, home with OP services (outpatient physical therapy )  Reason for Discharge (OT Discharge Summary): no further needs identified  Plan of Care Review  Plan of Care Reviewed With: patient, family  Plan of Care Reviewed With: patient, family  Outcome Summary: OT Evaluation Complete: Pt able to perform transfers, functional mobility, and ADLs with CGA. Pt has a hx of recent L TKA and reports she had no difficulty with management of daily tasks following previous TKA and does not anticipate any difficulty upon discharge to home. Pt reports she has no concerns for home regarding the managment of daily tasks and will have assistance at home as needed.                Outcome Measures     Row Name 10/17/18 3633 10/17/18 1311          How much help from another person do you currently need...     Turning from your back to your side while in flat bed without using bedrails?  -- 3  -JW     Moving from lying on back to sitting on the side of a flat bed without bedrails?  -- 3  -JW     Moving to and from a bed to a chair (including a wheelchair)?  -- 3  -JW     Standing up from a chair using your arms (e.g., wheelchair, bedside chair)?  -- 3  -JW     Climbing 3-5 steps with a railing?  -- 2  -JW     To walk in hospital room?  -- 3  -JW     AM-PAC 6 Clicks Score  -- 17  -JW        How much help from another is currently needed...    Putting on and taking off regular lower body clothing? 3  -SD (r) OA (t) SD (c)  --     Bathing (including washing, rinsing, and drying) 3  -SD (r) OA (t) SD (c)  --     Toileting (which includes using toilet bed pan or urinal) 4  -SD (r) OA (t) SD (c)  --     Putting on and taking off regular upper body clothing 4  -SD (r) OA (t) SD (c)  --     Taking care of personal grooming (such as brushing teeth) 4  -SD (r) OA (t) SD (c)  --     Eating meals 4  -SD (r) OA (t) SD (c)  --     Score 22  -SD (r) OA (t)  --        Functional Assessment    Outcome Measure Options AM-[facility] 6 Clicks Daily Activity (OT)  -SD (r) OA (t) SD (c) -[facility] 6 Clicks Basic Mobility (PT)  -       User Key  (r) = Recorded By, (t) = Taken By, (c) = Cosigned By    Initials Name Provider Type    SD Maury Flanagan, OTR Occupational Therapist    JW Joan Diaz, PT Physical Therapist    OA Kerry Umana, OT Student OT Student          Time Calculation:         Time Calculation- OT     Row Name 10/17/18 1409             Time Calculation- OT    OT Start Time 1311  -SD (r) OA (t) SD (c)      OT Stop Time 1337  -SD (r) OA (t) SD (c)      OT Time Calculation (min) 26 min  -SD (r) OA (t)        User Key  (r) = Recorded By, (t) = Taken By, (c) = Cosigned By    Initials Name Provider Type    Maury Espinoza, OTR Occupational Therapist    Kerry Hicks OT Student OT Student        Therapy Suggested Charges     Code    Minutes Charges    None           Therapy Charges for Today     Code Description Service Date Service Provider Modifiers Qty    [id number] HC OT EVAL LOW COMPLEXITY 2 10/17/2018 Kerry Umana, OT Student GO 1          OT Discharge Summary  Anticipated Discharge Disposition (OT): home with assist, home with OP services (outpatient physical therapy )    Kerry Umana OT Student  10/17/2018

## 2018-10-17 NOTE — H&P (VIEW-ONLY)
History & Physical       Patient: Nitish Barfield    YOB: 1955    Medical Record Number: [id number]    Surgeon:  Dr. Costa Zuñiga    Chief Complaints:   Chief Complaint   Patient presents with   • Right Knee - Pain, Follow-up       Subjective:  This problem is not new to this examiner.     History of Present Illness: 63 y.o. female presents with   Chief Complaint   Patient presents with   • Right Knee - Pain, Follow-up   . Onset of symptoms was years ago and has been progressively worsening despite more conservative treatment measures.  Symptoms are associated with ability to move, exercise, and perform activities of daily living.  Symptoms are aggravated by weight bearing and ROM necessary for activities of daily living.   Symptoms improve with rest, ice and elevation only minimally.      Allergies:   Allergies   Allergen Reactions   • Augmentin [Amoxicillin-Pot Clavulanate] Nausea Only and Nausea And Vomiting   • Methotrexate Diarrhea, Nausea And Vomiting and Other (See Comments)     Other reaction(s): Stomach Pain  Blisters, hair loss  Blisters, hair loss  Blisters, hair loss       Medications:   Home Medications:  Current Outpatient Prescriptions on File Prior to Visit   Medication Sig   • allopurinol (ZYLOPRIM) 100 MG tablet Take 100 mg by mouth Daily.   • chlorthalidone (HYGROTEN) 50 MG tablet Take 50 mg by mouth Daily.   • EDARBI 80 MG tablet tablet Take 80 mg by mouth Every Morning.   • leflunomide (ARAVA) 10 MG tablet Take 10 mg by mouth Daily.   • potassium chloride (K-DUR,KLOR-CON) 10 MEQ CR tablet Take 20 mEq by mouth Daily.   • traMADol (ULTRAM) 50 MG tablet Take 50 mg by mouth.   • vitamin D (ERGOCALCIFEROL) 99624 units capsule capsule Take 50,000 Units by mouth 2 (Two) Times a Week.   • Vortioxetine HBr (TRINTELLIX) 20 MG tablet Take 20 mg by mouth Every Morning.   • [DISCONTINUED] ALPRAZolam (XANAX) 0.5 MG tablet take 1 or 2 tablets by mouth every 8 hours if needed   • [DISCONTINUED]  busPIRone (BUSPAR) 10 MG tablet take 2 tablets by mouth twice a day   • [DISCONTINUED] BYSTOLIC 10 MG tablet Take 1 tablet daily   • [DISCONTINUED] lansoprazole (PREVACID) 30 MG capsule take 1 capsule by mouth once daily     No current facility-administered medications on file prior to visit.      Current Medications:  Scheduled Meds:  Continuous Infusions:  No current facility-administered medications for this visit.   PRN Meds:.    I have reviewed the patient's medical history in detail and updated the computerized patient record.  Review and summarization of old records include:    Past Medical History:   Diagnosis Date   • Allergy    • Anxiety    • Arthritis     RA, FINGERS, TOES   • Bleeding esophageal ulcer     history of   • Esophageal reflux    • GERD (gastroesophageal reflux disease)    • History of transfusion    • Hypertension    • Hypertension    • Osteoarthritis of right knee     sched TKA        Past Surgical History:   Procedure Laterality Date   • COLONOSCOPY     • ENDOSCOPY     • ESOPHAGOSCOPY / EGD      cauterize esophageal bleeding ulcers   • HYSTERECTOMY      OPEN   • KNEE SURGERY Left     ARTHROSCOPY   • TOTAL KNEE ARTHROPLASTY Left 4/11/2018    Procedure: TOTAL KNEE ARTHROPLASTY AND ALL ASSOCIATED PROCEDURES;  Surgeon: Costa Zuñiga MD;  Location: [facility];  Service: Orthopedics   • WISDOM TOOTH EXTRACTION          Social History     Occupational History   • Not on file.     Social History Main Topics   • Smoking status: Never Smoker   • Smokeless tobacco: Never Used   • Alcohol use Yes      Comment: glass of rum nightly   • Drug use: No   • Sexual activity: Defer    Social History     Social History Narrative   • No narrative on file        Family History   Problem Relation Age of Onset   • Cancer Mother         bone   • Other Father         cardiac failure   • Heart disease Father    • Malig Hyperthermia Neg Hx        ROS: 14 point review of systems was performed and was negative except  for documented findings in HPI and today's encounter.     Allergies:   Allergies   Allergen Reactions   • Augmentin [Amoxicillin-Pot Clavulanate] Nausea Only and Nausea And Vomiting   • Methotrexate Diarrhea, Nausea And Vomiting and Other (See Comments)     Other reaction(s): Stomach Pain  Blisters, hair loss  Blisters, hair loss  Blisters, hair loss     Constitutional:  Denies fever, shaking or chills   Eyes:  Denies change in visual acuity   HENT:  Denies nasal congestion or sore throat   Respiratory:  Denies cough or shortness of breath   Cardiovascular:  Denies chest pain or severe LE edema   GI:  Denies abdominal pain, nausea, vomiting, bloody stools or diarrhea   Musculoskeletal:  Denies numbness tingling or loss of motor function except as outlined above in history of present illness.  : Denies painful urination or hematuria  Integument:  Denies rash, lesion or ulceration   Neurologic:  Denies headache or focal weakness  Endocrine:  Denies lymphadenopathy  Psych:  Denies confusion or change in mental status   Hem:  Denies active bleeding    Physical Exam: 63 y.o. female  There is no height or weight on file to calculate BMI.  There were no vitals filed for this visit.  Vital signs reviewed.   General Appearance:    Alert, cooperative, in no acute distress                  Eyes: conjunctiva clear  ENT: external ears and nose atraumatic  CV: no peripheral edema  Resp: normal respiratory effort  Skin: no rashes or wounds; normal turgor  Psych: mood and affect appropriate  Lymph: no nodes appreciated  Neuro: gross sensation intact  Vascular:  Palpable peripheral pulse in noted extremity  Musculoskeletal Extremities: Right knee-active range of motion 5-120°, 4+ out of 5 strength on flexion and extension, moderate effusion noted, maximal tenderness along medial joint line with positive Mis exam, overall varus alignment noted, crepitus on active and passive range of motion.  Positive active patellar  compression test.  Stable to varus and valgus stress at 5 and 30.  No hip pain on logroll or [facility] exam.  Positive sensation light touch all discretions right foot symmetric to the left, brisk cap refill all digits, 2+ dorsalis pedis pulse.    Debilities/Disabilities Identified: None      Diagnostic Tests:  Appointment on 10/11/2018   Component Date Value Ref Range Status   • Color, UA 10/11/2018 Yellow  Yellow, Straw Final   • Appearance, UA 10/11/2018 Clear  Clear Final   • pH, UA 10/11/2018 5.5  4.5 - 8.0 Final   • Specific Gravity, UA 10/11/2018 1.010  1.003 - 1.030 Final   • Glucose, UA 10/11/2018 Negative  Negative Final   • Ketones, UA 10/11/2018 Negative  Negative, 80 mg/dL (3+), >=160 mg/dL (4+) Final   • Bilirubin, UA 10/11/2018 Negative  Negative Final   • Blood, UA 10/11/2018 Negative  Negative Final   • Protein, UA 10/11/2018 Negative  Negative Final   • Leuk Esterase, UA 10/11/2018 Negative  Negative Final   • Nitrite, UA 10/11/2018 Negative  Negative Final   • Urobilinogen, UA 10/11/2018 0.2 E.U./dL  0.2 - 1.0 E.U./dL Final   • Glucose 10/11/2018 122* 65 - 99 mg/dL Final   • BUN 10/11/2018 26* 8 - 23 mg/dL Final   • Creatinine 10/11/2018 1.20* 0.57 - 1.00 mg/dL Final   • Sodium 10/11/2018 140  136 - 145 mmol/L Final   • Potassium 10/11/2018 3.8  3.5 - 5.2 mmol/L Final   • Chloride 10/11/2018 100  98 - 107 mmol/L Final   • CO2 10/11/2018 27.4  22.0 - 29.0 mmol/L Final   • Calcium 10/11/2018 8.6* 8.8 - 10.5 mg/dL Final   • eGFR Non African Amer 10/11/2018 45* >60 mL/min/1.73 Final   • BUN/Creatinine Ratio 10/11/2018 21.7  7.0 - 25.0 Final   • Anion Gap 10/11/2018 12.6  mmol/L Final   • Protime 10/11/2018 13.8  12.1 - 15.0 Seconds Final   • INR 10/11/2018 1.06  0.90 - 1.10 Final   • PTT 10/11/2018 25.8  24.3 - 38.1 seconds Final   • WBC 10/11/2018 8.93  4.80 - 10.80 10*3/mm3 Final   • RBC 10/11/2018 4.72  4.20 - 5.40 10*6/mm3 Final   • Hemoglobin 10/11/2018 12.7  12.0 - 16.0 g/dL Final   •  Hematocrit 10/11/2018 41.5  37.0 - 47.0 % Final   • MCV 10/11/2018 87.9  81.0 - 99.0 fL Final   • MCH 10/11/2018 26.9* 27.0 - 31.0 pg Final   • MCHC 10/11/2018 30.6* 31.0 - 37.0 g/dL Final   • RDW 10/11/2018 16.6* 11.5 - 14.5 % Final   • RDW-SD 10/11/2018 53.6  37.0 - 54.0 fl Final   • MPV 10/11/2018 9.7  7.4 - 10.4 fL Final   • Platelets 10/11/2018 373  140 - 500 10*3/mm3 Final   • Neutrophil % 10/11/2018 66.0  45.0 - 70.0 % Final   • Lymphocyte % 10/11/2018 22.2  20.0 - 45.0 % Final   • Monocyte % 10/11/2018 9.2* 3.0 - 8.0 % Final   • Eosinophil % 10/11/2018 1.5  0.0 - 4.0 % Final   • Basophil % 10/11/2018 0.8  0.0 - 2.0 % Final   • Immature Grans % 10/11/2018 0.3  0.0 - 0.5 % Final   • Neutrophils, Absolute 10/11/2018 5.90  1.50 - 8.30 10*3/mm3 Final   • Lymphocytes, Absolute 10/11/2018 1.98  0.60 - 4.80 10*3/mm3 Final   • Monocytes, Absolute 10/11/2018 0.82  0.00 - 1.00 10*3/mm3 Final   • Eosinophils, Absolute 10/11/2018 0.13  0.10 - 0.30 10*3/mm3 Final   • Basophils, Absolute 10/11/2018 0.07  0.00 - 0.20 10*3/mm3 Final   • Immature Grans, Absolute 10/11/2018 0.03  0.00 - 0.03 10*3/mm3 Final   • nRBC 10/11/2018 0.0  0.0 - 0.0 /100 WBC Final     No results found.  Assessment:  Patient Active Problem List   Diagnosis   • Anxiety   • Depression   • Gastroesophageal reflux disease with esophagitis   • Generalized anxiety disorder   • Hyperlipidemia   • Hypertension   • Impaired glucose tolerance   • Renal insufficiency   • Primary osteoarthritis of right knee   • S/P total knee arthroplasty, left       Plan:  I reviewed anatomy of a total joint arthroplasty in laymen's terms, as well as typical postoperative recovery and possibly 6-12 months for maximal recovery, and possible need for rehabilitation stay after hospitalization. We also discussed risks, benefits, alternatives, and limitations of procedure with risks including but not limited to neurovascular damage, bleeding, infection, malalignment, chronic pian,  failure of implants, osteolysis, loosening of implants, loss of motion, weakness, stiffness, instability, DVT, pulmonary embolus, death, stroke, complex regional pain syndrome, myocardial infarction, and need for additional procedures. No guarantees were given regarding results of surgery.      Nitish Barfield was given the opportunity to ask and have all questions answered today.  The patient voiced understanding of the risks, benefits, and alternative forms of treatment that were discussed and the patient consents to proceed with surgery.     Patient's blood clot history is negative.    Plan for DVT prophylaxis is asa    Patient's MRSA infection history is negative.    Patient's skin infection history is negative.    Discharge Plan: POD 2-3 to home    Date: 10/11/2018    Dictated utilizing Dragon dictation

## 2018-10-17 NOTE — CONSULTS
HOSPITALIST SERVICES  @ [facility], [place]            [facility] Hospitalist Team    CONSULTATION NOTE      Patient Care Team:  Violeta Hammond MD as PCP - General  Violeta Hammond MD as PCP - Family Medicine    CHIEF COMPLAINT:     Worsening pain in Rt Knee    HISTORY OF PRESENT ILLNESS:    Ms. Nitish Barfield is a 63 year old  female who hx of HTN, GERD and anxiety with hx of progressively worsening DJD of the Rt Knee. Dr Zuñiga took her to OR and did Rt TKA. The Hospitalist service was contacted for management of the medical issues during this hospitalization.     Patient denies any sx of fever, headache, chest pain, shortness of breath, abdominal pain, nausea/ vomiting, dysuria, urgency/ frequency or any recent hx of blood in stool. No other medical history available.        Past Medical History:   Diagnosis Date   • Allergy    • Anxiety    • Arthritis     RA, FINGERS, TOES   • Bleeding esophageal ulcer     history of   • Esophageal reflux    • GERD (gastroesophageal reflux disease)    • History of transfusion    • Hypertension    • Hypertension    • Osteoarthritis of right knee     sched TKA     Past Surgical History:   Procedure Laterality Date   • COLONOSCOPY     • ENDOSCOPY     • ESOPHAGOSCOPY / EGD      cauterize esophageal bleeding ulcers   • HYSTERECTOMY      OPEN   • KNEE SURGERY Left     ARTHROSCOPY   • TOTAL KNEE ARTHROPLASTY Left 4/11/2018    Procedure: TOTAL KNEE ARTHROPLASTY AND ALL ASSOCIATED PROCEDURES;  Surgeon: Costa Zuñiga MD;  Location: [facility];  Service: Orthopedics   • WISDOM TOOTH EXTRACTION       Family History   Problem Relation Age of Onset   • Cancer Mother         bone   • Other Father         cardiac failure   • Heart disease Father    • Malig Hyperthermia Neg Hx      Social History   Substance Use Topics   • Smoking status: Never Smoker   • Smokeless tobacco: Never Used   • Alcohol use Yes      Comment: glass of rum nightly     Prescriptions  "Prior to Admission   Medication Sig Dispense Refill Last Dose   • allopurinol (ZYLOPRIM) 100 MG tablet Take 100 mg by mouth Daily.  0 10/16/2018 at Unknown time   • ALPRAZolam (XANAX) 0.5 MG tablet Take 0.5 mg by mouth Every 8 (Eight) Hours As Needed for Anxiety (1-2 tabs).   10/16/2018 at 0800   • busPIRone (BUSPAR) 10 MG tablet Take 20 mg by mouth 2 (Two) Times a Day.   10/16/2018 at 0800   • chlorthalidone (HYGROTEN) 50 MG tablet Take 50 mg by mouth Daily.  4 Past Week at Unknown time   • EDARBI 80 MG tablet tablet Take 80 mg by mouth Every Morning.  3 10/17/2018 at 0500   • lansoprazole (PREVACID) 30 MG capsule Take 30 mg by mouth Daily.   10/16/2018 at 2300   • nebivolol (BYSTOLIC) 10 MG tablet Take 10 mg by mouth Daily.   10/17/2018 at 0500   • potassium chloride (K-DUR,KLOR-CON) 10 MEQ CR tablet Take 20 mEq by mouth Daily.   10/10/2018 at Unknown time   • Vortioxetine HBr (TRINTELLIX) 20 MG tablet Take 20 mg by mouth Every Morning.   10/17/2018 at 0500   • leflunomide (ARAVA) 10 MG tablet Take 10 mg by mouth Daily.  0 10/3/2018   • traMADol (ULTRAM) 50 MG tablet Take 50 mg by mouth.   Taking   • vitamin D (ERGOCALCIFEROL) 94801 units capsule capsule Take 50,000 Units by mouth 2 (Two) Times a Week.   10/10/2018     Allergies:  Augmentin [amoxicillin-pot clavulanate] and Methotrexate    REVIEW OF SYSTEMS:  Please see the above history of present illness for pertinent positives and negatives.  The remainder of the patient's systems have been reviewed and are negative.     Vital Signs  Temp:  [96.2 °F (35.7 °C)-98.4 °F (36.9 °C)] 97.6 °F (36.4 °C)  Heart Rate:  [61-92] 75  Resp:  [15-20] 20  BP: (122-156)/() 122/75    Flowsheet Rows      First Filed Value   Admission Height  172.7 cm (68\") Documented at 10/17/2018 1129   Admission Weight  104 kg (229 lb) Documented at 10/17/2018 0559           Physical Exam:    PHYSICAL EXAMINATION:       Physical Exam   Constitutional: Patient appears well-developed and " well-nourished and in no acute distress   HEENT:   Head: Normocephalic and atraumatic.   Eyes:  Pupils are equal, round, and reactive to light. EOM are intact. Sclera are anicteric and non-injected.  Mouth and Throat: Patient has moist mucous membranes. Oropharynx is clear of any erythema or exudate.     Neck: Neck supple. No JVD present. No thyromegaly present. No lymphadenopathy present.  Cardiovascular: Regular rate, regular rhythm, S1 normal and S2 normal.  Exam reveals no gallop and no friction rub.  No murmur heard.  Pulmonary/Chest: Lungs are clear to auscultation bilaterally. No respiratory distress. No wheezes. No rhonchi. No rales.   Abdominal: Soft. Bowel sounds are normal. No distension and no mass. There is no hepatosplenomegaly. There is no tenderness.   Musculoskeletal: Normal Muscle tone  Extremities: S/P Rt Total Knee Arthroplasty.  No ankle/pedal edema. Pulses are palpable in all 4 extremities.  Neurological: Patient is alert and oriented to person, place, and time. Cranial nerves II-XII are grossly intact with no focal deficits.  Skin: Skin is warm. No rash noted. Nails show no clubbing.  No cyanosis or erythema.               Results Review:    I reviewed the patient's new clinical results.  Lab Results (most recent)     Procedure Component Value Units Date/Time    Potassium [[id number]]  (Abnormal) Collected:  10/17/18 0603    Specimen:  Blood Updated:  10/17/18 0642     Potassium 3.3 (L) mmol/L           Imaging Results (most recent)     Procedure Component Value Units Date/Time    XR Knee 1 or 2 View Right [[id number]] Collected:  10/17/18 1118     Updated:  10/17/18 1121    Narrative:       POSTOP RIGHT KNEE SERIES, 10/17/2018:     HISTORY:  Postop knee arthroplasty surgery.     TECHNIQUE:  AP and crosstable lateral radiographs of the right knee were obtained  portably following surgery.     FINDINGS:  Total knee arthroplasty components are well-positioned postoperatively.  No visible fracture  or dislocation.       Impression:       Satisfactory postoperative appearance following right total knee  arthroplasty.     This report was finalized on 10/17/2018 11:18 AM by Dr. Prashant Andrews MD.           reviewed    ECG/EMG Results (most recent)     None        reviewed    Assessment/Plan           ASSESSMENT AND PLAN:       SUMMARY:    ?   PROPHYLAXIS:   -Oxygen Saturation: As per Nurses' notes.   -DVT Prophylaxis: On ASA and SCDs   -Gastritis Prophylaxis: Pantoprazole 40 mg PO qAM   -Constipation Prophylaxis: colace 100 mg po BID   -Immunizations: N/A  -Smoking/ Nicotine issues: N/A      NUTRITION AND FLUIDS:  -Diet/ Nutrition: Regular, cardiac diet   -Fluid Status/Electrolytes: LR and  mL/Hr      THERAPEUTIC:   ALLERGIES: Augmentin and Methotrexate  ANTIBIOTICS: as per order sheet   PAIN MANAGEMENT: Inj Morphine and Roxicodone  INSULIN THERAPY: N/A    CHEST PAIN: N/A    NEBULIZER TREATMENT: N/A    ANXIETY: Xanax 0.5 mg po q8h for anxiety   DEPRESSION: N/A    INSOMNIA: N/A             PLAN:    Labs and diagnostic tests reviewed: Serum K was 3.3 earlier. Will repeat    Diagnostic tests reviewed:      XRAY of Rt Knee  IMPRESSION:  Satisfactory postoperative appearance following right total knee  arthroplasty.     This report was finalized on 10/17/2018 11:18 AM by Dr. Prashant Andrews MD.           Patient is clinically and hemodynamically stable    Reason for consultation: Management of medical issues during this hospitalization    Any new recommendations: To continue current treatment and management plan    New Labs ordered: Repeat BMP now and As per Dr Zuñiga    New diagnostic tests ordered: N/A    Any changes in medications: N/A    To continue current management and supportive care    Pain management issues: Inj Morphine and Roxicodone    Discharge planning issues: Will need Rehab services    Will follow patient closely    Nothing new to add for right now            DIAGNOSES:    PRIMARY  "DIAGNOSES:      HTN: Last /75. On Hygroton and Bystolic. Will monitor    Hypokalemia: Recheck and repletion    GERD: On Pantoprazole. No acute issues at this time    Anxiety: On Xanax. No acute issues at this time    DJD: On Mobic. No acute issues at this time    Hyperuricemia: On Allopurinol. No acute issues at this time.    Vitamin D deficiency: On replacement    Obesity: BMI 38.1. Nutritional counselling    Smoking: No such hx available    Alcohol: One glass of rum nightly. No acute issues at this time    S/P Rt Total Knee Arthroplasty: Surgery done by Dr Mccain today    DVT prophylaxis: ASA and SCDs      ?     SECONDARY DIAGNOSES:  ?     As above      SURGICAL DIAGNOSES:      As per Problem List       Thanks for the opportunity to see this patient today on consultation. We, as the Hospitalist group, will be happy to follow this patient with you while the patient is hospitalized here and we will address all the medical (nonsurgical) problems.          I discussed the patients findings and my recommendations with patient and patient is agreeable to current treatment and management plan.     Tae Bentley MD  10/17/18  6:09 PM          Tae (\"TRAV\") VIELKA Bentley M.D., FACP  Internal Medicine/ Hospitalist        Time:       "

## 2018-10-17 NOTE — INTERVAL H&P NOTE
H&P reviewed. The patient was examined and there are no changes to the H&P.     Vitals:    10/17/18 0559 10/17/18 0627   BP:  149/74   BP Location:  Left arm   Patient Position:  Sitting   Pulse:  73   Resp:  16   Temp: 98.4 °F (36.9 °C)    TempSrc: Oral    SpO2:  99%   Weight: 104 kg (229 lb)

## 2018-10-17 NOTE — PLAN OF CARE
Problem: Patient Care Overview  Goal: Plan of Care Review  Outcome: Ongoing (interventions implemented as appropriate)   10/17/18 9203   Coping/Psychosocial   Plan of Care Reviewed With patient   OTHER   Outcome Summary Physical therapy evaluation complete. Patient performs bed mobility with supervision and sit to/from stand with CGA with use of RW and knee immobilizer. Patient performs gait x20 feet with CGA and verbal cues for sequencing and safety with walker. Patient impulsive throughout evaluation, requiring verbal cues for safety and continued redirection to task. Plan to see twice daily for therapy to address deficits and promote functional independence. Recommend outpatient PT services at discharge.

## 2018-10-17 NOTE — ANESTHESIA PROCEDURE NOTES
Airway  Urgency: elective    Airway not difficult    General Information and Staff    Patient location during procedure: OR  CRNA: FREDERICK SOLOMON    Indications and Patient Condition  Indications for airway management: airway protection    Preoxygenated: yes  MILS maintained throughout  Mask difficulty assessment: 0 - not attempted    Final Airway Details  Final airway type: supraglottic airway      Successful airway: unique  Size 4    Number of attempts at approach: 1

## 2018-10-17 NOTE — ANESTHESIA PROCEDURE NOTES
Spinal Block      Patient reassessed immediately prior to procedure    Patient location during procedure: pre-op  Start Time: 10/17/2018 7:28 AM  Stop Time: 10/17/2018 7:32 AM  Indication:at surgeon's request  Performed By  CRNA: FREDERICK SOLOMON  Preanesthetic Checklist  Completed: patient identified, site marked, surgical consent, pre-op evaluation, timeout performed, IV checked, risks and benefits discussed and monitors and equipment checked  Spinal Block Prep:  Patient Position:sitting  Sterile Tech:cap, gloves, mask and sterile barriers  Prep:Betadine  Patient Monitoring:blood pressure monitoring, continuous pulse oximetry and EKG  Spinal Block Procedure  Approach:midline  Guidance:landmark technique and palpation technique  Location:L4-L5  Needle Type:Sprotte  Needle Gauge:25 G  Placement of Spinal needle event:cerebrospinal fluid aspirated  Paresthesia: no  Fluid Appearance:clear  Post Assessment  Patient Tolerance:patient tolerated the procedure well with no apparent complications  Complications no

## 2018-10-17 NOTE — OP NOTE
Date of Surgery: 10/17/2018    Preoperative diagnosis: right knee osteoarthritis    Postoperative diagnosis: right knee osteoarthritis    Procedure:  1.right total knee arthroplasty  2. Intraoperative use of kinetic knee balance sensor for implant stability during total knee arthroplasty    Surgeon: Yogesh Kumar.: Jolynn    general with regional block    Estimated blood loss: 100 mL    Fluids: per anesthesia    Specimens: None    Complications: None    Drains: None    Tourniquet time: Tourniquet Times:     Inflated: 10/17/2018  8:13 AM     Deflated: 10/17/2018  9:32 AM    Implants used: Rochelle Persona total knee arthroplasty system with a size 32 patella, size E tibia, size 9 cruciate retaining femoral component , 11 mm highly cross-linked medial congruent polyethylene insert, antibiotic Palacos cement    Examination under anesthesia: right knee passive range of motion 3-120°, varus alignment, no open wounds lacerations or abrasions over knee, stable to varus and valgus stress at 3 and 30°, 2+ dorsalis pedis pulse.    Indications for procedure: Patient is a pleasant 63 y.o. female who has had significant pain to right knee over the last several years, has failed conservative treatment with intra-articular injections, bracing, home exercise program, activity modification, anti-inflammatory medications. Has had persistent pain limiting activities of daily living and even has had pain at rest. We discussed treatment options and patient wished to proceed with above-mentioned surgery. Was explained details of procedure as well as risks benefits and alternatives as documented on history and physical and had all questions answered. No guarantees were given in regards to results of the surgery.    Details of procedure: Patient was seen, evaluated, and cleared for surgery by anesthesia. Had been medically optimized by primary care physician. Patient was met in the preoperative hold area, operative site was marked, consent  was reviewed, history and physical was updated, and preoperative labs were reviewed. Regional block was placed per anesthesia and patient was taken to the operating room and placed in supine position on a regular OR table. After successful intubation per anesthesia, examination under anesthesia was carried out this time. Nonsterile tourniquet was then applied to right lower extremity. Patient was secured to the table with a waist strap and all bony prominences were well-padded. right lower extremity was then sterilely prepped and draped in standard fashion.  Formal timeout was completed including confirmation of history and physical, operative consent, operative site, patient identification number, and preoperative antibiotics administration. right leg was then exsanguinated and tourniquet inflated to 300 mmHg. Procedure was then begun with longitudinal incision over the anterior aspect of the right knee through skin and subcutaneous tissue with 15 blade. Minimal subcutaneous flaps were developed at this point in time, and standard medial parapatellar arthrotomy was then created at this point. Portion of suprapatellar and infrapatellar fat pad were resected this point in time. Medial tibial peel was carried out in order to allow for further exposure the knee. Medial and lateral meniscus were resected this time and ACL was transected.  Patella was then everted and measured with a caliper. Patellar reamer was then used to create initial retropatellar cut followed by completion of cut with a oscillating saw in standard fashion and use of rongeur. Patella was sized as a 32 and patella guard was placed at this time.  Attention was then turned to the distal femur with the knee being brought into a flexed position.  Reference pin for I-Assist navigational system was placed in the distal femur in-line with Whitesides line in retrograde fashion.  Navigational distal femoral cutting block was positioned at this time as well  and calibrated with multiple planes of knee and hip motion carried out to set reference positions for navigation device.  Navigational device was then adjusted to 0 degrees valgus cut on distal femur and 3 degrees flexion cut on distal femur.  Distal femoral cutting block was pinned into position at this site, navigational device removed. Depth of the resection was checked with a sickle and noted to be appropriate. Additional pin was placed for security of the cutting block and oscillating saw was then used to create a distal femoral cut in standard fashion while collateral ligaments were protected with Z retractors. Bone was removed and measured at this time.  Cut validation was completed with navigational device at this point time as well confirming appropriate cut consistent with set parameters as noted above. Pins and cutting block were removed as well. Contents of the notch were then resected including the ACL, PCL, and posterior horns of medial and lateral meniscus. Posterior retractor was then placed and tibia was subluxated anteriorly.   Attention was then turned to the proximal tibia. Additional release of lateral tissues was completed at this time to allow for appropriate visualization of the proximal tibia articular surface. I-Assist navigational device was then pinned in position over the tibial tubercle with 3 pins at this time, external guide was placed in line with the tibial crest and clamped into position over the ankle while proximal guide pins were impacted into the top of the tibia.  Navigational pods were then calibrated with range of motion of the hip and once noted to be appropriate, external guide from tibia was removed.  Adjustments were made to the navigational device to place the cut in neutral varus and 4 degrees posterior slope.  Tibial cutting block resection depth was set with a sickle, block was pinned into position at this time, and alignment assessed with the drop cliff noted to be in  line with tibial crest, medial third of tibial tubercle, and center of the ankle joint.  Oscillating saw was then used to complete the proximal tibial cut while collateral ligaments were protected with Z retractors. Bone fragments were removed and measured.  Tibial cut was then validated with validation device from navigational system and confirmed to be within reasonable position of above-mentioned set parameters for the proximal tibia cut. Knee was brought into full extension with extension gap being checked with spacer block at this time and noted be acceptable with knee brought into full extension, stable medial and lateral collateral stability at full extension.  Navigational G8 was then removed at this point time.   Attention was then returned to the distal femur with a femoral sizing guide being placed, transverse epicondylar axis and Whitesides line being assessed and used to determine appropriate external rotation of 3 degrees.  Femoral sizing guide was secured to the distal femur with 2 screws, sizing caliper was adjusted to the anterior femur and femur was sized at a 9.  2 drill holes were made through the sizing guide which was then removed including 2 screws. Size 9 femoral cutting block was then impacted onto the distal femoral cut surface and pinned into position. Sickle was used to check the anterior cut and there was no evidence of anticipated notching. Collateral ligaments were protected with Z retractors while anterior, posterior, and chamfer cuts were created in standard fashion with oscillating saw. Femoral cutting block was removed at this time as well as bone fragments. Posterior capsule was stripped at this time. Injection of posterior capsule was completed with exparel solution at this time as well. Size 9 femoral trial was placed. Size E tibial baseplate trial with 11 mm polyethylene trial was inserted. Knee was then ranged from 0-125°, good varus valgus stability at full extension and 30°  as well as throughout mid flexion with good AP translation at 90° of flexion noted.  Patella was everted at this point in time, 32 mm patella reaming guide was placed and lug holes were drilled for patella at this point. Patella trial was placed and patella was noted to track in midline with no evidence of subluxation. Knee was ranged from full extension to full flexion and tibial rotation was noted with midline of tibial trial being marked with Bovie electrocautery at the proximal tibia. Femoral and patellar and tibial trials were removed at this point in time and tibia was subluxated anteriorly with posterior retractor. Tibial trial baseplate was placed once again, position confirmed with drop cliff as well as with previous marking for tibial rotation and assessing for bony coverage of implant. Once position of tibial baseplate was noted to be appropriate, 2 pins were placed at this time, and reamer and punch were then used through the tibial baseplate.  All trial components were once again removed at this time and pulsatile lavage was used to thoroughly clean all bony cut surfaces as well as subcutaneous tissue. Final implants were opened on the back table this time. Cement was prepared on the back table and was applied to the cut surfaces of the distal femur, proximal tibia, and patella as well as to the backside of the implants. Tibial component was placed first followed by distal femur followed by patella. Extraneous cement was removed with freer at this time. Once all implants were in position knee was brought into full extension with axial compression to allow for cement to cure fully.  Once cement was completely hardened, trial polyethylene insert was assessed, range of motion of knee from 0-125° was achieved with good varus and valgus stability throughout range of motion and good AP translation at 90° of flexion. Thus a 11 mm polyethylene insert was opened on the back table, inserted and locked in  appropriately into the tibial baseplate. Knee was thoroughly irrigated with a second evaluation for any additional bone fragments or cement particles. Knee was then thoroughly irrigated with Betadine solution followed by pulsatile lavage. Exparel injection was injected into the periosteal and subcutaneous layers at this time.   Attention was then turned to closure of the wound with running self locking #2 suture ×1, 2-0 Vicryls in inverted fashion for deep subcutaneous closure, 2-0 running slef locking strata-fix suture, and Prineo glue and mesh for skin. Wound was dressed with Telfa, 4 x 4 gauze, web roll, ABD pad, Ace wrap, and patient was placed in knee immobilizer and given ice pack. At the end of procedure all lap, needle and sponge counts were correct ×2. Patient had brisk cap refill all digits right lower extremity, compartments are soft and easily compressible at the end of the procedure.    Disposition: Patient was successfully extubated per anesthesia and taken recovery room in stable condition. Will be admitted for pain control and initiation of therapy, weight-bear as tolerated right lower extremity, DVT prophylaxis will be started on postoperative day #1 with ASA. Results of the procedure were discussed immediately postoperatively with patient's family and they had all questions answered at that time.

## 2018-10-17 NOTE — ANESTHESIA PROCEDURE NOTES
Peripheral Block    Pre-sedation assessment completed: 10/17/2018 7:04 AM    Patient reassessed immediately prior to procedure    Patient location during procedure: pre-op  Start time: 10/17/2018 7:05 AM  Stop time: 10/17/2018 7:11 AM  Reason for block: at surgeon's request and post-op pain management  Performed by  CRNA: FREDERICK SOLOMON  Preanesthetic Checklist  Completed: patient identified, site marked, surgical consent, pre-op evaluation, timeout performed, IV checked, risks and benefits discussed and monitors and equipment checked  Prep:  Pt Position: supine  Sterile barriers:cap, gloves and sterile barriers  Prep: ChloraPrep  Patient monitoring: blood pressure monitoring, continuous pulse oximetry and EKG  Procedure  Sedation:yes  Performed under: PNB  Guidance:ultrasound guided  ULTRASOUND INTERPRETATION.  Using ultrasound guidance a 21 G gauge needle was placed in close proximity to the femoral nerve, at which point, under ultrasound guidance anesthetic was injected in the area of the nerve and spread of the anesthesia was seen on ultrasound in close proximity thereto.  There were no abnormalities seen on ultrasound; a digital image was taken; and the patient tolerated the procedure with no complications. Images:still images obtained    Laterality:right  Block Type:adductor canal block  Injection Technique:single-shot  Needle Type:echogenic  Needle Gauge:21 G  Resistance on Injection: none  Medications  Local Injected:ropivacaine 0.2% Local Amount Injected:20mL  Post Assessment  Injection Assessment: negative aspiration for heme  Patient Tolerance:comfortable throughout block  Complications:no

## 2018-10-17 NOTE — PLAN OF CARE
Problem: Patient Care Overview  Goal: Plan of Care Review   10/17/18 7097   Coping/Psychosocial   Plan of Care Reviewed With patient;family   OTHER   Outcome Summary OT Evaluation Complete: Pt able to perform transfers, functional mobility, and ADLs with [facility]BA-C[facility]. Pt has a hx of recent L TKA and reports she had no difficulty with management of daily tasks following previous TKA and does not anticipate any difficulty upon discharge to home. Pt reports she has no concerns for home regarding OT services and will have assistance at home as needed. No needs indicated for OT services at this time.

## 2018-10-17 NOTE — THERAPY EVALUATION
Acute Care - Physical Therapy Initial Evaluation   Kim Harris     Patient Name: Nitish Barfield  : 1955  MRN: [id number]  Today's Date: 10/17/2018   Onset of Illness/Injury or Date of Surgery: 10/17/18  Date of Referral to PT: 10/17/18  Referring Physician: Dr Zuñiga      Admit Date: 10/17/2018    Visit Dx:     ICD-10-CM ICD-9-CM   1. Primary osteoarthritis of right knee M17.11 715.16     Patient Active Problem List   Diagnosis   • Anxiety   • Depression   • Gastroesophageal reflux disease with esophagitis   • Generalized anxiety disorder   • Hyperlipidemia   • Hypertension   • Impaired glucose tolerance   • Renal insufficiency   • Primary osteoarthritis of right knee   • S/P total knee arthroplasty, left     Past Medical History:   Diagnosis Date   • Allergy    • Anxiety    • Arthritis     RA, FINGERS, TOES   • Bleeding esophageal ulcer     history of   • Esophageal reflux    • GERD (gastroesophageal reflux disease)    • History of transfusion    • Hypertension    • Hypertension    • Osteoarthritis of right knee     sched TKA     Past Surgical History:   Procedure Laterality Date   • COLONOSCOPY     • ENDOSCOPY     • ESOPHAGOSCOPY / EGD      cauterize esophageal bleeding ulcers   • HYSTERECTOMY      OPEN   • KNEE SURGERY Left     ARTHROSCOPY   • TOTAL KNEE ARTHROPLASTY Left 2018    Procedure: TOTAL KNEE ARTHROPLASTY AND ALL ASSOCIATED PROCEDURES;  Surgeon: Costa Zuñiga MD;  Location: [facility];  Service: Orthopedics   • WISDOM TOOTH EXTRACTION          PT ASSESSMENT (last 12 hours)      Physical Therapy Evaluation     Row Name 10/17/18 1311          PT Evaluation Time/Intention    Subjective Information complains of;numbness  -JW     Document Type evaluation  -JW     Mode of Treatment physical therapy  -JW     Patient Effort good  -JW     Comment reports numbness from surgical block  -JW     Row Name 10/17/18 1311          General Information    Patient Profile Reviewed? yes  -JW     Onset of  Illness/Injury or Date of Surgery 10/17/18  -JW     Referring Physician Dr Zuñiga  -     Patient Observations alert;cooperative;agree to therapy  -JW     Patient/Family Observations pt supine, agreeable to therapy, family present.  -JW     Prior Level of Function independent:;all household mobility;community mobility;ADL's  -JW     Equipment Currently Used at Home shower chair;walker, rolling;commode, bedside  -JW     Pertinent History of Current Functional Problem pt with worsening right knee pain that did not respond to conservative treatment.  pt s/p right TKA.  Patient reports she was independent with all functional mobility but required use of rolling walker at times in the evenings after work.  -JW     Existing Precautions/Restrictions fall;brace worn when out of bed  -JW     Risks Reviewed patient:;increased discomfort  -JW     Benefits Reviewed patient and family:;improve function;increase strength;increase balance;increase independence  -     Barriers to Rehab none identified  -     Row Name 10/17/18 1311          Relationship/Environment    Primary Source of Support/Comfort spouse  -     Row Name 10/17/18 1311          Resource/Environmental Concerns    Current Living Arrangements home/apartment/condo   1 story house  -     Row Name 10/17/18 1311          Home Main Entrance    Number of Stairs, Main Entrance five  -JW     Stair Railings, Main Entrance railings on both sides of stairs  -     Stairs Comment, Main Entrance pt reports she has 2 steps with landing followed by 3 steps  -     Row Name 10/17/18 1311          Cognitive Assessment/Intervention- PT/OT    Orientation Status (Cognition) oriented x 4  -JW     Follows Commands (Cognition) follows one step commands  -     Safety Deficit (Cognitive) impulsivity  -     Personal Safety Interventions gait belt;nonskid shoes/slippers when out of bed  -JW     Row Name 10/17/18 1311          Bed Mobility Assessment/Treatment    Bed Mobility  Assessment/Treatment supine-sit  -     Supine-Sit [place] (Bed Mobility) supervision;verbal cues  -     Assistive Device (Bed Mobility) bed rails;head of bed elevated  -     Row Name 10/17/18 1311          Transfer Assessment/Treatment    Transfer Assessment/Treatment sit-stand transfer;stand-sit transfer  -     Sit-Stand [place] (Transfers) contact guard;verbal cues  -JW     Stand-Sit [place] (Transfers) contact guard;verbal cues  -     Row Name 10/17/18 1311          Sit-Stand Transfer    Assistive Device (Sit-Stand Transfers) walker, front-wheeled  -JW     Row Name 10/17/18 1311          Stand-Sit Transfer    Assistive Device (Stand-Sit Transfers) walker, front-wheeled  -     Row Name 10/17/18 1311          Gait/Stairs Assessment/Training    [place] Level (Gait) contact guard;verbal cues  -     Assistive Device (Gait) walker, front-wheeled  -JW     Distance in Feet (Gait) 20  -JW     Pattern (Gait) swing-through  -JW     Deviations/Abnormal Patterns (Gait) base of support, narrow  -JW     Comment (Gait/Stairs) pt requires verbal cues for proper distance from walker and maintaining UE contact with walker throughout treatment.  -     Row Name 10/17/18 1311          General ROM    GENERAL ROM COMMENTS left LE WFL, right LE not tested due to numbness  -     Row Name 10/17/18 1311          MMT (Manual Muscle Testing)    General MMT Comments left LE 5/5, right ankle 3+/5, right knee 3-/5  -     Row Name 10/17/18 1311          Sensory Assessment/Intervention    Sensory General Assessment --   pt reports numbness in right LE due to surgical block  -     Row Name 10/17/18 1311          Pain Assessment    Additional Documentation --   no c/o pain  -     Row Name             Wound 10/17/18 0851 Right knee incision    Wound - Properties Group Date first assessed: 10/17/18  -DANNY Time first assessed: 0851  -DANNY Side: Right  -DANNY Location: knee  -DANNY Type: incision  -DANNY    Row Name        "      [REMOVED] Wound 04/11/18 1022 Left knee incision    Wound - Properties Group Date first assessed: 04/11/18  -AA Time first assessed: 1022  -AA Side: Left  -AA Location: knee  -AA Type: incision  -AA Resolution Date: 10/17/18  -AW Resolution Time: 1116  -AW    Row Name 10/17/18 1311          Plan of Care Review    Plan of Care Reviewed With patient;family  -     Row Name 10/17/18 1311          Physical Therapy Clinical Impression    Date of Referral to PT 10/17/18  -     Patient/Family Goals Statement (PT Clinical Impression) \"go home tomorrow\"  -     Criteria for Skilled Interventions Met (PT Clinical Impression) yes;treatment indicated  -     Rehab Potential (PT Clinical Summary) good, to achieve stated therapy goals  -     Predicted Duration of Therapy (PT) 2 days  -     Care Plan Review (PT) evaluation/treatment results reviewed;care plan/treatment goals reviewed;patient/other agree to care plan  -     Care Plan Review, Other Participant (PT Clinical Impression) spouse  -     Row Name 10/17/18 1311          Physical Therapy Goals    Bed Mobility Goal Selection (PT) bed mobility, PT goal 1  -     Transfer Goal Selection (PT) transfer, PT goal 1  -     Gait Training Goal Selection (PT) gait training, PT goal 1  -     Stairs Goal Selection (PT) stairs, PT goal 1  -     Additional Documentation Stairs Goal Selection (PT) (Row)  -     Row Name 10/17/18 1311          Bed Mobility Goal 1 (PT)    Activity/Assistive Device (Bed Mobility Goal 1, PT) bed mobility activities, all  -     [place] Level/Cues Needed (Bed Mobility Goal 1, PT) conditional independence  -     Time Frame (Bed Mobility Goal 1, PT) 2 days  -     Progress/Outcomes (Bed Mobility Goal 1, PT) goal ongoing  -     Row Name 10/17/18 1311          Transfer Goal 1 (PT)    Activity/Assistive Device (Transfer Goal 1, PT) transfers, all;walker, rolling  -     [place] Level/Cues Needed (Transfer Goal 1, PT) " supervision required  -JW     Time Frame (Transfer Goal 1, PT) 2 days  -JW     Progress/Outcome (Transfer Goal 1, PT) goal ongoing  -     Row Name 10/17/18 1311          Gait Training Goal 1 (PT)    Activity/Assistive Device (Gait Training Goal 1, PT) gait (walking locomotion);assistive device use  -JW     [place] Level (Gait Training Goal 1, PT) supervision required  -JW     Distance (Gait Goal 1, PT) 150  -JW     Time Frame (Gait Training Goal 1, PT) 2 days  -JW     Progress/Outcome (Gait Training Goal 1, PT) goal ongoing  -     Row Name 10/17/18 1311          Stairs Goal 1 (PT)    Activity/Assistive Device (Stairs Goal 1, PT) ascending stairs;descending stairs  -JW     [place] Level/Cues Needed (Stairs Goal 1, PT) contact guard assist  -JW     Number of Stairs (Stairs Goal 1, PT) 5   with 2 handrails  -JW     Time Frame (Stairs Goal 1, PT) 2 days  -JW     Progress/Outcome (Stairs Goal 1, PT) goal ongoing  -     Row Name 10/17/18 1311          Positioning and Restraints    Pre-Treatment Position in bed  -JW     Post Treatment Position chair  -JW     In Chair notified nsg;reclined;encouraged to call for assist;call light within reach;with family/caregiver  -     Row Name 10/17/18 1311          Living Environment    Home Accessibility stairs to enter home  -       User Key  (r) = Recorded By, (t) = Taken By, (c) = Cosigned By    Initials Name Provider Type    Mckenzie Pineda RN Registered Nurse    Chio Lane RN Registered Nurse    Vanessa Pollack RN Registered Nurse    Joan Healy, PT Physical Therapist          Physical Therapy Education     Title: PT OT SLP Therapies (Active)     Topic: Physical Therapy (Active)     Point: Mobility training (Done)    Learning Progress Summary     Learner Status Readiness Method Response Comment Documented by    Patient Done Acceptance E,TB VU   10/17/18 1409                      User Key     Initials Effective Dates Name Provider  Type Discipline     04/03/18 -  Joan Diaz, PT Physical Therapist PT                PT Recommendation and Plan  Anticipated Discharge Disposition (PT): home with OP services  Planned Therapy Interventions (PT Eval): balance training, bed mobility training, gait training, home exercise program, strengthening, stair training, transfer training, ROM (range of motion)  Therapy Frequency (PT Clinical Impression): 2 times/day  Outcome Summary/Treatment Plan (PT)  Anticipated Discharge Disposition (PT): home with OP services  Plan of Care Reviewed With: patient  Outcome Summary: Physical therapy evaluation complete.  Patient performs bed mobility with supervision and sit to/from stand with CGA with use of RW and knee immobilizer.  Patient performs gait x20 feet with CGA and verbal cues for sequencing and safety with walker.  Patient impulsive throughout evaluation, requiring verbal cues for safety and continued redirection to task.  Plan to see twice daily for therapy to address deficits and promote functional independence.  Recommend outpatient PT services at discharge.          Outcome Measures     Row Name 10/17/18 1311             How much help from another person do you currently need...    Turning from your back to your side while in flat bed without using bedrails? 3  -JW      Moving from lying on back to sitting on the side of a flat bed without bedrails? 3  -JW      Moving to and from a bed to a chair (including a wheelchair)? 3  -JW      Standing up from a chair using your arms (e.g., wheelchair, bedside chair)? 3  -JW      Climbing 3-5 steps with a railing? 2  -JW      To walk in hospital room? 3  -JW      AM-PAC 6 Clicks Score 17  -         Functional Assessment    Outcome Measure Options AM-PAC 6 Clicks Basic Mobility (PT)  -        User Key  (r) = Recorded By, (t) = Taken By, (c) = Cosigned By    Initials Name Provider Type     Joan Diaz, PT Physical Therapist           Time Calculation:          PT Charges     Row Name 10/17/18 1406             Time Calculation    Start Time 1311  -JW      PT Received On 10/17/18  -YVETTE      PT - Next Appointment 10/18/18  -YVETTE        User Key  (r) = Recorded By, (t) = Taken By, (c) = Cosigned By    Initials Name Provider Type    Joan Healy PT Physical Therapist        Therapy Suggested Charges     Code   Minutes Charges    None           Therapy Charges for Today     Code Description Service Date Service Provider Modifiers Qty    70073204725 HC PT MOBILITY CURRENT 10/17/2018 Joan Diaz, PT GP, CJ 1    50796360908 HC PT MOBILITY PROJECTED 10/17/2018 Joan Diaz, PT GP, CI 1    32248621161 HC PT EVAL LOW COMPLEXITY 3 10/17/2018 Joan Diaz, PT GP 1          PT G-Codes  PT Professional Judgement Used?: Yes  Outcome Measure Options: AM-PAC 6 Clicks Basic Mobility (PT)  AM-PAC 6 Clicks Score: 17  Functional Limitation: Mobility: Walking and moving around  Mobility: Walking and Moving Around Current Status (): At least 20 percent but less than 40 percent impaired, limited or restricted  Mobility: Walking and Moving Around Goal Status (): At least 1 percent but less than 20 percent impaired, limited or restricted      Joan Diaz PT  10/17/2018

## 2018-10-18 VITALS
OXYGEN SATURATION: 99 % | HEART RATE: 78 BPM | TEMPERATURE: 97.8 F | HEIGHT: 68 IN | DIASTOLIC BLOOD PRESSURE: 79 MMHG | BODY MASS INDEX: 37.92 KG/M2 | WEIGHT: 250.2 LBS | SYSTOLIC BLOOD PRESSURE: 166 MMHG | RESPIRATION RATE: 18 BRPM

## 2018-10-18 LAB
ANION GAP SERPL CALCULATED.3IONS-SCNC: 10.8 MMOL/L
BASOPHILS # BLD AUTO: 0.06 10*3/MM3 (ref 0–0.2)
BASOPHILS NFR BLD AUTO: 0.4 % (ref 0–2)
BUN BLD-MCNC: 32 MG/DL (ref 8–23)
BUN/CREAT SERPL: 14.6 (ref 7–25)
CALCIUM SPEC-SCNC: 8.5 MG/DL (ref 8.8–10.5)
CHLORIDE SERPL-SCNC: 104 MMOL/L (ref 98–107)
CO2 SERPL-SCNC: 28.2 MMOL/L (ref 22–29)
CREAT BLD-MCNC: 2.19 MG/DL (ref 0.57–1)
DEPRECATED RDW RBC AUTO: 51.2 FL (ref 37–54)
EOSINOPHIL # BLD AUTO: 0 10*3/MM3 (ref 0.1–0.3)
EOSINOPHIL NFR BLD AUTO: 0 % (ref 0–4)
ERYTHROCYTE [DISTWIDTH] IN BLOOD BY AUTOMATED COUNT: 15.8 % (ref 11.5–14.5)
GFR SERPL CREATININE-BSD FRML MDRD: 23 ML/MIN/1.73
GLUCOSE BLD-MCNC: 131 MG/DL (ref 65–99)
HCT VFR BLD AUTO: 37.1 % (ref 37–47)
HGB BLD-MCNC: 11.5 G/DL (ref 12–16)
IMM GRANULOCYTES # BLD: 0.07 10*3/MM3 (ref 0–0.03)
IMM GRANULOCYTES NFR BLD: 0.4 % (ref 0–0.5)
LYMPHOCYTES # BLD AUTO: 1.81 10*3/MM3 (ref 0.6–4.8)
LYMPHOCYTES NFR BLD AUTO: 10.8 % (ref 20–45)
MCH RBC QN AUTO: 27.6 PG (ref 27–31)
MCHC RBC AUTO-ENTMCNC: 31 G/DL (ref 31–37)
MCV RBC AUTO: 89.2 FL (ref 81–99)
MONOCYTES # BLD AUTO: 1.48 10*3/MM3 (ref 0–1)
MONOCYTES NFR BLD AUTO: 8.8 % (ref 3–8)
NEUTROPHILS # BLD AUTO: 13.31 10*3/MM3 (ref 1.5–8.3)
NEUTROPHILS NFR BLD AUTO: 79.6 % (ref 45–70)
NRBC BLD MANUAL-RTO: 0 /100 WBC (ref 0–0)
PLATELET # BLD AUTO: 349 10*3/MM3 (ref 140–500)
PMV BLD AUTO: 10.6 FL (ref 7.4–10.4)
POTASSIUM BLD-SCNC: 4.3 MMOL/L (ref 3.5–5.2)
RBC # BLD AUTO: 4.16 10*6/MM3 (ref 4.2–5.4)
SODIUM BLD-SCNC: 143 MMOL/L (ref 136–145)
WBC NRBC COR # BLD: 16.73 10*3/MM3 (ref 4.8–10.8)

## 2018-10-18 PROCEDURE — 80048 BASIC METABOLIC PNL TOTAL CA: CPT | Performed by: ORTHOPAEDIC SURGERY

## 2018-10-18 PROCEDURE — G0378 HOSPITAL OBSERVATION PER HR: HCPCS

## 2018-10-18 PROCEDURE — 90661 CCIIV3 VAC ABX FR 0.5 ML IM: CPT | Performed by: ORTHOPAEDIC SURGERY

## 2018-10-18 PROCEDURE — G0008 ADMIN INFLUENZA VIRUS VAC: HCPCS | Performed by: ORTHOPAEDIC SURGERY

## 2018-10-18 PROCEDURE — 97116 GAIT TRAINING THERAPY: CPT

## 2018-10-18 PROCEDURE — 25010000002 INFLUENZA VAC SUBUNIT QUAD 0.5 ML SUSPENSION PREFILLED SYRINGE: Performed by: ORTHOPAEDIC SURGERY

## 2018-10-18 PROCEDURE — 85025 COMPLETE CBC W/AUTO DIFF WBC: CPT | Performed by: ORTHOPAEDIC SURGERY

## 2018-10-18 PROCEDURE — 97110 THERAPEUTIC EXERCISES: CPT

## 2018-10-18 RX ORDER — ONDANSETRON 4 MG/1
4 TABLET, FILM COATED ORAL EVERY 8 HOURS PRN
Qty: 20 TABLET | Refills: 0 | Status: SHIPPED | OUTPATIENT
Start: 2018-10-18 | End: 2018-11-02

## 2018-10-18 RX ORDER — DOCUSATE SODIUM 100 MG/1
100 CAPSULE, LIQUID FILLED ORAL 2 TIMES DAILY PRN
Qty: 60 CAPSULE | Refills: 0 | OUTPATIENT
Start: 2018-10-18 | End: 2018-12-02

## 2018-10-18 RX ORDER — OXYCODONE AND ACETAMINOPHEN 7.5; 325 MG/1; MG/1
1 TABLET ORAL EVERY 4 HOURS PRN
Qty: 42 TABLET | Refills: 0 | Status: SHIPPED | OUTPATIENT
Start: 2018-10-18 | End: 2018-11-02 | Stop reason: DRUGHIGH

## 2018-10-18 RX ADMIN — POTASSIUM CHLORIDE 20 MEQ: 1500 TABLET, EXTENDED RELEASE ORAL at 08:19

## 2018-10-18 RX ADMIN — INFLUENZA A VIRUS A/SINGAPORE/GP1908/2015 IVR-180 (H1N1) ANTIGEN (MDCK CELL DERIVED, PROPIOLACTONE INACTIVATED), INFLUENZA A VIRUS A/NORTH CAROLINA/04/2016 (H3N2) HEMAGGLUTININ ANTIGEN (MDCK CELL DERIVED, PROPIOLACTONE INACTIVATED), INFLUENZA B VIRUS B/IOWA/06/2017 HEMAGGLUTININ ANTIGEN (MDCK CELL DERIVED, PROPIOLACTONE INACTIVATED), INFLUENZA B VIRUS B/SINGAPORE/INFTT-16-0610/2016 HEMAGGLUTININ ANTIGEN (MDCK CELL DERIVED, PROPIOLACTONE INACTIVATED) 0.5 ML: 15; 15; 15; 15 INJECTION, SUSPENSION INTRAMUSCULAR at 12:11

## 2018-10-18 RX ADMIN — ACETAMINOPHEN 1000 MG: 500 TABLET, FILM COATED ORAL at 08:19

## 2018-10-18 RX ADMIN — OXYCODONE HYDROCHLORIDE 10 MG: 5 TABLET ORAL at 05:14

## 2018-10-18 RX ADMIN — OXYCODONE HYDROCHLORIDE 10 MG: 5 TABLET ORAL at 01:10

## 2018-10-18 RX ADMIN — BUSPIRONE HYDROCHLORIDE 20 MG: 15 TABLET ORAL at 08:22

## 2018-10-18 RX ADMIN — ASPIRIN 325 MG: 325 TABLET, DELAYED RELEASE ORAL at 08:19

## 2018-10-18 RX ADMIN — NEBIVOLOL HYDROCHLORIDE 10 MG: 5 TABLET ORAL at 08:25

## 2018-10-18 RX ADMIN — ALPRAZOLAM 0.5 MG: 0.25 TABLET ORAL at 10:35

## 2018-10-18 RX ADMIN — PANTOPRAZOLE SODIUM 40 MG: 40 TABLET, DELAYED RELEASE ORAL at 06:11

## 2018-10-18 RX ADMIN — ALLOPURINOL 100 MG: 100 TABLET ORAL at 08:19

## 2018-10-18 RX ADMIN — OXYCODONE HYDROCHLORIDE 10 MG: 5 TABLET ORAL at 11:28

## 2018-10-18 RX ADMIN — VITAMIN D, TAB 1000IU (100/BT) 5000 UNITS: 25 TAB at 08:23

## 2018-10-18 RX ADMIN — PREGABALIN 75 MG: 75 CAPSULE ORAL at 08:19

## 2018-10-18 NOTE — DISCHARGE INSTRUCTIONS
Total Knee Replacement Discharge Instructions:    I. ACTIVITIES:  1. Exercises:  ? Complete exercise program as taught by the hospital physical therapist 2 times per day  ? Exercise program will be advanced by the physical therapist  ? During the day be up ambulating every 2 hours (while awake) for short distances  ? Complete the ankle pump exercises at least 10 times per hour (while awake)  ? Elevate legs most of the day the first week post operatively and thereafter elevate legs when in bed and for at least 30 minutes during the day. Caution must be taken to avoid pillow placement under the bend of the knee as this can lead to flexion contractures of the knee.  ? Use cold packs 20-30 minutes approximately 5 times per day. This should be done before and after completing your exercises and at any time you are experiencing pain/ stiffness in your operative extremity.  ? Apply 6 inch ace wraps to operative extremity from ankle to groin. Please keep in place at all times except when bathing.      2. Activities of Daily Living:  ? No tub baths, hot tubs, or swimming pools for 4 weeks  ? May shower on post-operative day #3- Do not scrub or rub around the incision or mesh. No soap or alcohol to incision, just let water run over wound.         II. RESTRICTIONS  ? Do not cross legs or kneel  ? Your surgeon will discuss with you when you will be able to drive again.  ? Weight bearing as tolerated  ? First week stay inside on even terrain. May go up and down stairs one stair at a time utilizing the hand rail  ? After one week, you may venture outside.     III. PRECAUTIONS:  ? Everyone that comes near you should wash their hands  ? No elective dental, genital-urinary, or colon procedures or surgical procedures for 12 weeks after surgery unless absolutely necessary.  ?  If dental work or surgical procedure is deemed absolutely necessary, you will need to contact your surgeon as you will need to take antibiotics 1 hour prior to  any dental work (including teeth cleanings).  ? Please discuss with your surgeon prophylactic antibiotics as the length of time this intervention will be necessary for you varies with each patient’s health history and situation.  ? Avoid sick people. If you must be around someone who is ill, they should wear a mask.  ? Avoid visits to the Emergency Room or Urgent Care unless you are having a life              threatening event.     IV. INCISION CARE:  ? Wash your hands prior to dressing changes  ? Incision and knee should be covered with an ACE wrap daily for compression. No creams or ointments to the incision  ? Do not touch or pick at the incision  ? Check incision every day and notify surgeon immediately if any of the following signs or symptoms are noted:  o Increase in redness  o Increase in swelling around the incision and of the entire extremity  o Increase in pain  o Drainage oozing from the incision  o Pulling apart of the edges of the incision  o Increase in overall body temperature (greater than 100.5 degrees)  ? You will be given further instructions on removal of the glue and mesh over your incision at your first follow up visit at the office.     V. MEDICATIONS:   1. Anticoagulants: You will be discharged on an anticoagulant, typically either Aspirin or Eliquis. This is a prophylactic medication that helps prevent blood clots during your post-operative period. The type and length of dosage varies based on your individual needs, procedure performed, and surgeon’s preference.  ? While taking the anticoagulant, you should avoid taking any additional aspirin, ibuprofen (Advil or Motrin), Aleve (Naprosyn) or other non-steroidal anti-inflammatory medications.   ? Notify surgeon immediately if any nallely bleeding is noted in the urine, stool, emesis, or from the nose or the incision. Blood in the stool will often appear as black rather than red. Blood in urine may appear as pink. Blood in emesis may appear as  brown/black like coffee grounds.  ? You will need to apply pressure for longer periods of time to any cuts or abrasions to stop bleeding  ? Avoid alcohol while taking anticoagulants    2. Stool Softeners: You will be at greater risk of constipation after surgery due to being less mobile and the pain medications.   ? Take stool softeners as instructed by your surgeon while on pain medications. Over the counter Colace 100 mg 1-2 capsules twice daily.   ? If stools become too loose or too frequent, please decreases the dosage or stop the stool softener.  ? If constipation occurs despite use of stool softeners, you are to continue the stool softeners and add a laxative (Milk of Magnesia 1 ounce daily as needed)  ? Drink plenty of fluids, and eat fruits and vegetables during your recovery time    3. Pain Medications utilized after surgery are narcotics and the law requires that the following information be given to all patients that are prescribed narcotics:  ? CLASSIFICATION: Pain medications are called Opioids and are narcotics  ? LEGALITIES: It is illegal to share narcotics with others and to drive within 24 hours of taking narcotics  ? POTENTIAL SIDE EFFECTS: Potential side effects of opioids include: nausea, vomiting, itching, dizziness, drowsiness, dry mouth, constipation, and difficulty urinating.  ? POTENTIAL ADVERSE EFFECTS:   o Opioid tolerance can develop with use of pain medications and this simply means that it requires more and more of the medication to control pain; however, this is seen more in patients that use opioids for longer periods of time.  o Opioid dependence can develop with use of Opioids and this simply means that to stop the medication can cause withdrawal symptoms; however, this is seen with patients that use Opioids for longer periods of time.  o Opioid addiction can develop with use of Opioids and the incidence of this is very unlikely in patients who take the medications as ordered and  stop the medications as instructed.  o Opioid overdose can be dangerous, but is unlikely when the medication is taken as ordered and stopped when ordered. It is important not to mix opioids with alcohol or with and type of sedative such as Benadryl as this can lead to over sedation and respiratory difficulty.  ? DOSAGE:   o Pain medications will need to be taken consistently for the first week to decrease pain and promote adequate pain relief and participation in physical therapy.  o After the initial surgical pain begins to resolve, you may begin to decrease the pain medication. By the end of 6 weeks, you should be off of pain medications.  o Refills will not be given by the office during evening hours, on weekends, or after 12 weeks post-op.  o To seek refills on pain medications during the initial 6 week post-operative period, you must call the office 48 hours in advance to request the refill. The office will then notify you when to  the prescription. DO NOT wait until you are out of the medication to request a refill.    VI. FOLLOW-UP VISITS:  ? You will need to follow up in the office with Paulette Jefferson Nurse Practitioner in 2 weeks. Please call  [phone]  to schedule this appointment.  ? You will need to follow up with your primary care physician within 4 weeks.  ? If you have any concerns or suspected complications prior to your follow up visit, please call your surgeons office. Do not wait until your appointment time if you suspect complications. These will need to be addressed in the office promptly.

## 2018-10-18 NOTE — ANESTHESIA POSTPROCEDURE EVALUATION
Patient: Nitish Barfield    Procedure Summary     Date:  10/17/18 Room / Location:   [facility] 3 / [facility]    Anesthesia Start:  0740 Anesthesia Stop:  1012    Procedure:  TOTAL KNEE ARTHROPLASTY AND ALL ASSOCIATED PROCEDURES (Right Knee) Diagnosis:       Primary osteoarthritis of right knee      (Primary osteoarthritis of right knee [M17.11])    Surgeon:  Costa Zuñiga MD Provider:  Deanna Macias CRNA    Anesthesia Type:  regional, spinal ASA Status:  2          Anesthesia Type: regional, spinal  Last vitals  BP   166/79 (10/18/18 0358)   Temp   97.8 °F (36.6 °C) (10/18/18 0358)   Pulse   78 (10/18/18 0358)   Resp   18 (10/18/18 0358)     SpO2   99 % (10/18/18 0358)     Post Anesthesia Care and Evaluation    Patient location during evaluation: PACU  Patient participation: complete - patient participated  Level of consciousness: awake  Pain management: adequate  Airway patency: patent  Anesthetic complications: No anesthetic complications  PONV Status: none  Cardiovascular status: acceptable  Respiratory status: acceptable  Hydration status: acceptable    Comments: Pt evaluated prior to going to the floor

## 2018-10-18 NOTE — PLAN OF CARE
Problem: Patient Care Overview  Goal: Plan of Care Review  Outcome: Ongoing (interventions implemented as appropriate)   10/18/18 1100   OTHER   Outcome Summary Pt was able to ambulate 170 feet with a FWW and CGA, requiring cues for upright posture. Pt ascended/descended 4 steps and required cues for proper sequencing. Patient's pain increased slightly with weightbearing activities. Pt anticipates discharge to home with spouse later today.

## 2018-10-18 NOTE — NURSING NOTE
Discharge Planning Assessment   Kim Harris     Patient Name: Nitish Barfield  MRN: [id number]  Today's Date: 10/18/2018    Admit Date: 10/17/2018          Discharge Needs Assessment     Row Name 10/18/18 1051       Living Environment    Lives With spouse    Current Living Arrangements home/apartment/condo    Primary Care Provided by self    Provides Primary Care For no one    Family Caregiver if Needed spouse    Quality of Family Relationships involved;supportive    Able to Return to Prior Arrangements yes       Resource/Environmental Concerns    Transportation Concerns car, none       Transition Planning    Patient/Family Anticipates Transition to home with family       Discharge Needs Assessment    Readmission Within the Last 30 Days no previous admission in last 30 days    Concerns to be Addressed denies needs/concerns at this time    Equipment Currently Used at Home walker, rolling;shower chair;commode            Discharge Plan     Row Name 10/18/18 1058       Plan    Plan plan home with [facility]    Patient/Family in Agreement with Plan yes    Plan Comments Spoke with patient at bedside, she is sitting up in recliner. Permission to speak with  present. Face sheet verified. Patient is independent of ADLs including driving and working prior to admission. She denies use of DME, home 02, cpap/bipap. She states she has bsc,rw & shower chair from when she had other knee done 6 months ago. She used [facility] previously and would lkie to use them again for a couple of weeks and then outpatient therapy. She uses [facility] [place] and denies issues obtainig medications.  She does not have a living will and declines additional infromation at this time. Spoke with Yina/[facility] - referral given and informed anticipate dc today. CM # placed on white board, will continue to follow.        Destination     No service coordination in this encounter.      Durable Medical Equipment     No service coordination in  this encounter.      Dialysis/Infusion     No service coordination in this encounter.      Home Medical Care     No service coordination in this encounter.      Social Care     No service coordination in this encounter.                Demographic Summary     Row Name 10/18/18 1051       General Information    Admission Type observation    Arrived From home    Referral Source admission list    Reason for Consult discharge planning    Preferred Language English     Used During This Interaction no       Contact Information    Permission Granted to Share Info With             Functional Status    No documentation.           Psychosocial    No documentation.           Abuse/Neglect    No documentation.           Legal    No documentation.           Substance Abuse    No documentation.           Patient Forms    No documentation.         Paul Amezcua RN

## 2018-10-18 NOTE — PROGRESS NOTES
POD# 1 s/p right TKA    Subjective:Patient states she is doing well this am, denies fevers chills or sweats overnight, denies any residual numbness or tingling to operative extremity.  No calf pain or swelling.    Objective:  Vitals:    10/17/18 1518 10/17/18 1939 10/18/18 0107 10/18/18 0358   BP: 122/75 148/68 163/76 166/79   BP Location: Left arm Left arm Left arm Left arm   Patient Position: Lying Lying Lying Lying   Pulse: 75 79 70 78   Resp: 20 18 18 18   Temp:  97.6 °F (36.4 °C) 97.7 °F (36.5 °C) 97.8 °F (36.6 °C)   TempSrc: Oral Oral Oral Oral   SpO2: 98% 96% 95% 99%   Weight:       Height:             Results from last 7 days  Lab Units 10/18/18  0359   WBC 10*3/mm3 16.73*   HEMOGLOBIN g/dL 11.5*   HEMATOCRIT % 37.1   PLATELETS 10*3/mm3 349         Results from last 7 days  Lab Units 10/18/18  0359 10/17/18  1937  10/17/18  0603   SODIUM mmol/L 143 138  --   --    POTASSIUM mmol/L 4.3 4.2  --  3.3*   CHLORIDE mmol/L 104 100  --   --    CO2 mmol/L 28.2 26.2  --   --    BUN mg/dL 32* 26*  --   --    CREATININE mg/dL 2.19* 1.89*  --   --    GLUCOSE mg/dL 131* 176*  < >  --    CALCIUM mg/dL 8.5* 8.9  --   --    < > = values in this interval not displayed.    Exam:    Right knee- incision clean, dry, intact   Flex and extend toes and ankle   No calf pain, negative Homans sign   Positive sensation light touch right foot   Brisk cap refill all digits, palpable dorsalis pedis pulse    Impression: s/p right TKA    Plan:  1. PT/OT- weight-bear as tolerated, ambulation, range of motion  2. Pain control- oxycodone, Tylenol, Lyrica  3. DVT prophylaxis- aspirin twice a day  4. Wound care- prineo dermabond remain intact until follow-up in office  5. Disposition- home with outpatient PT once medically stable and cleared by PT

## 2018-10-18 NOTE — PLAN OF CARE
Problem: Patient Care Overview  Goal: Discharge Needs Assessment  Outcome: Ongoing (interventions implemented as appropriate)   10/17/18 1115 10/18/18 1051   Discharge Needs Assessment   Readmission Within the Last 30 Days --  no previous admission in last 30 days   Concerns to be Addressed --  denies needs/concerns at this time   Patient/Family Anticipates Transition to --  home with family   Patient/Family Anticipated Services at Transition none --    Transportation Concerns --  car, none   Transportation Anticipated car, drives self --    Disability   Equipment Currently Used at Home --  walker, rolling;shower chair;commode

## 2018-10-18 NOTE — NURSING NOTE
Case Management Discharge Note    Final Note: dc home with [facility]    Destination     No service has been selected for the patient.      Durable Medical Equipment     No service has been selected for the patient.      Dialysis/Infusion     No service has been selected for the patient.      Home Medical Care - Selection Complete     Service Request Status Selected Specialties Address Phone Number Fax Number    [facility] Selected Home Health Services [address] [address] [phone]      Social Care     No service has been selected for the patient.             Final Discharge Disposition Code: 06 - home with home health care

## 2018-10-18 NOTE — THERAPY TREATMENT NOTE
Acute Care - Physical Therapy Treatment Note  WYATT Newberry     Patient Name: Nitish Barfield  : 1955  MRN: [id number]  Today's Date: 10/18/2018  Onset of Illness/Injury or Date of Surgery: 10/17/18  Date of Referral to PT: 10/17/18  Referring Physician: Dr Zuñiga    Admit Date: 10/17/2018    Visit Dx:    ICD-10-CM ICD-9-CM   1. Status post total right knee replacement Z96.651 V43.65   2. Primary osteoarthritis of right knee M17.11 715.16     Patient Active Problem List   Diagnosis   • Anxiety   • Depression   • Gastroesophageal reflux disease with esophagitis   • Generalized anxiety disorder   • Hyperlipidemia   • Hypertension   • Impaired glucose tolerance   • Renal insufficiency   • Primary osteoarthritis of right knee   • S/P total knee arthroplasty, left       Therapy Treatment          Rehabilitation Treatment Summary     Row Name 10/18/18 0931             Treatment Time/Intention    Discipline physical therapist  -STAR CRAWFORD JW2      Document Type therapy note (daily note)  -STAR CRAWFORD JW2      Subjective Information no complaints  -STAR CRAWFORD JW2      Mode of Treatment physical therapy  -STAR CRAWFORD JW2      Patient/Family Observations Pt in chair,  present, agreeable to therapy  -STAR CRAWFORD JW2      Care Plan Review evaluation/treatment results reviewed;care plan/treatment goals reviewed;risks/benefits reviewed;patient/other agree to care plan  -STAR CRAWFORD JW2      Comment Numbness from block has worn off, pt wishes to be discharged today  -STAR CRAWFORD JW2      Recorded by [STAR CRAWFORD,JW2] Joan Diaz, PT (r) Elizabeth Finney, PT Student (t) Joan Diaz, PT (c) 10/18/18 1125      Row Name 10/18/18 0931             Cognitive Assessment/Intervention- PT/OT    Personal Safety Interventions gait belt;muscle strengthening facilitated;supervised activity;nonskid shoes/slippers when out of bed  -STAR CRAWFORD,JW2      Recorded by [STAR CRAWFORD,JW2] Joan Diaz, JASON (r) Elizabeth Finney, PT Student (t) Joan Diaz, PT (c) 10/18/18  "1125      Row Name 10/18/18 0931             Bed Mobility Assessment/Treatment    Bed Mobility Assessment/Treatment sit-supine  -JW      Supine-Sit [place] (Bed Mobility) --  -JW      Sit-Supine [place] (Bed Mobility) supervision  -JW      Comment (Bed Mobility) Pt required supervision, took additional time to complete  -JW2,STAR,JW3      Recorded by [JW] Joan Diaz, PT 10/18/18 1125  [JW2,STAR,JW3] Joan Diaz, PT (r) Elizabeth Finney, PT Student (t) Joan Diaz, PT (c) 10/18/18 1125      Row Name 10/18/18 0931             Sit-Stand Transfer    Sit-Stand [place] (Transfers) contact guard  -YVETTE,STAR,JW2      Assistive Device (Sit-Stand Transfers) walker, front-wheeled  -JW,STAR,JW2      Recorded by [STAR CRAWFORD,JW2] Joan Diaz, PT (r) Elizabeth Finney, PT Student (t) Joan Diaz, PT (c) 10/18/18 1125      Row Name 10/18/18 0931             Stand-Sit Transfer    Stand-Sit [place] (Transfers) supervision  -YVETTE,STAR,JW2      Assistive Device (Stand-Sit Transfers) walker, front-wheeled  -JW,STAR,JW2      Recorded by [YVETTE,STAR,JW2] Joan Diaz, PT (r) Elizabeth Finney, PT Student (t) Joan Diaz, PT (c) 10/18/18 1125      Row Name 10/18/18 0931             Gait/Stairs Assessment/Training    [place] Level (Gait) contact guard  -JW,STAR,JW2      Assistive Device (Gait) walker, front-wheeled  -JW,STAR,JW2      Distance in Feet (Gait) 170  -JW,STAR,JW2      Pattern (Gait) step-through  -JW,STAR,JW2      Deviations/Abnormal Patterns (Gait) gait speed decreased;stride length decreased  -JW,STAR,JW2      Negotiation (Stairs) stairs independence;handrail location;number of steps;ascending technique;descending technique  -JW,STAR,JW2      [place] Level (Stairs) contact guard  -JW,STAR,JW2      Handrail Location (Stairs) both sides  -JW,STAR,JW2      Number of Steps (Stairs) 4   up/down on each 3\" and 6\" stairs x 1  -YVETTE,STAR,JW2      Ascending Technique (Stairs) step-to-step  -STAR CRAWFORD,YVETTE2      " Descending Technique (Stairs) step-to-step  -STAR CRAWFORD,JW2      Recorded by [STAR CRAWFORD,JW2] Joan Diaz, PT (r) Elizabeth Finney, PT Student (t) Joan Diaz, PT (c) 10/18/18 1125      Row Name 10/18/18 0931             Motor Skills Assessment/Interventions    Additional Documentation Therapeutic Exercise Interventions (Group);Therapeutic Exercise (Group)  -STAR CRAWFORD,JW2      Recorded by [STAR CRAWFORD,JW2] Joan Diaz, PT (r) Elizabeth Finney, PT Student (t) Joan Diaz, PT (c) 10/18/18 1125      Row Name 10/18/18 0931             Lower Extremity Seated Therapeutic Exercise    Performed, Seated Lower Extremity (Therapeutic Exercise) LAQ (long arc quad), knee extension   marching  -STAR CRAWFORD,JW2      Sets/Reps Detail, Seated Lower Extremity (Therapeutic Exercise) 1x10 ea  -STAR CRAWFORD,JW2      Comment, Seated Lower Extremity (Therapeutic Exercise) written handout issued  -JW3      Recorded by [STAR CRAWFORD,JW2] Joan Diaz PT (r) Elizabeth Finney, PT Student (t) Joan Diaz, PT (c) 10/18/18 1125  [JW3] Joan Diaz, PT 10/18/18 1125      Row Name 10/18/18 0931             Lower Extremity Supine Therapeutic Exercise    Performed, Supine Lower Extremity (Therapeutic Exercise) SLR (straight leg raise);quadriceps sets;gluteal sets;heel slides;ankle pumps;hip abduction/adduction  -STAR CRAWFROD,JW2      Sets/Reps Detail, Supine Lower Extremity (Therapeutic Exercise) 1x10 ea  -STAR CRAWFORD,JW2      Comment, Supine Lower Extremity (Therapeutic Exercise) Pt unable to complete SAQ due to significant discomfort   -YVETTE,STAR,JW2      Recorded by [YVETTE,STAR,JW2] Joan Diaz, JASON (r) Elizabeth Finney, PT Student (t) Joan Diaz, PT (c) 10/18/18 1125      Row Name 10/18/18 0931             Positioning and Restraints    Pre-Treatment Position sitting in chair/recliner  -STAR CRAWFORD,JW2      Post Treatment Position chair  -JW,STAR,JW2      In Chair reclined;call light within reach;encouraged to call for assist;with family/caregiver  -JW3       Recorded by [YVETTE,STAR,JW2] Joan Diaz, PT (r) Elizabeth Finney, PT Student (t) Joan Diaz, PT (c) 10/18/18 1125  [JW3] Joan Diaz, PT 10/18/18 1125      Row Name 10/18/18 0931             Pain Assessment    Additional Documentation Pain Scale: Numbers Pre/Post-Treatment (Group)  -JW,STAR,JW2      Recorded by [YVETTE,STAR,JW2] Joan Diaz, PT (r) Elizabeth Finney, PT Student (t) Joan Diaz, PT (c) 10/18/18 1125      Row Name 10/18/18 0931             Pain Scale: Numbers Pre/Post-Treatment    Pain Scale: Numbers, Pretreatment 2/10  -JW,STAR,JW2      Pain Scale: Numbers, Post-Treatment 4/10  -JW,STAR,JW2      Pain Location - Side Right  -JW,STAR,JW2      Pain Location knee  -JW,STAR,JW2      Pre/Post Treatment Pain Comment Pt had increased pain with activity  -STAR CRAWFORD,JW2      Pain Intervention(s) Cold pack;Ambulation/increased activity  -JW,STAR,JW2      Recorded by [YVETTE,STAR,JW2] Joan Diaz, PT (r) Elizabeth Finney, PT Student (t) Joan Diaz, PT (c) 10/18/18 1125      Row Name                Wound 10/17/18 0851 Right knee incision    Wound - Properties Group Date first assessed: 10/17/18 [DANNY] Time first assessed: 0851 [DANNY] Side: Right [DANNY] Location: knee [DANNY] Type: incision [DANNY] Recorded by:  [DANNY] Mckenzie Moreno RN 10/17/18 0851    Row Name 10/18/18 0931             Plan of Care Review    Plan of Care Reviewed With patient;spouse  -JW      Recorded by [JW] Joan Diaz, PT 10/18/18 1125      Row Name 10/18/18 0931             Outcome Summary/Treatment Plan (PT)    Daily Summary of Progress (PT) progress toward functional goals is good;prepare for discharge   pt reports she plans to return home today  -YVETTE      Patient/Family Concerns, Anticipated Discharge Disposition (PT) pt and spouse express no concerns regarding return home  -JW      Recorded by [JW] Joan Diaz, PT 10/18/18 1125        User Key  (r) = Recorded By, (t) = Taken By, (c) = Cosigned By    Initials Name  Effective Dates Discipline    Mckenzie Pineda RN 06/16/16 -  Nurse    Joan Healy, PT 04/03/18 -  PT    Elizabeth Dunlap, PT Student 10/10/18 -  PT          Wound 10/17/18 0851 Right knee incision (Active)   Dressing Appearance dry;intact 10/18/2018  8:25 AM   Closure HERMILO 10/18/2018  8:25 AM   Base dressing in place, unable to visualize 10/18/2018  8:25 AM   Periwound intact;blanchable 10/17/2018 12:50 PM   Periwound Temperature warm 10/17/2018 12:50 PM   Periwound Skin Turgor soft 10/17/2018 12:50 PM   Drainage Amount none 10/18/2018  8:25 AM   Dressing Care, Wound other (see comments) 10/17/2018 12:50 PM   Periwound Care, Wound dry periwound area maintained 10/17/2018 12:50 PM             Physical Therapy Education     Title: PT OT SLP Therapies (Active)     Topic: Physical Therapy (Done)     Point: Mobility training (Done)    Learning Progress Summary     Learner Status Readiness Method Response Comment Documented by    Patient Done IGGY Giron,H REGINA  STAR 10/18/18 1059     Done IGGY Leal   10/17/18 1404          Point: Home exercise program (Done)    Learning Progress Summary     Learner Status Readiness Method Response Comment Documented by    Patient Done IGGY Giron H VU   10/18/18 1059                      User Key     Initials Effective Dates Name Provider Type Discipline     04/03/18 -  Joan Diaz, PT Physical Therapist PT    STAR 10/10/18 -  Elizabeth Finney, PT Student PT Student PT                    PT Recommendation and Plan     Outcome Summary: Pt was able to ambulate 170 feet with a FWW and CGA, requiring cues for upright posture. Pt ascended/descended 4 steps and required cues for proper sequencing. Patient's pain increased slightly with weightbearing activities. Pt anticipates discharge later today.          Outcome Measures     Row Name 10/18/18 0931 10/17/18 1337 10/17/18 1311       How much help from another person do you currently need...    Turning  from your back to your side while in flat bed without using bedrails? 4  -JW (r) STAR (t) JW (c)  -- 3  -JW    Moving from lying on back to sitting on the side of a flat bed without bedrails? 4  -JW (r) STAR (t) JW (c)  -- 3  -JW    Moving to and from a bed to a chair (including a wheelchair)? 3  -JW (r) STAR (t) JW (c)  -- 3  -JW    Standing up from a chair using your arms (e.g., wheelchair, bedside chair)? 3  -JW (r) STAR (t) JW (c)  -- 3  -JW    Climbing 3-5 steps with a railing? 3  -JW (r) STAR (t) JW (c)  -- 2  -JW    To walk in hospital room? 4  -JW (r) STAR (t) JW (c)  -- 3  -JW    AM-PAC 6 Clicks Score 21  -JW (r) STAR (t)  -- 17  -JW       How much help from another is currently needed...    Putting on and taking off regular lower body clothing?  -- 3  -SD (r) OA (t) SD (c)  --    Bathing (including washing, rinsing, and drying)  -- 3  -SD (r) OA (t) SD (c)  --    Toileting (which includes using toilet bed pan or urinal)  -- 4  -SD (r) OA (t) SD (c)  --    Putting on and taking off regular upper body clothing  -- 4  -SD (r) OA (t) SD (c)  --    Taking care of personal grooming (such as brushing teeth)  -- 4  -SD (r) OA (t) SD (c)  --    Eating meals  -- 4  -SD (r) OA (t) SD (c)  --    Score  -- 22  -SD (r) OA (t)  --       Functional Assessment    Outcome Measure Options AM-PAC 6 Clicks Basic Mobility (PT)  -JW (r) STAR (t) JW (c) AM-PAC 6 Clicks Daily Activity (OT)  -SD (r) OA (t) SD (c) AM-PAC 6 Clicks Basic Mobility (PT)  -      User Key  (r) = Recorded By, (t) = Taken By, (c) = Cosigned By    Initials Name Provider Type    Maury Espinoza, OTR Occupational Therapist    Joan Healy, PT Physical Therapist    Kerry Hicks, OT Student OT Student    Elizabeth Dunlap, PT Student PT Student           Time Calculation:         PT Charges     Row Name 10/18/18 1103             Time Calculation    Start Time 0931  -YVETTE (r) STAR (t) YVETTE (c)      Stop Time 0958  -YVETTE (r) STAR (t) YVETTE (c)      Time Calculation  (min) 27 min  -YVETTE (r) STAR (t)      PT Received On 10/18/18  -YVETTE (r) STAR (t) YVETTE (c)      PT - Next Appointment 10/18/18  -YVETTE (r) STAR (t) YVETTE (c)         Timed Charges    04150 - PT Therapeutic Exercise Minutes 12  -JW (r) STAR (t) YVETTE (c)      53628 - Gait Training Minutes  15  -JW (r) STAR (t) YVETTE (c)        User Key  (r) = Recorded By, (t) = Taken By, (c) = Cosigned By    Initials Name Provider Type    Joan Healy, PT Physical Therapist    Elizabeth Dunlap, PT Student PT Student        Therapy Suggested Charges     Code   Minutes Charges    64208 (CPT®) Hc Pt Neuromusc Re Education Ea 15 Min      66039 (CPT®) Hc Pt Ther Proc Ea 15 Min 12 1    83529 (CPT®) Hc Gait Training Ea 15 Min 15 1    31123 (CPT®) Hc Pt Therapeutic Act Ea 15 Min      26225 (CPT®) Hc Pt Manual Therapy Ea 15 Min      31658 (CPT®) Hc Pt Iontophoresis Ea 15 Min      51019 (CPT®) Hc Pt Elec Stim Ea-Per 15 Min      55799 (CPT®) Hc Pt Ultrasound Ea 15 Min      19258 (CPT®) Hc Pt Self Care/Mgmt/Train Ea 15 Min      40509 (CPT®) Hc Pt Prosthetic (S) Train Initial Encounter, Each 15 Min      05923 (CPT®) Hc Pt Orthotic(S)/Prosthetic(S) Encounter, Each 15 Min      53139 (CPT®) Hc Orthotic(S) Mgmt/Train Initial Encounter, Each 15min      Total  27 2        Therapy Charges for Today     Code Description Service Date Service Provider Modifiers Qty    39668601491 HC PT THER PROC EA 15 MIN 10/18/2018 Elizabeth Finney, PT Student GP 1    38359294334 HC GAIT TRAINING EA 15 MIN 10/18/2018 Elizabeth Finney, PT Student GP 1          PT G-Codes  PT Professional Judgement Used?: Yes  Outcome Measure Options: AM-PAC 6 Clicks Basic Mobility (PT)  AM-PAC 6 Clicks Score: 21  Score: 22  Functional Limitation: Mobility: Walking and moving around  Mobility: Walking and Moving Around Current Status (): At least 20 percent but less than 40 percent impaired, limited or restricted  Mobility: Walking and Moving Around Goal Status (): At least 1  percent but less than 20 percent impaired, limited or restricted    Elizabeth Finney, PT Student  10/18/2018

## 2018-10-18 NOTE — PROGRESS NOTES
Patient: Nitish Barfield  Procedure(s) with comments:  TOTAL KNEE ARTHROPLASTY AND ALL ASSOCIATED PROCEDURES - RIGHT TOTAL KNEE ARTHROPLASTY  Anesthesia type: [unfilled]    Patient location: [facility] Floor  Vitals:    10/17/18 1518 10/17/18 1939 10/18/18 0107 10/18/18 0358   BP: 122/75 148/68 163/76 166/79   BP Location: Left arm Left arm Left arm Left arm   Patient Position: Lying Lying Lying Lying   Pulse: 75 79 70 78   Resp: 20 18 18 18   Temp:  97.6 °F (36.4 °C) 97.7 °F (36.5 °C) 97.8 °F (36.6 °C)   TempSrc: Oral Oral Oral Oral   SpO2: 98% 96% 95% 99%   Weight:       Height:         Level of consciousness: awake, alert and oriented    Post-anesthesia pain: adequate analgesia  Airway patency: patent  Respiratory: unassisted  Cardiovascular: stable and blood pressure at baseline  Hydration: euvolemic    Anesthetic complications: no

## 2018-10-18 NOTE — PLAN OF CARE
Problem: Patient Care Overview  Goal: Plan of Care Review  Outcome: Ongoing (interventions implemented as appropriate)   10/18/18 0433   Coping/Psychosocial   Plan of Care Reviewed With patient   OTHER   Outcome Summary PAIN CONTROLLED THROUGHOUT NIGHT SEE EMAR. NO S/S OF DISTRESS OR INFECTION. VSS. ALL QUESTIONS ANSWERED.    Plan of Care Review   Progress improving     Goal: Individualization and Mutuality  Outcome: Ongoing (interventions implemented as appropriate)   10/17/18 0745 10/17/18 2023 10/18/18 0433   Individualization   Patient Specific Preferences prefers to be called Hayley --  --    Patient Specific Goals (Include Timeframe) --  pain to stay managed throughout the night --    Patient Specific Interventions --  RN to give pt pain meds q4h to keep pain under control for therapy in the morning --    Mutuality/Individual Preferences   What Anxieties, Fears, Concerns, or Questions Do You Have About Your Care? --  --  FEARS,CONCERNS, AND QUESTIONS ADDRESSED WITH PT. ANXIETY DECREASED WITH MEDS AND ENVIORMENTAL MODIFICATIONS   Mutuality/Individual Preferences   How to Address Anxieties/Fears --  --  VERBILIZATION OF ANXIETIES/FEARS BY PT.PT REMAINS FREE OF ANXIETIES     Goal: Interprofessional Rounds/Family Conf  Outcome: Ongoing (interventions implemented as appropriate)   10/18/18 0433   Interdisciplinary Rounds/Family Conf   Participants nursing       Problem: Fall Risk (Adult)  Intervention: Monitor/Assist with Self Care   10/18/18 0433   Activity   Activity Assistance Provided assistance, 1 person   Daily Care Interventions   Self-Care Promotion independence encouraged;BADL personal routines maintained;safe use of adaptive equipment encouraged     Intervention: Review Medications/Identify Contributors to Fall Risk   10/18/18 0433   Safety Management   Medication Review/Management medications reviewed;high risk medications identified       Goal: Absence of Fall  Outcome: Ongoing (interventions implemented  as appropriate)   10/18/18 0433   Fall Risk (Adult)   Absence of Fall achieves outcome       Problem: Knee Arthroplasty (Total, Partial) (Adult)  Intervention: Prevent/Manage Post-surgical Infection   10/18/18 0433   Prevent/Manage Colorectal Surgical Infection   Fever Reduction/Comfort Measures ice pack(s);fluid intake increased;medication administered   Safety Management   Infection Prevention equipment surfaces disinfected;personal protective equipment utilized;rest/sleep promoted;single patient room provided       Goal: Signs and Symptoms of Listed Potential Problems Will be Absent, Minimized or Managed (Knee Arthroplasty)  Outcome: Ongoing (interventions implemented as appropriate)   10/18/18 0433   Goal/Outcome Evaluation   Problems Assessed (Knee Arthroplasty) all   Problems Present (Knee Arthroplasty) none       Problem: Skin Injury Risk (Adult)  Intervention: Prevent or Minimize Pressure   10/18/18 0433   Positioning   Body Position foot of bed elevated   Skin Interventions   Pressure Reduction Techniques pressure points protected         Problem: Pain, Acute (Adult)  Intervention: Monitor and Manage Analgesia   10/18/18 0433   Promote Effective Bowel Elimination   Bowel Intervention adequate fluid intake promoted;ambulation promoted;commode/bedpan at bedside   Safety Management   Medication Review/Management medications reviewed;high risk medications identified     Intervention: Mutually Develop/Implement Acute Pain Management Plan   10/18/18 0433   Promote Oxygenation/Perfusion   Pain Management Interventions awakened for pain meds per patient request;care clustered;cold applied;pain management plan reviewed with patient/caregiver;pillow support provided;position adjusted;prescribed exercises encouraged;quiet environment facilitated;see MAR;relaxation techniques promoted;unnecessary movement minimized   Cognitive Interventions   Sensory Stimulation Regulation auditory stimulation minimized;care  clustered;lighting decreased;quiet environment promoted;tactile stimulation minimized     Intervention: Support/Optimize Psychosocial Response to Acute Pain   10/18/18 0433   Coping/Psychosocial Interventions   Supportive Measures active listening utilized;positive reinforcement provided;relaxation techniques promoted;self-care encouraged;verbalization of feelings encouraged   Psychosocial Support   Diversional Activities television   Interventions   Trust Relationship/Rapport care explained;choices provided;emotional support provided;empathic listening provided;questions answered;questions encouraged;reassurance provided;thoughts/feelings acknowledged       Goal: Identify Related Risk Factors and Signs and Symptoms   10/18/18 0433   Pain, Acute (Adult)   Related Risk Factors (Acute Pain) positioning;procedure/treatment;surgery   Signs and Symptoms (Acute Pain) autonomic responses (sympathetic stimulation);sleep pattern alteration;verbalization of pain descriptors;guarding/abnormal posturing/positioning;fatigue/weakness;BADLs/IADLs reluctance/inability to perform     Goal: Acceptable Pain Control/Comfort Level  Outcome: Ongoing (interventions implemented as appropriate)   10/18/18 0433   Pain, Acute (Adult)   Acceptable Pain Control/Comfort Level making progress toward outcome

## 2018-10-19 ENCOUNTER — READMISSION MANAGEMENT (OUTPATIENT)
Dept: CALL CENTER | Facility: HOSPITAL | Age: 63
End: 2018-10-19

## 2018-10-19 ENCOUNTER — TELEPHONE (OUTPATIENT)
Dept: ORTHOPEDIC SURGERY | Facility: CLINIC | Age: 63
End: 2018-10-19

## 2018-10-19 RX ORDER — MELOXICAM 7.5 MG/1
7.5 TABLET ORAL DAILY
Qty: 30 TABLET | Refills: 0 | OUTPATIENT
Start: 2018-10-19 | End: 2018-12-02

## 2018-10-19 NOTE — TELEPHONE ENCOUNTER
Ms. Barfield called because oxycodone 7.5mg is not helping her pain at all.  She is taking one every four hours.  Says that she is not taking anything in between and that she had this issue with the last surgery and oxycontin 10 was sent in for her.  It helped.

## 2018-10-19 NOTE — TELEPHONE ENCOUNTER
She can double up on the 7.5 until she runs out and then we will give her 10s on next script. Also needs to be taking some NSAID- meloxicam, aleve, etc with narcotics

## 2018-10-19 NOTE — OUTREACH NOTE
Prep Survey      Responses   Facility patient discharged from?  [facility]   Is LACE score < 7 ?  Yes   Is patient eligible?  Yes   Discharge diagnosis  TOTAL KNEE ARTHROPLASTY    Does the patient have one of the following disease processes/diagnoses(primary or secondary)?  Total Joint Replacement   Does the patient have Home health ordered?  No   Is there a DME ordered?  No   Prep survey completed?  Yes          Lashawn Almaraz RN

## 2018-10-19 NOTE — TELEPHONE ENCOUNTER
Misti with [facility] just wanted to make us aware that Ms. Barfield is refusing any PT until Monday due to her pain.

## 2018-10-22 ENCOUNTER — TELEPHONE (OUTPATIENT)
Dept: ORTHOPEDIC SURGERY | Facility: CLINIC | Age: 63
End: 2018-10-22

## 2018-10-22 ENCOUNTER — READMISSION MANAGEMENT (OUTPATIENT)
Dept: CALL CENTER | Facility: HOSPITAL | Age: 63
End: 2018-10-22

## 2018-10-22 RX ORDER — OXYCODONE AND ACETAMINOPHEN 10; 325 MG/1; MG/1
1 TABLET ORAL EVERY 4 HOURS PRN
Qty: 42 TABLET | Refills: 0 | Status: SHIPPED | OUTPATIENT
Start: 2018-10-22 | End: 2018-10-31 | Stop reason: SDUPTHER

## 2018-10-22 NOTE — OUTREACH NOTE
Total Joint Week 1 Survey      Responses   Facility patient discharged from?  [facility]   Does the patient have one of the following disease processes/diagnoses(primary or secondary)?  Total Joint Replacement   Is there a successful TCM telephone encounter documented?  No   Joint surgery performed?  Knee   Week 1 attempt successful?  No   Unsuccessful attempts  Attempt 1          Sommer Evans RN

## 2018-10-23 ENCOUNTER — READMISSION MANAGEMENT (OUTPATIENT)
Dept: CALL CENTER | Facility: HOSPITAL | Age: 63
End: 2018-10-23

## 2018-10-23 NOTE — OUTREACH NOTE
Total Joint Week 1 Survey      Responses   Facility patient discharged from?  [facility]   Does the patient have one of the following disease processes/diagnoses(primary or secondary)?  Total Joint Replacement   Is there a successful TCM telephone encounter documented?  No   Joint surgery performed?  Knee   Week 1 attempt successful?  No   Unsuccessful attempts  Attempt 2          Elsa Macario RN

## 2018-10-25 ENCOUNTER — READMISSION MANAGEMENT (OUTPATIENT)
Dept: CALL CENTER | Facility: HOSPITAL | Age: 63
End: 2018-10-25

## 2018-10-25 NOTE — OUTREACH NOTE
Total Joint Week 1 Survey      Responses   Facility patient discharged from?  [facility]   Does the patient have one of the following disease processes/diagnoses(primary or secondary)?  Total Joint Replacement   Is there a successful TCM telephone encounter documented?  No   Joint surgery performed?  Knee   Week 1 attempt successful?  Yes   Call start time  0921   Call end time  0929   Has the patient been back in either the hospital or Emergency Department since discharge?  No   Discharge diagnosis  TOTAL KNEE ARTHROPLASTY    Is patient permission given to speak with other caregiver?  Yes   Person spoke with today (if not patient) and relationship     Does the patient have all medications related to this admission filled (includes all antibiotics, pain medications, etc.)  Yes   Is the patient taking all medications as directed (includes completed medication regime)?  Yes   Is the patient able to teach back alternate methods of pain control?  Ice, Knee-elevation/no pillow under knee, Reposition, Correct alignment, Short, frequent activity, Other   Does the patient have a follow up appointment with their surgeon?  Yes   Has the patient kept scheduled appointments due by today?  N/A   Has home health visited the patient within 72 hours of discharge?  Yes   Psychosocial issues?  No   Has the patient began therapy sessions (either in the home or as an out patient)?  Yes   If the patient has started attending therapy, what post op day did they begin to attend (either in home or as an out patient)?    6 days post op at home   Does the patient have a wound vac in place?  N/A   Has the patient fallen since discharge?  No   Did the patient receive a copy of their discharge instructions?  Yes   Nursing interventions  Reviewed instructions with patient, Educated on MyChart   What is the patient's perception of their functional status since discharge?  Improving   Is the patient able to teach back signs and symptoms of  infection?  Temp >100.4 for 24h or longer, Incisional drainage, Blisters around incision, Increased swelling or redness around incision (not associated with surgical edema), Severe discomfort or pain, Changes in mobility, Shortness of breath or chest pain   Is the patient able to teach back how to prevent infection?  Check incision daily, Wash hands before and after touching incision, Keep incision covered if drainage, Keep incision covered if pets in house, Shower only as directed by surgeon, Monitor blood sugar if diabetic, Eat well-balanced diet, No tub baths, hot tub or swimming, No lotion or creams   Is the patient able to teach back signs and symptoms of DVT?  Redness in calf, Area hot to touch, Shortness of breath or chest pain, Swelling in calf, Severe pain in calf   Is the patient able to teach back home safety measures?  Ability to shower, Accessibility to necessary areas in home, Modifications to reach items, Modifications with ADLs such as dressing, cooking, toileting   Did the patient implement home safety suggestions from pre-surgery classes if attended?  Yes   Is the patient/caregiver able to teach back the hierarchy of who to call/visit for symptoms/problems? PCP, Specialist, Home health nurse, Urgent Care, ED, 911  Yes   Week 1 call completed?  Yes          Beatriz Saldivar RN

## 2018-10-31 RX ORDER — OXYCODONE AND ACETAMINOPHEN 10; 325 MG/1; MG/1
1 TABLET ORAL EVERY 4 HOURS PRN
Qty: 42 TABLET | Refills: 0 | Status: SHIPPED | OUTPATIENT
Start: 2018-10-31 | End: 2018-11-12 | Stop reason: SDUPTHER

## 2018-11-02 ENCOUNTER — OFFICE VISIT (OUTPATIENT)
Dept: ORTHOPEDIC SURGERY | Facility: CLINIC | Age: 63
End: 2018-11-02

## 2018-11-02 DIAGNOSIS — Z96.651 STATUS POST TOTAL RIGHT KNEE REPLACEMENT: Primary | ICD-10-CM

## 2018-11-02 PROCEDURE — 99024 POSTOP FOLLOW-UP VISIT: CPT | Performed by: NURSE PRACTITIONER

## 2018-11-02 RX ORDER — POTASSIUM CHLORIDE 750 MG/1
20 TABLET, FILM COATED, EXTENDED RELEASE ORAL DAILY
Refills: 9 | COMMUNITY
Start: 2018-10-17 | End: 2020-08-11 | Stop reason: SDUPTHER

## 2018-11-02 RX ORDER — ONDANSETRON 4 MG/1
4 TABLET, FILM COATED ORAL EVERY 8 HOURS PRN
Qty: 20 TABLET | Refills: 0 | Status: SHIPPED | OUTPATIENT
Start: 2018-11-02 | End: 2018-11-30 | Stop reason: SDUPTHER

## 2018-11-02 NOTE — PROGRESS NOTES
CC: s/p right total knee arthroplasty, DOS 10/17/2018    Interval History: Nitish Barfield returns for 2 week postoperative visit.  She is doing well. Pain is controlled with pain medication and is  improving. She denies any wound problem, fevers, or chills. Patient is continuing to work with home health PT. Ambulating with walker. Continuing DVT prophylaxis with use of ASA twice daily.     Physical Examination: Right knee was examined   Incision clean and dry   ROM 4-115,  4/5 strength   Stable to varus and valgus stress   Flex/extend ankle and toes   Positive sensation right foot   No calf pain, negative Homans sign bilaterally    Assessment/Plan:  Nitish Barfield is recovering from surgery as expected.  We will start with outpatient therapy for range of motion, strengthening, and gait normalization.    She is to follow up in clinic in 4 weeks with xrays. Patient had all question answered today. Will continue DVT prophylaxis for an additional 1 weeks. Discussed with patient to avoid immersing incision for 4 weeks total after surgery.     Medications:  No orders of the defined types were placed in this encounter.      YUDITH Kinney

## 2018-11-03 ENCOUNTER — READMISSION MANAGEMENT (OUTPATIENT)
Dept: CALL CENTER | Facility: HOSPITAL | Age: 63
End: 2018-11-03

## 2018-11-03 NOTE — OUTREACH NOTE
Total Joint Week 2 Survey      Responses   Facility patient discharged from?  [facility]   Does the patient have one of the following disease processes/diagnoses(primary or secondary)?  Total Joint Replacement   Joint surgery performed?  Knee   Week 2 attempt successful?  No   Unsuccessful attempts  Attempt 1          Al Perea RN

## 2018-11-05 ENCOUNTER — HOSPITAL ENCOUNTER (OUTPATIENT)
Dept: PHYSICAL THERAPY | Facility: HOSPITAL | Age: 63
Setting detail: THERAPIES SERIES
Discharge: HOME | End: 2018-11-05

## 2018-11-05 ENCOUNTER — READMISSION MANAGEMENT (OUTPATIENT)
Dept: CALL CENTER | Facility: HOSPITAL | Age: 63
End: 2018-11-05

## 2018-11-05 DIAGNOSIS — Z96.651 S/P TOTAL KNEE ARTHROPLASTY, RIGHT: Primary | ICD-10-CM

## 2018-11-05 DIAGNOSIS — Z96.651 STATUS POST TOTAL RIGHT KNEE REPLACEMENT: ICD-10-CM

## 2018-11-05 PROCEDURE — G8979 MOBILITY GOAL STATUS: HCPCS | Performed by: PHYSICAL THERAPIST

## 2018-11-05 PROCEDURE — G8978 MOBILITY CURRENT STATUS: HCPCS | Performed by: PHYSICAL THERAPIST

## 2018-11-05 PROCEDURE — 97110 THERAPEUTIC EXERCISES: CPT | Performed by: PHYSICAL THERAPIST

## 2018-11-05 PROCEDURE — 97161 PT EVAL LOW COMPLEX 20 MIN: CPT | Performed by: PHYSICAL THERAPIST

## 2018-11-05 NOTE — OUTREACH NOTE
Total Joint Week 2 Survey      Responses   Facility patient discharged from?  [facility]   Does the patient have one of the following disease processes/diagnoses(primary or secondary)?  Total Joint Replacement   Joint surgery performed?  Knee   Week 2 attempt successful?  Yes   Call start time  1459   Call end time  1502   Has the patient been back in either the hospital or Emergency Department since discharge?  No   Discharge diagnosis  TOTAL KNEE ARTHROPLASTY    Is patient permission given to speak with other caregiver?  Yes   Person spoke with today (if not patient) and relationship     Does the patient have all medications related to this admission filled (includes all antibiotics, pain medications, etc.)  Yes   Is the patient taking all medications as directed (includes completed medication regime)?  Yes   Is the patient able to teach back alternate methods of pain control?  Ice, Knee-elevation/no pillow under knee, Reposition, Correct alignment, Short, frequent activity   Does the patient have a follow up appointment with their surgeon?  Yes   Has the patient kept scheduled appointments due by today?  Yes   Comments  Saw YUDITH and will see  at the end of November   What is the Home health agency?   Started OTPT PT today   Psychosocial issues?  No   Has the patient fallen since discharge?  No   What is the patient's perception of their functional status since discharge?  Improving   Is the patient able to teach back signs and symptoms of infection?  Temp >100.4 for 24h or longer, Incisional drainage, Blisters around incision, Increased swelling or redness around incision (not associated with surgical edema), Severe discomfort or pain, Changes in mobility, Shortness of breath or chest pain   Is the patient able to teach back how to prevent infection?  Check incision daily, Wash hands before and after touching incision, Eat well-balanced diet, No tub baths, hot tub or swimming, No lotion or creams   Is the  patient able to teach back signs and symptoms of DVT?  Redness in calf, Area hot to touch, Shortness of breath or chest pain, Swelling in calf, Severe pain in calf   Is the patient able to teach back home safety measures?  Ability to shower, Modifications to reach items   Is the patient/caregiver able to teach back the hierarchy of who to call/visit for symptoms/problems? PCP, Specialist, Home health nurse, Urgent Care, ED, 911  Yes   Additional teach back comments  Pt doing very well and progressing nicely with therapy   Week 2 call completed?  Yes          Annette Cuellar, RN

## 2018-11-05 NOTE — THERAPY EVALUATION
Outpatient Physical Therapy Ortho Initial Evaluation  [facility]berry     Patient Name: Nitish Barfield  : 1955  MRN: [id number]  Today's Date: 2018      Visit Date: 2018    Patient Active Problem List   Diagnosis   • Anxiety   • Depression   • Gastroesophageal reflux disease with esophagitis   • Generalized anxiety disorder   • Hyperlipidemia   • Hypertension   • Impaired glucose tolerance   • Renal insufficiency   • Primary osteoarthritis of right knee   • Status post total right knee replacement        Past Medical History:   Diagnosis Date   • Allergy    • Anxiety    • Arthritis     RA, FINGERS, TOES   • Bleeding esophageal ulcer     history of   • Esophageal reflux    • GERD (gastroesophageal reflux disease)    • History of transfusion    • Hypertension    • Hypertension    • Osteoarthritis of right knee     sched TKA        Past Surgical History:   Procedure Laterality Date   • COLONOSCOPY     • ENDOSCOPY     • ESOPHAGOSCOPY / EGD      cauterize esophageal bleeding ulcers   • HYSTERECTOMY      OPEN   • KNEE SURGERY Left     ARTHROSCOPY   • TOTAL KNEE ARTHROPLASTY Left 2018    Procedure: TOTAL KNEE ARTHROPLASTY AND ALL ASSOCIATED PROCEDURES;  Surgeon: Costa Zuñiga MD;  Location: [facility] OR;  Service: Orthopedics   • TOTAL KNEE ARTHROPLASTY Right 10/17/2018    Procedure: TOTAL KNEE ARTHROPLASTY AND ALL ASSOCIATED PROCEDURES;  Surgeon: Costa Zuñiga MD;  Location: [facility] OR;  Service: Orthopedics   • WISDOM TOOTH EXTRACTION         Visit Dx:     ICD-10-CM ICD-9-CM   1. S/P total knee arthroplasty, right Z96.651 V43.65   2. Status post total right knee replacement Z96.651 V43.65             Patient History     Row Name 18 0800             History    Chief Complaint Pain;Difficulty with daily activities;Difficulty Walking  -SP      Type of Pain Knee pain;Lower Extremity / Leg   right  -SP      Date Current Problem(s) Began 10/17/18  -SP      Brief Description of Current  Complaint Patient with chronic history of (B) knee pain, she underwent left TKA 4-; she is now s/p right TKA 10/17/2018.  Patient reports that she had 2 weeks of home health and is getting around her home without assistive device most of the time, but does use rolling walker for community mobility.  Patient states that she is able to get up her 5 stairs to enter home and complete all transfers independently.  She states she does have some difficulty with transfers in/out of car.  Patient is taking her pain meds but still wakes at night due to pain.  She is intermittently using ice.    -SP      Previous treatment for THIS PROBLEM Rehabilitation;Medication;Surgery  -SP      Surgery Date: 10/17/18  -SP      Patient/Caregiver Goals Relieve pain;Return to work;Return to prior level of function  -SP      Current Tobacco Use no  -SP      Patient's Rating of General Health Good  -SP      Occupation/sports/leisure activities works as  full time, desk work, plans to return to work  -SP      How has patient tried to help current problem? pain meds, ice  -SP      What clinical tests have you had for this problem? X-ray  -SP      Results of Clinical Tests DJD  -SP         Pain     Pain Location Knee   right  -SP      Pain at Present 6  -SP      Pain at Best 0  -SP      Pain at Worst 7  -SP      Pain Frequency Intermittent  -SP      Pain Description Aching;Sharp  -SP      What Performance Factors Make the Current Problem(s) WORSE? weight bearing, exercises, trying to sleep at night  -SP      What Performance Factors Make the Current Problem(s) BETTER? rest, meds  -SP      Tolerance Time- Standing tolerates brief standing for light ADLs, states she is unable to stand to prepare meals  -SP      Tolerance Time- Sitting tolerates for longer periods of time but has to shift weight frequently  -SP      Tolerance Time- Walking household distances and short community distances  -SP      Is your sleep disturbed?  "Yes  -SP      What position do you sleep in? Supine;Right sidelying;Left sidelying  -SP      Difficulties at work? unable to work  -SP      Difficulties with ADL's? spouse performs most household ADLs, limited tolerance for any household ADLs, she is able to complete all transfers and self care ADLs independently with increased time required  -SP         Fall Risk Assessment    Any falls in the past year: No  -SP         Daily Activities    Primary Language English  -SP      Are you able to read Yes  -SP      Are you able to write Yes  -SP      How does patient learn best? Listening;Reading  -SP      Teaching needs identified Home Exercise Program;Management of Condition  -SP      Patient is concerned about/has problems with Performing home management (household chores, shopping, care of dependents);Performing job responsibilities/community activities (work, school,;Standing;Transfers (getting out of a chair, bed);Walking  -SP      Does patient have problems with the following? Depression;Anxiety  -SP      Barriers to learning None  -SP      Pt Participated in POC and Goals Yes  -SP         Safety    Are you being hurt, hit, or frightened by anyone at home or in your life? No  -SP      Are you being neglected by a caregiver No  -SP        User Key  (r) = Recorded By, (t) = Taken By, (c) = Cosigned By    Initials Name Provider Type    SP Paulette Ren, PT Physical Therapist                PT Ortho     Row Name 11/05/18 0800       Posture/Observations    Observations Edema;Incision healing;Muscle atrophy  -SP    Posture/Observations Comments patient with several small skin abrasions from tape; no signs or symptoms of infection  -SP       Knee Palpation    Knee Palpation? --   pt \"jerking\" leg with light palpation  -SP    Patella Right:;Tender  -SP    Medial Joint Line Right:;Tender;Swollen  -SP    Lateral Joint Line Right:;Tender;Swollen  -SP    Posterior Joint Line Right:;Tender;Swollen  -SP    Quads " Tender;Right:  -SP       Patellar Accessory Motions    Patellar Accessory Motions Tested? --   difficulty assessing, pt with mm guarding   -SP    Superior glide Right:;Hypomobile  -SP    Inferior glide Right:;Hypomobile  -SP    Medial glide Right:;Hypomobile  -SP    Lateral glide Right:;Hypomobile  -SP       Knee (Tibiofemoral) Accessory Motions    Posterior glide of tibia on femur Right:;Hypomobile  -SP       Knee Special Tests    Darrion’s sign (DVT) Right:;Negative  -SP       Right Lower Ext    Rt Knee Extension/Flexion AROM 12 to 105 degrees in heel slide position  -SP    Rt Knee Extension/Flexion PROM 10 to 118 degrees  -SP       MMT Right Lower Ext    Rt Hip Flexion MMT, Gross Movement (4-/5) good minus  -SP    Rt Hip ABduction MMT, Gross Movement (3+/5) fair plus  -SP    Rt Hip ADduction MMT, Gross Movement (3+/5) fair plus  -SP    Rt Knee Extension MMT, Gross Movement (2+/5) poor plus  -SP    Rt Knee Flexion MMT, Gross Movement (2+/5) poor plus  -SP    Rt Ankle Plantarflexion MMT, Gross Movement (3/5) fair  -SP    Rt Ankle Dorsiflexion MMT, Gross Movement (3/5) fair  -SP       Sensation    Sensation WNL? --   decreased to light touch lateral right knee  -SP       Lower Extremity Flexibility    Hamstrings Right:;Moderately limited  -SP    Hip Flexors Right:;Moderately limited  -SP    Quadriceps Right:;Moderately limited  -SP       Transfers    Sit-Stand [place] (Transfers) independent  -SP    Stand-Sit [place] (Transfers) independent  -SP    Comment (Transfers) uses UEs to assist with transfers to stand  -SP       Gait/Stairs Assessment/Training    [place] Level (Gait) independent  -SP    Deviations/Abnormal Patterns (Gait) gait speed decreased;stride length decreased  -SP    Bilateral Gait Deviations forward flexed posture  -SP    Right Sided Gait Deviations heel strike decreased  -SP    Ascending Technique (Stairs) step-to-step  -SP    Descending Technique (Stairs) step-to-step  -SP    Comment  (Gait/Stairs) requires hold to rail  -SP      User Key  (r) = Recorded By, (t) = Taken By, (c) = Cosigned By    Initials Name Provider Type    Paulette Melendez, PT Physical Therapist                      Therapy Education  Education Details: Advised patient to avoid propping knee over pillow; patient instructed to perform frequent knee extension stretches and positioning at home  Given: HEP  Program: Reinforced  How Provided: Verbal  Provided to: Patient  Level of Understanding: Verbalized, Demonstrated           PT OP Goals     Row Name 11/05/18 0800          PT Short Term Goals    STG 1 Patient demonstrates right knee AROM extension 0-5 degrees to improve heel strike with gait  -SP     STG 2 Patient demonstrates right knee AROM to 120 degrees to aid in functional mobility  -SP     STG 3 Patient ambulates level surfaces/community distances with least restrictive device  -SP        Long Term Goals    LTG 1 Patient demonstrates right LE strength increased by one muscle grade to better tolerate ADLs  -SP     LTG 2 Patient reports improved ability to tolerate weight bearing ADLs at home and community with pain level <2/10 at worst  -SP     LTG 3 Patient independent with HEP  -SP        Time Calculation    PT Goal Re-Cert Due Date 12/05/18  -SP       User Key  (r) = Recorded By, (t) = Taken By, (c) = Cosigned By    Initials Name Provider Type    Paulette Melendez, PT Physical Therapist                PT Assessment/Plan     Row Name 11/05/18 0941          PT Assessment    Functional Limitations Impaired gait;Decreased safety during functional activities;Limitation in home management;Limitations in community activities;Performance in work activities;Performance in self-care ADL;Performance in leisure activities;Limitations in functional capacity and performance  -SP     Impairments Balance;Edema;Endurance;Gait;Pain;Muscle strength;Range of motion;Impaired flexibility  -SP     Assessment Comments  Patient with chronic history of right knee pain, now s/p right TKA performed 10-.  Patient presents with pain, decreased right knee ROM, decreased right LE strength, altered gait and decreased tolerance for home and community mobility and ADLs  -SP     Please refer to paper survey for additional self-reported information Yes  -SP     Rehab Potential Good  -SP     Patient/caregiver participated in establishment of treatment plan and goals Yes  -SP     Patient would benefit from skilled therapy intervention Yes  -SP        PT Plan    PT Frequency 2x/week;3x/week  -SP     Predicted Duration of Therapy Intervention (Therapy Eval) 6-8 weeks  -SP     Planned CPT's? PT EVAL LOW COMPLEXITY: 44854;PT THER PROC EA 15 MIN: 51541;PT GAIT TRAINING EA 15 MIN: 55441;PT HOT OR COLD PACK TREAT MCARE;PT MANUAL THERAPY EA 15 MIN: 92578;PT ELECTRICAL STIM UNATTEND:   -SP       User Key  (r) = Recorded By, (t) = Taken By, (c) = Cosigned By    Initials Name Provider Type    Paulette Melendez, PT Physical Therapist                Modalities     Row Name 11/05/18 0900             Ice    Patient denies application of Ice Yes  -SP      Patient reports will apply ice at home to involved area Yes  -SP        User Key  (r) = Recorded By, (t) = Taken By, (c) = Cosigned By    Initials Name Provider Type    Paulette Melendez, PT Physical Therapist              Exercises     Row Name 11/05/18 0800             Exercise 1    Exercise Name 1 heel slide  -SP      Time 1 5 min  -SP         Exercise 2    Exercise Name 2 cycle-recumbent  -SP      Time 2 5 min  -SP         Exercise 3    Exercise Name 3 heel raises  -SP         Exercise 4    Exercise Name 4 step ups  -SP         Exercise 5    Exercise Name 5 partial squats  -SP         Exercise 6    Exercise Name 6 SAQ  -SP      Reps 6 20  -SP         Exercise 7    Exercise Name 7 SLR  -SP      Reps 7 20  -SP         Exercise 8    Exercise Name 8 QS towel under knee/ankle   -SP      Reps 8 10  -SP      Time 8 5 sec  -SP         Exercise 9    Exercise Name 9 HS stretch with sheet  -SP      Reps 9 5  -SP      Time 9 15 sec  -SP         Exercise 10    Exercise Name 10 heel prop   -SP      Time 10 4 min  -SP        User Key  (r) = Recorded By, (t) = Taken By, (c) = Cosigned By    Initials Name Provider Type    SP Paulette Ren, PT Physical Therapist           Manual Rx (last 36 hours)      Manual Treatments     Row Name 11/05/18 0900             Manual Rx 1    Manual Rx 1 Location right knee   -SP      Manual Rx 1 Type PROM knee flexion stretch in short sit  -SP      Manual Rx 1 Duration 10 x 10 sec  -SP         Manual Rx 2    Manual Rx 2 Location right knee   -SP      Manual Rx 2 Type PROM knee extension stretch  -SP      Manual Rx 2 Duration 15 sec x 3  -SP        User Key  (r) = Recorded By, (t) = Taken By, (c) = Cosigned By    Initials Name Provider Type    Paulette Melendez, PT Physical Therapist                      Outcome Measure Options: Lower Extremity Functional Scale (LEFS)  Lower Extremity Functional Index  Any of your usual work, housework or school activities: Quite a bit of difficulty  Your usual hobbies, recreational or sporting activities: Extreme difficulty or unable to perform activity  Getting into or out of the bath: No difficulty  Walking between rooms: No difficulty  Putting on your shoes or socks: No difficulty  Squatting: Extreme difficulty or unable to perform activity  Lifting an object, like a bag of groceries from the floor: Moderate difficulty  Performing light activities around your home: Quite a bit of difficulty  Performing heavy activities around your home: Extreme difficulty or unable to perform activity  Getting into or out of a car: Moderate difficulty  Walking 2 blocks: Extreme difficulty or unable to perform activity  Walking a mile: Extreme difficulty or unable to perform activity  Going up or down 10 stairs (about 1 flight of  stairs): Moderate difficulty  Standing for 1 hour: Extreme difficulty or unable to perform activity  Sitting for 1 hour: No difficulty  Running on even ground: Extreme difficulty or unable to perform activity  Running on uneven ground: Extreme difficulty or unable to perform activity  Making sharp turns while running fast: Extreme difficulty or unable to perform activity  Hopping: Extreme difficulty or unable to perform activity  Rolling over in bed: No difficulty  Total: 28      Time Calculation:     Therapy Suggested Charges     Code   Minutes Charges    None             Start Time: 0800  Stop Time: 0920  Time Calculation (min): 80 min     Therapy Charges for Today     Code Description Service Date Service Provider Modifiers Qty    69323624266 HC PT MOBILITY CURRENT 11/5/2018 Paulette Ren, PT GP, CL 1    14956503075 HC PT MOBILITY PROJECTED 11/5/2018 Paulette Ren, PT GP, CI 1    84336198618 HC PT THER PROC EA 15 MIN 11/5/2018 Paulette Ren, PT GP 1    64788701187 HC PT EVAL LOW COMPLEXITY 2 11/5/2018 Paulette Ren, PT GP 1          PT G-Codes  Outcome Measure Options: Lower Extremity Functional Scale (LEFS)  Total: 28  Mobility: Walking and Moving Around Current Status (): At least 60 percent but less than 80 percent impaired, limited or restricted  Mobility: Walking and Moving Around Goal Status (): At least 1 percent but less than 20 percent impaired, limited or restricted         Paulette Ren, PT  11/5/2018

## 2018-11-07 ENCOUNTER — HOSPITAL ENCOUNTER (OUTPATIENT)
Dept: PHYSICAL THERAPY | Facility: HOSPITAL | Age: 63
Setting detail: THERAPIES SERIES
Discharge: HOME | End: 2018-11-07

## 2018-11-07 PROCEDURE — 97110 THERAPEUTIC EXERCISES: CPT | Performed by: PHYSICAL THERAPIST

## 2018-11-07 NOTE — THERAPY TREATMENT NOTE
Outpatient Physical Therapy Ortho Treatment Note  [facility]     Patient Name: Nitish Barfield  : 1955  MRN: [id number]  Today's Date: 2018      Visit Date: 2018    Visit Dx:  No diagnosis found.    Patient Active Problem List   Diagnosis   • Anxiety   • Depression   • Gastroesophageal reflux disease with esophagitis   • Generalized anxiety disorder   • Hyperlipidemia   • Hypertension   • Impaired glucose tolerance   • Renal insufficiency   • Primary osteoarthritis of right knee   • Status post total right knee replacement        Past Medical History:   Diagnosis Date   • Allergy    • Anxiety    • Arthritis     RA, FINGERS, TOES   • Bleeding esophageal ulcer     history of   • Esophageal reflux    • GERD (gastroesophageal reflux disease)    • History of transfusion    • Hypertension    • Hypertension    • Osteoarthritis of right knee     sched TKA        Past Surgical History:   Procedure Laterality Date   • COLONOSCOPY     • ENDOSCOPY     • ESOPHAGOSCOPY / EGD      cauterize esophageal bleeding ulcers   • HYSTERECTOMY      OPEN   • KNEE SURGERY Left     ARTHROSCOPY   • TOTAL KNEE ARTHROPLASTY Left 2018    Procedure: TOTAL KNEE ARTHROPLASTY AND ALL ASSOCIATED PROCEDURES;  Surgeon: Costa Zuñiga MD;  Location: [facility] OR;  Service: Orthopedics   • TOTAL KNEE ARTHROPLASTY Right 10/17/2018    Procedure: TOTAL KNEE ARTHROPLASTY AND ALL ASSOCIATED PROCEDURES;  Surgeon: Costa Zuñiga MD;  Location: [facility] OR;  Service: Orthopedics   • WISDOM TOOTH EXTRACTION               PT Ortho     Row Name 18 0900       Subjective Comments    Subjective Comments Patient reports she was sore following last visit.  She reports she has had her pain meds today  -SP    Row Name 18 0800       Posture/Observations    Observations Edema;Incision healing;Muscle atrophy  -    Posture/Observations Comments patient with several small skin abrasions from tape; no signs or symptoms of infection   "-SP       Knee Palpation    Knee Palpation? --   pt \"jerking\" leg with light palpation  -SP    Patella Right:;Tender  -SP    Medial Joint Line Right:;Tender;Swollen  -SP    Lateral Joint Line Right:;Tender;Swollen  -SP    Posterior Joint Line Right:;Tender;Swollen  -SP    Quads Tender;Right:  -SP       Patellar Accessory Motions    Patellar Accessory Motions Tested? --   difficulty assessing, pt with mm guarding   -SP    Superior glide Right:;Hypomobile  -SP    Inferior glide Right:;Hypomobile  -SP    Medial glide Right:;Hypomobile  -SP    Lateral glide Right:;Hypomobile  -SP       Knee (Tibiofemoral) Accessory Motions    Posterior glide of tibia on femur Right:;Hypomobile  -SP       Knee Special Tests    Darrion’s sign (DVT) Right:;Negative  -SP       Right Lower Ext    Rt Knee Extension/Flexion AROM 12 to 105 degrees in heel slide position  -SP    Rt Knee Extension/Flexion PROM 10 to 118 degrees  -SP       MMT Right Lower Ext    Rt Hip Flexion MMT, Gross Movement (4-/5) good minus  -SP    Rt Hip ABduction MMT, Gross Movement (3+/5) fair plus  -SP    Rt Hip ADduction MMT, Gross Movement (3+/5) fair plus  -SP    Rt Knee Extension MMT, Gross Movement (2+/5) poor plus  -SP    Rt Knee Flexion MMT, Gross Movement (2+/5) poor plus  -SP    Rt Ankle Plantarflexion MMT, Gross Movement (3/5) fair  -SP    Rt Ankle Dorsiflexion MMT, Gross Movement (3/5) fair  -SP       Sensation    Sensation WNL? --   decreased to light touch lateral right knee  -SP       Lower Extremity Flexibility    Hamstrings Right:;Moderately limited  -SP    Hip Flexors Right:;Moderately limited  -SP    Quadriceps Right:;Moderately limited  -SP       Transfers    Sit-Stand [place] (Transfers) independent  -SP    Stand-Sit [place] (Transfers) independent  -SP    Comment (Transfers) uses UEs to assist with transfers to stand  -SP       Gait/Stairs Assessment/Training    [place] Level (Gait) independent  -SP    Deviations/Abnormal Patterns " "(Gait) gait speed decreased;stride length decreased  -SP    Bilateral Gait Deviations forward flexed posture  -SP    Right Sided Gait Deviations heel strike decreased  -SP    Ascending Technique (Stairs) step-to-step  -SP    Descending Technique (Stairs) step-to-step  -SP    Comment (Gait/Stairs) requires hold to rail  -SP      User Key  (r) = Recorded By, (t) = Taken By, (c) = Cosigned By    Initials Name Provider Type    Paulette Melendez, PT Physical Therapist                            PT Assessment/Plan     Row Name 11/07/18 1020          PT Assessment    Assessment Comments Patient tolerates progression of closed chain exercises well  -SP        PT Plan    PT Plan Comments Continue to progress ROM and strengthening  -SP       User Key  (r) = Recorded By, (t) = Taken By, (c) = Cosigned By    Initials Name Provider Type    Paulette Melendez, PT Physical Therapist                Modalities     Row Name 11/07/18 0900             Ice    Patient denies application of Ice Yes  -SP      Patient reports will apply ice at home to involved area Yes  -SP        User Key  (r) = Recorded By, (t) = Taken By, (c) = Cosigned By    Initials Name Provider Type    Paulette Melendez, PT Physical Therapist                Exercises     Row Name 11/07/18 0900             Subjective Comments    Subjective Comments Patient reports she was sore following last visit.  She reports she has had her pain meds today  -SP         Exercise 1    Exercise Name 1 heel slide  -SP      Time 1 6 min  -SP         Exercise 2    Exercise Name 2 cycle-recumbent  -SP      Time 2 6 min  -SP         Exercise 3    Exercise Name 3 heel raises  -SP      Additional Comments unable due to ankle pain  -SP         Exercise 4    Exercise Name 4 step ups 4\"  -SP      Reps 4 15 x each  -SP         Exercise 5    Exercise Name 5 partial squats  -SP      Reps 5 15  -SP         Exercise 6    Exercise Name 6 SAQ  -SP      Reps 6 20  -SP      "    Exercise 7    Exercise Name 7 SLR  -SP      Reps 7 25  -SP         Exercise 8    Exercise Name 8 QS towel under knee/ankle  -SP      Reps 8 10  -SP      Time 8 5 sec  -SP         Exercise 9    Exercise Name 9 HS stretch with sheet  -SP      Reps 9 5  -SP      Time 9 15 sec  -SP         Exercise 10    Exercise Name 10 heel prop   -SP      Time 10 4 min  -SP        User Key  (r) = Recorded By, (t) = Taken By, (c) = Cosigned By    Initials Name Provider Type    Paulette Melendez, PT Physical Therapist                        Manual Rx (last 36 hours)      Manual Treatments     Row Name 11/07/18 0900             Manual Rx 1    Manual Rx 1 Location right knee   -SP      Manual Rx 1 Type PROM knee flexion stretch in short sit  -SP      Manual Rx 1 Duration 10 x 10 sec  -SP         Manual Rx 2    Manual Rx 2 Location right knee   -SP      Manual Rx 2 Type PROM knee extension stretch  -SP      Manual Rx 2 Duration 15 sec x 3  -SP        User Key  (r) = Recorded By, (t) = Taken By, (c) = Cosigned By    Initials Name Provider Type    Paulette Melendez, PT Physical Therapist                             Time Calculation:   Start Time: 0855  Stop Time: 1005  Time Calculation (min): 70 min  Therapy Suggested Charges     Code   Minutes Charges    None           Therapy Charges for Today     Code Description Service Date Service Provider Modifiers Qty    [id number]  PT THER PROC EA 15 MIN 11/7/2018 Paulette Ren, PT GP 2                    Paulette Ren, JASON  11/7/2018

## 2018-11-08 ENCOUNTER — HOSPITAL ENCOUNTER (OUTPATIENT)
Dept: PHYSICAL THERAPY | Facility: HOSPITAL | Age: 63
Setting detail: THERAPIES SERIES
Discharge: HOME | End: 2018-11-08

## 2018-11-08 PROCEDURE — 97110 THERAPEUTIC EXERCISES: CPT | Performed by: PHYSICAL THERAPIST

## 2018-11-08 NOTE — THERAPY TREATMENT NOTE
Outpatient Physical Therapy Ortho Treatment Note  [facility]     Patient Name: Nitish Barfield  : 1955  MRN: [id number]  Today's Date: 2018      Visit Date: 2018    Visit Dx:  No diagnosis found.    Patient Active Problem List   Diagnosis   • Anxiety   • Depression   • Gastroesophageal reflux disease with esophagitis   • Generalized anxiety disorder   • Hyperlipidemia   • Hypertension   • Impaired glucose tolerance   • Renal insufficiency   • Primary osteoarthritis of right knee   • Status post total right knee replacement        Past Medical History:   Diagnosis Date   • Allergy    • Anxiety    • Arthritis     RA, FINGERS, TOES   • Bleeding esophageal ulcer     history of   • Esophageal reflux    • GERD (gastroesophageal reflux disease)    • History of transfusion    • Hypertension    • Hypertension    • Osteoarthritis of right knee     sched TKA        Past Surgical History:   Procedure Laterality Date   • COLONOSCOPY     • ENDOSCOPY     • ESOPHAGOSCOPY / EGD      cauterize esophageal bleeding ulcers   • HYSTERECTOMY      OPEN   • KNEE SURGERY Left     ARTHROSCOPY   • TOTAL KNEE ARTHROPLASTY Left 2018    Procedure: TOTAL KNEE ARTHROPLASTY AND ALL ASSOCIATED PROCEDURES;  Surgeon: Costa Zuñiga MD;  Location: [facility] OR;  Service: Orthopedics   • TOTAL KNEE ARTHROPLASTY Right 10/17/2018    Procedure: TOTAL KNEE ARTHROPLASTY AND ALL ASSOCIATED PROCEDURES;  Surgeon: Costa Zuñiga MD;  Location: [facility] OR;  Service: Orthopedics   • WISDOM TOOTH EXTRACTION               PT Ortho     Row Name 18 1100       Right Lower Ext    Rt Knee Extension/Flexion PROM 10 to 123 degrees  -SP    Row Name 18 1000       Subjective Comments    Subjective Comments Patient reports that she is doing better and is tolerating walking for longer periods of time.    -SP    Row Name 18 0900       Subjective Comments    Subjective Comments Patient reports she was sore following last visit.   "She reports she has had her pain meds today  -SP      User Key  (r) = Recorded By, (t) = Taken By, (c) = Cosigned By    Initials Name Provider Type    Paulette Melendez, PT Physical Therapist                            PT Assessment/Plan     Row Name 11/08/18 1156          PT Assessment    Assessment Comments Patient with progressing right  knee ROM but continues to exhibit limited knee extension  -SP        PT Plan    PT Plan Comments Continue per POC  -SP       User Key  (r) = Recorded By, (t) = Taken By, (c) = Cosigned By    Initials Name Provider Type    Paulette Melendez, PT Physical Therapist                Modalities     Row Name 11/08/18 1000             Ice    Patient denies application of Ice Yes  -SP      Patient reports will apply ice at home to involved area Yes  -SP        User Key  (r) = Recorded By, (t) = Taken By, (c) = Cosigned By    Initials Name Provider Type    Paulette Melendez, PT Physical Therapist                Exercises     Row Name 11/08/18 1000             Subjective Comments    Subjective Comments Patient reports that she is doing better and is tolerating walking for longer periods of time.    -SP         Exercise 1    Exercise Name 1 heel slide  -SP      Time 1 7 min  -SP         Exercise 2    Exercise Name 2 cycle-recumbent  -SP      Time 2 7 min  -SP         Exercise 3    Exercise Name 3 heel raises  -SP         Exercise 4    Exercise Name 4 step ups 4\"  -SP      Reps 4 15 x each  -SP         Exercise 5    Exercise Name 5 partial squats  -SP      Reps 5 20  -SP         Exercise 6    Exercise Name 6 SAQ  -SP      Reps 6 30  -SP      Additional Comments 1#  -SP         Exercise 7    Exercise Name 7 SLR  -SP      Reps 7 30  -SP         Exercise 8    Exercise Name 8 QS towel under knee/ankle  -SP      Reps 8 15  -SP      Time 8 5 sec  -SP         Exercise 9    Exercise Name 9 HS stretch with sheet  -SP      Reps 9 5  -SP      Time 9 15 sec  -SP         " Exercise 10    Exercise Name 10 heel prop   -SP      Time 10 4 min  -SP         Exercise 11    Exercise Name 11 sidelying hip abduction  -SP      Reps 11 30  -SP        User Key  (r) = Recorded By, (t) = Taken By, (c) = Cosigned By    Initials Name Provider Type    Paulette Melendez, PT Physical Therapist                        Manual Rx (last 36 hours)      Manual Treatments     Row Name 11/08/18 0100 11/07/18 0900          Manual Rx 1    Manual Rx 1 Location right knee   -SP right knee   -SP     Manual Rx 1 Type PROM knee flexion stretch in short sit  -SP PROM knee flexion stretch in short sit  -SP     Manual Rx 1 Duration 10 x 10 sec  -SP 10 x 10 sec  -SP        Manual Rx 2    Manual Rx 2 Location right knee   -SP right knee   -SP     Manual Rx 2 Type PROM knee extension stretch  -SP PROM knee extension stretch  -SP     Manual Rx 2 Duration 15 sec x 3  -SP 15 sec x 3  -SP       User Key  (r) = Recorded By, (t) = Taken By, (c) = Cosigned By    Initials Name Provider Type    Paulette Melendez, JASON Physical Therapist                             Time Calculation:   Start Time: 1000  Stop Time: 1111  Time Calculation (min): 71 min  Therapy Suggested Charges     Code   Minutes Charges    None           Therapy Charges for Today     Code Description Service Date Service Provider Modifiers Qty    09833831060 HC PT THER PROC EA 15 MIN 11/8/2018 Paulette Ren, PT GP 2                    Paulette Ren, PT  11/8/2018

## 2018-11-12 ENCOUNTER — HOSPITAL ENCOUNTER (OUTPATIENT)
Dept: PHYSICAL THERAPY | Facility: HOSPITAL | Age: 63
Setting detail: THERAPIES SERIES
Discharge: HOME | End: 2018-11-12

## 2018-11-12 PROCEDURE — 97110 THERAPEUTIC EXERCISES: CPT | Performed by: PHYSICAL THERAPIST

## 2018-11-12 RX ORDER — OXYCODONE AND ACETAMINOPHEN 10; 325 MG/1; MG/1
1 TABLET ORAL EVERY 4 HOURS PRN
Qty: 42 TABLET | Refills: 0 | Status: SHIPPED | OUTPATIENT
Start: 2018-11-12 | End: 2018-11-26 | Stop reason: SDUPTHER

## 2018-11-13 ENCOUNTER — HOSPITAL ENCOUNTER (OUTPATIENT)
Dept: PHYSICAL THERAPY | Facility: HOSPITAL | Age: 63
Setting detail: THERAPIES SERIES
Discharge: HOME | End: 2018-11-13

## 2018-11-13 DIAGNOSIS — Z96.651 S/P TOTAL KNEE ARTHROPLASTY, RIGHT: Primary | ICD-10-CM

## 2018-11-13 PROCEDURE — 97110 THERAPEUTIC EXERCISES: CPT | Performed by: PHYSICAL THERAPIST

## 2018-11-15 ENCOUNTER — HOSPITAL ENCOUNTER (OUTPATIENT)
Dept: PHYSICAL THERAPY | Facility: HOSPITAL | Age: 63
Setting detail: THERAPIES SERIES
Discharge: HOME | End: 2018-11-15

## 2018-11-15 DIAGNOSIS — Z96.651 S/P TOTAL KNEE ARTHROPLASTY, RIGHT: Primary | ICD-10-CM

## 2018-11-15 DIAGNOSIS — Z96.651 STATUS POST TOTAL RIGHT KNEE REPLACEMENT: ICD-10-CM

## 2018-11-15 PROCEDURE — 97110 THERAPEUTIC EXERCISES: CPT | Performed by: PHYSICAL THERAPIST

## 2018-11-15 NOTE — THERAPY TREATMENT NOTE
Outpatient Physical Therapy Ortho Treatment Note  [facility]     Patient Name: Nitish Barfield  : 1955  MRN: [id number]  Today's Date: 11/15/2018      Visit Date: 11/15/2018    Visit Dx:    ICD-10-CM ICD-9-CM   1. S/P total knee arthroplasty, right Z96.651 V43.65   2. Status post total right knee replacement Z96.651 V43.65       Patient Active Problem List   Diagnosis   • Anxiety   • Depression   • Gastroesophageal reflux disease with esophagitis   • Generalized anxiety disorder   • Hyperlipidemia   • Hypertension   • Impaired glucose tolerance   • Renal insufficiency   • Primary osteoarthritis of right knee   • Status post total right knee replacement        Past Medical History:   Diagnosis Date   • Allergy    • Anxiety    • Arthritis     RA, FINGERS, TOES   • Bleeding esophageal ulcer     history of   • Esophageal reflux    • GERD (gastroesophageal reflux disease)    • History of transfusion    • Hypertension    • Hypertension    • Osteoarthritis of right knee     sched TKA        Past Surgical History:   Procedure Laterality Date   • COLONOSCOPY     • ENDOSCOPY     • ESOPHAGOSCOPY / EGD      cauterize esophageal bleeding ulcers   • HYSTERECTOMY      OPEN   • KNEE SURGERY Left     ARTHROSCOPY   • WISDOM TOOTH EXTRACTION         PT Ortho     Row Name 11/15/18 0900       Subjective Comments    Subjective Comments  Patient reports that she continues to have difficulty with transfers to stand  -SP    Row Name 18 0925       Subjective Comments    Subjective Comments  Patient reports that she continues to have difficulty with transfers to stand  -SP      User Key  (r) = Recorded By, (t) = Taken By, (c) = Cosigned By    Initials Name Provider Type    Paulette Melendez, PT Physical Therapist                      PT Assessment/Plan     Row Name 11/15/18 1044          PT Assessment    Assessment Comments  Patient tolerates progression of ther-ex.  -SP       User Key  (r) = Recorded By, (t) =  "Taken By, (c) = Cosigned By    Initials Name Provider Type    Paulette Melendez, PT Physical Therapist              Exercises     Row Name 11/15/18 0900             Subjective Comments    Subjective Comments  Patient reports that she continues to have difficulty with transfers to stand  -SP         Exercise 1    Exercise Name 1  heel slide  -SP      Time 1  7 min  -SP         Exercise 2    Exercise Name 2  cycle-recumbent (seat 8)  -SP      Time 2  7 min  -SP         Exercise 3    Exercise Name 3  heel raises  -SP         Exercise 4    Exercise Name 4  step ups 4\"  -SP      Reps 4  20 x each  -SP         Exercise 5    Exercise Name 5  partial squats  -SP      Reps 5  25  -SP         Exercise 6    Exercise Name 6  SAQ  -SP      Reps 6  40  -SP      Time 6  3#  -SP         Exercise 7    Exercise Name 7  SLR  -SP      Reps 7  40  -SP      Time 7  1#  -SP         Exercise 8    Exercise Name 8  QS towel under knee/ankle  -SP      Reps 8  15  -SP      Time 8  5 sec  -SP         Exercise 9    Exercise Name 9  HS stretch with sheet  -SP      Reps 9  5  -SP      Time 9  15 sec  -SP         Exercise 10    Exercise Name 10  heel prop   -SP      Time 10  4 min  -SP         Exercise 11    Exercise Name 11  sidelying hip abduction  -SP      Reps 11  40  -SP        User Key  (r) = Recorded By, (t) = Taken By, (c) = Cosigned By    Initials Name Provider Type    Paulette Melendez, PT Physical Therapist                        Manual Rx (last 36 hours)      Manual Treatments     Row Name 11/15/18 0900             Manual Rx 1    Manual Rx 1 Location  right knee   -SP      Manual Rx 1 Type  PROM knee flexion stretch in short sit  -SP      Manual Rx 1 Duration  10 x 10 sec  -SP         Manual Rx 2    Manual Rx 2 Location  right knee   -SP      Manual Rx 2 Type  PROM knee extension stretch  -SP      Manual Rx 2 Duration  15 sec x 3  -SP        User Key  (r) = Recorded By, (t) = Taken By, (c) = Cosigned By    Initials " Name Provider Type    SP Paulette Ren, PT Physical Therapist                             Time Calculation:   Start Time: 0900  Stop Time: 1015  Time Calculation (min): 75 min  Therapy Suggested Charges     Code   Minutes Charges    None           Therapy Charges for Today     Code Description Service Date Service Provider Modifiers Qty    92827983932 HC PT THER PROC EA 15 MIN 11/15/2018 Paulette Ren, PT GP 2                    Paulette Ren, PT  11/15/2018

## 2018-11-19 ENCOUNTER — READMISSION MANAGEMENT (OUTPATIENT)
Dept: CALL CENTER | Facility: HOSPITAL | Age: 63
End: 2018-11-19

## 2018-11-19 ENCOUNTER — HOSPITAL ENCOUNTER (OUTPATIENT)
Dept: PHYSICAL THERAPY | Facility: HOSPITAL | Age: 63
Setting detail: THERAPIES SERIES
Discharge: HOME | End: 2018-11-19

## 2018-11-19 DIAGNOSIS — Z96.651 S/P TOTAL KNEE ARTHROPLASTY, RIGHT: Primary | ICD-10-CM

## 2018-11-19 PROCEDURE — 97110 THERAPEUTIC EXERCISES: CPT | Performed by: PHYSICAL THERAPIST

## 2018-11-19 NOTE — THERAPY TREATMENT NOTE
Outpatient Physical Therapy Ortho Treatment Note  [facility]     Patient Name: Nitish Barfield  : 1955  MRN: [id number]  Today's Date: 2018      Visit Date: 2018    Visit Dx:    ICD-10-CM ICD-9-CM   1. S/P total knee arthroplasty, right Z96.651 V43.65       Patient Active Problem List   Diagnosis   • Anxiety   • Depression   • Gastroesophageal reflux disease with esophagitis   • Generalized anxiety disorder   • Hyperlipidemia   • Hypertension   • Impaired glucose tolerance   • Renal insufficiency   • Primary osteoarthritis of right knee   • Status post total right knee replacement        Past Medical History:   Diagnosis Date   • Allergy    • Anxiety    • Arthritis     RA, FINGERS, TOES   • Bleeding esophageal ulcer     history of   • Esophageal reflux    • GERD (gastroesophageal reflux disease)    • History of transfusion    • Hypertension    • Hypertension    • Osteoarthritis of right knee     sched TKA        Past Surgical History:   Procedure Laterality Date   • COLONOSCOPY     • ENDOSCOPY     • ESOPHAGOSCOPY / EGD      cauterize esophageal bleeding ulcers   • HYSTERECTOMY      OPEN   • KNEE SURGERY Left     ARTHROSCOPY   • WISDOM TOOTH EXTRACTION         PT Ortho     Row Name 18 3800       Subjective Comments    Subjective Comments  Patient reports that she is doing better and is having less difficulty with transfers to stand  -SP      User Key  (r) = Recorded By, (t) = Taken By, (c) = Cosigned By    Initials Name Provider Type    Paulette Melendez, PT Physical Therapist                      PT Assessment/Plan     Row Name 18 1241          PT Assessment    Assessment Comments  Patient with improving gait and transfers with decreasing pain  -SP        PT Plan    PT Plan Comments  Continue per POC  -SP       User Key  (r) = Recorded By, (t) = Taken By, (c) = Cosigned By    Initials Name Provider Type    Paulette Melendez, PT Physical Therapist     "          Exercises     Row Name 11/19/18 0930             Subjective Comments    Subjective Comments  Patient reports that she is doing better and is having less difficulty with transfers to stand  -SP         Exercise 1    Exercise Name 1  heel slide  -SP      Time 1  7 min  -SP         Exercise 2    Exercise Name 2  cycle-recumbent (seat 8)  -SP      Time 2  7 min  -SP         Exercise 3    Exercise Name 3  heel raises  -SP         Exercise 4    Exercise Name 4  step ups 4\"  -SP      Reps 4  20 x each  -SP         Exercise 5    Exercise Name 5  partial squats  -SP      Reps 5  25  -SP         Exercise 6    Exercise Name 6  SAQ  -SP      Reps 6  40  -SP      Time 6  3#  -SP         Exercise 7    Exercise Name 7  SLR  -SP      Reps 7  40  -SP      Time 7  1#  -SP         Exercise 8    Exercise Name 8  QS towel under knee/ankle  -SP      Reps 8  15  -SP      Time 8  5 sec  -SP         Exercise 9    Exercise Name 9  HS stretch with sheet  -SP      Reps 9  5  -SP      Time 9  15 sec  -SP         Exercise 10    Exercise Name 10  heel prop   -SP      Time 10  4 min  -SP         Exercise 11    Exercise Name 11  sidelying hip abduction  -SP      Reps 11  40  -SP        User Key  (r) = Recorded By, (t) = Taken By, (c) = Cosigned By    Initials Name Provider Type    SP Paulette Ren, PT Physical Therapist                        Manual Rx (last 36 hours)      Manual Treatments     Row Name 11/19/18 0930             Manual Rx 1    Manual Rx 1 Location  right knee   -SP      Manual Rx 1 Type  PROM knee flexion stretch in short sit  -SP      Manual Rx 1 Duration  10 x 10 sec  -SP         Manual Rx 2    Manual Rx 2 Location  right knee   -SP      Manual Rx 2 Type  PROM knee extension stretch  -SP      Manual Rx 2 Duration  15 sec x 3  -SP        User Key  (r) = Recorded By, (t) = Taken By, (c) = Cosigned By    Initials Name Provider Type    SP Paulette Ren, PT Physical Therapist                       "       Time Calculation:   Start Time: 0930  Stop Time: 1047  Time Calculation (min): 77 min  Therapy Suggested Charges     Code   Minutes Charges    None           Therapy Charges for Today     Code Description Service Date Service Provider Modifiers Qty    68703911249  PT THER PROC EA 15 MIN 11/19/2018 Paulette Ren, PT GP 1    [id number]  PT THER PROC EA 15 MIN 11/19/2018 Paulette Ren, PT GP 1                    Paulette Ren, PT  11/19/2018

## 2018-11-19 NOTE — OUTREACH NOTE
Total Joint Month 1 Survey      Responses   Facility patient discharged from?  [place]   Does the patient have one of the following disease processes/diagnoses(primary or secondary)?  Total Joint Replacement   Month 1 attempt successful?  Yes   Call start time  1624   Call end time  1626   Has the patient been back in either the hospital or Emergency Department since discharge?  No   Is the patient taking all medications as directed (includes completed medication regime)?  Yes   Has the patient kept scheduled appointments due by today?  Yes   Is the patient still receiving Home Health Services?  No   Is the patient still attending therapy sessions(either in the home or as an outpatient)?  Yes   Has the patient fallen since discharge?  No   If the patient has fallen, were there any injuries?  No   What is the patient's perception of their functional status since discharge?  Improving   Month 1 call completed?  Yes          Molly Pereira RN

## 2018-11-26 ENCOUNTER — HOSPITAL ENCOUNTER (OUTPATIENT)
Dept: PHYSICAL THERAPY | Facility: HOSPITAL | Age: 63
Setting detail: THERAPIES SERIES
Discharge: HOME | End: 2018-11-26

## 2018-11-26 PROCEDURE — 97110 THERAPEUTIC EXERCISES: CPT | Performed by: PHYSICAL THERAPIST

## 2018-11-26 RX ORDER — OXYCODONE AND ACETAMINOPHEN 10; 325 MG/1; MG/1
1 TABLET ORAL EVERY 4 HOURS PRN
Qty: 42 TABLET | Refills: 0 | Status: SHIPPED | OUTPATIENT
Start: 2018-11-26 | End: 2019-01-18

## 2018-11-26 NOTE — THERAPY TREATMENT NOTE
Outpatient Physical Therapy Ortho Treatment Note  [facility]     Patient Name: Nitish Barfield  : 1955  MRN: [id number]  Today's Date: 2018      Visit Date: 2018    Visit Dx:  No diagnosis found.    Patient Active Problem List   Diagnosis   • Anxiety   • Depression   • Gastroesophageal reflux disease with esophagitis   • Generalized anxiety disorder   • Hyperlipidemia   • Hypertension   • Impaired glucose tolerance   • Renal insufficiency   • Primary osteoarthritis of right knee   • Status post total right knee replacement        Past Medical History:   Diagnosis Date   • Allergy    • Anxiety    • Arthritis     RA, FINGERS, TOES   • Bleeding esophageal ulcer     history of   • Esophageal reflux    • GERD (gastroesophageal reflux disease)    • History of transfusion    • Hypertension    • Hypertension    • Osteoarthritis of right knee     sched TKA        Past Surgical History:   Procedure Laterality Date   • COLONOSCOPY     • ENDOSCOPY     • ESOPHAGOSCOPY / EGD      cauterize esophageal bleeding ulcers   • HYSTERECTOMY      OPEN   • KNEE SURGERY Left     ARTHROSCOPY   • WISDOM TOOTH EXTRACTION         PT Ortho     Row Name 18 7006       Subjective Comments    Subjective Comments  Patient reports that she is doing well.  She continues to have open areas on her knee from previous tape irritation.  She reports that the areas scab over but come off in the shower  -SP       Posture/Observations    Posture/Observations Comments  Patient with multipe open areas surrounding.  Wounds appear clean without signs of infection or drainage  -SP       Right Lower Ext    Rt Knee Extension/Flexion PROM  124 flexion PROM  -SP      User Key  (r) = Recorded By, (t) = Taken By, (c) = Cosigned By    Initials Name Provider Type    SP Paulette Ren, PT Physical Therapist                      PT Assessment/Plan     Row Name 18 1145 18 1143       PT Assessment    Assessment Comments  --   "Patient with progressing right knee flexion, continues to ambulate with right knee in flexion  -SP       PT Plan    PT Plan Comments  Continue per POC  -SP  --      User Key  (r) = Recorded By, (t) = Taken By, (c) = Cosigned By    Initials Name Provider Type    Paulette Melendez, PT Physical Therapist              Exercises     Row Name 11/26/18 0920             Subjective Comments    Subjective Comments  Patient reports that she is doing well.  She continues to have open areas on her knee from previous tape irritation.  She reports that the areas scab over but come off in the shower  -SP         Exercise 1    Exercise Name 1  heel slide  -SP      Time 1  7 min  -SP         Exercise 2    Exercise Name 2  cycle-recumbent (seat 8)  -SP      Time 2  7 min  -SP         Exercise 3    Exercise Name 3  heel raises  -SP         Exercise 4    Exercise Name 4  step ups 4\"  -SP      Reps 4  20 x each  -SP         Exercise 5    Exercise Name 5  partial squats  -SP      Reps 5  25  -SP         Exercise 6    Exercise Name 6  SAQ  -SP      Reps 6  40  -SP      Time 6  3#  -SP         Exercise 7    Exercise Name 7  SLR  -SP      Reps 7  40  -SP      Time 7  1#  -SP         Exercise 8    Exercise Name 8  QS towel under knee/ankle  -SP      Reps 8  15  -SP      Time 8  5 sec  -SP         Exercise 9    Exercise Name 9  HS stretch with sheet  -SP      Reps 9  5  -SP      Time 9  15 sec  -SP         Exercise 10    Exercise Name 10  heel prop   -SP      Time 10  4 min  -SP         Exercise 11    Exercise Name 11  sidelying hip abduction  -SP      Reps 11  40  -SP      Time 11  1#  -SP        User Key  (r) = Recorded By, (t) = Taken By, (c) = Cosigned By    Initials Name Provider Type    Paulette Melendez, PT Physical Therapist                        Manual Rx (last 36 hours)      Manual Treatments     Row Name 11/26/18 0974             Manual Rx 1    Manual Rx 1 Location  right knee   -SP      Manual Rx 1 Type  PROM " knee flexion stretch in short sit  -SP      Manual Rx 1 Duration  10 x 10 sec  -SP        User Key  (r) = Recorded By, (t) = Taken By, (c) = Cosigned By    Initials Name Provider Type    Paulette Melendez, PT Physical Therapist                             Time Calculation:   Start Time: 0920  Stop Time: 1030  Time Calculation (min): 70 min  Therapy Suggested Charges     Code   Minutes Charges    None           Therapy Charges for Today     Code Description Service Date Service Provider Modifiers Qty    [id number]  PT THER PROC EA 15 MIN 11/26/2018 Paulette Ren, PT GP 1    [id number]  PT THER PROC EA 15 MIN 11/26/2018 Paulette Ren, PT GP 1                    Paulette Ren, PT  11/26/2018

## 2018-11-28 ENCOUNTER — HOSPITAL ENCOUNTER (OUTPATIENT)
Dept: PHYSICAL THERAPY | Facility: HOSPITAL | Age: 63
Setting detail: THERAPIES SERIES
Discharge: HOME | End: 2018-11-28

## 2018-11-28 DIAGNOSIS — Z96.651 S/P TOTAL KNEE ARTHROPLASTY, RIGHT: Primary | ICD-10-CM

## 2018-11-28 PROCEDURE — 97110 THERAPEUTIC EXERCISES: CPT | Performed by: PHYSICAL THERAPIST

## 2018-11-28 NOTE — THERAPY TREATMENT NOTE
Outpatient Physical Therapy Ortho Treatment Note  [facility]     Patient Name: Nitish Barfield  : 1955  MRN: [id number]  Today's Date: 2018      Visit Date: 2018    Visit Dx:    ICD-10-CM ICD-9-CM   1. S/P total knee arthroplasty, right Z96.651 V43.65       Patient Active Problem List   Diagnosis   • Anxiety   • Depression   • Gastroesophageal reflux disease with esophagitis   • Generalized anxiety disorder   • Hyperlipidemia   • Hypertension   • Impaired glucose tolerance   • Renal insufficiency   • Primary osteoarthritis of right knee   • Status post total right knee replacement        Past Medical History:   Diagnosis Date   • Allergy    • Anxiety    • Arthritis     RA, FINGERS, TOES   • Bleeding esophageal ulcer     history of   • Esophageal reflux    • GERD (gastroesophageal reflux disease)    • History of transfusion    • Hypertension    • Hypertension    • Osteoarthritis of right knee     sched TKA        Past Surgical History:   Procedure Laterality Date   • COLONOSCOPY     • ENDOSCOPY     • ESOPHAGOSCOPY / EGD      cauterize esophageal bleeding ulcers   • HYSTERECTOMY      OPEN   • KNEE SURGERY Left     ARTHROSCOPY   • WISDOM TOOTH EXTRACTION         PT Ortho     Row Name 18 0920       Subjective Comments    Subjective Comments  Patient reports that she is doing well and does not think she needs any more therapy.  She is planning to return to work next week  -SP       Right Lower Ext    Rt Knee Extension/Flexion PROM  5 to 124 degrees  -SP       MMT Right Lower Ext    Rt Hip Flexion MMT, Gross Movement  (4/5) good  -SP    Rt Hip ABduction MMT, Gross Movement  (4/5) good  -SP    Rt Hip ADduction MMT, Gross Movement  (4/5) good  -SP    Rt Knee Extension MMT, Gross Movement  (4-/5) good minus  -SP    Rt Knee Flexion MMT, Gross Movement  (4-/5) good minus  -SP    Rt Ankle Plantarflexion MMT, Gross Movement  (4/5) good  -SP    Rt Ankle Dorsiflexion MMT, Gross Movement  (4/5) good   "-SP    Row Name 11/26/18 0920       Subjective Comments    Subjective Comments  Patient reports that she is doing well.  She continues to have open areas on her knee from previous tape irritation.  She reports that the areas scab over but come off in the shower  -SP       Posture/Observations    Posture/Observations Comments  Patient with multipe open areas surrounding.  Wounds appear clean without signs of infection or drainage  -SP       Right Lower Ext    Rt Knee Extension/Flexion PROM  124 flexion PROM  -SP      User Key  (r) = Recorded By, (t) = Taken By, (c) = Cosigned By    Initials Name Provider Type    Paulette Melendez, PT Physical Therapist                      PT Assessment/Plan     Row Name 11/28/18 1034          PT Assessment    Assessment Comments  Patient has met or made progress toward all goals.    -SP        PT Plan    PT Plan Comments  Patient requests to discontinue therapy.  She agrees to continue with HEP.  Patient planning to return to work next week  -SP       User Key  (r) = Recorded By, (t) = Taken By, (c) = Cosigned By    Initials Name Provider Type    Paulette Melendez, PT Physical Therapist              Exercises     Row Name 11/28/18 0920             Subjective Comments    Subjective Comments  Patient reports that she is doing well and does not think she needs any more therapy.  She is planning to return to work next week  -SP         Exercise 1    Exercise Name 1  heel slide  -SP      Time 1  7 min  -SP         Exercise 2    Exercise Name 2  cycle-recumbent (seat 8)  -SP      Time 2  7 min  -SP         Exercise 3    Exercise Name 3  heel raises  -SP         Exercise 4    Exercise Name 4  step ups 4\"  -SP      Reps 4  20 x each  -SP         Exercise 5    Exercise Name 5  partial squats  -SP      Reps 5  25  -SP         Exercise 6    Exercise Name 6  SAQ  -SP      Reps 6  40  -SP      Time 6  3#  -SP         Exercise 7    Exercise Name 7  SLR  -SP      Reps 7  40  " -SP      Time 7  1#  -SP         Exercise 8    Exercise Name 8  QS towel under knee/ankle  -SP      Reps 8  15  -SP      Time 8  5 sec  -SP         Exercise 9    Exercise Name 9  HS stretch with sheet  -SP      Reps 9  5  -SP      Time 9  15 sec  -SP         Exercise 10    Exercise Name 10  heel prop   -SP      Time 10  4 min  -SP         Exercise 11    Exercise Name 11  sidelying hip abduction  -SP      Reps 11  40  -SP      Time 11  1#  -SP        User Key  (r) = Recorded By, (t) = Taken By, (c) = Cosigned By    Initials Name Provider Type    Paulette Melendez, PT Physical Therapist                        Manual Rx (last 36 hours)      Manual Treatments     Row Name 11/28/18 0920             Manual Rx 1    Manual Rx 1 Location  right knee   -SP      Manual Rx 1 Type  PROM knee flexion stretch in short sit  -SP      Manual Rx 1 Duration  10 x 10 sec  -SP        User Key  (r) = Recorded By, (t) = Taken By, (c) = Cosigned By    Initials Name Provider Type    Paulette Melendez, PT Physical Therapist          PT OP Goals     Row Name 11/28/18 1000          PT Short Term Goals    STG 1  Patient demonstrates right knee AROM extension 0-5 degrees to improve heel strike with gait  -SP     STG 1 Progress  Partially Met  -SP     STG 2  Patient demonstrates right knee AROM to 120 degrees to aid in functional mobility  -SP     STG 2 Progress  Met  -SP     STG 3  Patient ambulates level surfaces/community distances with least restrictive device  -SP     STG 3 Progress  Met  -SP        Long Term Goals    LTG 1  Patient demonstrates right LE strength increased by one muscle grade to better tolerate ADLs  -SP     LTG 1 Progress  Met  -SP     LTG 2  Patient reports improved ability to tolerate weight bearing ADLs at home and community with pain level <2/10 at worst  -SP     LTG 2 Progress  Met  -SP     LTG 3  Patient independent with HEP  -SP     LTG 3 Progress  Met  -SP       User Key  (r) = Recorded By, (t) =  Taken By, (c) = Cosigned By    Initials Name Provider Type    Paulette Melendez, PT Physical Therapist                         Time Calculation:   Start Time: 0920  Stop Time: 1042  Time Calculation (min): 82 min  Therapy Suggested Charges     Code   Minutes Charges    None           Therapy Charges for Today     Code Description Service Date Service Provider Modifiers Qty    98822022648 HC PT THER PROC EA 15 MIN 11/28/2018 Paulette Ren, PT GP 2                    Paulette Ren, PT  11/28/2018

## 2018-11-30 ENCOUNTER — OFFICE VISIT (OUTPATIENT)
Dept: ORTHOPEDIC SURGERY | Facility: CLINIC | Age: 63
End: 2018-11-30

## 2018-11-30 DIAGNOSIS — R52 PAIN: Primary | ICD-10-CM

## 2018-11-30 DIAGNOSIS — Z96.651 STATUS POST TOTAL RIGHT KNEE REPLACEMENT: ICD-10-CM

## 2018-11-30 PROCEDURE — 99024 POSTOP FOLLOW-UP VISIT: CPT | Performed by: ORTHOPAEDIC SURGERY

## 2018-11-30 PROCEDURE — 73562 X-RAY EXAM OF KNEE 3: CPT | Performed by: ORTHOPAEDIC SURGERY

## 2018-11-30 RX ORDER — ONDANSETRON 4 MG/1
4 TABLET, FILM COATED ORAL EVERY 8 HOURS PRN
Qty: 20 TABLET | Refills: 0 | Status: SHIPPED | OUTPATIENT
Start: 2018-11-30

## 2018-11-30 NOTE — PROGRESS NOTES
CC: s/p right total knee arthroplasty, DOS 10/17/2018  Interval History: Nitish Barfield returns for 6 week postoperative visit. She is doing well. Pain is controlled with pain medication and is improving. She denies any wound problem, fevers, or chills. Patient is continuing to work with outpatient PT. Ambulating without cane. Had issues with tape and some superficial skin loss, wounds have been clean.      Physical Examination: Right knee was examined   Incision well healed, superificial abrasions lateral to wound clean with good granulation tissue   ROM 0-120,  4/5 strength   Stable to varus and valgus stress   Flex/extend ankle and toes   Positive sensation right foot   No calf pain, negative Homans sign bilaterally    Radiographic review: Radiographs of the right  knee 3 views, AP, lateral and patella axial views, compared to immediate postop films, indication s/p TKA,  shows that the implant is in good position. There is no evidence of implant loosening or osteolysis, no dislocation or periprosthetic fractures. Overall alignment acceptable at this time.     Assessment/Plan:  Nitish Barfield is recovering from surgery as expected.  We will continue therapy for range of motion, strengthening, and gait normalization. .  She is to follow up in clinic in 6 weeks with no xrays. Patient had all question answered today. Discussed need for prophylactic antibiotics with dental/endoscopy procedures.   Will send in Tramadol for additional pain scripts  Medications:  New Medications Ordered This Visit   Medications   • ondansetron (ZOFRAN) 4 MG tablet     Sig: Take 1 tablet by mouth Every 8 (Eight) Hours As Needed for Nausea or Vomiting.     Dispense:  20 tablet     Refill:  0       Costa Zuñiga MD  11/30/2018 11:14 AM

## 2018-12-02 ENCOUNTER — APPOINTMENT (OUTPATIENT)
Dept: CT IMAGING | Facility: HOSPITAL | Age: 63
End: 2018-12-02

## 2018-12-02 ENCOUNTER — HOSPITAL ENCOUNTER (EMERGENCY)
Facility: HOSPITAL | Age: 63
Discharge: HOME | End: 2018-12-02
Attending: EMERGENCY MEDICINE | Admitting: EMERGENCY MEDICINE

## 2018-12-02 VITALS
WEIGHT: 228 LBS | OXYGEN SATURATION: 96 % | HEIGHT: 68 IN | RESPIRATION RATE: 18 BRPM | SYSTOLIC BLOOD PRESSURE: 174 MMHG | DIASTOLIC BLOOD PRESSURE: 109 MMHG | BODY MASS INDEX: 34.56 KG/M2 | HEART RATE: 69 BPM | TEMPERATURE: 98.7 F

## 2018-12-02 DIAGNOSIS — N83.202 CYST OF LEFT OVARY: ICD-10-CM

## 2018-12-02 DIAGNOSIS — R19.7 NAUSEA VOMITING AND DIARRHEA: Primary | ICD-10-CM

## 2018-12-02 DIAGNOSIS — R11.2 NAUSEA VOMITING AND DIARRHEA: Primary | ICD-10-CM

## 2018-12-02 DIAGNOSIS — R03.0 ELEVATED BLOOD PRESSURE READING: ICD-10-CM

## 2018-12-02 LAB
ALBUMIN SERPL-MCNC: 4.1 G/DL (ref 3.5–5.2)
ALBUMIN/GLOB SERPL: 1.1 G/DL
ALP SERPL-CCNC: 152 U/L (ref 40–129)
ALT SERPL W P-5'-P-CCNC: 9 U/L (ref 5–33)
AMORPH URATE CRY URNS QL MICRO: ABNORMAL /HPF
ANION GAP SERPL CALCULATED.3IONS-SCNC: 19.6 MMOL/L
AST SERPL-CCNC: 15 U/L (ref 5–32)
BACTERIA UR QL AUTO: ABNORMAL /HPF
BASOPHILS # BLD AUTO: 0.05 10*3/MM3 (ref 0–0.2)
BASOPHILS NFR BLD AUTO: 0.3 % (ref 0–2)
BILIRUB SERPL-MCNC: 0.5 MG/DL (ref 0.2–1.2)
BILIRUB UR QL STRIP: NEGATIVE
BUN BLD-MCNC: 13 MG/DL (ref 8–23)
BUN/CREAT SERPL: 12 (ref 7–25)
CALCIUM SPEC-SCNC: 9.9 MG/DL (ref 8.8–10.5)
CHLORIDE SERPL-SCNC: 102 MMOL/L (ref 98–107)
CLARITY UR: ABNORMAL
CO2 SERPL-SCNC: 21.4 MMOL/L (ref 22–29)
COLOR UR: YELLOW
CREAT BLD-MCNC: 1.08 MG/DL (ref 0.57–1)
D-LACTATE SERPL-SCNC: 2.5 MMOL/L (ref 0.5–2)
DEPRECATED RDW RBC AUTO: 46.8 FL (ref 37–54)
EOSINOPHIL # BLD AUTO: 0.12 10*3/MM3 (ref 0.1–0.3)
EOSINOPHIL NFR BLD AUTO: 0.7 % (ref 0–4)
ERYTHROCYTE [DISTWIDTH] IN BLOOD BY AUTOMATED COUNT: 14.9 % (ref 11.5–14.5)
GFR SERPL CREATININE-BSD FRML MDRD: 51 ML/MIN/1.73
GLOBULIN UR ELPH-MCNC: 3.7 GM/DL
GLUCOSE BLD-MCNC: 152 MG/DL (ref 65–99)
GLUCOSE UR STRIP-MCNC: NEGATIVE MG/DL
HCT VFR BLD AUTO: 45.5 % (ref 37–47)
HGB BLD-MCNC: 14.1 G/DL (ref 12–16)
HGB UR QL STRIP.AUTO: NEGATIVE
HOLD SPECIMEN: NORMAL
HYALINE CASTS UR QL AUTO: ABNORMAL /LPF
IMM GRANULOCYTES # BLD: 0.03 10*3/MM3 (ref 0–0.03)
IMM GRANULOCYTES NFR BLD: 0.2 % (ref 0–0.5)
KETONES UR QL STRIP: ABNORMAL
LEUKOCYTE ESTERASE UR QL STRIP.AUTO: NEGATIVE
LIPASE SERPL-CCNC: 31 U/L (ref 13–60)
LYMPHOCYTES # BLD AUTO: 1.2 10*3/MM3 (ref 0.6–4.8)
LYMPHOCYTES NFR BLD AUTO: 6.5 % (ref 20–45)
MCH RBC QN AUTO: 26.7 PG (ref 27–31)
MCHC RBC AUTO-ENTMCNC: 31 G/DL (ref 31–37)
MCV RBC AUTO: 86 FL (ref 81–99)
MONOCYTES # BLD AUTO: 0.83 10*3/MM3 (ref 0–1)
MONOCYTES NFR BLD AUTO: 4.5 % (ref 3–8)
NEUTROPHILS # BLD AUTO: 16.2 10*3/MM3 (ref 1.5–8.3)
NEUTROPHILS NFR BLD AUTO: 87.8 % (ref 45–70)
NITRITE UR QL STRIP: NEGATIVE
NRBC BLD MANUAL-RTO: 0 /100 WBC (ref 0–0)
PH UR STRIP.AUTO: 7 [PH] (ref 4.5–8)
PLATELET # BLD AUTO: 389 10*3/MM3 (ref 140–500)
PMV BLD AUTO: 10.1 FL (ref 7.4–10.4)
POTASSIUM BLD-SCNC: 3.4 MMOL/L (ref 3.5–5.2)
PROT SERPL-MCNC: 7.8 G/DL (ref 6–8.5)
PROT UR QL STRIP: ABNORMAL
RBC # BLD AUTO: 5.29 10*6/MM3 (ref 4.2–5.4)
RBC # UR: ABNORMAL /HPF
REF LAB TEST METHOD: ABNORMAL
SODIUM BLD-SCNC: 143 MMOL/L (ref 136–145)
SP GR UR STRIP: 1.02 (ref 1–1.03)
SQUAMOUS #/AREA URNS HPF: ABNORMAL /HPF
UROBILINOGEN UR QL STRIP: ABNORMAL
WBC NRBC COR # BLD: 18.43 10*3/MM3 (ref 4.8–10.8)
WBC UR QL AUTO: ABNORMAL /HPF

## 2018-12-02 PROCEDURE — 83605 ASSAY OF LACTIC ACID: CPT | Performed by: EMERGENCY MEDICINE

## 2018-12-02 PROCEDURE — 87040 BLOOD CULTURE FOR BACTERIA: CPT | Performed by: EMERGENCY MEDICINE

## 2018-12-02 PROCEDURE — 85025 COMPLETE CBC W/AUTO DIFF WBC: CPT | Performed by: EMERGENCY MEDICINE

## 2018-12-02 PROCEDURE — 96361 HYDRATE IV INFUSION ADD-ON: CPT

## 2018-12-02 PROCEDURE — 25010000002 ONDANSETRON PER 1 MG: Performed by: EMERGENCY MEDICINE

## 2018-12-02 PROCEDURE — 96376 TX/PRO/DX INJ SAME DRUG ADON: CPT

## 2018-12-02 PROCEDURE — 80053 COMPREHEN METABOLIC PANEL: CPT | Performed by: EMERGENCY MEDICINE

## 2018-12-02 PROCEDURE — 96374 THER/PROPH/DIAG INJ IV PUSH: CPT

## 2018-12-02 PROCEDURE — 83690 ASSAY OF LIPASE: CPT | Performed by: EMERGENCY MEDICINE

## 2018-12-02 PROCEDURE — 81001 URINALYSIS AUTO W/SCOPE: CPT | Performed by: EMERGENCY MEDICINE

## 2018-12-02 PROCEDURE — 25010000002 IOPAMIDOL 61 % SOLUTION: Performed by: EMERGENCY MEDICINE

## 2018-12-02 PROCEDURE — 99284 EMERGENCY DEPT VISIT MOD MDM: CPT

## 2018-12-02 PROCEDURE — 74177 CT ABD & PELVIS W/CONTRAST: CPT

## 2018-12-02 PROCEDURE — 99284 EMERGENCY DEPT VISIT MOD MDM: CPT | Performed by: EMERGENCY MEDICINE

## 2018-12-02 RX ORDER — SODIUM CHLORIDE 0.9 % (FLUSH) 0.9 %
10 SYRINGE (ML) INJECTION AS NEEDED
Status: DISCONTINUED | OUTPATIENT
Start: 2018-12-02 | End: 2018-12-02 | Stop reason: HOSPADM

## 2018-12-02 RX ORDER — LOPERAMIDE HYDROCHLORIDE 2 MG/1
2 CAPSULE ORAL 4 TIMES DAILY PRN
Qty: 20 CAPSULE | Refills: 0 | Status: SHIPPED | OUTPATIENT
Start: 2018-12-02 | End: 2019-01-18

## 2018-12-02 RX ORDER — ONDANSETRON 2 MG/ML
4 INJECTION INTRAMUSCULAR; INTRAVENOUS ONCE
Status: COMPLETED | OUTPATIENT
Start: 2018-12-02 | End: 2018-12-02

## 2018-12-02 RX ORDER — ONDANSETRON 4 MG/1
4 TABLET, FILM COATED ORAL EVERY 8 HOURS PRN
Qty: 20 TABLET | Refills: 0 | Status: SHIPPED | OUTPATIENT
Start: 2018-12-02 | End: 2018-12-09

## 2018-12-02 RX ADMIN — ONDANSETRON 4 MG: 2 INJECTION, SOLUTION INTRAMUSCULAR; INTRAVENOUS at 04:19

## 2018-12-02 RX ADMIN — SODIUM CHLORIDE 1000 ML: 9 INJECTION, SOLUTION INTRAVENOUS at 04:19

## 2018-12-02 RX ADMIN — IOPAMIDOL 100 ML: 612 INJECTION, SOLUTION INTRAVENOUS at 06:02

## 2018-12-02 RX ADMIN — ONDANSETRON 4 MG: 2 INJECTION, SOLUTION INTRAMUSCULAR; INTRAVENOUS at 06:43

## 2018-12-02 RX ADMIN — SODIUM CHLORIDE 1000 ML: 900 INJECTION, SOLUTION INTRAVENOUS at 05:49

## 2018-12-02 NOTE — ED PROVIDER NOTES
Subjective   History of Present Illness  History of Present Illness    Chief complaint: vomiting and diarrhea    Location: [facility]    Quality/Severity:  Moderate to severe symptoms    Timing/Duration: began suddenly overnight    Modifying Factors: none    Narrative: This patient arrives via EMS for evaluation of new onset vomiting and diarrhea problems tonight.  She had [employer] for dinner last evening around 6 PM.  She went to bed feeling well at her usual bedtime.  Then she awoke suddenly in the middle the night around 2:00am with some severe nausea and vomiting symptoms and also some diarrheal stools.  She says she vomited multiple times at home.  She also had some diarrheal incontinence.  She still feels very sick and lightheaded.  She denies any abdominal pains.  She denies any recent dysuria or hematuria problems.  She is having some chills.  There have been no known sick contacts.  She did not take any medications at home prior to arrival.    Associated Symptoms: As above  Review of Systems   Constitutional: Negative for activity change, diaphoresis and fever.   HENT: Negative.    Eyes: Negative for pain and visual disturbance.   Respiratory: Negative for cough and shortness of breath.    Cardiovascular: Negative for chest pain.   Gastrointestinal: Positive for diarrhea, nausea and vomiting. Negative for abdominal pain.   Genitourinary: Negative for dysuria, flank pain and hematuria.   Skin: Negative for color change and rash.   Neurological: Positive for light-headedness. Negative for syncope and headaches.   All other systems reviewed and are negative.      Past Medical History:   Diagnosis Date   • Allergy    • Anxiety    • Arthritis     RA, FINGERS, TOES   • Bleeding esophageal ulcer     history of   • Esophageal reflux    • GERD (gastroesophageal reflux disease)    • History of transfusion    • Hypertension    • Hypertension    • Osteoarthritis of right knee     sched TKA       Allergies    Allergen Reactions   • Augmentin [Amoxicillin-Pot Clavulanate] Nausea Only and Nausea And Vomiting   • Methotrexate Diarrhea, Nausea And Vomiting and Other (See Comments)     Other reaction(s): Stomach Pain  Blisters, hair loss  Blisters, hair loss  Blisters, hair loss       Past Surgical History:   Procedure Laterality Date   • COLONOSCOPY     • ENDOSCOPY     • ESOPHAGOSCOPY / EGD      cauterize esophageal bleeding ulcers   • HYSTERECTOMY      OPEN   • KNEE SURGERY Left     ARTHROSCOPY   • WISDOM TOOTH EXTRACTION         Family History   Problem Relation Age of Onset   • Cancer Mother         bone   • Other Father         cardiac failure   • Heart disease Father    • Malig Hyperthermia Neg Hx        Social History     Socioeconomic History   • Marital status:      Spouse name: Not on file   • Number of children: Not on file   • Years of education: Not on file   • Highest education level: Not on file   Tobacco Use   • Smoking status: Never Smoker   • Smokeless tobacco: Never Used   Substance and Sexual Activity   • Alcohol use: Yes     Comment: glass of rum nightly   • Drug use: No   • Sexual activity: Defer           Objective   Physical Exam   Constitutional: She is oriented to person, place, and time. She appears well-developed and well-nourished. She appears distressed.   Nauseated, holding emesis bag, appears uncomfortable   HENT:   Head: Normocephalic and atraumatic.   Eyes: EOM are normal. Pupils are equal, round, and reactive to light. Right eye exhibits no discharge. Left eye exhibits no discharge.   Neck: Normal range of motion. Neck supple. No tracheal deviation present.   Cardiovascular: Normal rate, regular rhythm, normal heart sounds and intact distal pulses. Exam reveals no gallop and no friction rub.   No murmur heard.  Pulmonary/Chest: Effort normal. No stridor. No respiratory distress. She has no wheezes. She has no rales.   Abdominal: Soft. She exhibits no distension and no mass. There is  no tenderness. There is no rebound and no guarding.   Soft, nontender throughout.  No peritoneal signs anywhere.   Musculoskeletal: Normal range of motion. She exhibits no edema, tenderness or deformity.   Neurological: She is alert and oriented to person, place, and time. She exhibits normal muscle tone.   Skin: Skin is warm and dry. No rash noted. No erythema. No pallor.   Psychiatric: She has a normal mood and affect. Her behavior is normal. Judgment and thought content normal.   Nursing note and vitals reviewed.      Results for orders placed or performed during the hospital encounter of 12/02/18   Comprehensive Metabolic Panel   Result Value Ref Range    Glucose 152 (H) 65 - 99 mg/dL    BUN 13 8 - 23 mg/dL    Creatinine 1.08 (H) 0.57 - 1.00 mg/dL    Sodium 143 136 - 145 mmol/L    Potassium 3.4 (L) 3.5 - 5.2 mmol/L    Chloride 102 98 - 107 mmol/L    CO2 21.4 (L) 22.0 - 29.0 mmol/L    Calcium 9.9 8.8 - 10.5 mg/dL    Total Protein 7.8 6.0 - 8.5 g/dL    Albumin 4.10 3.50 - 5.20 g/dL    ALT (SGPT) 9 5 - 33 U/L    AST (SGOT) 15 5 - 32 U/L    Alkaline Phosphatase 152 (H) 40 - 129 U/L    Total Bilirubin 0.5 0.2 - 1.2 mg/dL    eGFR Non African Amer 51 (L) >60 mL/min/1.73    Globulin 3.7 gm/dL    A/G Ratio 1.1 g/dL    BUN/Creatinine Ratio 12.0 7.0 - 25.0    Anion Gap 19.6 mmol/L   Lipase   Result Value Ref Range    Lipase 31 13 - 60 U/L   Urinalysis With Microscopic If Indicated (No Culture) - Urine, Clean Catch   Result Value Ref Range    Color, UA Yellow Yellow, Straw    Appearance, UA Cloudy (A) Clear    pH, UA 7.0 4.5 - 8.0    Specific Gravity, UA 1.020 1.003 - 1.030    Glucose, UA Negative Negative    Ketones, UA 40 mg/dL (2+) (A) Negative, 80 mg/dL (3+), >=160 mg/dL (4+)    Bilirubin, UA Negative Negative    Blood, UA Negative Negative    Protein, UA 30 mg/dL (1+) (A) Negative    Leuk Esterase, UA Negative Negative    Nitrite, UA Negative Negative    Urobilinogen, UA 0.2 E.U./dL 0.2 - 1.0 E.U./dL   CBC Auto  Differential   Result Value Ref Range    WBC 18.43 (H) 4.80 - 10.80 10*3/mm3    RBC 5.29 4.20 - 5.40 10*6/mm3    Hemoglobin 14.1 12.0 - 16.0 g/dL    Hematocrit 45.5 37.0 - 47.0 %    MCV 86.0 81.0 - 99.0 fL    MCH 26.7 (L) 27.0 - 31.0 pg    MCHC 31.0 31.0 - 37.0 g/dL    RDW 14.9 (H) 11.5 - 14.5 %    RDW-SD 46.8 37.0 - 54.0 fl    MPV 10.1 7.4 - 10.4 fL    Platelets 389 140 - 500 10*3/mm3    Neutrophil % 87.8 (H) 45.0 - 70.0 %    Lymphocyte % 6.5 (L) 20.0 - 45.0 %    Monocyte % 4.5 3.0 - 8.0 %    Eosinophil % 0.7 0.0 - 4.0 %    Basophil % 0.3 0.0 - 2.0 %    Immature Grans % 0.2 0.0 - 0.5 %    Neutrophils, Absolute 16.20 (H) 1.50 - 8.30 10*3/mm3    Lymphocytes, Absolute 1.20 0.60 - 4.80 10*3/mm3    Monocytes, Absolute 0.83 0.00 - 1.00 10*3/mm3    Eosinophils, Absolute 0.12 0.10 - 0.30 10*3/mm3    Basophils, Absolute 0.05 0.00 - 0.20 10*3/mm3    Immature Grans, Absolute 0.03 0.00 - 0.03 10*3/mm3    nRBC 0.0 0.0 - 0.0 /100 WBC   Lactic Acid, Plasma   Result Value Ref Range    Lactate 2.5 (C) 0.5 - 2.0 mmol/L   Urinalysis, Microscopic Only - Urine, Clean Catch   Result Value Ref Range    RBC, UA 0-2 (A) None Seen /HPF    WBC, UA None Seen None Seen /HPF    Bacteria, UA Trace (A) None Seen /HPF    Squamous Epithelial Cells, UA 0-2 None Seen, 0-2 /HPF    Hyaline Casts, UA None Seen None Seen /LPF    Amorphous Crystals, UA Small/1+ None Seen /HPF    Methodology Manual Light Microscopy        RADIOLOGY        Study: CT abd/pelvis with contrast    Findings: IMPRESSION:   1. Diffusely fluid-filled colon without discrete fat stranding; the  appearance is nonspecific but would be compatible with diarrheal  illness. The appendix in particular is normal.  2. Colonic diverticulosis. Adjacent to the descending colon but without  significant surrounding fat stranding, there is a small extraluminal  collection (measuring 9 x 12 x 16 mm, and as such too small to drain).  Suspect this relates to remote bout of diverticulitis with  "perforation.  Doubt this collection is responsible for patient's acute presentation.  Please correlate for acute left lower quadrant pain.  3. 7.4 cm in maximum diameter presumably left ovarian cystic lesion in  the left aspect of the deep pelvis. This patient is presumably  postmenopausal, and as such recommend OB/GYN consultation once out of  the acute phase of illness.    This report was finalized on 12/2/2018 6:35 AM by Bret Morris MD      Interpreted contemporaneously with treatment by Dr. Morris, independently viewed by me      Procedures           ED Course  ED Course as of Dec 02 0828   Sun Dec 02, 2018   0806 I have reviewed the labs and the CT scan from tonight's visit.  Patient is feeling much better now after some IV fluids and Zofran ×2.  She says that her nausea is subsided now.  She does not have any abdominal pain.  She remains afebrile.  CT indicates an acute diarrheal illness.  I presume this may be a viral illness and therefore have not started any antibiotics at this time.  I did discuss with the patient my recommendation to be observed in the hospital today for further symptom control and IV hydration as needed.  However, the patient declined this recommendation stating that she is \"out of FMLA days and has to return to work on Monday or else she'll lose her job.\"  I reviewed with her the risks and benefits of potentially returning home to early.  She expressed understanding of my recommendation.  Therefore, according to her preference, I made plans to discharge the patient home with continued symptomatic management using Zofran and Imodium for her symptoms.  I advised her to return to the hospital immediately if she has any worsening pain or vomiting or diarrhea problems and she agreed to do so.  I also encouraged her to follow up with her primary care doctor this week for repeat evaluation.  [NICKY]   0828 I also advised Pt of CT finding for ovarian cyst and need to f/u in GYN office.  She " agreed to do so.   [NICKY]      ED Course User Index  [NICKY] Remi Welch MD                  [facility]  Number of Diagnoses or Management Options  Cyst of left ovary:   Elevated blood pressure reading:   Nausea vomiting and diarrhea:      Amount and/or Complexity of Data Reviewed  Clinical lab tests: reviewed and ordered  Tests in the radiology section of CPT®: reviewed and ordered  Decide to obtain previous medical records or to obtain history from someone other than the patient: yes  Review and summarize past medical records: yes  Independent visualization of images, tracings, or specimens: yes    Risk of Complications, Morbidity, and/or Mortality  Presenting problems: moderate  Diagnostic procedures: moderate  Management options: moderate          Final diagnoses:   Nausea vomiting and diarrhea   Elevated blood pressure reading   Cyst of left ovary            Remi Welch MD  12/02/18 0800

## 2018-12-02 NOTE — DISCHARGE INSTRUCTIONS
Gentle diet and plenty of fluids as tolerated. Please return to the ER for any worsening pain, fevers, nausea, vomiting, diarrhea, weakness or any other concerns.

## 2018-12-02 NOTE — ED NOTES
Pt given zofran for nausea and started vomiting before zofran was in.       Stella Peace RN  12/02/18 0609

## 2018-12-06 ENCOUNTER — DOCUMENTATION (OUTPATIENT)
Dept: PHYSICAL THERAPY | Facility: HOSPITAL | Age: 63
End: 2018-12-06

## 2018-12-06 NOTE — THERAPY DISCHARGE NOTE
Outpatient Physical Therapy Rehab Program Treatment/Discharge       Patient Name: Nitish Barfield  : 1955  MRN: [id number]  Today's Date: 2018      Visit Date: 2018    Visit Dx:  No diagnosis found.    Patient Active Problem List   Diagnosis   • Anxiety   • Depression   • Gastroesophageal reflux disease with esophagitis   • Generalized anxiety disorder   • Hyperlipidemia   • Hypertension   • Impaired glucose tolerance   • Renal insufficiency   • Primary osteoarthritis of right knee   • Status post total right knee replacement        Past Medical History:   Diagnosis Date   • Allergy    • Anxiety    • Arthritis     RA, FINGERS, TOES   • Bleeding esophageal ulcer     history of   • Esophageal reflux    • GERD (gastroesophageal reflux disease)    • History of transfusion    • Hypertension    • Hypertension    • Osteoarthritis of right knee     sched TKA        Past Surgical History:   Procedure Laterality Date   • COLONOSCOPY     • ENDOSCOPY     • ESOPHAGOSCOPY / EGD      cauterize esophageal bleeding ulcers   • HYSTERECTOMY      OPEN   • KNEE SURGERY Left     ARTHROSCOPY   • WISDOM TOOTH EXTRACTION                                                      PT OP Goals     Row Name 18 1526          PT Short Term Goals    STG 1  Patient demonstrates right knee AROM extension 0-5 degrees to improve heel strike with gait  -SP     STG 1 Progress  Partially Met  -SP     STG 2  Patient demonstrates right knee AROM to 120 degrees to aid in functional mobility  -SP     STG 2 Progress  Met  -SP     STG 3  Patient ambulates level surfaces/community distances with least restrictive device  -SP     STG 3 Progress  Met  -SP        Long Term Goals    LTG 1  Patient demonstrates right LE strength increased by one muscle grade to better tolerate ADLs  -SP     LTG 1 Progress  Met  -SP     LTG 2  Patient reports improved ability to tolerate weight bearing ADLs at home and community with pain level <2/10 at worst   -SP     LTG 2 Progress  Met  -SP     LTG 3  Patient independent with HEP  -SP     LTG 3 Progress  Met  -SP       User Key  (r) = Recorded By, (t) = Taken By, (c) = Cosigned By    Initials Name Provider Type    Paulette Melendez, PT Physical Therapist                    Time Calculation:       Therapy Suggested Charges     Code   Minutes Charges    None                     OP PT Discharge Summary  Date of Discharge: 12/06/18  Reason for Discharge: Maximum functional potential achieved  Outcomes Achieved: Patient able to partially acheive established goals  Discharge Destination: Home with home program  Discharge Instructions/Additional Comments: Patient plans to return to work and feels ready to discharge therapy at this time.      Paulette Ren, PT  12/6/2018

## 2018-12-07 LAB
BACTERIA SPEC AEROBE CULT: NORMAL
BACTERIA SPEC AEROBE CULT: NORMAL

## 2019-01-04 ENCOUNTER — TELEPHONE (OUTPATIENT)
Dept: OBSTETRICS AND GYNECOLOGY | Facility: CLINIC | Age: 64
End: 2019-01-04

## 2019-01-04 NOTE — TELEPHONE ENCOUNTER
[facility] called back after orginally scheduling patient appt 02/15/2019 for ovarian cyst stating that this is urgent request she is new patient is it ok to bring her in earlier

## 2019-01-08 ENCOUNTER — OFFICE VISIT (OUTPATIENT)
Dept: ORTHOPEDIC SURGERY | Facility: CLINIC | Age: 64
End: 2019-01-08

## 2019-01-08 VITALS — BODY MASS INDEX: 33.65 KG/M2 | WEIGHT: 222 LBS | HEIGHT: 68 IN

## 2019-01-08 DIAGNOSIS — Z96.651 STATUS POST TOTAL RIGHT KNEE REPLACEMENT: Primary | ICD-10-CM

## 2019-01-08 PROCEDURE — 99024 POSTOP FOLLOW-UP VISIT: CPT | Performed by: ORTHOPAEDIC SURGERY

## 2019-01-08 RX ORDER — TRAMADOL HYDROCHLORIDE 50 MG/1
TABLET ORAL
Refills: 1 | COMMUNITY
Start: 2018-12-12 | End: 2019-04-09 | Stop reason: ALTCHOICE

## 2019-01-08 NOTE — PROGRESS NOTES
CC: s/p right total knee arthroplasty, DOS 10/17/2018  Interval History: Nitish Barfield returns for 12 week postoperative visit. She is doing well. Still having issues with some superficial scabs but they are healing well. Pain is controlled with pain medication and is improving. She denies any fevers, or chills. Patient is continuing to work with home exercises. Ambulating without cane.     Physical Examination: Right knee was examined   Incision well healed, superificial abrasions healing well with good granulation tissue   ROM 0-120,  4+/5 strength   Stable to varus and valgus stress   Flex/extend ankle and toes   Positive sensation right foot   No calf pain, negative Homans sign bilaterally    Assessment/Plan:  Nitish Barfield is recovering from surgery as expected. We will continue home exercises for range of motion, strengthening, and gait normalization. She is to follow up in clinic in 3 months with xrays. Patient had all question answered today. Discussed need for prophylactic antibiotics with dental/endoscopy procedures.       Medications:  No orders of the defined types were placed in this encounter.      Costa Zuñiga MD  1/8/2019 10:56 AM

## 2019-01-18 ENCOUNTER — OFFICE VISIT (OUTPATIENT)
Dept: OBSTETRICS AND GYNECOLOGY | Facility: CLINIC | Age: 64
End: 2019-01-18

## 2019-01-18 ENCOUNTER — PROCEDURE VISIT (OUTPATIENT)
Dept: OBSTETRICS AND GYNECOLOGY | Facility: CLINIC | Age: 64
End: 2019-01-18

## 2019-01-18 VITALS
HEIGHT: 68 IN | BODY MASS INDEX: 33.49 KG/M2 | DIASTOLIC BLOOD PRESSURE: 80 MMHG | WEIGHT: 221 LBS | SYSTOLIC BLOOD PRESSURE: 130 MMHG

## 2019-01-18 DIAGNOSIS — N83.209 CYST OF OVARY, UNSPECIFIED LATERALITY: ICD-10-CM

## 2019-01-18 DIAGNOSIS — Z11.51 SPECIAL SCREENING EXAMINATION FOR HUMAN PAPILLOMAVIRUS (HPV): ICD-10-CM

## 2019-01-18 DIAGNOSIS — N83.202 CYST OF LEFT OVARY: Primary | ICD-10-CM

## 2019-01-18 DIAGNOSIS — Z01.419 WELL FEMALE EXAM WITH ROUTINE GYNECOLOGICAL EXAM: Primary | ICD-10-CM

## 2019-01-18 LAB
BILIRUB BLD-MCNC: NEGATIVE MG/DL
CLARITY, POC: CLEAR
COLOR UR: YELLOW
GLUCOSE UR STRIP-MCNC: NEGATIVE MG/DL
KETONES UR QL: NEGATIVE
LEUKOCYTE EST, POC: NEGATIVE
NITRITE UR-MCNC: NEGATIVE MG/ML
PH UR: 6 [PH] (ref 5–8)
PROT UR STRIP-MCNC: NEGATIVE MG/DL
RBC # UR STRIP: NEGATIVE /UL
SP GR UR: 1.02 (ref 1–1.03)
UROBILINOGEN UR QL: NORMAL

## 2019-01-18 PROCEDURE — 81002 URINALYSIS NONAUTO W/O SCOPE: CPT | Performed by: OBSTETRICS & GYNECOLOGY

## 2019-01-18 PROCEDURE — 76830 TRANSVAGINAL US NON-OB: CPT | Performed by: OBSTETRICS & GYNECOLOGY

## 2019-01-18 PROCEDURE — 99203 OFFICE O/P NEW LOW 30 MIN: CPT | Performed by: OBSTETRICS & GYNECOLOGY

## 2019-01-18 NOTE — PROGRESS NOTES
New GYN Exam    CC- Here for pelvic mass    Nitish Barfield is a 63 y.o. female new patient who presents for a L pelvic mass. She had a JUAN DANIEL with OC at age 36 for fibroids. She has never been on any HRT. She had an acute GI illness and went to the ER on 18 and was found to have a 7.4 x 5.8 x 4.7 cm L adnexal mass. She also had evidence of previous diverticulosis. There was no ascites, omental caking or LAD noted on CT scan. She has some L sided discomfort but denies any nausea, vomiting or bloating. She has had weight loss over the last 6 months but has had a recent orthopedic surgery as well and contributes her weight loss to this surgery. Her US today shows a L adnexal mass that is 7.6 x 6.6 x 5.2 cm and appears simple. She is very concerned that this might be cancer and has been so anxious she has been scratching her skin raw, joselyn her knees where her incisions are located. She has no family h/o ovarian cancer. Her mom had breast cancer > 51 yo. Pt states she is UTD on her MMG but I can't find the report.       OB History      Para Term  AB Living    0 0 0 0 0 0    SAB TAB Ectopic Molar Multiple Live Births    0 0 0 0 0 0        Obstetric Comments    By choice          Menarche:8  Menopause: JUAN DANIEL with OC at age 36 for fibroids  HRT:none  Current contraception: post menopausal status  History of abnormal Pap smear: no  History of abnormal mammogram: no  Family history of uterine, colon or ovarian cancer: no  Family history of breast cancer: yes - mom > 50  STD's: none  Last pap < 5 years ago    Health Maintenance   Topic Date Due   • ANNUAL PHYSICAL  1958   • TDAP/TD VACCINES (1 - Tdap) 1972   • MAMMOGRAM  2016   • HEPATITIS C SCREENING  2016   • PAP SMEAR  2016   • COLONOSCOPY  2016   • ZOSTER VACCINE (2 of 3) 2017   • LIPID PANEL  2019   • PNEUMOCOCCAL VACCINES (65+ LOW/MEDIUM RISK) (2 of 2 - PPSV23) 2022   • INFLUENZA VACCINE  Completed        Past Medical History:   Diagnosis Date   • Allergy    • Anxiety    • Arthritis     RA, FINGERS, TOES   • Bleeding esophageal ulcer     history of   • Depression    • Esophageal reflux    • Fibroid    • GERD (gastroesophageal reflux disease)    • History of transfusion    • Hypertension    • Hypertension    • Osteoarthritis of right knee     sched TKA   • RA (rheumatoid arthritis) (CMS/[facility])        Past Surgical History:   Procedure Laterality Date   • COLONOSCOPY     • ENDOSCOPY     • ESOPHAGOSCOPY / EGD      cauterize esophageal bleeding ulcers   • HYSTERECTOMY      JUAN DANIEL with OC for fibroids age 36   • KNEE SURGERY Left     ARTHROSCOPY   • TOTAL KNEE ARTHROPLASTY Left 4/11/2018    Procedure: TOTAL KNEE ARTHROPLASTY AND ALL ASSOCIATED PROCEDURES;  Surgeon: Costa Zuñiga MD;  Location: [facility] OR;  Service: Orthopedics   • TOTAL KNEE ARTHROPLASTY Right 10/17/2018    Procedure: TOTAL KNEE ARTHROPLASTY AND ALL ASSOCIATED PROCEDURES;  Surgeon: Costa Zuñiga MD;  Location: [facility] OR;  Service: Orthopedics   • WISDOM TOOTH EXTRACTION           Current Outpatient Medications:   •  allopurinol (ZYLOPRIM) 100 MG tablet, Take 100 mg by mouth Daily., Disp: , Rfl: 0  •  ALPRAZolam (XANAX) 0.5 MG tablet, Take 0.5 mg by mouth Every 8 (Eight) Hours As Needed for Anxiety (1-2 tabs)., Disp: , Rfl:   •  busPIRone (BUSPAR) 10 MG tablet, Take 20 mg by mouth 2 (Two) Times a Day., Disp: , Rfl:   •  chlorthalidone (HYGROTEN) 50 MG tablet, Take 50 mg by mouth Daily., Disp: , Rfl: 4  •  EDARBI 80 MG tablet tablet, Take 80 mg by mouth Every Morning., Disp: , Rfl: 3  •  lansoprazole (PREVACID) 30 MG capsule, Take 30 mg by mouth Daily., Disp: , Rfl:   •  leflunomide (ARAVA) 10 MG tablet, Take 10 mg by mouth Daily., Disp: , Rfl: 0  •  nebivolol (BYSTOLIC) 10 MG tablet, Take 10 mg by mouth Daily., Disp: , Rfl:   •  ondansetron (ZOFRAN) 4 MG tablet, Take 1 tablet by mouth Every 8 (Eight) Hours As Needed for Nausea or Vomiting.,  Disp: 20 tablet, Rfl: 0  •  potassium chloride (K-DUR) 10 MEQ CR tablet, Take 20 mEq by mouth Daily., Disp: , Rfl: 9  •  potassium chloride (K-DUR,KLOR-CON) 10 MEQ CR tablet, Take 20 mEq by mouth Daily., Disp: , Rfl:   •  traMADol (ULTRAM) 50 MG tablet, TK 1 T PO Q 6 H PRF PAIN, Disp: , Rfl: 1  •  vitamin D (ERGOCALCIFEROL) 30848 units capsule capsule, Take 50,000 Units by mouth 2 (Two) Times a Week., Disp: , Rfl:   •  Vortioxetine HBr (TRINTELLIX) 20 MG tablet, Take 20 mg by mouth Every Morning., Disp: , Rfl:     Allergies   Allergen Reactions   • Augmentin [Amoxicillin-Pot Clavulanate] Nausea Only and Nausea And Vomiting   • Methotrexate Diarrhea, Nausea And Vomiting and Other (See Comments)     Other reaction(s): Stomach Pain  Blisters, hair loss  Blisters, hair loss  Blisters, hair loss       Social History     Tobacco Use   • Smoking status: Never Smoker   • Smokeless tobacco: Never Used   Substance Use Topics   • Alcohol use: Yes     Comment: glass of rum nightly   • Drug use: No       Family History   Problem Relation Age of Onset   • Cancer Mother         bone   • Breast cancer Mother    • Other Father         cardiac failure   • Heart disease Father    • Deep vein thrombosis Brother    • Malig Hyperthermia Neg Hx    • Ovarian cancer Neg Hx    • Uterine cancer Neg Hx        Review of Systems   Constitutional: Negative for appetite change, fatigue, fever and unexpected weight change (loss).   Respiratory: Negative for cough and shortness of breath.    Cardiovascular: Negative for chest pain and palpitations.   Gastrointestinal: Negative for abdominal distention, abdominal pain, constipation, diarrhea and nausea.   Endocrine: Negative for cold intolerance and heat intolerance.   Genitourinary: Positive for pelvic pain. Negative for dyspareunia, dysuria, menstrual problem, vaginal bleeding and vaginal discharge.   Musculoskeletal: Negative for back pain.   Skin: Negative for color change and rash.  "  Neurological: Negative for headaches.   Psychiatric/Behavioral: Negative for dysphoric mood. The patient is not nervous/anxious.    All other systems reviewed and are negative.      /80   Ht 172.7 cm (68\")   Wt 100 kg (221 lb)   BMI 33.60 kg/m²     Physical Exam   Constitutional: She is oriented to person, place, and time. She appears well-developed and well-nourished.   HENT:   Head: Normocephalic and atraumatic.   Eyes: Conjunctivae are normal. No scleral icterus.   Neck: Neck supple. No thyromegaly present.   Cardiovascular: Normal rate, regular rhythm and normal heart sounds.   Pulmonary/Chest: Effort normal and breath sounds normal.   Abdominal: Soft. Bowel sounds are normal. She exhibits no distension and no mass. There is tenderness. There is no rebound and no guarding. No hernia.   Genitourinary: Pelvic exam was performed with patient supine. There is no rash, tenderness, lesion or injury on the right labia. There is no tenderness, lesion or injury on the left labia. Right adnexum displays no mass, no tenderness and no fullness. Left adnexum displays mass, tenderness and fullness. No erythema, tenderness or bleeding in the vagina. No foreign body in the vagina. No signs of injury around the vagina. No vaginal discharge found.   Genitourinary Comments: Moderate atrophy  TTP in LLQ   Neurological: She is alert and oriented to person, place, and time.   Skin: Skin is warm and dry.        Psychiatric: She has a normal mood and affect. Her behavior is normal. Judgment and thought content normal.   Nursing note and vitals reviewed.         Assessment/Plan    1) L adnexal mass- D/w pt that due to her age and it;s size, this mass must be removed. There are no clear signs that this is a malignancy on CT scan and we discussed that this could be a large, functional cyst or even colonic adhesions due to chronic diverticulosis. We will drawn tumor markers today ( CEA and ). We discussed having procedure " done here with the possibility that she may need an interval procedure for staging if malignancy is found vs a immediate referral to gyn oncology for definitive procedure and she is interested in seeing gyn onc. Will place referral.   2) RHM- pt needs MMG, annual and DEXA once she has recovered from her procedure.          Nitish was seen today for gynecologic exam and ovarian cyst.    Diagnoses and all orders for this visit:    Well female exam with routine gynecological exam  -     POC Urinalysis Dipstick  -     Pap IG, HPV-hr    Special screening examination for human papillomavirus (HPV)  -     Pap IG, HPV-hr    Cyst of ovary, unspecified laterality  -     Cancel: CEA  -     Cancel:   -     CEA  -       -     Ambulatory Referral to Gynecologic Oncology        Sommer Townsend MD  1/18/19  5:29 PM

## 2019-01-19 LAB
CANCER AG125 SERPL-ACNC: 17.6 U/ML (ref 0–38.1)
CEA SERPL-MCNC: 2.59 NG/ML

## 2019-01-20 PROBLEM — N83.209 CYST OF OVARY: Status: ACTIVE | Noted: 2019-01-20

## 2019-01-22 LAB
CYTOLOGIST CVX/VAG CYTO: NORMAL
CYTOLOGY CVX/VAG DOC THIN PREP: NORMAL
DX ICD CODE: NORMAL
HIV 1 & 2 AB SER-IMP: NORMAL
HPV I/H RISK 1 DNA CVX QL PROBE+SIG AMP: NEGATIVE
OTHER STN SPEC: NORMAL
PATH REPORT.FINAL DX SPEC: NORMAL
STAT OF ADQ CVX/VAG CYTO-IMP: NORMAL

## 2019-02-27 ENCOUNTER — TRANSCRIBE ORDERS (OUTPATIENT)
Dept: ADMINISTRATIVE | Facility: HOSPITAL | Age: 64
End: 2019-02-27

## 2019-02-27 ENCOUNTER — LAB (OUTPATIENT)
Dept: LAB | Facility: HOSPITAL | Age: 64
End: 2019-02-27

## 2019-02-27 DIAGNOSIS — Z79.899 NEED FOR PROPHYLACTIC CHEMOTHERAPY: ICD-10-CM

## 2019-02-27 DIAGNOSIS — M05.79 SEROPOSITIVE RHEUMATOID ARTHRITIS OF MULTIPLE SITES (HCC): ICD-10-CM

## 2019-02-27 DIAGNOSIS — M05.79 SEROPOSITIVE RHEUMATOID ARTHRITIS OF MULTIPLE SITES (HCC): Primary | ICD-10-CM

## 2019-02-27 LAB
ALBUMIN SERPL-MCNC: 3.6 G/DL (ref 3.5–5.2)
ALP SERPL-CCNC: 171 U/L (ref 40–129)
ALT SERPL W P-5'-P-CCNC: 16 U/L (ref 5–33)
AST SERPL-CCNC: 19 U/L (ref 5–32)
BASOPHILS # BLD AUTO: 0.09 10*3/MM3 (ref 0–0.2)
BASOPHILS NFR BLD AUTO: 1 % (ref 0–1.5)
BILIRUB CONJ SERPL-MCNC: <0.2 MG/DL (ref 0.2–0.3)
BILIRUB INDIRECT SERPL-MCNC: ABNORMAL MG/DL
BILIRUB SERPL-MCNC: <0.2 MG/DL (ref 0.2–1.2)
DEPRECATED RDW RBC AUTO: 47.5 FL (ref 37–54)
EOSINOPHIL # BLD AUTO: 0.23 10*3/MM3 (ref 0–0.4)
EOSINOPHIL NFR BLD AUTO: 2.6 % (ref 0.3–6.2)
ERYTHROCYTE [DISTWIDTH] IN BLOOD BY AUTOMATED COUNT: 14.8 % (ref 12.3–15.4)
HCT VFR BLD AUTO: 41 % (ref 34–46.6)
HGB BLD-MCNC: 12.7 G/DL (ref 12–15.9)
IMM GRANULOCYTES # BLD AUTO: 0.02 10*3/MM3 (ref 0–0.05)
IMM GRANULOCYTES NFR BLD AUTO: 0.2 % (ref 0–0.5)
LYMPHOCYTES # BLD AUTO: 2.56 10*3/MM3 (ref 0.7–3.1)
LYMPHOCYTES NFR BLD AUTO: 29.1 % (ref 19.6–45.3)
MCH RBC QN AUTO: 26.9 PG (ref 26.6–33)
MCHC RBC AUTO-ENTMCNC: 31 G/DL (ref 31.5–35.7)
MCV RBC AUTO: 86.9 FL (ref 79–97)
MONOCYTES # BLD AUTO: 0.88 10*3/MM3 (ref 0.1–0.9)
MONOCYTES NFR BLD AUTO: 10 % (ref 5–12)
NEUTROPHILS # BLD AUTO: 5.01 10*3/MM3 (ref 1.4–7)
NEUTROPHILS NFR BLD AUTO: 57.1 % (ref 42.7–76)
NRBC BLD AUTO-RTO: 0 /100 WBC (ref 0–0)
PLATELET # BLD AUTO: 389 10*3/MM3 (ref 140–450)
PMV BLD AUTO: 9.9 FL (ref 6–12)
PROT SERPL-MCNC: 7.3 G/DL (ref 6–8.5)
RBC # BLD AUTO: 4.72 10*6/MM3 (ref 3.77–5.28)
WBC NRBC COR # BLD: 8.79 10*3/MM3 (ref 3.4–10.8)

## 2019-02-27 PROCEDURE — 36415 COLL VENOUS BLD VENIPUNCTURE: CPT

## 2019-02-27 PROCEDURE — 85025 COMPLETE CBC W/AUTO DIFF WBC: CPT

## 2019-02-27 PROCEDURE — 80076 HEPATIC FUNCTION PANEL: CPT

## 2019-04-09 ENCOUNTER — OFFICE VISIT (OUTPATIENT)
Dept: ORTHOPEDIC SURGERY | Facility: CLINIC | Age: 64
End: 2019-04-09

## 2019-04-09 VITALS — BODY MASS INDEX: 33.65 KG/M2 | HEIGHT: 68 IN | WEIGHT: 222 LBS

## 2019-04-09 DIAGNOSIS — Z96.651 STATUS POST TOTAL RIGHT KNEE REPLACEMENT: Primary | ICD-10-CM

## 2019-04-09 PROCEDURE — 73562 X-RAY EXAM OF KNEE 3: CPT | Performed by: ORTHOPAEDIC SURGERY

## 2019-04-09 PROCEDURE — 99212 OFFICE O/P EST SF 10 MIN: CPT | Performed by: ORTHOPAEDIC SURGERY

## 2019-04-09 RX ORDER — MELOXICAM 7.5 MG/1
TABLET ORAL
Refills: 3 | COMMUNITY
Start: 2019-04-05 | End: 2021-07-27 | Stop reason: HOSPADM

## 2019-04-09 NOTE — PROGRESS NOTES
Subjective:     Patient ID: Nitish Barfield is a 64 y.o. female.    Chief Complaint:   s/p right total knee arthroplasty, DOS 10/17/2018  History of Present Illness  Nitish Barfield returns to clinic today for evaluation of right knee, states she is doing very well at this point time, she has had some issues with some superficial wounds due to significant itching with anxieties over a possible concerning diagnosis from a mass.  Now that she states she has received news that it is benign, she has been able to treat the wounds more effectively and they are starting to heal this point time.  Denies any discrete issues with the knee itself sweats, she is made good progress in regards to range of motion and strength.  Minimal residual pain rates as 1-2 out of 10 with no radiation.     Social History     Occupational History   • Not on file   Tobacco Use   • Smoking status: Never Smoker   • Smokeless tobacco: Never Used   Substance and Sexual Activity   • Alcohol use: Yes     Comment: glass of rum nightly   • Drug use: No   • Sexual activity: Not Currently     Partners: Male     Birth control/protection: Surgical     Comment: JUAN DANIEL with OC      Past Medical History:   Diagnosis Date   • Allergy    • Anxiety    • Arthritis     RA, FINGERS, TOES   • Bleeding esophageal ulcer     history of   • Depression    • Esophageal reflux    • Fibroid    • GERD (gastroesophageal reflux disease)    • History of transfusion    • Hypertension    • Hypertension    • Osteoarthritis of right knee     sched TKA   • RA (rheumatoid arthritis) (CMS/[facility])      Past Surgical History:   Procedure Laterality Date   • COLONOSCOPY     • ENDOSCOPY     • ESOPHAGOSCOPY / EGD      cauterize esophageal bleeding ulcers   • HYSTERECTOMY      JUAN DANIEL with OC for fibroids age 36   • KNEE SURGERY Left     ARTHROSCOPY   • TOTAL KNEE ARTHROPLASTY Left 4/11/2018    Procedure: TOTAL KNEE ARTHROPLASTY AND ALL ASSOCIATED PROCEDURES;  Surgeon: Costa Zuñiga MD;  Location:   "[facility] OR;  Service: Orthopedics   • TOTAL KNEE ARTHROPLASTY Right 10/17/2018    Procedure: TOTAL KNEE ARTHROPLASTY AND ALL ASSOCIATED PROCEDURES;  Surgeon: Costa Zuñiga MD;  Location:  [facility] OR;  Service: Orthopedics   • WISDOM TOOTH EXTRACTION         Family History   Problem Relation Age of Onset   • Cancer Mother         bone   • Breast cancer Mother    • Other Father         cardiac failure   • Heart disease Father    • Deep vein thrombosis Brother    • Malig Hyperthermia Neg Hx    • Ovarian cancer Neg Hx    • Uterine cancer Neg Hx          Review of Systems   Constitutional: Negative for chills, diaphoresis, fever and unexpected weight change.   HENT: Negative for hearing loss, nosebleeds, sore throat and tinnitus.    Eyes: Negative for pain and visual disturbance.   Respiratory: Negative for cough, shortness of breath and wheezing.    Cardiovascular: Negative for chest pain and palpitations.   Gastrointestinal: Negative for abdominal pain, diarrhea, nausea and vomiting.   Endocrine: Negative for cold intolerance, heat intolerance and polydipsia.   Genitourinary: Negative for difficulty urinating, dysuria and hematuria.   Musculoskeletal: Positive for arthralgias and myalgias. Negative for joint swelling.   Skin: Negative for rash and wound.   Allergic/Immunologic: Negative for environmental allergies.   Neurological: Negative for dizziness, syncope and numbness.   Hematological: Does not bruise/bleed easily.   Psychiatric/Behavioral: Negative for dysphoric mood and sleep disturbance. The patient is not nervous/anxious.            Objective:  Vitals:    04/09/19 0817   Weight: 101 kg (222 lb)   Height: 172.7 cm (68\")         04/09/19  0817   Weight: 101 kg (222 lb)     Body mass index is 33.75 kg/m².  General: No acute distress.  Resp: normal respiratory effort  Skin: no rashes or wounds; normal turgor  Psych: mood and affect appropriate; recent and remote memory intact          Ortho Exam     Right " knee-active range of motion 0-125 degrees, 4+ out of 5 strength in flexion extension, stable to varus and valgus stress at 0 and 30 degrees, overlying wounds are noted to be well healing with good granulation tissue despite exposed superficial layer.  Incision itself is well-healed.  Imaging:  3 view x-rays right knee from today's visit, AP, lateral, patella axial views, ordered and reviewed by me, indication status post total knee Jamison plasty, compared to prior postoperative x-rays from office.  Implants appear to be in stable position with no evidence of reactive bone formation or ostial lysis.  Assessment:        1. Status post total right knee replacement           Plan:          Discussed treatment options at length with patient at today's visit.  Patient has made good progress at this point time, continue to treat superficial wounds appropriately.  Follow-up in 1 year with repeat x-rays bilateral knees.  Discussed need for antibiotic prophylaxis with dental or endoscopy procedures.    Nitish Barfield  was in agreement with plan and had all questions answered.     Orders:  Orders Placed This Encounter   Procedures   • XR Knee 3 View Right       Medications:  No orders of the defined types were placed in this encounter.      Followup:  No Follow-up on file.    Nitish was seen today for follow-up and pain.    Diagnoses and all orders for this visit:    Status post total right knee replacement  -     XR Knee 3 View Right          Dictated utilizing Dragon dictation

## 2019-10-09 ENCOUNTER — TRANSCRIBE ORDERS (OUTPATIENT)
Dept: ADMINISTRATIVE | Facility: HOSPITAL | Age: 64
End: 2019-10-09

## 2019-10-09 DIAGNOSIS — Z12.31 SCREENING MAMMOGRAM FOR HIGH-RISK PATIENT: Primary | ICD-10-CM

## 2019-10-09 DIAGNOSIS — M19.90 OSTEOARTHRITIS, UNSPECIFIED OSTEOARTHRITIS TYPE, UNSPECIFIED SITE: ICD-10-CM

## 2019-10-18 ENCOUNTER — HOSPITAL ENCOUNTER (OUTPATIENT)
Dept: MAMMOGRAPHY | Facility: HOSPITAL | Age: 64
Discharge: HOME | End: 2019-10-18
Admitting: FAMILY MEDICINE

## 2019-10-18 ENCOUNTER — APPOINTMENT (OUTPATIENT)
Dept: BONE DENSITY | Facility: HOSPITAL | Age: 64
End: 2019-10-18

## 2019-10-18 DIAGNOSIS — Z12.31 SCREENING MAMMOGRAM FOR HIGH-RISK PATIENT: ICD-10-CM

## 2019-10-18 DIAGNOSIS — M19.90 OSTEOARTHRITIS, UNSPECIFIED OSTEOARTHRITIS TYPE, UNSPECIFIED SITE: ICD-10-CM

## 2019-10-18 PROCEDURE — 77067 SCR MAMMO BI INCL CAD: CPT

## 2019-10-18 PROCEDURE — 77080 DXA BONE DENSITY AXIAL: CPT

## 2019-10-18 PROCEDURE — 77063 BREAST TOMOSYNTHESIS BI: CPT

## 2019-10-23 ENCOUNTER — TELEPHONE (OUTPATIENT)
Dept: GASTROENTEROLOGY | Facility: CLINIC | Age: 64
End: 2019-10-23

## 2019-11-13 ENCOUNTER — DOCUMENTATION (OUTPATIENT)
Dept: SURGERY | Facility: CLINIC | Age: 64
End: 2019-11-13

## 2019-11-18 ENCOUNTER — TELEPHONE (OUTPATIENT)
Dept: GASTROENTEROLOGY | Facility: CLINIC | Age: 64
End: 2019-11-18

## 2019-11-22 ENCOUNTER — PREP FOR SURGERY (OUTPATIENT)
Dept: OTHER | Facility: HOSPITAL | Age: 64
End: 2019-11-22

## 2019-11-22 DIAGNOSIS — Z12.11 ENCOUNTER FOR SCREENING COLONOSCOPY: Primary | ICD-10-CM

## 2020-01-03 ENCOUNTER — LAB (OUTPATIENT)
Dept: LAB | Facility: HOSPITAL | Age: 65
End: 2020-01-03

## 2020-01-03 ENCOUNTER — TRANSCRIBE ORDERS (OUTPATIENT)
Dept: ADMINISTRATIVE | Facility: HOSPITAL | Age: 65
End: 2020-01-03

## 2020-01-03 DIAGNOSIS — M05.79 SEROPOSITIVE RHEUMATOID ARTHRITIS OF MULTIPLE SITES (HCC): ICD-10-CM

## 2020-01-03 DIAGNOSIS — Z79.899 ENCOUNTER FOR LONG-TERM (CURRENT) USE OF OTHER MEDICATIONS: ICD-10-CM

## 2020-01-03 DIAGNOSIS — M05.79 SEROPOSITIVE RHEUMATOID ARTHRITIS OF MULTIPLE SITES (HCC): Primary | ICD-10-CM

## 2020-01-03 LAB
BASOPHILS # BLD AUTO: 0.11 10*3/MM3 (ref 0–0.2)
BASOPHILS NFR BLD AUTO: 1.1 % (ref 0–1.5)
DEPRECATED RDW RBC AUTO: 41.7 FL (ref 37–54)
EOSINOPHIL # BLD AUTO: 0.25 10*3/MM3 (ref 0–0.4)
EOSINOPHIL NFR BLD AUTO: 2.5 % (ref 0.3–6.2)
ERYTHROCYTE [DISTWIDTH] IN BLOOD BY AUTOMATED COUNT: 13.5 % (ref 12.3–15.4)
HAV IGM SERPL QL IA: NORMAL
HBV CORE IGM SERPL QL IA: NORMAL
HBV SURFACE AG SERPL QL IA: NORMAL
HCT VFR BLD AUTO: 41.2 % (ref 34–46.6)
HCV AB SER DONR QL: NORMAL
HGB BLD-MCNC: 13 G/DL (ref 12–15.9)
IMM GRANULOCYTES # BLD AUTO: 0.03 10*3/MM3 (ref 0–0.05)
IMM GRANULOCYTES NFR BLD AUTO: 0.3 % (ref 0–0.5)
LYMPHOCYTES # BLD AUTO: 2.12 10*3/MM3 (ref 0.7–3.1)
LYMPHOCYTES NFR BLD AUTO: 20.8 % (ref 19.6–45.3)
MCH RBC QN AUTO: 27.1 PG (ref 26.6–33)
MCHC RBC AUTO-ENTMCNC: 31.6 G/DL (ref 31.5–35.7)
MCV RBC AUTO: 85.8 FL (ref 79–97)
MONOCYTES # BLD AUTO: 1.07 10*3/MM3 (ref 0.1–0.9)
MONOCYTES NFR BLD AUTO: 10.5 % (ref 5–12)
NEUTROPHILS # BLD AUTO: 6.61 10*3/MM3 (ref 1.7–7)
NEUTROPHILS NFR BLD AUTO: 64.8 % (ref 42.7–76)
NRBC BLD AUTO-RTO: 0 /100 WBC (ref 0–0.2)
PLATELET # BLD AUTO: 425 10*3/MM3 (ref 140–450)
PMV BLD AUTO: 11.1 FL (ref 6–12)
RBC # BLD AUTO: 4.8 10*6/MM3 (ref 3.77–5.28)
WBC NRBC COR # BLD: 10.19 10*3/MM3 (ref 3.4–10.8)

## 2020-01-03 PROCEDURE — 80076 HEPATIC FUNCTION PANEL: CPT

## 2020-01-03 PROCEDURE — 80074 ACUTE HEPATITIS PANEL: CPT

## 2020-01-03 PROCEDURE — 85025 COMPLETE CBC W/AUTO DIFF WBC: CPT

## 2020-01-03 PROCEDURE — 36415 COLL VENOUS BLD VENIPUNCTURE: CPT

## 2020-01-06 LAB
ALBUMIN SERPL-MCNC: 4.2 G/DL (ref 3.5–5.2)
ALP SERPL-CCNC: 104 U/L (ref 39–117)
ALT SERPL W P-5'-P-CCNC: 10 U/L (ref 1–33)
AST SERPL-CCNC: 18 U/L (ref 1–32)
BILIRUB CONJ SERPL-MCNC: <0.2 MG/DL (ref 0.2–0.3)
BILIRUB INDIRECT SERPL-MCNC: ABNORMAL MG/DL
BILIRUB SERPL-MCNC: <0.2 MG/DL (ref 0.2–1.2)
PROT SERPL-MCNC: 6.8 G/DL (ref 6–8.5)

## 2020-06-17 ENCOUNTER — LAB (OUTPATIENT)
Dept: LAB | Facility: HOSPITAL | Age: 65
End: 2020-06-17

## 2020-06-17 ENCOUNTER — TRANSCRIBE ORDERS (OUTPATIENT)
Dept: ADMINISTRATIVE | Facility: HOSPITAL | Age: 65
End: 2020-06-17

## 2020-06-17 DIAGNOSIS — M05.9 RHEUMATOID ARTHRITIS WITH POSITIVE RHEUMATOID FACTOR, INVOLVING UNSPECIFIED SITE (HCC): ICD-10-CM

## 2020-06-17 DIAGNOSIS — Z79.899 LONG TERM USE OF DRUG: ICD-10-CM

## 2020-06-17 DIAGNOSIS — M05.9 RHEUMATOID ARTHRITIS WITH POSITIVE RHEUMATOID FACTOR, INVOLVING UNSPECIFIED SITE (HCC): Primary | ICD-10-CM

## 2020-06-17 LAB
ALBUMIN SERPL-MCNC: 3.9 G/DL (ref 3.5–5.2)
ALP SERPL-CCNC: 154 U/L (ref 39–117)
ALT SERPL W P-5'-P-CCNC: 13 U/L (ref 1–33)
AST SERPL-CCNC: 16 U/L (ref 1–32)
BASOPHILS # BLD AUTO: 0.08 10*3/MM3 (ref 0–0.2)
BASOPHILS NFR BLD AUTO: 0.6 % (ref 0–1.5)
BILIRUB CONJ SERPL-MCNC: <0.2 MG/DL (ref 0.2–0.3)
BILIRUB INDIRECT SERPL-MCNC: ABNORMAL MG/DL
BILIRUB SERPL-MCNC: 0.3 MG/DL (ref 0.2–1.2)
DEPRECATED RDW RBC AUTO: 43.8 FL (ref 37–54)
EOSINOPHIL # BLD AUTO: 0.24 10*3/MM3 (ref 0–0.4)
EOSINOPHIL NFR BLD AUTO: 1.9 % (ref 0.3–6.2)
ERYTHROCYTE [DISTWIDTH] IN BLOOD BY AUTOMATED COUNT: 13.6 % (ref 12.3–15.4)
HCT VFR BLD AUTO: 43.7 % (ref 34–46.6)
HGB BLD-MCNC: 13.8 G/DL (ref 12–15.9)
IMM GRANULOCYTES # BLD AUTO: 0.06 10*3/MM3 (ref 0–0.05)
IMM GRANULOCYTES NFR BLD AUTO: 0.5 % (ref 0–0.5)
LYMPHOCYTES # BLD AUTO: 2.08 10*3/MM3 (ref 0.7–3.1)
LYMPHOCYTES NFR BLD AUTO: 16.3 % (ref 19.6–45.3)
MCH RBC QN AUTO: 27.7 PG (ref 26.6–33)
MCHC RBC AUTO-ENTMCNC: 31.6 G/DL (ref 31.5–35.7)
MCV RBC AUTO: 87.8 FL (ref 79–97)
MONOCYTES # BLD AUTO: 1.57 10*3/MM3 (ref 0.1–0.9)
MONOCYTES NFR BLD AUTO: 12.3 % (ref 5–12)
NEUTROPHILS # BLD AUTO: 8.73 10*3/MM3 (ref 1.7–7)
NEUTROPHILS NFR BLD AUTO: 68.4 % (ref 42.7–76)
NRBC BLD AUTO-RTO: 0 /100 WBC (ref 0–0.2)
PLATELET # BLD AUTO: 508 10*3/MM3 (ref 140–450)
PMV BLD AUTO: 10.9 FL (ref 6–12)
PROT SERPL-MCNC: 7.8 G/DL (ref 6–8.5)
RBC # BLD AUTO: 4.98 10*6/MM3 (ref 3.77–5.28)
WBC NRBC COR # BLD: 12.76 10*3/MM3 (ref 3.4–10.8)

## 2020-06-17 PROCEDURE — 85025 COMPLETE CBC W/AUTO DIFF WBC: CPT

## 2020-06-17 PROCEDURE — 80076 HEPATIC FUNCTION PANEL: CPT

## 2020-06-17 PROCEDURE — 36415 COLL VENOUS BLD VENIPUNCTURE: CPT

## 2020-08-07 NOTE — PROGRESS NOTES
Subjective:     Patient ID: Nitish Barfield is a 65 y.o. female.    Chief Complaint: s/p right total knee arthroplasty, DOS 10/17/2018,  S/p left total knee arthroplasty 4/11/2018    History of Present Illness  Nitish Barfield returns to clinic today for evaluation of her bilateral knees. She is doing well overall at this time. There continues to be a callus over the incision on the right knee. She does not complain of any residual pain in the knees. However, she notes some pain in the right hip, which she states began about 2 months ago.     Social History     Occupational History   • Not on file   Tobacco Use   • Smoking status: Never Smoker   • Smokeless tobacco: Never Used   Substance and Sexual Activity   • Alcohol use: Yes     Comment: glass of rum nightly   • Drug use: No   • Sexual activity: Not Currently     Partners: Male     Birth control/protection: Surgical     Comment: JUAN DANIEL with OC      Past Medical History:   Diagnosis Date   • Allergy    • Anxiety    • Arthritis     RA, FINGERS, TOES   • Bleeding esophageal ulcer     history of   • Depression    • Esophageal reflux    • Fibroid    • GERD (gastroesophageal reflux disease)    • History of transfusion    • Hypertension    • Hypertension    • Osteoarthritis of right knee     sched TKA   • RA (rheumatoid arthritis) (CMS/[facility])      Past Surgical History:   Procedure Laterality Date   • COLONOSCOPY     • ENDOSCOPY     • ESOPHAGOSCOPY / EGD      cauterize esophageal bleeding ulcers   • HYSTERECTOMY      JUAN DANIEL with OC for fibroids age 36   • KNEE SURGERY Left     ARTHROSCOPY   • TOTAL KNEE ARTHROPLASTY Left 4/11/2018    Procedure: TOTAL KNEE ARTHROPLASTY AND ALL ASSOCIATED PROCEDURES;  Surgeon: Costa Zuñiga MD;  Location: [facility] OR;  Service: Orthopedics   • TOTAL KNEE ARTHROPLASTY Right 10/17/2018    Procedure: TOTAL KNEE ARTHROPLASTY AND ALL ASSOCIATED PROCEDURES;  Surgeon: Costa Zuñiga MD;  Location: [facility] OR;  Service: Orthopedics   • WISDOM TOOTH  "EXTRACTION         Family History   Problem Relation Age of Onset   • Cancer Mother         bone   • Breast cancer Mother    • Other Father         cardiac failure   • Heart disease Father    • Deep vein thrombosis Brother    • Malig Hyperthermia Neg Hx    • Ovarian cancer Neg Hx    • Uterine cancer Neg Hx          Review of Systems   Constitutional: Negative for chills, diaphoresis, fever and unexpected weight change.   HENT: Negative for hearing loss, nosebleeds, sore throat and tinnitus.    Eyes: Negative for pain and visual disturbance.   Respiratory: Negative for cough, shortness of breath and wheezing.    Cardiovascular: Negative for chest pain and palpitations.   Gastrointestinal: Negative for abdominal pain, diarrhea, nausea and vomiting.   Endocrine: Negative for cold intolerance, heat intolerance and polydipsia.   Genitourinary: Negative for difficulty urinating, dysuria and hematuria.   Musculoskeletal: Positive for arthralgias and myalgias. Negative for joint swelling.   Skin: Negative for rash and wound.   Allergic/Immunologic: Negative for environmental allergies.   Neurological: Negative for dizziness, syncope and numbness.   Hematological: Does not bruise/bleed easily.   Psychiatric/Behavioral: Negative for dysphoric mood and sleep disturbance. The patient is not nervous/anxious.            Objective:  Vitals:    08/11/20 0911   Weight: 101 kg (222 lb)   Height: 172.7 cm (68\")         08/11/20  0911   Weight: 101 kg (222 lb)     Body mass index is 33.75 kg/m².    General: No acute distress.  Resp: normal respiratory effort  Skin: no rashes or wounds; normal turgor  Psych: mood and affect appropriate; recent and remote memory intact        Ortho Exam       Right knee     2 cm region of scar overgrowth in the region of the lateral patella   Remainder of incision well-healed  ROM 0-130 degrees    Positive sensation light tough all distributions symmetric to contralateral side  Brisk cap refill all " digits  2+ dorsalis pedis pulse    Left knee-   incision well-healed active range of motion 0 to 125 degrees   Minimal effusion   Stable to varus and valgus at 0 and 30   No focal pain    Imaging:  Bilateral Knee X-Ray  Indication: s/p total knee     AP, Lateral, and [place] views    Findings:  Total knee arthroplasty components are in stable position with acceptable overall alignment, no evidence of periprosthetic fracture, loosening, osteolysis, or reactive bone formation.    Compared to prior postoperative x-rays    Assessment:        1. Status post total right knee replacement    2. S/P total knee arthroplasty, left           Plan:          1. Discussed treatment options at length with patient at today's visit.  2. Referral to Dr. Lara, dermatology, for biopsy of the callus region over the right knee incision.   3. Advised patient to start at-home IT band exercise program for improvement in strength, range of motion and function with daily activities of the right hip.  4. Follow-up with me in 1 year for repeat x-rays of the knees      Nitish Barfield was in agreement with plan and had all questions answered.     Orders:  Orders Placed This Encounter   Procedures   • XR Knee 3 View Bilateral       Medications:  No orders of the defined types were placed in this encounter.      Followup:  Return in about 1 year (around 8/11/2021) for repeat x-rays of the knees.    Nitish was seen today for follow-up and follow-up.    Diagnoses and all orders for this visit:    Status post total right knee replacement  -     XR Knee 3 View Bilateral    S/P total knee arthroplasty, left  -     XR Knee 3 View Bilateral           By signing my name here, I Melissa Alford attest that all documentation on 08/11/20 at 10:07 has been prepared under the direction and in the presence of Dr. Costa Zuñiga MD.    I, Dr. Costa Zuñiga, personally performed the services described in this documentation, as scribed by Melissa Alford, in my  presence, and it is both accurate and complete.        Dictated utilizing Dragon dictation

## 2020-08-11 ENCOUNTER — OFFICE VISIT (OUTPATIENT)
Dept: ORTHOPEDIC SURGERY | Facility: CLINIC | Age: 65
End: 2020-08-11

## 2020-08-11 VITALS — HEIGHT: 68 IN | WEIGHT: 222 LBS | BODY MASS INDEX: 33.65 KG/M2

## 2020-08-11 DIAGNOSIS — Z96.652 S/P TOTAL KNEE ARTHROPLASTY, LEFT: ICD-10-CM

## 2020-08-11 DIAGNOSIS — Z96.651 STATUS POST TOTAL RIGHT KNEE REPLACEMENT: Primary | ICD-10-CM

## 2020-08-11 DIAGNOSIS — L98.9 SKIN LESION OF RIGHT LEG: ICD-10-CM

## 2020-08-11 PROCEDURE — 73562 X-RAY EXAM OF KNEE 3: CPT | Performed by: ORTHOPAEDIC SURGERY

## 2020-08-11 PROCEDURE — 99213 OFFICE O/P EST LOW 20 MIN: CPT | Performed by: ORTHOPAEDIC SURGERY

## 2020-08-11 RX ORDER — LEFLUNOMIDE 20 MG/1
20 TABLET ORAL DAILY
COMMUNITY
Start: 2020-05-26 | End: 2021-07-27 | Stop reason: HOSPADM

## 2020-08-11 RX ORDER — TRAMADOL HYDROCHLORIDE 50 MG/1
50 TABLET ORAL
COMMUNITY
Start: 2020-04-09 | End: 2021-04-09

## 2020-08-11 RX ORDER — POTASSIUM CHLORIDE 750 MG/1
1 CAPSULE, EXTENDED RELEASE ORAL
COMMUNITY
Start: 2020-05-12 | End: 2020-08-11 | Stop reason: SDUPTHER

## 2020-08-24 NOTE — TELEPHONE ENCOUNTER
CC:   Chief Complaint   Patient presents with    Rash       HPI: Alison Ponce is a 21 m.o. patient here for evaluation of  some sores on her left leg and abdomen. The sores are cracked and painful, with associated red color and some crusting. There is no associated sore throat or fever.    No past medical history on file.    Review of Systems   Constitutional: Negative for fever.   HENT: Negative for congestion and sore throat.    Eyes: Negative for pain.   Respiratory: Negative for cough and shortness of breath.    Gastrointestinal: Negative for diarrhea and vomiting.   Musculoskeletal: Negative for joint pain.   Skin: Positive for rash.   Answers for HPI/ROS submitted by the patient on 8/24/2020   Rash  Chronicity: new  Onset: in the past 7 days  Progression since onset: rapidly worsening  Affected locations: abdomen, left upper leg, left leg  Characteristics: dryness, redness, peeling, scaling  Exposed to: unknown  anorexia: No  facial edema: No  fatigue: No  nail changes: No  rhinorrhea: No  Treatments tried: antibiotic cream, antibiotics, moisturizer  Improvement on treatment: no relief  asthma: No  allergies: No  eczema: Yes  varicella: No        EXAM  Pulse (!) 159   Temp 97.3 °F (36.3 °C) (Temporal)   Resp 22   Wt 9.979 kg (22 lb)   SpO2 100%   General appearance: alert and cooperative  Skin: round peeling sores on inner left leg, one large one cultured today    IMPRESSION:  1. Bullous impetigo           MARINA Rosas was seen today for rash.    Diagnoses and all orders for this visit:    Bullous impetigo      Current Outpatient Medications   Medication Instructions    amoxicillin-clavulanate (AUGMENTIN) 600-42.9 mg/5 mL SusR 300 mg, Oral, 2 times daily    mupirocin (BACTROBAN) 2 % ointment Apply to sores TID and to nares nightly for 3-6 weeks       Hand washing, saline flush nose, complete all medication, and contact precautions discussed  Prefer Lever 2000 bar soap for bathing  Clip nails short  Requesting same thing she received last time because it is helping with her pain   and apply the antibiotic ointment to the nares as well.

## 2020-10-20 ENCOUNTER — LAB (OUTPATIENT)
Dept: LAB | Facility: HOSPITAL | Age: 65
End: 2020-10-20

## 2020-10-20 ENCOUNTER — TRANSCRIBE ORDERS (OUTPATIENT)
Dept: ADMINISTRATIVE | Facility: HOSPITAL | Age: 65
End: 2020-10-20

## 2020-10-20 DIAGNOSIS — M05.79 SEROPOSITIVE RHEUMATOID ARTHRITIS OF MULTIPLE SITES (HCC): Primary | ICD-10-CM

## 2020-10-20 DIAGNOSIS — Z79.899 ENCOUNTER FOR LONG-TERM (CURRENT) USE OF OTHER MEDICATIONS: ICD-10-CM

## 2020-10-20 DIAGNOSIS — M05.79 SEROPOSITIVE RHEUMATOID ARTHRITIS OF MULTIPLE SITES (HCC): ICD-10-CM

## 2020-10-20 LAB
ALBUMIN SERPL-MCNC: 3.6 G/DL (ref 3.5–5.2)
ALP SERPL-CCNC: 182 U/L (ref 39–117)
ALT SERPL W P-5'-P-CCNC: 9 U/L (ref 1–33)
AST SERPL-CCNC: 17 U/L (ref 1–32)
BASOPHILS # BLD AUTO: 0.08 10*3/MM3 (ref 0–0.2)
BASOPHILS NFR BLD AUTO: 0.6 % (ref 0–1.5)
BILIRUB CONJ SERPL-MCNC: <0.2 MG/DL (ref 0–0.3)
BILIRUB INDIRECT SERPL-MCNC: ABNORMAL MG/DL
BILIRUB SERPL-MCNC: 0.3 MG/DL (ref 0–1.2)
DEPRECATED RDW RBC AUTO: 44.7 FL (ref 37–54)
EOSINOPHIL # BLD AUTO: 0.21 10*3/MM3 (ref 0–0.4)
EOSINOPHIL NFR BLD AUTO: 1.5 % (ref 0.3–6.2)
ERYTHROCYTE [DISTWIDTH] IN BLOOD BY AUTOMATED COUNT: 13.7 % (ref 12.3–15.4)
HCT VFR BLD AUTO: 44.8 % (ref 34–46.6)
HGB BLD-MCNC: 13.8 G/DL (ref 12–15.9)
LYMPHOCYTES # BLD AUTO: 1.72 10*3/MM3 (ref 0.7–3.1)
LYMPHOCYTES NFR BLD AUTO: 12.6 % (ref 19.6–45.3)
MCH RBC QN AUTO: 27.5 PG (ref 26.6–33)
MCHC RBC AUTO-ENTMCNC: 30.8 G/DL (ref 31.5–35.7)
MCV RBC AUTO: 89.4 FL (ref 79–97)
MONOCYTES # BLD AUTO: 1.2 10*3/MM3 (ref 0.1–0.9)
MONOCYTES NFR BLD AUTO: 8.8 % (ref 5–12)
NEUTROPHILS NFR BLD AUTO: 10.42 10*3/MM3 (ref 1.7–7)
NEUTROPHILS NFR BLD AUTO: 76.1 % (ref 42.7–76)
PLATELET # BLD AUTO: 498 10*3/MM3 (ref 140–450)
PMV BLD AUTO: 11 FL (ref 6–12)
PROT SERPL-MCNC: 7.6 G/DL (ref 6–8.5)
RBC # BLD AUTO: 5.01 10*6/MM3 (ref 3.77–5.28)
WBC # BLD AUTO: 13.68 10*3/MM3 (ref 3.4–10.8)

## 2020-10-20 PROCEDURE — 36415 COLL VENOUS BLD VENIPUNCTURE: CPT

## 2020-10-20 PROCEDURE — 80076 HEPATIC FUNCTION PANEL: CPT

## 2020-10-20 PROCEDURE — 85027 COMPLETE CBC AUTOMATED: CPT

## 2020-12-01 ENCOUNTER — TRANSCRIBE ORDERS (OUTPATIENT)
Dept: ADMINISTRATIVE | Facility: HOSPITAL | Age: 65
End: 2020-12-01

## 2020-12-01 ENCOUNTER — TRANSCRIBE ORDERS (OUTPATIENT)
Dept: OBSTETRICS AND GYNECOLOGY | Facility: CLINIC | Age: 65
End: 2020-12-01

## 2020-12-01 DIAGNOSIS — R06.00 DYSPNEA, UNSPECIFIED TYPE: Primary | ICD-10-CM

## 2020-12-01 DIAGNOSIS — Z01.818 OTHER SPECIFIED PRE-OPERATIVE EXAMINATION: Primary | ICD-10-CM

## 2020-12-01 DIAGNOSIS — M06.9 RHEUMATOID ARTHRITIS, INVOLVING UNSPECIFIED SITE, UNSPECIFIED WHETHER RHEUMATOID FACTOR PRESENT (HCC): ICD-10-CM

## 2020-12-04 ENCOUNTER — LAB (OUTPATIENT)
Dept: LAB | Facility: HOSPITAL | Age: 65
End: 2020-12-04

## 2020-12-04 DIAGNOSIS — Z01.818 OTHER SPECIFIED PRE-OPERATIVE EXAMINATION: ICD-10-CM

## 2020-12-04 PROCEDURE — C9803 HOPD COVID-19 SPEC COLLECT: HCPCS

## 2020-12-04 PROCEDURE — U0004 COV-19 TEST NON-CDC HGH THRU: HCPCS | Performed by: OBSTETRICS & GYNECOLOGY

## 2020-12-05 LAB — SARS-COV-2 RNA RESP QL NAA+PROBE: NOT DETECTED

## 2020-12-07 ENCOUNTER — HOSPITAL ENCOUNTER (OUTPATIENT)
Dept: GENERAL RADIOLOGY | Facility: HOSPITAL | Age: 65
Discharge: HOME | End: 2020-12-07

## 2020-12-07 ENCOUNTER — TRANSCRIBE ORDERS (OUTPATIENT)
Dept: ADMINISTRATIVE | Facility: HOSPITAL | Age: 65
End: 2020-12-07

## 2020-12-07 ENCOUNTER — HOSPITAL ENCOUNTER (OUTPATIENT)
Dept: PULMONOLOGY | Facility: HOSPITAL | Age: 65
Discharge: HOME | End: 2020-12-07

## 2020-12-07 DIAGNOSIS — M05.79 SEROPOSITIVE RHEUMATOID ARTHRITIS OF MULTIPLE SITES (HCC): ICD-10-CM

## 2020-12-07 DIAGNOSIS — R06.00 DYSPNEA, UNSPECIFIED TYPE: ICD-10-CM

## 2020-12-07 DIAGNOSIS — M06.9 RHEUMATOID ARTHRITIS, INVOLVING UNSPECIFIED SITE, UNSPECIFIED WHETHER RHEUMATOID FACTOR PRESENT (HCC): ICD-10-CM

## 2020-12-07 DIAGNOSIS — M05.79 SEROPOSITIVE RHEUMATOID ARTHRITIS OF MULTIPLE SITES (HCC): Primary | ICD-10-CM

## 2020-12-07 PROCEDURE — 94010 BREATHING CAPACITY TEST: CPT

## 2020-12-07 PROCEDURE — 94726 PLETHYSMOGRAPHY LUNG VOLUMES: CPT

## 2020-12-07 PROCEDURE — 71046 X-RAY EXAM CHEST 2 VIEWS: CPT

## 2020-12-07 PROCEDURE — 94729 DIFFUSING CAPACITY: CPT

## 2020-12-23 ENCOUNTER — TRANSCRIBE ORDERS (OUTPATIENT)
Dept: ADMINISTRATIVE | Facility: HOSPITAL | Age: 65
End: 2020-12-23

## 2020-12-23 DIAGNOSIS — R06.00 DYSPNEA AND RESPIRATORY ABNORMALITIES: Primary | ICD-10-CM

## 2020-12-23 DIAGNOSIS — R06.89 DYSPNEA AND RESPIRATORY ABNORMALITIES: Primary | ICD-10-CM

## 2020-12-29 ENCOUNTER — HOSPITAL ENCOUNTER (OUTPATIENT)
Dept: CT IMAGING | Facility: HOSPITAL | Age: 65
Discharge: HOME | End: 2020-12-29
Admitting: NURSE PRACTITIONER

## 2020-12-29 DIAGNOSIS — R06.00 DYSPNEA AND RESPIRATORY ABNORMALITIES: ICD-10-CM

## 2020-12-29 DIAGNOSIS — R06.89 DYSPNEA AND RESPIRATORY ABNORMALITIES: ICD-10-CM

## 2020-12-29 PROCEDURE — 71250 CT THORAX DX C-: CPT

## 2021-03-15 ENCOUNTER — OFFICE VISIT (OUTPATIENT)
Dept: CARDIOLOGY | Facility: CLINIC | Age: 66
End: 2021-03-15

## 2021-03-15 VITALS
HEIGHT: 68 IN | DIASTOLIC BLOOD PRESSURE: 104 MMHG | OXYGEN SATURATION: 98 % | WEIGHT: 207 LBS | SYSTOLIC BLOOD PRESSURE: 150 MMHG | BODY MASS INDEX: 31.37 KG/M2 | HEART RATE: 93 BPM

## 2021-03-15 DIAGNOSIS — I45.10 RBBB: ICD-10-CM

## 2021-03-15 DIAGNOSIS — I10 ESSENTIAL HYPERTENSION: ICD-10-CM

## 2021-03-15 DIAGNOSIS — R94.31 ABNORMAL EKG: Primary | ICD-10-CM

## 2021-03-15 PROCEDURE — 99204 OFFICE O/P NEW MOD 45 MIN: CPT | Performed by: INTERNAL MEDICINE

## 2021-03-15 PROCEDURE — 93000 ELECTROCARDIOGRAM COMPLETE: CPT | Performed by: INTERNAL MEDICINE

## 2021-03-15 NOTE — PROGRESS NOTES
PATIENTINFORMATION    Date of Office Visit: 03/15/2021  Encounter Provider: Nic Guevara MD  Place of Service: [facility] CARDIOLOGY  Patient Name: Nitish Barfield  : 1955    Subjective:     Encounter Date:03/15/2021      Patient ID: Nitish Barfield is a 65 y.o. female.    Chief Complaint   Patient presents with   • Hypertension     new patient    • Abnormal ECG     HPI  Ms. Barfield is a 65 years old female patient with past medical history of hypertension on nebivolol, azilsartan and chlorthalidone, rheumatoid arthritis iliotibial band syndrome on the right side came to cardiology clinic for evaluation of possible cardiovascular complication from moderate arthritis.  Patient has had hypertension for years and has been on the aforementioned medications with reasonable control till recently when blood pressure started to run high because of pain issues per patient, she has had rheumatoid arthritis for years and since 2020 she has had severe right hip pain and she was told it is from iliotibial band syndrome and has been getting physical therapy and narcotic for pain control.  Even at time of exam she was in severe pain especially when she move the hip.  She denied any significant shortness of breath, orthopnea, PND, extremity swelling, chest pain or leg swelling.  No prior known cardiovascular illness other than hypertension.  No prior testing.  She reports being compliant with all her medications.  She was noted to have right bundle branch block at least since .  Patient reports she was active till onset of hip pain and has become more sedentary.   ROS   All systems reviewed and negative except as noted in HPI.    Past Medical History:   Diagnosis Date   • Allergy    • Anxiety    • Arthritis     RA, FINGERS, TOES   • Bleeding esophageal ulcer     history of   • Depression    • Esophageal reflux    • Fibroid    • GERD (gastroesophageal reflux disease)    • History of transfusion     • Hypertension    • Hypertension    • Osteoarthritis of right knee     sched TKA   • RA (rheumatoid arthritis) (CMS/HCC)        Past Surgical History:   Procedure Laterality Date   • COLONOSCOPY     • ENDOSCOPY     • ESOPHAGOSCOPY / EGD      cauterize esophageal bleeding ulcers   • HYSTERECTOMY      JUAN DANIEL with OC for fibroids age 36   • KNEE SURGERY Left     ARTHROSCOPY   • TOTAL KNEE ARTHROPLASTY Left 4/11/2018    Procedure: TOTAL KNEE ARTHROPLASTY AND ALL ASSOCIATED PROCEDURES;  Surgeon: Costa Zuñiga MD;  Location: [facility] OR;  Service: Orthopedics   • TOTAL KNEE ARTHROPLASTY Right 10/17/2018    Procedure: TOTAL KNEE ARTHROPLASTY AND ALL ASSOCIATED PROCEDURES;  Surgeon: Costa Zuñiga MD;  Location: [facility] OR;  Service: Orthopedics   • WISDOM TOOTH EXTRACTION         Social History     Socioeconomic History   • Marital status:      Spouse name: Not on file   • Number of children: Not on file   • Years of education: Not on file   • Highest education level: Not on file   Tobacco Use   • Smoking status: Never Smoker   • Smokeless tobacco: Never Used   Substance and Sexual Activity   • Alcohol use: Yes     Comment: glass of rum nightly   • Drug use: No   • Sexual activity: Not Currently     Partners: Male     Birth control/protection: Surgical     Comment: JUAN DANIEL with OC       Family History   Problem Relation Age of Onset   • Cancer Mother         bone   • Breast cancer Mother    • Other Father         cardiac failure   • Heart disease Father    • Deep vein thrombosis Brother    • Malig Hyperthermia Neg Hx    • Ovarian cancer Neg Hx    • Uterine cancer Neg Hx            ECG 12 Lead    Date/Time: 3/15/2021 1:19 PM  Performed by: Nic Guevara MD  Authorized by: Nic Guevara MD   Comparison: compared with previous ECG from 10/11/2018  Rhythm: sinus rhythm  Rate: normal  Conduction: right bundle branch block  ST Segments: ST segments normal  T Waves: T waves normal  QRS axis:  "normal  Other: no other findings    Clinical impression: abnormal EKG               Objective:     BP (!) 150/104   Pulse 93   Ht 172.7 cm (68\")   Wt 93.9 kg (207 lb)   SpO2 98%   BMI 31.47 kg/m²  Body mass index is 31.47 kg/m².     Constitutional:       General: Not in acute distress.     Appearance: Well-developed. Not diaphoretic.   Eyes:      Pupils: Pupils are equal, round, and reactive to light.   HENT:      Head: Normocephalic and atraumatic.   Neck:      Thyroid: No thyromegaly.   Pulmonary:      Effort: Pulmonary effort is normal. No respiratory distress.      Breath sounds: Normal breath sounds. No wheezing. No rales.   Chest:      Chest wall: Not tender to palpatation.   Cardiovascular:      Normal rate. Regular rhythm.      No gallop.   Pulses:     Intact distal pulses.   Edema:     Peripheral edema absent.   Abdominal:      General: Bowel sounds are normal. There is no distension.      Palpations: Abdomen is soft.      Tenderness: There is no guarding.   Musculoskeletal: Normal range of motion.         General: No deformity.      Cervical back: Normal range of motion and neck supple. Skin:     General: Skin is warm and dry.      Findings: No rash.   Neurological:      Mental Status: Alert and oriented to person, place, and time.      Cranial Nerves: No cranial nerve deficit.      Deep Tendon Reflexes: Reflexes are normal and symmetric.   Psychiatric:         Judgment: Judgment normal.         Review Of Data:       Assessment/Plan:       1.  Hypertension-uncontrolled.  Probably pain is contributing.  I have advised patient to keep blood pressure log at home and call clinic in a week to see home readings.  Continue current care in the meantime  2.  Longstanding rheumatoid arthritis-on leflunomide and following up with rheumatology  3.  Severe right hip pain from iliotibial band syndrome-getting physical therapy and pain medication.  4. Obesity   5. RBBB- not new    Get echocardiogram to rule out any " significant hypertensive heart disease or pulmonary pretension.    Diagnosis and plan of care discussed with patient and verbalized understanding.           Nic Guevara MD  03/15/21  13:56 EDT

## 2021-03-16 NOTE — DISCHARGE SUMMARY
Orthopedic Discharge Summary      Patient: Nitish Barfield      YOB: 1955    Medical Record Number: [id number]    Attending Physician: Costa Zuñiga MD  Consulting Physician(s):   Date of Admission: 10/17/2018  5:36 AM  Date of Discharge: 10/18/2018      Patient Active Problem List   Diagnosis   • Anxiety   • Depression   • Gastroesophageal reflux disease with esophagitis   • Generalized anxiety disorder   • Hyperlipidemia   • Hypertension   • Impaired glucose tolerance   • Renal insufficiency   • Primary osteoarthritis of right knee   • S/P total knee arthroplasty, left     Status Post: TOTAL KNEE ARTHROPLASTY AND ALL ASSOCIATED PROCEDURES      Allergies   Allergen Reactions   • Augmentin [Amoxicillin-Pot Clavulanate] Nausea Only and Nausea And Vomiting   • Methotrexate Diarrhea, Nausea And Vomiting and Other (See Comments)     Other reaction(s): Stomach Pain  Blisters, hair loss  Blisters, hair loss  Blisters, hair loss       Current Medications:     Discharge Medications      New Medications      Instructions Start Date   aspirin 325 MG EC tablet   325 mg, Oral, Every 12 Hours Scheduled      docusate sodium 100 MG capsule  Commonly known as:  COLACE   100 mg, Oral, 2 Times Daily PRN      ondansetron 4 MG tablet  Commonly known as:  ZOFRAN   4 mg, Oral, Every 8 Hours PRN      oxyCODONE-acetaminophen 7.5-325 MG per tablet  Commonly known as:  PERCOCET   1 tablet, Oral, Every 4 Hours PRN         Continue These Medications      Instructions Start Date   allopurinol 100 MG tablet  Commonly known as:  ZYLOPRIM   100 mg, Oral, Daily      ALPRAZolam 0.5 MG tablet  Commonly known as:  XANAX   0.5 mg, Oral, Every 8 Hours PRN      busPIRone 10 MG tablet  Commonly known as:  BUSPAR   20 mg, Oral, 2 Times Daily      chlorthalidone 50 MG tablet  Commonly known as:  HYGROTEN   50 mg, Oral, Daily      EDARBI 80 MG tablet tablet  Generic drug:  azilsartan medoxomil   80 mg, Oral, Every Morning      lansoprazole  Detail Level: Detailed 30 MG capsule  Commonly known as:  PREVACID   30 mg, Oral, Daily      leflunomide 10 MG tablet  Commonly known as:  ARAVA   10 mg, Oral, Daily      nebivolol 10 MG tablet  Commonly known as:  BYSTOLIC   10 mg, Oral, Daily      potassium chloride 10 MEQ CR tablet  Commonly known as:  K-DUR,KLOR-CON   20 mEq, Oral, Daily      TRINTELLIX 20 MG tablet  Generic drug:  Vortioxetine HBr   20 mg, Oral, Every Morning      vitamin D 29640 units capsule capsule  Commonly known as:  ERGOCALCIFEROL   50,000 Units, Oral, 2 Times Weekly         Stop These Medications    traMADol 50 MG tablet  Commonly known as:  ULTRAM                Past Medical History:   Diagnosis Date   • Allergy    • Anxiety    • Arthritis     RA, FINGERS, TOES   • Bleeding esophageal ulcer     history of   • Esophageal reflux    • GERD (gastroesophageal reflux disease)    • History of transfusion    • Hypertension    • Hypertension    • Osteoarthritis of right knee     sched TKA     Past Surgical History:   Procedure Laterality Date   • COLONOSCOPY     • ENDOSCOPY     • ESOPHAGOSCOPY / EGD      cauterize esophageal bleeding ulcers   • HYSTERECTOMY      OPEN   • KNEE SURGERY Left     ARTHROSCOPY   • TOTAL KNEE ARTHROPLASTY Left 4/11/2018    Procedure: TOTAL KNEE ARTHROPLASTY AND ALL ASSOCIATED PROCEDURES;  Surgeon: Costa Zuñiga MD;  Location: [facility] OR;  Service: Orthopedics   • TOTAL KNEE ARTHROPLASTY Right 10/17/2018    Procedure: TOTAL KNEE ARTHROPLASTY AND ALL ASSOCIATED PROCEDURES;  Surgeon: Costa Zuñiga MD;  Location: [facility] OR;  Service: Orthopedics   • WISDOM TOOTH EXTRACTION       Social History     Occupational History   • Not on file.     Social History Main Topics   • Smoking status: Never Smoker   • Smokeless tobacco: Never Used   • Alcohol use Yes      Comment: glass of rum nightly   • Drug use: No   • Sexual activity: Defer      Social History     Social History Narrative   • No narrative on file     Family History   Problem  Patient Specific Counseling (Will Not Stick From Patient To Patient): -\\nPatient to d/c topical Efudex. Apply vaseline to AA and use gentle cleansers to wash face while healing. Patient Specific Counseling (Will Not Stick From Patient To Patient): -\\n Patient recalls using Efudex cream BID [id number]. She D/C cream today. \\nAdvised patient to d/c cream and only use vaseline to aid in healing. \\nWill f/u in 2 months to recheck site. Relation Age of Onset   • Cancer Mother         bone   • Other Father         cardiac failure   • Heart disease Father    • Malig Hyperthermia Neg Hx          Physical Exam: 63 y.o. female  General Appearance:    Alert, cooperative, in no acute distress                      Vitals:    10/17/18 1518 10/17/18 1939 10/18/18 0107 10/18/18 0358   BP: 122/75 148/68 163/76 166/79   BP Location: Left arm Left arm Left arm Left arm   Patient Position: Lying Lying Lying Lying   Pulse: 75 79 70 78   Resp: 20 18 18 18   Temp:  97.6 °F (36.4 °C) 97.7 °F (36.5 °C) 97.8 °F (36.6 °C)   TempSrc: Oral Oral Oral Oral   SpO2: 98% 96% 95% 99%   Weight:       Height:            Head:    Normocephalic, without obvious abnormality, atraumatic   Eyes:            Lids and lashes normal, conjunctivae and sclerae normal, no   icterus, no pallor, corneas clear, PERRLA   Ears:    Ears appear intact with no abnormalities noted   Throat:   No oral lesions, no thrush, oral mucosa moist   Neck:   No adenopathy, supple, trachea midline, no thyromegaly, no    carotid bruit, no JVD   Back:     No kyphosis present, no scoliosis present, no skin lesions,       erythema or scars, no tenderness to percussion or                   palpation,   range of motion normal   Lungs:     Clear to auscultation,respirations regular, even and                   unlabored    Heart:    Regular rhythm and normal rate, normal S1 and S2, no            murmur, no gallop, no rub, no click   Chest Wall:    No abnormalities observed   Abdomen:     Normal bowel sounds, no masses, no organomegaly, soft        non-tender, non-distended, no guarding, no rebound                 tenderness   Rectal:     Deferred   Extremities:   Incision intact without signs or symptoms of infection.               Neurovascular status remains intact to operative extremity.      Moves all extremities well, no edema, no cyanosis, no              redness   Pulses:   Pulses palpable and equal bilaterally    Skin:   No bleeding, bruising or rash   Lymph nodes:   No palpable adenopathy   Neurologic:   Cranial nerves 2 - 12 grossly intact, sensation intact, DTR        present and equal bilaterally           Hospital Course:  63 y.o. female admitted to [facility] to services of oCsta Zuñiga MD with Primary osteoarthritis of right knee [M17.11]  Primary osteoarthritis of right knee [M17.11] on 10/17/2018 and underwent TOTAL KNEE ARTHROPLASTY AND ALL ASSOCIATED PROCEDURES  Per Costa Zuñiga MD. Antibiotic and VTE prophylaxis were per SCIP protocols. Post-operatively the patient transferred to the post-operative floor where the patient underwent mobilization therapy that included active as well as passive ROM exercises. Opioids were titrated to achieve appropriate pain management to allow for participation in mobilization exercises. Vital signs are now stable. The incision is intact without signs or symptoms of infection. Operative extremity neurovascular status remains intact.   Appropriate education re: incision care, activity levels, medications, and follow up visits was completed and all questions were answered. The patient is now deemed stable for discharge to Home.      DIAGNOSTIC TESTS:     Admission on 10/17/2018   Component Date Value Ref Range Status   • Potassium 10/17/2018 3.3* 3.5 - 5.2 mmol/L Final   • Glucose 10/17/2018 176* 65 - 99 mg/dL Final   • BUN 10/17/2018 26* 8 - 23 mg/dL Final   • Creatinine 10/17/2018 1.89* 0.57 - 1.00 mg/dL Final   • Sodium 10/17/2018 138  136 - 145 mmol/L Final   • Potassium 10/17/2018 4.2  3.5 - 5.2 mmol/L Final   • Chloride 10/17/2018 100  98 - 107 mmol/L Final   • CO2 10/17/2018 26.2  22.0 - 29.0 mmol/L Final   • Calcium 10/17/2018 8.9  8.8 - 10.5 mg/dL Final   • eGFR Non African Amer 10/17/2018 27* >60 mL/min/1.73 Final   • BUN/Creatinine Ratio 10/17/2018 13.8  7.0 - 25.0 Final   • Anion Gap 10/17/2018 11.8  mmol/L Final   • Glucose 10/18/2018 131* 65 - 99  mg/dL Final   • BUN 10/18/2018 32* 8 - 23 mg/dL Final   • Creatinine 10/18/2018 2.19* 0.57 - 1.00 mg/dL Final   • Sodium 10/18/2018 143  136 - 145 mmol/L Final   • Potassium 10/18/2018 4.3  3.5 - 5.2 mmol/L Final   • Chloride 10/18/2018 104  98 - 107 mmol/L Final   • CO2 10/18/2018 28.2  22.0 - 29.0 mmol/L Final   • Calcium 10/18/2018 8.5* 8.8 - 10.5 mg/dL Final   • eGFR Non African Amer 10/18/2018 23* >60 mL/min/1.73 Final   • BUN/Creatinine Ratio 10/18/2018 14.6  7.0 - 25.0 Final   • Anion Gap 10/18/2018 10.8  mmol/L Final   • WBC 10/18/2018 16.73* 4.80 - 10.80 10*3/mm3 Final   • RBC 10/18/2018 4.16* 4.20 - 5.40 10*6/mm3 Final   • Hemoglobin 10/18/2018 11.5* 12.0 - 16.0 g/dL Final   • Hematocrit 10/18/2018 37.1  37.0 - 47.0 % Final   • MCV 10/18/2018 89.2  81.0 - 99.0 fL Final   • MCH 10/18/2018 27.6  27.0 - 31.0 pg Final   • MCHC 10/18/2018 31.0  31.0 - 37.0 g/dL Final   • RDW 10/18/2018 15.8* 11.5 - 14.5 % Final   • RDW-SD 10/18/2018 51.2  37.0 - 54.0 fl Final   • MPV 10/18/2018 10.6* 7.4 - 10.4 fL Final   • Platelets 10/18/2018 349  140 - 500 10*3/mm3 Final   • Neutrophil % 10/18/2018 79.6* 45.0 - 70.0 % Final   • Lymphocyte % 10/18/2018 10.8* 20.0 - 45.0 % Final   • Monocyte % 10/18/2018 8.8* 3.0 - 8.0 % Final   • Eosinophil % 10/18/2018 0.0  0.0 - 4.0 % Final   • Basophil % 10/18/2018 0.4  0.0 - 2.0 % Final   • Immature Grans % 10/18/2018 0.4  0.0 - 0.5 % Final   • Neutrophils, Absolute 10/18/2018 13.31* 1.50 - 8.30 10*3/mm3 Final   • Lymphocytes, Absolute 10/18/2018 1.81  0.60 - 4.80 10*3/mm3 Final   • Monocytes, Absolute 10/18/2018 1.48* 0.00 - 1.00 10*3/mm3 Final   • Eosinophils, Absolute 10/18/2018 0.00* 0.10 - 0.30 10*3/mm3 Final   • Basophils, Absolute 10/18/2018 0.06  0.00 - 0.20 10*3/mm3 Final   • Immature Grans, Absolute 10/18/2018 0.07* 0.00 - 0.03 10*3/mm3 Final   • nRBC 10/18/2018 0.0  0.0 - 0.0 /100 WBC Final       No results found.    Discharge and Follow up Instructions:   See discharge  instructions and follow-up      Date: 10/18/2018    YUDITH Kinney      Time: Discharge less than 30 min

## 2021-04-05 ENCOUNTER — HOSPITAL ENCOUNTER (OUTPATIENT)
Dept: CARDIOLOGY | Facility: HOSPITAL | Age: 66
Discharge: HOME | End: 2021-04-05
Admitting: INTERNAL MEDICINE

## 2021-04-05 VITALS
WEIGHT: 207 LBS | HEIGHT: 68 IN | BODY MASS INDEX: 31.37 KG/M2 | SYSTOLIC BLOOD PRESSURE: 148 MMHG | DIASTOLIC BLOOD PRESSURE: 92 MMHG

## 2021-04-05 DIAGNOSIS — R94.31 ABNORMAL EKG: ICD-10-CM

## 2021-04-05 LAB
AORTIC DIMENSIONLESS INDEX: 1 (DI)
BH CV ECHO MEAS - ACS: 1.7 CM
BH CV ECHO MEAS - AO MAX PG: 6 MMHG
BH CV ECHO MEAS - AO MEAN PG (FULL): 1 MMHG
BH CV ECHO MEAS - AO MEAN PG: 3 MMHG
BH CV ECHO MEAS - AO ROOT AREA (BSA CORRECTED): 1.1
BH CV ECHO MEAS - AO ROOT AREA: 3.8 CM^2
BH CV ECHO MEAS - AO ROOT DIAM: 2.2 CM
BH CV ECHO MEAS - AO V2 MAX: 118 CM/SEC
BH CV ECHO MEAS - AO V2 MEAN: 73.1 CM/SEC
BH CV ECHO MEAS - AO V2 VTI: 22.8 CM
BH CV ECHO MEAS - AVA(I,A): 3 CM^2
BH CV ECHO MEAS - AVA(I,D): 3 CM^2
BH CV ECHO MEAS - BSA(HAYCOCK): 2.2 M^2
BH CV ECHO MEAS - BSA: 2.1 M^2
BH CV ECHO MEAS - BZI_BMI: 31.5 KILOGRAMS/M^2
BH CV ECHO MEAS - BZI_METRIC_HEIGHT: 172.7 CM
BH CV ECHO MEAS - BZI_METRIC_WEIGHT: 93.9 KG
BH CV ECHO MEAS - EDV(CUBED): 99.3 ML
BH CV ECHO MEAS - EDV(MOD-SP2): 67.8 ML
BH CV ECHO MEAS - EDV(MOD-SP4): 80.3 ML
BH CV ECHO MEAS - EDV(TEICH): 98.8 ML
BH CV ECHO MEAS - EF(CUBED): 74.9 %
BH CV ECHO MEAS - EF(MOD-BP): 59.3 %
BH CV ECHO MEAS - EF(MOD-SP2): 58.4 %
BH CV ECHO MEAS - EF(MOD-SP4): 63 %
BH CV ECHO MEAS - EF(TEICH): 66.9 %
BH CV ECHO MEAS - ESV(CUBED): 24.9 ML
BH CV ECHO MEAS - ESV(MOD-SP2): 28.2 ML
BH CV ECHO MEAS - ESV(MOD-SP4): 29.7 ML
BH CV ECHO MEAS - ESV(TEICH): 32.8 ML
BH CV ECHO MEAS - FS: 36.9 %
BH CV ECHO MEAS - IVS/LVPW: 1.1
BH CV ECHO MEAS - IVSD: 1.2 CM
BH CV ECHO MEAS - LAT PEAK E' VEL: 7.6 CM/SEC
BH CV ECHO MEAS - LV DIASTOLIC VOL/BSA (35-75): 38.7 ML/M^2
BH CV ECHO MEAS - LV MASS(C)D: 273.3 GRAMS
BH CV ECHO MEAS - LV MASS(C)DI: 131.7 GRAMS/M^2
BH CV ECHO MEAS - LV MAX PG: 4.5 MMHG
BH CV ECHO MEAS - LV MEAN PG: 2 MMHG
BH CV ECHO MEAS - LV SYSTOLIC VOL/BSA (12-30): 14.3 ML/M^2
BH CV ECHO MEAS - LV V1 MAX: 106 CM/SEC
BH CV ECHO MEAS - LV V1 MEAN: 71.6 CM/SEC
BH CV ECHO MEAS - LV V1 VTI: 22 CM
BH CV ECHO MEAS - LVIDD: 4.6 CM
BH CV ECHO MEAS - LVIDS: 2.9 CM
BH CV ECHO MEAS - LVLD AP2: 6.8 CM
BH CV ECHO MEAS - LVLD AP4: 6.5 CM
BH CV ECHO MEAS - LVLS AP2: 4.9 CM
BH CV ECHO MEAS - LVLS AP4: 5.5 CM
BH CV ECHO MEAS - LVOT AREA (M): 3.1 CM^2
BH CV ECHO MEAS - LVOT AREA: 3.1 CM^2
BH CV ECHO MEAS - LVOT DIAM: 2 CM
BH CV ECHO MEAS - LVPWD: 1.2 CM
BH CV ECHO MEAS - MED PEAK E' VEL: 6.2 CM/SEC
BH CV ECHO MEAS - MV A MAX VEL: 96.5 CM/SEC
BH CV ECHO MEAS - MV DEC SLOPE: 386 CM/SEC^2
BH CV ECHO MEAS - MV DEC TIME: 201 SEC
BH CV ECHO MEAS - MV E MAX VEL: 54.8 CM/SEC
BH CV ECHO MEAS - MV E/A: 0.57
BH CV ECHO MEAS - MV MEAN PG: 2 MMHG
BH CV ECHO MEAS - MV P1/2T MAX VEL: 81.4 CM/SEC
BH CV ECHO MEAS - MV P1/2T: 61.8 MSEC
BH CV ECHO MEAS - MV V2 MEAN: 56.1 CM/SEC
BH CV ECHO MEAS - MV V2 VTI: 18.9 CM
BH CV ECHO MEAS - MVA P1/2T LCG: 2.7 CM^2
BH CV ECHO MEAS - MVA(P1/2T): 3.6 CM^2
BH CV ECHO MEAS - MVA(VTI): 3.7 CM^2
BH CV ECHO MEAS - PA MAX PG: 1.9 MMHG
BH CV ECHO MEAS - PA V2 MAX: 69.2 CM/SEC
BH CV ECHO MEAS - QP/QS: 1
BH CV ECHO MEAS - RAP SYSTOLE: 3 MMHG
BH CV ECHO MEAS - RV MEAN PG: 1 MMHG
BH CV ECHO MEAS - RV V1 MEAN: 38.3 CM/SEC
BH CV ECHO MEAS - RV V1 VTI: 13.4 CM
BH CV ECHO MEAS - RVOT AREA: 5.3 CM^2
BH CV ECHO MEAS - RVOT DIAM: 2.6 CM
BH CV ECHO MEAS - SI(AO): 41.8 ML/M^2
BH CV ECHO MEAS - SI(CUBED): 35.9 ML/M^2
BH CV ECHO MEAS - SI(LVOT): 33.3 ML/M^2
BH CV ECHO MEAS - SI(MOD-SP2): 19.1 ML/M^2
BH CV ECHO MEAS - SI(MOD-SP4): 24.4 ML/M^2
BH CV ECHO MEAS - SI(TEICH): 31.9 ML/M^2
BH CV ECHO MEAS - SV(AO): 86.7 ML
BH CV ECHO MEAS - SV(CUBED): 74.4 ML
BH CV ECHO MEAS - SV(LVOT): 69.1 ML
BH CV ECHO MEAS - SV(MOD-SP2): 39.6 ML
BH CV ECHO MEAS - SV(MOD-SP4): 50.6 ML
BH CV ECHO MEAS - SV(RVOT): 71.1 ML
BH CV ECHO MEAS - SV(TEICH): 66.1 ML
BH CV ECHO MEAS - TAPSE (>1.6): 2.3 CM
BH CV ECHO MEAS - TR MAX VEL: 243.5 CM/SEC
BH CV ECHO MEASUREMENTS AVERAGE E/E' RATIO: 7.94
BH CV XLRA - TDI S': 10.1 CM/SEC
LEFT ATRIUM VOLUME INDEX: 21 ML/M2
MAXIMAL PREDICTED HEART RATE: 152 BPM
SINUS: 2.6 CM
STRESS TARGET HR: 129 BPM

## 2021-04-05 PROCEDURE — 93306 TTE W/DOPPLER COMPLETE: CPT | Performed by: INTERNAL MEDICINE

## 2021-04-05 PROCEDURE — 93306 TTE W/DOPPLER COMPLETE: CPT

## 2021-04-06 ENCOUNTER — TELEPHONE (OUTPATIENT)
Dept: CARDIOLOGY | Facility: CLINIC | Age: 66
End: 2021-04-06

## 2021-04-06 NOTE — TELEPHONE ENCOUNTER
Dr. Guevara not in the office today.  Please let patient know that echocardiogram shows that her heart is pumping strong.  She does have some mild thickness to the left lower side of her heart which could possibly be from her history of uncontrolled high blood pressure but also would like to make sure no sleep apnea symptoms.  Please see if she has been checking blood pressure at home since last visit and if so, how it has been running.  Would also make sure she does not have signs/symptoms of sleep apnea including history of snoring plus morning or daytime fatigue.

## 2021-04-07 ENCOUNTER — TELEPHONE (OUTPATIENT)
Dept: CARDIOLOGY | Facility: CLINIC | Age: 66
End: 2021-04-07

## 2021-04-07 NOTE — TELEPHONE ENCOUNTER
"Patient notified of results and recomendations and verbalized understanding    Re bp- is running higher than normal due to a hip injury she is being treated for currently due to the pain.  She was at physical therapy when I called.  When she gets home she will call with an updated bp log.    Re sleep apnea-  She doesn't want to go forward with this, even though she snores.  She stated \"I can't stand to ware anything on my face and I know I will not use the sleep apnea machine\"    Deanna Zamorano RN  Triage nurse      "

## 2021-04-07 NOTE — TELEPHONE ENCOUNTER
12:35 pm    Pt called again for echo results.  Please call back as she is very anxious and would like resutls today.     [facility] RMA

## 2021-07-23 ENCOUNTER — APPOINTMENT (OUTPATIENT)
Dept: CT IMAGING | Facility: HOSPITAL | Age: 66
End: 2021-07-23

## 2021-07-23 ENCOUNTER — HOSPITAL ENCOUNTER (INPATIENT)
Facility: HOSPITAL | Age: 66
LOS: 4 days | Discharge: HOME | End: 2021-07-27
Attending: INTERNAL MEDICINE | Admitting: INTERNAL MEDICINE

## 2021-07-23 ENCOUNTER — HOSPITAL ENCOUNTER (EMERGENCY)
Facility: HOSPITAL | Age: 66
Discharge: ANOTHER HOSPITAL | End: 2021-07-23
Attending: EMERGENCY MEDICINE | Admitting: EMERGENCY MEDICINE

## 2021-07-23 VITALS
TEMPERATURE: 97.8 F | SYSTOLIC BLOOD PRESSURE: 165 MMHG | OXYGEN SATURATION: 98 % | HEIGHT: 68 IN | HEART RATE: 87 BPM | DIASTOLIC BLOOD PRESSURE: 111 MMHG | RESPIRATION RATE: 18 BRPM | BODY MASS INDEX: 31.47 KG/M2

## 2021-07-23 DIAGNOSIS — K57.20 DIVERTICULITIS OF LARGE INTESTINE WITH PERFORATION AND ABSCESS WITHOUT BLEEDING: Primary | ICD-10-CM

## 2021-07-23 DIAGNOSIS — N83.202 LEFT OVARIAN CYST: ICD-10-CM

## 2021-07-23 DIAGNOSIS — K57.20 DIVERTICULITIS OF LARGE INTESTINE WITH ABSCESS WITHOUT BLEEDING: Primary | ICD-10-CM

## 2021-07-23 LAB
ALBUMIN SERPL-MCNC: 3 G/DL (ref 3.5–5.2)
ALBUMIN/GLOB SERPL: 0.9 G/DL
ALP SERPL-CCNC: 247 U/L (ref 39–117)
ALT SERPL W P-5'-P-CCNC: <5 U/L (ref 1–33)
ANION GAP SERPL CALCULATED.3IONS-SCNC: 14.9 MMOL/L (ref 5–15)
AST SERPL-CCNC: 7 U/L (ref 1–32)
BASOPHILS # BLD AUTO: 0.04 10*3/MM3 (ref 0–0.2)
BASOPHILS NFR BLD AUTO: 0.3 % (ref 0–1.5)
BILIRUB SERPL-MCNC: 0.3 MG/DL (ref 0–1.2)
BUN SERPL-MCNC: 18 MG/DL (ref 8–23)
BUN/CREAT SERPL: 19.8 (ref 7–25)
CALCIUM SPEC-SCNC: 9.1 MG/DL (ref 8.6–10.5)
CHLORIDE SERPL-SCNC: 98 MMOL/L (ref 98–107)
CO2 SERPL-SCNC: 23.1 MMOL/L (ref 22–29)
CREAT SERPL-MCNC: 0.91 MG/DL (ref 0.57–1)
D-LACTATE SERPL-SCNC: 1.2 MMOL/L (ref 0.5–2)
DEPRECATED RDW RBC AUTO: 52.3 FL (ref 37–54)
EOSINOPHIL # BLD AUTO: 0.02 10*3/MM3 (ref 0–0.4)
EOSINOPHIL NFR BLD AUTO: 0.1 % (ref 0.3–6.2)
ERYTHROCYTE [DISTWIDTH] IN BLOOD BY AUTOMATED COUNT: 16.2 % (ref 12.3–15.4)
FLUAV RNA RESP QL NAA+PROBE: NOT DETECTED
FLUBV RNA RESP QL NAA+PROBE: NOT DETECTED
GFR SERPL CREATININE-BSD FRML MDRD: 61 ML/MIN/1.73
GLOBULIN UR ELPH-MCNC: 3.3 GM/DL
GLUCOSE SERPL-MCNC: 101 MG/DL (ref 65–99)
HCT VFR BLD AUTO: 30.1 % (ref 34–46.6)
HGB BLD-MCNC: 9.1 G/DL (ref 12–15.9)
IMM GRANULOCYTES # BLD AUTO: 0.06 10*3/MM3 (ref 0–0.05)
IMM GRANULOCYTES NFR BLD AUTO: 0.4 % (ref 0–0.5)
LIPASE SERPL-CCNC: 21 U/L (ref 13–60)
LYMPHOCYTES # BLD AUTO: 1.13 10*3/MM3 (ref 0.7–3.1)
LYMPHOCYTES NFR BLD AUTO: 7.8 % (ref 19.6–45.3)
MCH RBC QN AUTO: 26.4 PG (ref 26.6–33)
MCHC RBC AUTO-ENTMCNC: 30.2 G/DL (ref 31.5–35.7)
MCV RBC AUTO: 87.2 FL (ref 79–97)
MONOCYTES # BLD AUTO: 1.16 10*3/MM3 (ref 0.1–0.9)
MONOCYTES NFR BLD AUTO: 8 % (ref 5–12)
NEUTROPHILS NFR BLD AUTO: 12.12 10*3/MM3 (ref 1.7–7)
NEUTROPHILS NFR BLD AUTO: 83.4 % (ref 42.7–76)
PLATELET # BLD AUTO: 641 10*3/MM3 (ref 140–450)
PMV BLD AUTO: 8.7 FL (ref 6–12)
POTASSIUM SERPL-SCNC: 3.8 MMOL/L (ref 3.5–5.2)
PROT SERPL-MCNC: 6.3 G/DL (ref 6–8.5)
QT INTERVAL: 398 MS
RBC # BLD AUTO: 3.45 10*6/MM3 (ref 3.77–5.28)
SARS-COV-2 RNA RESP QL NAA+PROBE: NOT DETECTED
SODIUM SERPL-SCNC: 136 MMOL/L (ref 136–145)
WBC # BLD AUTO: 14.53 10*3/MM3 (ref 3.4–10.8)

## 2021-07-23 PROCEDURE — 99284 EMERGENCY DEPT VISIT MOD MDM: CPT

## 2021-07-23 PROCEDURE — 85025 COMPLETE CBC W/AUTO DIFF WBC: CPT | Performed by: EMERGENCY MEDICINE

## 2021-07-23 PROCEDURE — 87636 SARSCOV2 & INF A&B AMP PRB: CPT | Performed by: EMERGENCY MEDICINE

## 2021-07-23 PROCEDURE — 96361 HYDRATE IV INFUSION ADD-ON: CPT

## 2021-07-23 PROCEDURE — 83690 ASSAY OF LIPASE: CPT | Performed by: EMERGENCY MEDICINE

## 2021-07-23 PROCEDURE — 93010 ELECTROCARDIOGRAM REPORT: CPT | Performed by: INTERNAL MEDICINE

## 2021-07-23 PROCEDURE — 74177 CT ABD & PELVIS W/CONTRAST: CPT

## 2021-07-23 PROCEDURE — 0K9P30Z DRAINAGE OF LEFT HIP MUSCLE WITH DRAINAGE DEVICE, PERCUTANEOUS APPROACH: ICD-10-PCS | Performed by: RADIOLOGY

## 2021-07-23 PROCEDURE — 80053 COMPREHEN METABOLIC PANEL: CPT | Performed by: EMERGENCY MEDICINE

## 2021-07-23 PROCEDURE — 99285 EMERGENCY DEPT VISIT HI MDM: CPT | Performed by: EMERGENCY MEDICINE

## 2021-07-23 PROCEDURE — 96367 TX/PROPH/DG ADDL SEQ IV INF: CPT

## 2021-07-23 PROCEDURE — 25010000002 ONDANSETRON PER 1 MG: Performed by: EMERGENCY MEDICINE

## 2021-07-23 PROCEDURE — 83605 ASSAY OF LACTIC ACID: CPT | Performed by: EMERGENCY MEDICINE

## 2021-07-23 PROCEDURE — 93005 ELECTROCARDIOGRAM TRACING: CPT | Performed by: EMERGENCY MEDICINE

## 2021-07-23 PROCEDURE — 96375 TX/PRO/DX INJ NEW DRUG ADDON: CPT

## 2021-07-23 PROCEDURE — 25010000002 CEFTRIAXONE SODIUM-DEXTROSE 1-3.74 GM-%(50ML) RECONSTITUTED SOLUTION: Performed by: EMERGENCY MEDICINE

## 2021-07-23 PROCEDURE — 0 IOPAMIDOL PER 1 ML: Performed by: EMERGENCY MEDICINE

## 2021-07-23 PROCEDURE — 96365 THER/PROPH/DIAG IV INF INIT: CPT

## 2021-07-23 PROCEDURE — 87040 BLOOD CULTURE FOR BACTERIA: CPT | Performed by: EMERGENCY MEDICINE

## 2021-07-23 RX ORDER — ONDANSETRON 2 MG/ML
4 INJECTION INTRAMUSCULAR; INTRAVENOUS ONCE
Status: COMPLETED | OUTPATIENT
Start: 2021-07-23 | End: 2021-07-23

## 2021-07-23 RX ORDER — HYDROCODONE BITARTRATE AND ACETAMINOPHEN 7.5; 325 MG/1; MG/1
1 TABLET ORAL EVERY 4 HOURS PRN
Status: DISCONTINUED | OUTPATIENT
Start: 2021-07-23 | End: 2021-07-27 | Stop reason: HOSPADM

## 2021-07-23 RX ORDER — SODIUM CHLORIDE 0.9 % (FLUSH) 0.9 %
10 SYRINGE (ML) INJECTION AS NEEDED
Status: DISCONTINUED | OUTPATIENT
Start: 2021-07-23 | End: 2021-07-23 | Stop reason: HOSPADM

## 2021-07-23 RX ORDER — SODIUM CHLORIDE 9 MG/ML
INJECTION, SOLUTION INTRAVENOUS
Status: DISCONTINUED
Start: 2021-07-23 | End: 2021-07-23 | Stop reason: HOSPADM

## 2021-07-23 RX ORDER — PANTOPRAZOLE SODIUM 40 MG/1
40 TABLET, DELAYED RELEASE ORAL EVERY MORNING
Status: DISCONTINUED | OUTPATIENT
Start: 2021-07-24 | End: 2021-07-27 | Stop reason: HOSPADM

## 2021-07-23 RX ORDER — SODIUM CHLORIDE 0.9 % (FLUSH) 0.9 %
10 SYRINGE (ML) INJECTION AS NEEDED
Status: DISCONTINUED | OUTPATIENT
Start: 2021-07-23 | End: 2021-07-27 | Stop reason: HOSPADM

## 2021-07-23 RX ORDER — BUSPIRONE HYDROCHLORIDE 10 MG/1
20 TABLET ORAL 2 TIMES DAILY
Status: DISCONTINUED | OUTPATIENT
Start: 2021-07-23 | End: 2021-07-27 | Stop reason: HOSPADM

## 2021-07-23 RX ORDER — MORPHINE SULFATE 2 MG/ML
1 INJECTION, SOLUTION INTRAMUSCULAR; INTRAVENOUS EVERY 4 HOURS PRN
Status: DISCONTINUED | OUTPATIENT
Start: 2021-07-23 | End: 2021-07-27 | Stop reason: HOSPADM

## 2021-07-23 RX ORDER — CEFTRIAXONE 1 G/50ML
1 INJECTION, SOLUTION INTRAVENOUS ONCE
Status: COMPLETED | OUTPATIENT
Start: 2021-07-23 | End: 2021-07-23

## 2021-07-23 RX ORDER — ALLOPURINOL 100 MG/1
100 TABLET ORAL DAILY
Status: DISCONTINUED | OUTPATIENT
Start: 2021-07-24 | End: 2021-07-27 | Stop reason: HOSPADM

## 2021-07-23 RX ORDER — ERGOCALCIFEROL 1.25 MG/1
50000 CAPSULE ORAL
Status: DISCONTINUED | OUTPATIENT
Start: 2021-07-28 | End: 2021-07-27 | Stop reason: HOSPADM

## 2021-07-23 RX ORDER — ACETAMINOPHEN 160 MG/5ML
650 SOLUTION ORAL EVERY 4 HOURS PRN
Status: DISCONTINUED | OUTPATIENT
Start: 2021-07-23 | End: 2021-07-27 | Stop reason: HOSPADM

## 2021-07-23 RX ORDER — CEFTRIAXONE SODIUM 1 G/50ML
1 INJECTION, SOLUTION INTRAVENOUS EVERY 24 HOURS
Status: DISCONTINUED | OUTPATIENT
Start: 2021-07-23 | End: 2021-07-27 | Stop reason: HOSPADM

## 2021-07-23 RX ORDER — NEBIVOLOL 10 MG/1
10 TABLET ORAL DAILY
Status: DISCONTINUED | OUTPATIENT
Start: 2021-07-24 | End: 2021-07-27 | Stop reason: HOSPADM

## 2021-07-23 RX ORDER — POTASSIUM CHLORIDE 750 MG/1
10 TABLET, FILM COATED, EXTENDED RELEASE ORAL DAILY
Status: DISCONTINUED | OUTPATIENT
Start: 2021-07-24 | End: 2021-07-27 | Stop reason: HOSPADM

## 2021-07-23 RX ORDER — ONDANSETRON 2 MG/ML
4 INJECTION INTRAMUSCULAR; INTRAVENOUS EVERY 6 HOURS PRN
Status: DISCONTINUED | OUTPATIENT
Start: 2021-07-23 | End: 2021-07-27 | Stop reason: HOSPADM

## 2021-07-23 RX ORDER — ACETAMINOPHEN 650 MG/1
650 SUPPOSITORY RECTAL EVERY 4 HOURS PRN
Status: DISCONTINUED | OUTPATIENT
Start: 2021-07-23 | End: 2021-07-27 | Stop reason: HOSPADM

## 2021-07-23 RX ORDER — SODIUM CHLORIDE 9 MG/ML
100 INJECTION, SOLUTION INTRAVENOUS CONTINUOUS
Status: DISCONTINUED | OUTPATIENT
Start: 2021-07-23 | End: 2021-07-25

## 2021-07-23 RX ORDER — SODIUM CHLORIDE 0.9 % (FLUSH) 0.9 %
10 SYRINGE (ML) INJECTION EVERY 12 HOURS SCHEDULED
Status: DISCONTINUED | OUTPATIENT
Start: 2021-07-23 | End: 2021-07-27 | Stop reason: HOSPADM

## 2021-07-23 RX ORDER — LEFLUNOMIDE 20 MG/1
20 TABLET ORAL DAILY
Status: DISCONTINUED | OUTPATIENT
Start: 2021-07-24 | End: 2021-07-24

## 2021-07-23 RX ORDER — ALPRAZOLAM 0.5 MG/1
0.5 TABLET ORAL EVERY 8 HOURS PRN
Status: DISCONTINUED | OUTPATIENT
Start: 2021-07-23 | End: 2021-07-24

## 2021-07-23 RX ORDER — NALOXONE HCL 0.4 MG/ML
0.4 VIAL (ML) INJECTION
Status: DISCONTINUED | OUTPATIENT
Start: 2021-07-23 | End: 2021-07-27 | Stop reason: HOSPADM

## 2021-07-23 RX ORDER — ACETAMINOPHEN 325 MG/1
650 TABLET ORAL EVERY 4 HOURS PRN
Status: DISCONTINUED | OUTPATIENT
Start: 2021-07-23 | End: 2021-07-27 | Stop reason: HOSPADM

## 2021-07-23 RX ADMIN — IOPAMIDOL 100 ML: 755 INJECTION, SOLUTION INTRAVENOUS at 15:52

## 2021-07-23 RX ADMIN — ONDANSETRON 4 MG: 2 INJECTION INTRAMUSCULAR; INTRAVENOUS at 14:46

## 2021-07-23 RX ADMIN — METRONIDAZOLE 500 MG: 500 INJECTION, SOLUTION INTRAVENOUS at 18:56

## 2021-07-23 RX ADMIN — SODIUM CHLORIDE 100 ML/HR: 9 INJECTION, SOLUTION INTRAVENOUS at 21:01

## 2021-07-23 RX ADMIN — SODIUM CHLORIDE 1000 ML: 9 INJECTION, SOLUTION INTRAVENOUS at 14:46

## 2021-07-23 RX ADMIN — CEFTRIAXONE 1 G: 1 INJECTION, SOLUTION INTRAVENOUS at 18:11

## 2021-07-23 RX ADMIN — SODIUM CHLORIDE, PRESERVATIVE FREE 10 ML: 5 INJECTION INTRAVENOUS at 21:01

## 2021-07-23 NOTE — ED NOTES
Danielle from lab here to draw blood cultures and lactic acid     Chrissy Jimenez, LUCIO  07/23/21 [id number]

## 2021-07-23 NOTE — ED PROVIDER NOTES
EMERGENCY DEPARTMENT ENCOUNTER    Room Number:  10/10  Date seen:  7/23/2021  PCP: Violeta Hammond MD  Historian: Patient      HPI:  Chief Complaint: Lightheaded  A complete HPI/ROS/PMH/PSH/SH/FH are unobtainable due to: Nothing  Context: Nitish Barfield is a 66 y.o. female who presents to the ED c/o episode of lightheadedness that occurred while she was shopping at [employer] today.  She reports that she got very lightheaded and then went to sit down in a chair by the pharmacy.  Someone gave her some water and then she proceeded to vomit it.  She reports that she has been having intermittent nausea and vomiting for several months now.  She is also had some intermittent left lower abdominal pain.  She has a history of left ovarian cyst but she feels like this is something else that is causing her pain next to her cyst.  She has had a prior hysterectomy sparing her ovaries.  She denies dysuria.  She reports that she has had some occasional diarrhea that is greasy.  She denies black or bloody stool.  She has had no fever or chills.  No chest pain or shortness of air.            PAST MEDICAL HISTORY  Active Ambulatory Problems     Diagnosis Date Noted   • Anxiety 02/04/2016   • Depression 02/04/2016   • Gastroesophageal reflux disease with esophagitis 02/04/2016   • Generalized anxiety disorder 02/04/2016   • Hyperlipidemia 02/04/2016   • Hypertension 02/04/2016   • Impaired glucose tolerance 02/04/2016   • Renal insufficiency 02/04/2016   • Status post total right knee replacement 04/27/2018   • Cyst of ovary 01/20/2019   • S/P total knee arthroplasty, left 08/11/2020   • RBBB 03/15/2021     Resolved Ambulatory Problems     Diagnosis Date Noted   • Generalized osteoarthritis 02/04/2016   • Knee instability 02/04/2016   • Knee pain 02/04/2016   • Primary osteoarthritis of right knee 02/09/2016   • Primary osteoarthritis of left knee 02/21/2017     Past Medical History:   Diagnosis Date   • Allergy    • Arthritis    • Bleeding  esophageal ulcer    • Esophageal reflux    • Fibroid    • GERD (gastroesophageal reflux disease)    • History of transfusion    • Osteoarthritis of right knee    • RA (rheumatoid arthritis) (CMS/[facility])          PAST SURGICAL HISTORY  Past Surgical History:   Procedure Laterality Date   • COLONOSCOPY     • ENDOSCOPY     • ESOPHAGOSCOPY / EGD      cauterize esophageal bleeding ulcers   • HYSTERECTOMY      JUAN DANIEL with OC for fibroids age 36   • KNEE SURGERY Left     ARTHROSCOPY   • TOTAL KNEE ARTHROPLASTY Left 4/11/2018    Procedure: TOTAL KNEE ARTHROPLASTY AND ALL ASSOCIATED PROCEDURES;  Surgeon: Costa Zuñiga MD;  Location:  [facility] OR;  Service: Orthopedics   • TOTAL KNEE ARTHROPLASTY Right 10/17/2018    Procedure: TOTAL KNEE ARTHROPLASTY AND ALL ASSOCIATED PROCEDURES;  Surgeon: Costa Zuñiga MD;  Location:  [facility] OR;  Service: Orthopedics   • WISDOM TOOTH EXTRACTION           FAMILY HISTORY  Family History   Problem Relation Age of Onset   • Cancer Mother         bone   • Breast cancer Mother    • Other Father         cardiac failure   • Heart disease Father    • Deep vein thrombosis Brother    • Malig Hyperthermia Neg Hx    • Ovarian cancer Neg Hx    • Uterine cancer Neg Hx          SOCIAL HISTORY  Social History     Socioeconomic History   • Marital status:      Spouse name: Not on file   • Number of children: Not on file   • Years of education: Not on file   • Highest education level: Not on file   Tobacco Use   • Smoking status: Never Smoker   • Smokeless tobacco: Never Used   Substance and Sexual Activity   • Alcohol use: Yes     Comment: glass of rum nightly   • Drug use: No   • Sexual activity: Not Currently     Partners: Male     Birth control/protection: Surgical     Comment: JUAN DANIEL with OC         ALLERGIES  Amoxicillin, Augmentin [amoxicillin-pot clavulanate], Clavulanic acid, Methotrexate, and Oxycodone        REVIEW OF SYSTEMS  Review of Systems   Review of all 14 systems is negative other than  stated in the HPI above.      PHYSICAL EXAM  ED Triage Vitals   Temp Heart Rate Resp BP SpO2   07/23/21 1406 07/23/21 1406 07/23/21 1406 07/23/21 1403 07/23/21 1406   98.8 °F (37.1 °C) 94 18 121/74 100 %      Temp src Heart Rate Source Patient Position BP Location FiO2 (%)   07/23/21 1406 07/23/21 1406 -- -- --   Oral Monitor            GENERAL: Awake and alert, no acute distress  HENT: nares patent  EYES: no scleral icterus  CV: regular rhythm, normal rate  RESPIRATORY: normal effort, lungs clear auscultation bilaterally  ABDOMEN: soft, nondistended, minimal left lower quadrant tenderness without rebound or guarding.  MUSCULOSKELETAL: no deformity  NEURO: alert, moves all extremities, follows commands  PSYCH:  calm, cooperative  SKIN: warm, dry    Vital signs and nursing notes reviewed.          LAB RESULTS  Recent Results (from the past 24 hour(s))   ECG 12 Lead    Collection Time: 07/23/21  2:28 PM   Result Value Ref Range    QT Interval 398 ms   Comprehensive Metabolic Panel    Collection Time: 07/23/21  2:45 PM    Specimen: Blood   Result Value Ref Range    Glucose 101 (H) 65 - 99 mg/dL    BUN 18 8 - 23 mg/dL    Creatinine 0.91 0.57 - 1.00 mg/dL    Sodium 136 136 - 145 mmol/L    Potassium 3.8 3.5 - 5.2 mmol/L    Chloride 98 98 - 107 mmol/L    CO2 23.1 22.0 - 29.0 mmol/L    Calcium 9.1 8.6 - 10.5 mg/dL    Total Protein 6.3 6.0 - 8.5 g/dL    Albumin 3.00 (L) 3.50 - 5.20 g/dL    ALT (SGPT) <5 1 - 33 U/L    AST (SGOT) 7 1 - 32 U/L    Alkaline Phosphatase 247 (H) 39 - 117 U/L    Total Bilirubin 0.3 0.0 - 1.2 mg/dL    eGFR Non African Amer 61 >60 mL/min/1.73    Globulin 3.3 gm/dL    A/G Ratio 0.9 g/dL    BUN/Creatinine Ratio 19.8 7.0 - 25.0    Anion Gap 14.9 5.0 - 15.0 mmol/L   Lipase    Collection Time: 07/23/21  2:45 PM    Specimen: Blood   Result Value Ref Range    Lipase 21 13 - 60 U/L   CBC Auto Differential    Collection Time: 07/23/21  2:45 PM    Specimen: Blood   Result Value Ref Range    WBC 14.53 (H) 3.40 -  10.80 10*3/mm3    RBC 3.45 (L) 3.77 - 5.28 10*6/mm3    Hemoglobin 9.1 (L) 12.0 - 15.9 g/dL    Hematocrit 30.1 (L) 34.0 - 46.6 %    MCV 87.2 79.0 - 97.0 fL    MCH 26.4 (L) 26.6 - 33.0 pg    MCHC 30.2 (L) 31.5 - 35.7 g/dL    RDW 16.2 (H) 12.3 - 15.4 %    RDW-SD 52.3 37.0 - 54.0 fl    MPV 8.7 6.0 - 12.0 fL    Platelets 641 (H) 140 - 450 10*3/mm3    Neutrophil % 83.4 (H) 42.7 - 76.0 %    Lymphocyte % 7.8 (L) 19.6 - 45.3 %    Monocyte % 8.0 5.0 - 12.0 %    Eosinophil % 0.1 (L) 0.3 - 6.2 %    Basophil % 0.3 0.0 - 1.5 %    Immature Grans % 0.4 0.0 - 0.5 %    Neutrophils, Absolute 12.12 (H) 1.70 - 7.00 10*3/mm3    Lymphocytes, Absolute 1.13 0.70 - 3.10 10*3/mm3    Monocytes, Absolute 1.16 (H) 0.10 - 0.90 10*3/mm3    Eosinophils, Absolute 0.02 0.00 - 0.40 10*3/mm3    Basophils, Absolute 0.04 0.00 - 0.20 10*3/mm3    Immature Grans, Absolute 0.06 (H) 0.00 - 0.05 10*3/mm3       Ordered the above labs and reviewed the results.        RADIOLOGY  CT Abdomen Pelvis With Contrast    Result Date: 7/23/2021  CT Abdomen Pelvis W INDICATION: Left lower quadrant pain TECHNIQUE: CT of the abdomen and pelvis with IV contrast. Coronal and sagittal reconstructions were obtained.  Radiation dose reduction techniques included automated exposure control or exposure modulation based on body size. Count of known CT and cardiac nuc med studies performed in previous 12 months: 0. COMPARISON: CT of the abdomen and pelvis from 12/2/18 FINDINGS: Abdomen: Lung bases are clear. Liver, spleen and pancreas are unremarkable. There is a small hiatal hernia. Pelvis: Prominent left ovarian cyst is again noted and may have slightly increased from the prior CT. This measures about 9.4 cm. On the prior this measured about 7.1 cm. Diverticulitis is noted involving the patient's left sigmoid colon with associated inflammation and fat stranding. There is an associated abscess in the patient's left iliacus muscle region entering about 7 x 4.6 cm. There may be a  potential small fistula from the colon into this abscess, as there was potential perforation that may have been chronic seen on the prior CT. AVN is seen involving the patient's right femoral head.     1. Findings are consistent with perforated sigmoid diverticulitis with a prominent abscess in the patient's left iliacus muscle. It is uncertain if there may be a small fistula from the colon into this abscess. NOTIFICATION: Critical Value/emergent results were called by telephone at the time of interpretation on 7/23/2021 4:05 PM to Dr. Obed MD who verbally acknowledged these results. 2. Interval increase in size of the left ovarian cystic lesion. This may be followed up nonemergently with OB/GYN. 3. AVN right femoral head Signer Name: Deanna Segal MD  Signed: 7/23/2021 4:16 PM  Workstation Name: [id number]  [facility]      Ordered the above noted radiological studies. Reviewed by me in PACS.            PROCEDURES  Procedures          MEDICATIONS GIVEN IN ER  Medications   sodium chloride 0.9 % flush 10 mL (has no administration in time range)   cefTRIAXone (ROCEPHIN) IVPB 1 g/50ml dextrose (premix) (has no administration in time range)   metroNIDAZOLE (FLAGYL) 500 mg/100mL IVPB (has no administration in time range)   ondansetron (ZOFRAN) injection 4 mg (4 mg Intravenous Given 7/23/21 1446)   sodium chloride 0.9 % bolus 1,000 mL (1,000 mL Intravenous New Bag 7/23/21 1446)   iopamidol (ISOVUE-370) 76 % injection 100 mL (100 mL Intravenous Given 7/23/21 1552)                   MEDICAL DECISION MAKING, PROGRESS, and CONSULTS    All labs have been independently reviewed by me.  All radiology studies have been reviewed by me and discussed with radiologist dictating the report.   EKG's independently viewed and interpreted by me.  Discussion below represents my analysis of pertinent findings related to patient's condition, differential diagnosis, treatment plan and final  disposition.      Differential diagnosis includes but is not limited to:  Vasovagal syncope  Orthostatic hypotension  Gastroenteritis  Diverticulitis  Ovarian cyst      ED Course as of Jul 23 1730 Fri Jul 23, 2021   1411 Medical record review: I reviewed ER visit from 12/2/2018 patient was seen for nausea, vomiting, diarrhea.  She was found to have a left ovarian cyst.    [JR]   1439 EKG          EKG time: 1428  Rhythm/Rate: Sinus rhythm, 86  P waves and AZ: Normal  QRS, axis: RBBB  ST and T waves: No acute ischemic changes    Interpreted Contemporaneously by me, independently viewed  Similar compared to prior 3/15/2021          [JR]   1551 WBC(!): 14.53 [JR]   1552 Hemoglobin(!): 9.1 [JR]   1552 Lipase: 21 [JR]   1552 Alkaline Phosphatase(!): 247 [JR]   1552 Sodium: 136 [JR]   1552 Creatinine: 0.91 [JR]   1614 CT abdomen pelvis independently reviewed by me in PACS and demonstrates a abscess within the left lower pelvic region likely secondary to a perforated diverticulitis.  Also discussed this finding with the radiologist.  I have placed a general surgery consult.  I have disclosed these findings to the patient.  I have ordered Rocephin and Flagyl.    [JR]   1619 Discussed with Dr. Ray, general surgery at [facility], who reports that patient will require transfer due to lack of interventional radiology capability here to place a percutaneous drain in the abscess.    [JR]   1655 Awaiting call from [facility] transfer center in order to facilitate transfer.  I have discussed patient's case with Dr. Larios here in the emergency department who will continue to monitor the patient while she is awaiting transfer.    [JR]   1729 Discussed with Dr. Masters, surgery, at [facility] who will consult.  Requested [facility] admit.  Dr. Rice accepts transfer.     [JR]      ED Course User Index  [JR] South Clay MD              I wore a mask, face shield, and gloves during this patient  encounter.  Patient also wearing a surgical mask.  Hand hygeine performed before and after seeing the patient.    DIAGNOSIS  Final diagnoses:   Diverticulitis of large intestine with perforation and abscess without bleeding   Left ovarian cyst         DISPOSITION  Transfer            Latest Documented Vital Signs:  As of 17:30 EDT  BP- 124/60 HR- 94 Temp- 98.8 °F (37.1 °C) (Oral) O2 sat- 100%        --    Please note that portions of this were completed with a voice recognition program.          South Clay MD  07/23/21 9304

## 2021-07-23 NOTE — ED NOTES
ER Tech attempted to draw BC and Lactic Acid without success. Lab called to come draw labs     Chrissy Jimenez RN  07/23/21 1944

## 2021-07-23 NOTE — ED NOTES
Call placed to [facility] for on call surgeon, awaiting call back.      Andrey Silvestre  07/23/21 0628

## 2021-07-24 ENCOUNTER — APPOINTMENT (OUTPATIENT)
Dept: CT IMAGING | Facility: HOSPITAL | Age: 66
End: 2021-07-24

## 2021-07-24 LAB
ALBUMIN SERPL-MCNC: 2.4 G/DL (ref 3.5–5.2)
ALBUMIN/GLOB SERPL: 0.8 G/DL
ALP SERPL-CCNC: 187 U/L (ref 39–117)
ALT SERPL W P-5'-P-CCNC: <5 U/L (ref 1–33)
ANION GAP SERPL CALCULATED.3IONS-SCNC: 9.8 MMOL/L (ref 5–15)
APTT PPP: 37.8 SECONDS (ref 22.7–35.4)
AST SERPL-CCNC: <5 U/L (ref 1–32)
BASOPHILS # BLD AUTO: 0.07 10*3/MM3 (ref 0–0.2)
BASOPHILS NFR BLD AUTO: 0.7 % (ref 0–1.5)
BILIRUB SERPL-MCNC: 0.2 MG/DL (ref 0–1.2)
BUN SERPL-MCNC: 17 MG/DL (ref 8–23)
BUN/CREAT SERPL: 19.1 (ref 7–25)
CALCIUM SPEC-SCNC: 8.2 MG/DL (ref 8.6–10.5)
CHLORIDE SERPL-SCNC: 104 MMOL/L (ref 98–107)
CO2 SERPL-SCNC: 23.2 MMOL/L (ref 22–29)
CREAT SERPL-MCNC: 0.89 MG/DL (ref 0.57–1)
D-LACTATE SERPL-SCNC: 0.7 MMOL/L (ref 0.5–2)
DEPRECATED RDW RBC AUTO: 46.4 FL (ref 37–54)
EOSINOPHIL # BLD AUTO: 0.07 10*3/MM3 (ref 0–0.4)
EOSINOPHIL NFR BLD AUTO: 0.7 % (ref 0.3–6.2)
ERYTHROCYTE [DISTWIDTH] IN BLOOD BY AUTOMATED COUNT: 14.9 % (ref 12.3–15.4)
GFR SERPL CREATININE-BSD FRML MDRD: 63 ML/MIN/1.73
GLOBULIN UR ELPH-MCNC: 2.9 GM/DL
GLUCOSE SERPL-MCNC: 77 MG/DL (ref 65–99)
HBA1C MFR BLD: 4.9 % (ref 4.8–5.6)
HCT VFR BLD AUTO: 26.1 % (ref 34–46.6)
HGB BLD-MCNC: 8 G/DL (ref 12–15.9)
IMM GRANULOCYTES # BLD AUTO: 0.04 10*3/MM3 (ref 0–0.05)
IMM GRANULOCYTES NFR BLD AUTO: 0.4 % (ref 0–0.5)
INR PPP: 1.28 (ref 0.9–1.1)
LYMPHOCYTES # BLD AUTO: 1.54 10*3/MM3 (ref 0.7–3.1)
LYMPHOCYTES NFR BLD AUTO: 15.6 % (ref 19.6–45.3)
MCH RBC QN AUTO: 26.2 PG (ref 26.6–33)
MCHC RBC AUTO-ENTMCNC: 30.7 G/DL (ref 31.5–35.7)
MCV RBC AUTO: 85.6 FL (ref 79–97)
MONOCYTES # BLD AUTO: 1.22 10*3/MM3 (ref 0.1–0.9)
MONOCYTES NFR BLD AUTO: 12.4 % (ref 5–12)
NEUTROPHILS NFR BLD AUTO: 6.92 10*3/MM3 (ref 1.7–7)
NEUTROPHILS NFR BLD AUTO: 70.2 % (ref 42.7–76)
NRBC BLD AUTO-RTO: 0 /100 WBC (ref 0–0.2)
PLATELET # BLD AUTO: 568 10*3/MM3 (ref 140–450)
PMV BLD AUTO: 8.5 FL (ref 6–12)
POTASSIUM SERPL-SCNC: 3.5 MMOL/L (ref 3.5–5.2)
PROT SERPL-MCNC: 5.3 G/DL (ref 6–8.5)
PROTHROMBIN TIME: 15.8 SECONDS (ref 11.7–14.2)
RBC # BLD AUTO: 3.05 10*6/MM3 (ref 3.77–5.28)
SODIUM SERPL-SCNC: 137 MMOL/L (ref 136–145)
WBC # BLD AUTO: 9.86 10*3/MM3 (ref 3.4–10.8)

## 2021-07-24 PROCEDURE — 87070 CULTURE OTHR SPECIMN AEROBIC: CPT | Performed by: INTERNAL MEDICINE

## 2021-07-24 PROCEDURE — 87077 CULTURE AEROBIC IDENTIFY: CPT | Performed by: INTERNAL MEDICINE

## 2021-07-24 PROCEDURE — 49406 IMAGE CATH FLUID PERI/RETRO: CPT

## 2021-07-24 PROCEDURE — 87205 SMEAR GRAM STAIN: CPT | Performed by: INTERNAL MEDICINE

## 2021-07-24 PROCEDURE — 85610 PROTHROMBIN TIME: CPT | Performed by: INTERNAL MEDICINE

## 2021-07-24 PROCEDURE — 83605 ASSAY OF LACTIC ACID: CPT | Performed by: INTERNAL MEDICINE

## 2021-07-24 PROCEDURE — 87186 SC STD MICRODIL/AGAR DIL: CPT | Performed by: INTERNAL MEDICINE

## 2021-07-24 PROCEDURE — 80053 COMPREHEN METABOLIC PANEL: CPT | Performed by: INTERNAL MEDICINE

## 2021-07-24 PROCEDURE — 87075 CULTR BACTERIA EXCEPT BLOOD: CPT | Performed by: INTERNAL MEDICINE

## 2021-07-24 PROCEDURE — 75989 ABSCESS DRAINAGE UNDER X-RAY: CPT

## 2021-07-24 PROCEDURE — C1729 CATH, DRAINAGE: HCPCS

## 2021-07-24 PROCEDURE — 25010000002 CEFTRIAXONE PER 250 MG: Performed by: INTERNAL MEDICINE

## 2021-07-24 PROCEDURE — 25010000003 LIDOCAINE 1 % SOLUTION: Performed by: RADIOLOGY

## 2021-07-24 PROCEDURE — 85025 COMPLETE CBC W/AUTO DIFF WBC: CPT | Performed by: INTERNAL MEDICINE

## 2021-07-24 PROCEDURE — 85730 THROMBOPLASTIN TIME PARTIAL: CPT | Performed by: INTERNAL MEDICINE

## 2021-07-24 PROCEDURE — 83036 HEMOGLOBIN GLYCOSYLATED A1C: CPT | Performed by: INTERNAL MEDICINE

## 2021-07-24 RX ORDER — ALPRAZOLAM 1 MG/1
1 TABLET ORAL EVERY 8 HOURS PRN
Status: DISCONTINUED | OUTPATIENT
Start: 2021-07-24 | End: 2021-07-27 | Stop reason: HOSPADM

## 2021-07-24 RX ORDER — ALPRAZOLAM 0.5 MG/1
0.5 TABLET ORAL EVERY 8 HOURS PRN
Status: DISCONTINUED | OUTPATIENT
Start: 2021-07-24 | End: 2021-07-27 | Stop reason: HOSPADM

## 2021-07-24 RX ORDER — LIDOCAINE HYDROCHLORIDE 10 MG/ML
20 INJECTION, SOLUTION INFILTRATION; PERINEURAL ONCE
Status: COMPLETED | OUTPATIENT
Start: 2021-07-24 | End: 2021-07-24

## 2021-07-24 RX ADMIN — HYDROCODONE BITARTRATE AND ACETAMINOPHEN 1 TABLET: 7.5; 325 TABLET ORAL at 18:42

## 2021-07-24 RX ADMIN — NEBIVOLOL HYDROCHLORIDE 10 MG: 10 TABLET ORAL at 11:06

## 2021-07-24 RX ADMIN — METRONIDAZOLE 500 MG: 500 INJECTION, SOLUTION INTRAVENOUS at 03:23

## 2021-07-24 RX ADMIN — PANTOPRAZOLE SODIUM 40 MG: 40 TABLET, DELAYED RELEASE ORAL at 18:47

## 2021-07-24 RX ADMIN — HYDROCODONE BITARTRATE AND ACETAMINOPHEN 1 TABLET: 7.5; 325 TABLET ORAL at 22:35

## 2021-07-24 RX ADMIN — ALPRAZOLAM 1 MG: 1 TABLET ORAL at 13:55

## 2021-07-24 RX ADMIN — SODIUM CHLORIDE 100 ML/HR: 9 INJECTION, SOLUTION INTRAVENOUS at 09:42

## 2021-07-24 RX ADMIN — LIDOCAINE HYDROCHLORIDE 20 ML: 10 INJECTION, SOLUTION INFILTRATION; PERINEURAL at 14:55

## 2021-07-24 RX ADMIN — CEFTRIAXONE SODIUM 1 G: 1 INJECTION, SOLUTION INTRAVENOUS at 18:41

## 2021-07-24 RX ADMIN — POTASSIUM CHLORIDE 10 MEQ: 750 TABLET, EXTENDED RELEASE ORAL at 18:43

## 2021-07-24 RX ADMIN — BUSPIRONE HYDROCHLORIDE 20 MG: 10 TABLET ORAL at 20:50

## 2021-07-24 RX ADMIN — SODIUM CHLORIDE, PRESERVATIVE FREE 10 ML: 5 INJECTION INTRAVENOUS at 20:50

## 2021-07-24 RX ADMIN — METRONIDAZOLE 500 MG: 500 INJECTION, SOLUTION INTRAVENOUS at 19:32

## 2021-07-24 RX ADMIN — METRONIDAZOLE 500 MG: 500 INJECTION, SOLUTION INTRAVENOUS at 12:47

## 2021-07-25 LAB
ALBUMIN SERPL-MCNC: 2.4 G/DL (ref 3.5–5.2)
ALBUMIN/GLOB SERPL: 0.8 G/DL
ALP SERPL-CCNC: 179 U/L (ref 39–117)
ALT SERPL W P-5'-P-CCNC: <5 U/L (ref 1–33)
ANION GAP SERPL CALCULATED.3IONS-SCNC: 13.3 MMOL/L (ref 5–15)
AST SERPL-CCNC: 10 U/L (ref 1–32)
BASOPHILS # BLD AUTO: 0.05 10*3/MM3 (ref 0–0.2)
BASOPHILS NFR BLD AUTO: 0.6 % (ref 0–1.5)
BILIRUB SERPL-MCNC: 0.2 MG/DL (ref 0–1.2)
BUN SERPL-MCNC: 12 MG/DL (ref 8–23)
BUN/CREAT SERPL: 15.6 (ref 7–25)
CALCIUM SPEC-SCNC: 8.1 MG/DL (ref 8.6–10.5)
CHLORIDE SERPL-SCNC: 108 MMOL/L (ref 98–107)
CO2 SERPL-SCNC: 18.7 MMOL/L (ref 22–29)
CREAT SERPL-MCNC: 0.77 MG/DL (ref 0.57–1)
DEPRECATED RDW RBC AUTO: 44.6 FL (ref 37–54)
EOSINOPHIL # BLD AUTO: 0.11 10*3/MM3 (ref 0–0.4)
EOSINOPHIL NFR BLD AUTO: 1.3 % (ref 0.3–6.2)
ERYTHROCYTE [DISTWIDTH] IN BLOOD BY AUTOMATED COUNT: 14.7 % (ref 12.3–15.4)
FOLATE SERPL-MCNC: 4.44 NG/ML (ref 4.78–24.2)
GFR SERPL CREATININE-BSD FRML MDRD: 75 ML/MIN/1.73
GLOBULIN UR ELPH-MCNC: 2.9 GM/DL
GLUCOSE SERPL-MCNC: 73 MG/DL (ref 65–99)
HCT VFR BLD AUTO: 25 % (ref 34–46.6)
HGB BLD-MCNC: 7.6 G/DL (ref 12–15.9)
IMM GRANULOCYTES # BLD AUTO: 0.04 10*3/MM3 (ref 0–0.05)
IMM GRANULOCYTES NFR BLD AUTO: 0.5 % (ref 0–0.5)
LYMPHOCYTES # BLD AUTO: 1.27 10*3/MM3 (ref 0.7–3.1)
LYMPHOCYTES NFR BLD AUTO: 14.8 % (ref 19.6–45.3)
MCH RBC QN AUTO: 25.8 PG (ref 26.6–33)
MCHC RBC AUTO-ENTMCNC: 30.4 G/DL (ref 31.5–35.7)
MCV RBC AUTO: 84.7 FL (ref 79–97)
MONOCYTES # BLD AUTO: 1.11 10*3/MM3 (ref 0.1–0.9)
MONOCYTES NFR BLD AUTO: 12.9 % (ref 5–12)
NEUTROPHILS NFR BLD AUTO: 6 10*3/MM3 (ref 1.7–7)
NEUTROPHILS NFR BLD AUTO: 69.9 % (ref 42.7–76)
NRBC BLD AUTO-RTO: 0 /100 WBC (ref 0–0.2)
PLATELET # BLD AUTO: 581 10*3/MM3 (ref 140–450)
PMV BLD AUTO: 8.5 FL (ref 6–12)
POTASSIUM SERPL-SCNC: 3.5 MMOL/L (ref 3.5–5.2)
PROT SERPL-MCNC: 5.3 G/DL (ref 6–8.5)
RBC # BLD AUTO: 2.95 10*6/MM3 (ref 3.77–5.28)
RETICS # AUTO: 0.04 10*6/MM3 (ref 0.02–0.13)
RETICS/RBC NFR AUTO: 1.43 % (ref 0.7–1.9)
SODIUM SERPL-SCNC: 140 MMOL/L (ref 136–145)
VIT B12 BLD-MCNC: 622 PG/ML (ref 211–946)
WBC # BLD AUTO: 8.58 10*3/MM3 (ref 3.4–10.8)

## 2021-07-25 PROCEDURE — 82607 VITAMIN B-12: CPT | Performed by: INTERNAL MEDICINE

## 2021-07-25 PROCEDURE — 99221 1ST HOSP IP/OBS SF/LOW 40: CPT | Performed by: SURGERY

## 2021-07-25 PROCEDURE — 80053 COMPREHEN METABOLIC PANEL: CPT | Performed by: INTERNAL MEDICINE

## 2021-07-25 PROCEDURE — 85025 COMPLETE CBC W/AUTO DIFF WBC: CPT | Performed by: INTERNAL MEDICINE

## 2021-07-25 PROCEDURE — 82746 ASSAY OF FOLIC ACID SERUM: CPT | Performed by: INTERNAL MEDICINE

## 2021-07-25 PROCEDURE — 85045 AUTOMATED RETICULOCYTE COUNT: CPT | Performed by: INTERNAL MEDICINE

## 2021-07-25 PROCEDURE — 25010000002 CEFTRIAXONE PER 250 MG: Performed by: INTERNAL MEDICINE

## 2021-07-25 RX ORDER — FOLIC ACID 1 MG/1
1 TABLET ORAL DAILY
Status: DISCONTINUED | OUTPATIENT
Start: 2021-07-25 | End: 2021-07-27 | Stop reason: HOSPADM

## 2021-07-25 RX ADMIN — CALCIUM CARBONATE-VITAMIN D TAB 500 MG-200 UNIT 500 MG: 500-200 TAB at 09:32

## 2021-07-25 RX ADMIN — METRONIDAZOLE 500 MG: 500 INJECTION, SOLUTION INTRAVENOUS at 11:54

## 2021-07-25 RX ADMIN — METRONIDAZOLE 500 MG: 500 INJECTION, SOLUTION INTRAVENOUS at 02:34

## 2021-07-25 RX ADMIN — VORTIOXETINE 20 MG: 5 TABLET, FILM COATED ORAL at 06:38

## 2021-07-25 RX ADMIN — NEBIVOLOL HYDROCHLORIDE 10 MG: 10 TABLET ORAL at 09:32

## 2021-07-25 RX ADMIN — ALPRAZOLAM 1 MG: 1 TABLET ORAL at 09:39

## 2021-07-25 RX ADMIN — BUSPIRONE HYDROCHLORIDE 20 MG: 10 TABLET ORAL at 20:32

## 2021-07-25 RX ADMIN — FOLIC ACID 1 MG: 1 TABLET ORAL at 20:33

## 2021-07-25 RX ADMIN — BUSPIRONE HYDROCHLORIDE 20 MG: 10 TABLET ORAL at 09:32

## 2021-07-25 RX ADMIN — POTASSIUM CHLORIDE 10 MEQ: 750 TABLET, EXTENDED RELEASE ORAL at 09:34

## 2021-07-25 RX ADMIN — ALLOPURINOL 100 MG: 100 TABLET ORAL at 09:32

## 2021-07-25 RX ADMIN — SODIUM CHLORIDE 100 ML/HR: 9 INJECTION, SOLUTION INTRAVENOUS at 11:55

## 2021-07-25 RX ADMIN — HYDROCODONE BITARTRATE AND ACETAMINOPHEN 1 TABLET: 7.5; 325 TABLET ORAL at 19:58

## 2021-07-25 RX ADMIN — HYDROCODONE BITARTRATE AND ACETAMINOPHEN 1 TABLET: 7.5; 325 TABLET ORAL at 06:38

## 2021-07-25 RX ADMIN — METRONIDAZOLE 500 MG: 500 INJECTION, SOLUTION INTRAVENOUS at 19:59

## 2021-07-25 RX ADMIN — SODIUM CHLORIDE, PRESERVATIVE FREE 10 ML: 5 INJECTION INTRAVENOUS at 20:33

## 2021-07-25 RX ADMIN — SODIUM CHLORIDE 100 ML/HR: 9 INJECTION, SOLUTION INTRAVENOUS at 02:34

## 2021-07-25 RX ADMIN — CEFTRIAXONE SODIUM 1 G: 1 INJECTION, SOLUTION INTRAVENOUS at 17:03

## 2021-07-25 RX ADMIN — PANTOPRAZOLE SODIUM 40 MG: 40 TABLET, DELAYED RELEASE ORAL at 06:38

## 2021-07-26 PROBLEM — E87.6 HYPOKALEMIA: Status: ACTIVE | Noted: 2021-07-26

## 2021-07-26 LAB
ANION GAP SERPL CALCULATED.3IONS-SCNC: 13 MMOL/L (ref 5–15)
BASOPHILS # BLD AUTO: 0.05 10*3/MM3 (ref 0–0.2)
BASOPHILS NFR BLD AUTO: 0.6 % (ref 0–1.5)
BUN SERPL-MCNC: 8 MG/DL (ref 8–23)
BUN/CREAT SERPL: 10.5 (ref 7–25)
CALCIUM SPEC-SCNC: 8.5 MG/DL (ref 8.6–10.5)
CHLORIDE SERPL-SCNC: 111 MMOL/L (ref 98–107)
CO2 SERPL-SCNC: 19 MMOL/L (ref 22–29)
CREAT SERPL-MCNC: 0.76 MG/DL (ref 0.57–1)
DEPRECATED RDW RBC AUTO: 45.5 FL (ref 37–54)
EOSINOPHIL # BLD AUTO: 0.13 10*3/MM3 (ref 0–0.4)
EOSINOPHIL NFR BLD AUTO: 1.6 % (ref 0.3–6.2)
ERYTHROCYTE [DISTWIDTH] IN BLOOD BY AUTOMATED COUNT: 14.6 % (ref 12.3–15.4)
GFR SERPL CREATININE-BSD FRML MDRD: 76 ML/MIN/1.73
GLUCOSE SERPL-MCNC: 104 MG/DL (ref 65–99)
HCT VFR BLD AUTO: 29.7 % (ref 34–46.6)
HGB BLD-MCNC: 8.9 G/DL (ref 12–15.9)
IMM GRANULOCYTES # BLD AUTO: 0.03 10*3/MM3 (ref 0–0.05)
IMM GRANULOCYTES NFR BLD AUTO: 0.4 % (ref 0–0.5)
LYMPHOCYTES # BLD AUTO: 1.81 10*3/MM3 (ref 0.7–3.1)
LYMPHOCYTES NFR BLD AUTO: 22.9 % (ref 19.6–45.3)
MAGNESIUM SERPL-MCNC: 2 MG/DL (ref 1.6–2.4)
MCH RBC QN AUTO: 25.6 PG (ref 26.6–33)
MCHC RBC AUTO-ENTMCNC: 30 G/DL (ref 31.5–35.7)
MCV RBC AUTO: 85.3 FL (ref 79–97)
MONOCYTES # BLD AUTO: 0.98 10*3/MM3 (ref 0.1–0.9)
MONOCYTES NFR BLD AUTO: 12.4 % (ref 5–12)
NEUTROPHILS NFR BLD AUTO: 4.91 10*3/MM3 (ref 1.7–7)
NEUTROPHILS NFR BLD AUTO: 62.1 % (ref 42.7–76)
NRBC BLD AUTO-RTO: 0 /100 WBC (ref 0–0.2)
PLATELET # BLD AUTO: 759 10*3/MM3 (ref 140–450)
PMV BLD AUTO: 8.5 FL (ref 6–12)
POTASSIUM SERPL-SCNC: 3.2 MMOL/L (ref 3.5–5.2)
RBC # BLD AUTO: 3.48 10*6/MM3 (ref 3.77–5.28)
SODIUM SERPL-SCNC: 143 MMOL/L (ref 136–145)
WBC # BLD AUTO: 7.91 10*3/MM3 (ref 3.4–10.8)

## 2021-07-26 PROCEDURE — 25010000002 CEFTRIAXONE PER 250 MG: Performed by: INTERNAL MEDICINE

## 2021-07-26 PROCEDURE — 85025 COMPLETE CBC W/AUTO DIFF WBC: CPT | Performed by: INTERNAL MEDICINE

## 2021-07-26 PROCEDURE — 83735 ASSAY OF MAGNESIUM: CPT | Performed by: INTERNAL MEDICINE

## 2021-07-26 PROCEDURE — 99231 SBSQ HOSP IP/OBS SF/LOW 25: CPT | Performed by: SURGERY

## 2021-07-26 PROCEDURE — 80048 BASIC METABOLIC PNL TOTAL CA: CPT | Performed by: INTERNAL MEDICINE

## 2021-07-26 RX ORDER — MAGNESIUM SULFATE HEPTAHYDRATE 40 MG/ML
2 INJECTION, SOLUTION INTRAVENOUS AS NEEDED
Status: DISCONTINUED | OUTPATIENT
Start: 2021-07-26 | End: 2021-07-27 | Stop reason: HOSPADM

## 2021-07-26 RX ORDER — POTASSIUM CHLORIDE 1.5 G/1.77G
40 POWDER, FOR SOLUTION ORAL AS NEEDED
Status: DISCONTINUED | OUTPATIENT
Start: 2021-07-26 | End: 2021-07-27 | Stop reason: HOSPADM

## 2021-07-26 RX ORDER — POTASSIUM CHLORIDE 750 MG/1
40 TABLET, FILM COATED, EXTENDED RELEASE ORAL AS NEEDED
Status: DISCONTINUED | OUTPATIENT
Start: 2021-07-26 | End: 2021-07-27 | Stop reason: HOSPADM

## 2021-07-26 RX ORDER — MAGNESIUM SULFATE HEPTAHYDRATE 40 MG/ML
4 INJECTION, SOLUTION INTRAVENOUS AS NEEDED
Status: DISCONTINUED | OUTPATIENT
Start: 2021-07-26 | End: 2021-07-27 | Stop reason: HOSPADM

## 2021-07-26 RX ADMIN — BUSPIRONE HYDROCHLORIDE 20 MG: 10 TABLET ORAL at 08:07

## 2021-07-26 RX ADMIN — FOLIC ACID 1 MG: 1 TABLET ORAL at 08:07

## 2021-07-26 RX ADMIN — NEBIVOLOL HYDROCHLORIDE 10 MG: 10 TABLET ORAL at 08:07

## 2021-07-26 RX ADMIN — HYDROCODONE BITARTRATE AND ACETAMINOPHEN 1 TABLET: 7.5; 325 TABLET ORAL at 20:46

## 2021-07-26 RX ADMIN — BUSPIRONE HYDROCHLORIDE 20 MG: 10 TABLET ORAL at 20:46

## 2021-07-26 RX ADMIN — POTASSIUM CHLORIDE 40 MEQ: 750 TABLET, EXTENDED RELEASE ORAL at 18:21

## 2021-07-26 RX ADMIN — METRONIDAZOLE 500 MG: 500 INJECTION, SOLUTION INTRAVENOUS at 04:03

## 2021-07-26 RX ADMIN — CALCIUM CARBONATE-VITAMIN D TAB 500 MG-200 UNIT 500 MG: 500-200 TAB at 08:07

## 2021-07-26 RX ADMIN — HYDROCODONE BITARTRATE AND ACETAMINOPHEN 1 TABLET: 7.5; 325 TABLET ORAL at 07:27

## 2021-07-26 RX ADMIN — METRONIDAZOLE 500 MG: 500 INJECTION, SOLUTION INTRAVENOUS at 10:38

## 2021-07-26 RX ADMIN — PANTOPRAZOLE SODIUM 40 MG: 40 TABLET, DELAYED RELEASE ORAL at 07:24

## 2021-07-26 RX ADMIN — VORTIOXETINE 20 MG: 5 TABLET, FILM COATED ORAL at 07:24

## 2021-07-26 RX ADMIN — SODIUM CHLORIDE, PRESERVATIVE FREE 10 ML: 5 INJECTION INTRAVENOUS at 20:46

## 2021-07-26 RX ADMIN — CEFTRIAXONE SODIUM 1 G: 1 INJECTION, SOLUTION INTRAVENOUS at 18:21

## 2021-07-26 RX ADMIN — POTASSIUM CHLORIDE 10 MEQ: 750 TABLET, EXTENDED RELEASE ORAL at 08:07

## 2021-07-26 RX ADMIN — HYDROCODONE BITARTRATE AND ACETAMINOPHEN 1 TABLET: 7.5; 325 TABLET ORAL at 11:39

## 2021-07-26 RX ADMIN — ALLOPURINOL 100 MG: 100 TABLET ORAL at 08:07

## 2021-07-26 RX ADMIN — METRONIDAZOLE 500 MG: 500 INJECTION, SOLUTION INTRAVENOUS at 19:18

## 2021-07-27 VITALS
DIASTOLIC BLOOD PRESSURE: 99 MMHG | SYSTOLIC BLOOD PRESSURE: 182 MMHG | HEART RATE: 83 BPM | OXYGEN SATURATION: 98 % | RESPIRATION RATE: 18 BRPM | WEIGHT: 133.4 LBS | TEMPERATURE: 97.1 F | BODY MASS INDEX: 20.28 KG/M2

## 2021-07-27 LAB
ANION GAP SERPL CALCULATED.3IONS-SCNC: 8.8 MMOL/L (ref 5–15)
BASOPHILS # BLD AUTO: 0.04 10*3/MM3 (ref 0–0.2)
BASOPHILS NFR BLD AUTO: 0.8 % (ref 0–1.5)
BUN SERPL-MCNC: 6 MG/DL (ref 8–23)
BUN/CREAT SERPL: 8 (ref 7–25)
CALCIUM SPEC-SCNC: 8 MG/DL (ref 8.6–10.5)
CHLORIDE SERPL-SCNC: 111 MMOL/L (ref 98–107)
CO2 SERPL-SCNC: 22.2 MMOL/L (ref 22–29)
CREAT SERPL-MCNC: 0.75 MG/DL (ref 0.57–1)
DEPRECATED RDW RBC AUTO: 47 FL (ref 37–54)
EOSINOPHIL # BLD AUTO: 0.17 10*3/MM3 (ref 0–0.4)
EOSINOPHIL NFR BLD AUTO: 3.4 % (ref 0.3–6.2)
ERYTHROCYTE [DISTWIDTH] IN BLOOD BY AUTOMATED COUNT: 14.7 % (ref 12.3–15.4)
GFR SERPL CREATININE-BSD FRML MDRD: 77 ML/MIN/1.73
GLUCOSE SERPL-MCNC: 77 MG/DL (ref 65–99)
HCT VFR BLD AUTO: 25.9 % (ref 34–46.6)
HGB BLD-MCNC: 7.7 G/DL (ref 12–15.9)
IMM GRANULOCYTES # BLD AUTO: 0.05 10*3/MM3 (ref 0–0.05)
IMM GRANULOCYTES NFR BLD AUTO: 1 % (ref 0–0.5)
LYMPHOCYTES # BLD AUTO: 1.63 10*3/MM3 (ref 0.7–3.1)
LYMPHOCYTES NFR BLD AUTO: 32.2 % (ref 19.6–45.3)
MCH RBC QN AUTO: 25.7 PG (ref 26.6–33)
MCHC RBC AUTO-ENTMCNC: 29.7 G/DL (ref 31.5–35.7)
MCV RBC AUTO: 86.3 FL (ref 79–97)
MONOCYTES # BLD AUTO: 0.79 10*3/MM3 (ref 0.1–0.9)
MONOCYTES NFR BLD AUTO: 15.6 % (ref 5–12)
NEUTROPHILS NFR BLD AUTO: 2.38 10*3/MM3 (ref 1.7–7)
NEUTROPHILS NFR BLD AUTO: 47 % (ref 42.7–76)
NRBC BLD AUTO-RTO: 0 /100 WBC (ref 0–0.2)
PLATELET # BLD AUTO: 629 10*3/MM3 (ref 140–450)
PMV BLD AUTO: 8.9 FL (ref 6–12)
POTASSIUM SERPL-SCNC: 3.3 MMOL/L (ref 3.5–5.2)
RBC # BLD AUTO: 3 10*6/MM3 (ref 3.77–5.28)
SODIUM SERPL-SCNC: 142 MMOL/L (ref 136–145)
WBC # BLD AUTO: 5.06 10*3/MM3 (ref 3.4–10.8)

## 2021-07-27 PROCEDURE — 85025 COMPLETE CBC W/AUTO DIFF WBC: CPT | Performed by: INTERNAL MEDICINE

## 2021-07-27 PROCEDURE — 99231 SBSQ HOSP IP/OBS SF/LOW 25: CPT | Performed by: SURGERY

## 2021-07-27 PROCEDURE — 80048 BASIC METABOLIC PNL TOTAL CA: CPT | Performed by: INTERNAL MEDICINE

## 2021-07-27 RX ORDER — AMOXICILLIN AND CLAVULANATE POTASSIUM 875; 125 MG/1; MG/1
1 TABLET, FILM COATED ORAL 2 TIMES DAILY
Qty: 10 TABLET | Refills: 0 | Status: SHIPPED | OUTPATIENT
Start: 2021-07-27 | End: 2021-09-22

## 2021-07-27 RX ORDER — AMOXICILLIN AND CLAVULANATE POTASSIUM 875; 125 MG/1; MG/1
1 TABLET, FILM COATED ORAL 2 TIMES DAILY
Qty: 10 TABLET | Refills: 0 | Status: SHIPPED | OUTPATIENT
Start: 2021-07-27 | End: 2021-07-27 | Stop reason: SDUPTHER

## 2021-07-27 RX ORDER — FOLIC ACID 1 MG/1
1 TABLET ORAL DAILY
Qty: 30 TABLET | Refills: 3 | Status: SHIPPED | OUTPATIENT
Start: 2021-07-28 | End: 2021-07-27

## 2021-07-27 RX ORDER — POTASSIUM CHLORIDE 750 MG/1
20 TABLET, EXTENDED RELEASE ORAL DAILY
Qty: 30 TABLET | Refills: 3 | Status: SHIPPED | OUTPATIENT
Start: 2021-07-27

## 2021-07-27 RX ORDER — FOLIC ACID 1 MG/1
1 TABLET ORAL DAILY
Qty: 30 TABLET | Refills: 3 | Status: SHIPPED | OUTPATIENT
Start: 2021-07-28

## 2021-07-27 RX ADMIN — NEBIVOLOL HYDROCHLORIDE 10 MG: 10 TABLET ORAL at 08:33

## 2021-07-27 RX ADMIN — METRONIDAZOLE 500 MG: 500 INJECTION, SOLUTION INTRAVENOUS at 03:24

## 2021-07-27 RX ADMIN — BUSPIRONE HYDROCHLORIDE 20 MG: 10 TABLET ORAL at 08:33

## 2021-07-27 RX ADMIN — CALCIUM CARBONATE-VITAMIN D TAB 500 MG-200 UNIT 500 MG: 500-200 TAB at 08:33

## 2021-07-27 RX ADMIN — FOLIC ACID 1 MG: 1 TABLET ORAL at 08:33

## 2021-07-27 RX ADMIN — PANTOPRAZOLE SODIUM 40 MG: 40 TABLET, DELAYED RELEASE ORAL at 06:24

## 2021-07-27 RX ADMIN — ALLOPURINOL 100 MG: 100 TABLET ORAL at 08:33

## 2021-07-27 RX ADMIN — POTASSIUM CHLORIDE 40 MEQ: 750 TABLET, EXTENDED RELEASE ORAL at 12:16

## 2021-07-27 RX ADMIN — VORTIOXETINE 20 MG: 5 TABLET, FILM COATED ORAL at 06:24

## 2021-07-27 RX ADMIN — POTASSIUM CHLORIDE 40 MEQ: 750 TABLET, EXTENDED RELEASE ORAL at 08:32

## 2021-07-28 ENCOUNTER — READMISSION MANAGEMENT (OUTPATIENT)
Dept: CALL CENTER | Facility: HOSPITAL | Age: 66
End: 2021-07-28

## 2021-07-28 LAB
BACTERIA FLD CULT: ABNORMAL
BACTERIA SPEC AEROBE CULT: NORMAL
BACTERIA SPEC AEROBE CULT: NORMAL
GRAM STN SPEC: ABNORMAL
STREP GROUPING: ABNORMAL

## 2021-07-28 NOTE — OUTREACH NOTE
Prep Survey      Responses   [facility] facility patient discharged from?  [facility]   Is LACE score < 7 ?  No   Emergency Room discharge w/ pulse ox?  No   Eligibility  Readm Mgmt   Discharge diagnosis  Diverticulitis of large intestine with perforation and abscess K57.20    Does the patient have one of the following disease processes/diagnoses(primary or secondary)?  Other   Does the patient have Home health ordered?  No   Is there a DME ordered?  No   Prep survey completed?  Yes          Talya Berrios RN

## 2021-07-31 LAB — BACTERIA SPEC ANAEROBE CULT: NORMAL

## 2021-08-03 ENCOUNTER — OFFICE VISIT (OUTPATIENT)
Dept: SURGERY | Facility: CLINIC | Age: 66
End: 2021-08-03

## 2021-08-03 ENCOUNTER — READMISSION MANAGEMENT (OUTPATIENT)
Dept: CALL CENTER | Facility: HOSPITAL | Age: 66
End: 2021-08-03

## 2021-08-03 VITALS
DIASTOLIC BLOOD PRESSURE: 84 MMHG | WEIGHT: 197 LBS | OXYGEN SATURATION: 95 % | HEIGHT: 68 IN | RESPIRATION RATE: 18 BRPM | TEMPERATURE: 97.4 F | BODY MASS INDEX: 29.86 KG/M2 | HEART RATE: 78 BPM | SYSTOLIC BLOOD PRESSURE: 150 MMHG

## 2021-08-03 DIAGNOSIS — Z87.19 HISTORY OF DIVERTICULITIS: Primary | ICD-10-CM

## 2021-08-03 PROCEDURE — 99213 OFFICE O/P EST LOW 20 MIN: CPT | Performed by: SURGERY

## 2021-08-03 RX ORDER — ROSUVASTATIN CALCIUM 5 MG/1
5 TABLET, COATED ORAL DAILY
COMMUNITY
Start: 2021-03-31 | End: 2022-03-31

## 2021-08-03 RX ORDER — OXYCODONE HYDROCHLORIDE AND ACETAMINOPHEN 5; 325 MG/1; MG/1
2 TABLET ORAL EVERY 6 HOURS
COMMUNITY

## 2021-08-03 RX ORDER — LEFLUNOMIDE 20 MG/1
TABLET ORAL
COMMUNITY
Start: 2021-04-22

## 2021-08-03 NOTE — OUTREACH NOTE
Medical Week 1 Survey      Responses   [facility] patient discharged from?  [facility]   Does the patient have one of the following disease processes/diagnoses(primary or secondary)?  Other   Week 1 attempt successful?  Yes   Call start time  1800   Revoke  Decline to participate   Call end time  1801          Molly Pereira, RN

## 2021-08-10 NOTE — PROGRESS NOTES
Chief Complaint   Patient presents with   • Diverticulitis     large intestine with perforation and abcess        Patient is a 66 y.o. female referred by Violeta Hammond MD for evaluation of diverticulitis with a large abscess.  Patient was seen in the emergency room on 7/23/21 and was found to have a large abscess in the left lower quadrant associated with diverticulitis.  Patient was transferred to [facility] for a CT guided drain placement.  Patient is here to follow-up for surgical intervention.  Patient reports that she is feeling better but her drain stopped draining and she was afraid it was occluded.  Patient denies any current fevers or chills.  Patient is currently on Augmentin.  Patient had not had any previous episodes of diverticulitis.  Patient has had a previous colonoscopy.  Patient has no history of colon cancer, Crohn's disease, ulcerative colitis, familial polyposis or family history of colon cancer.    Past Medical History:   Diagnosis Date   • Allergy    • Anxiety    • Arthritis     RA, FINGERS, TOES   • Bleeding esophageal ulcer     history of   • Depression    • Esophageal reflux    • Fibroid    • GERD (gastroesophageal reflux disease)    • History of transfusion    • Hypertension    • Hypertension    • Osteoarthritis of right knee     sched TKA   • RA (rheumatoid arthritis) (CMS/[facility])      Past Surgical History:   Procedure Laterality Date   • COLONOSCOPY     • ENDOSCOPY     • ESOPHAGOSCOPY / EGD      cauterize esophageal bleeding ulcers   • HYSTERECTOMY      JUAN DANIEL with OC for fibroids age 36   • KNEE SURGERY Left     ARTHROSCOPY   • TOTAL KNEE ARTHROPLASTY Left 4/11/2018    Procedure: TOTAL KNEE ARTHROPLASTY AND ALL ASSOCIATED PROCEDURES;  Surgeon: Costa Zuñiga MD;  Location: [facility] OR;  Service: Orthopedics   • TOTAL KNEE ARTHROPLASTY Right 10/17/2018    Procedure: TOTAL KNEE ARTHROPLASTY AND ALL ASSOCIATED PROCEDURES;  Surgeon: Costa Zuñiga MD;  Location: [facility] OR;  Service:  Orthopedics   • WISDOM TOOTH EXTRACTION       Family History   Problem Relation Age of Onset   • Cancer Mother         bone   • Breast cancer Mother    • Other Father         cardiac failure   • Heart disease Father    • Deep vein thrombosis Brother    • Malig Hyperthermia Neg Hx    • Ovarian cancer Neg Hx    • Uterine cancer Neg Hx      Social History     Tobacco Use   • Smoking status: Never Smoker   • Smokeless tobacco: Never Used   Substance Use Topics   • Alcohol use: Yes     Comment: glass of rum nightly   • Drug use: No     Allergies   Allergen Reactions   • Amoxicillin Diarrhea   • Augmentin [Amoxicillin-Pot Clavulanate] Nausea Only and Nausea And Vomiting   • Clavulanic Acid Diarrhea   • Methotrexate Diarrhea, Nausea And Vomiting, Myalgia and Unknown - High Severity     Stomach Pain, Blisters, hair loss      Other reaction(s): Stomach Pain   • Oxycodone Nausea Only       Current Outpatient Medications:   •  allopurinol (ZYLOPRIM) 100 MG tablet, Take 100 mg by mouth Daily., Disp: , Rfl: 0  •  ALPRAZolam (XANAX) 0.5 MG tablet, Take 0.5 mg by mouth Every 8 (Eight) Hours As Needed for Anxiety (1-2 tabs)., Disp: , Rfl:   •  busPIRone (BUSPAR) 10 MG tablet, Take 20 mg by mouth 2 (Two) Times a Day., Disp: , Rfl:   •  Calcium Carbonate-Vitamin D 600-200 MG-UNIT capsule, take 1 po daily, Disp: , Rfl:   •  EDARBI 80 MG tablet tablet, Take 80 mg by mouth Every Morning., Disp: , Rfl: 3  •  folic acid (FOLVITE) 1 MG tablet, Take 1 tablet by mouth Daily., Disp: 30 tablet, Rfl: 3  •  lansoprazole (PREVACID) 30 MG capsule, Take 30 mg by mouth Daily., Disp: , Rfl:   •  nebivolol (BYSTOLIC) 10 MG tablet, Take 10 mg by mouth Daily., Disp: , Rfl:   •  ondansetron (ZOFRAN) 4 MG tablet, Take 1 tablet by mouth Every 8 (Eight) Hours As Needed for Nausea or Vomiting., Disp: 20 tablet, Rfl: 0  •  oxyCODONE-acetaminophen (PERCOCET) 5-325 MG per tablet, Take 2 tablets by mouth Every 6 (Six) Hours., Disp: , Rfl:   •  potassium chloride  "(K-DUR,KLOR-CON) 10 MEQ CR tablet, Take 2 tablets by mouth Daily., Disp: 30 tablet, Rfl: 3  •  rosuvastatin (CRESTOR) 5 MG tablet, Take 5 mg by mouth Daily., Disp: , Rfl:   •  vitamin D (ERGOCALCIFEROL) 16197 units capsule capsule, Take 50,000 Units by mouth Every 7 (Seven) Days. EVERY WEDNESDAY, Disp: , Rfl:   •  Vortioxetine HBr (TRINTELLIX) 20 MG tablet, Take 20 mg by mouth Every Morning., Disp: , Rfl:   •  amoxicillin-clavulanate (Augmentin) 875-125 MG per tablet, Take 1 tablet by mouth 2 (Two) Times a Day., Disp: 10 tablet, Rfl: 0  •  chlorthalidone (HYGROTEN) 50 MG tablet, Take 50 mg by mouth Daily., Disp: , Rfl: 4  •  COVID-19 mRNA Virus Vaccine (MODERNA COVID-19 VACCINE IM), Inject  into the appropriate muscle as directed by prescriber., Disp: , Rfl:   •  leflunomide (ARAVA) 20 MG tablet, Take 1 tablet by mouth once daily, Disp: , Rfl:     Review of Systems  General: Patient reports during good health  Eyes: No eye problems  Ears, nose, mouth and throat: No rhinitis, no hearing problems, no chronic cough  Cardiovascular/heart: Denies palpitations, syncope or chest pain  Respiratory/lung: Denies shortness of breath, hemoptysis, dyspnea on exertion   Genital/urinary: No frequency, hematuria or dysuria  Hematological/lymphatic: Denies anemia or other problems  Musculoskeletal: No joint pain, no defects  Skin: No psoriasis or other skin issues  Neurological: No seizures or other neurological problems  Psychiatric: None  Endocrine: Negative  Gastro-intestinal: See HPI  Vitals:    08/03/21 0837   BP: 150/84   BP Location: Left arm   Patient Position: Sitting   Cuff Size: Large Adult   Pulse: 78   Resp: 18   Temp: 97.4 °F (36.3 °C)   TempSrc: Temporal   SpO2: 95%   Weight: 89.4 kg (197 lb)   Height: 172.7 cm (67.99\")       Physical Exam  General/physcological:   Alert and oriented x3, in no acute distress  HEENT: Normal cephalic, atraumatic, PERRLA, EOMI, sclera anicteric, moist mucous membranes, neck is supple, no " JVD, no carotid bruits, no thyromegaly no adenopathy  Respiratory: CTA and percussion  CVA: RRR, normal S1-S2, no murmurs, no gallops or rubs  GI: Positive BS, soft, nondistended, nontender, no rebound, no guarding, no hernias, no organomegaly and no masses  Left lower quadrant STAR drain with purulent drainage  Musculoskeletal: Moves all 4 ext, no clubbing, no cyanosis or edema  Neurovascular: Grossly intact  Debilities: none  Emotional behavior: appropriate     Patient does not use tobacco products currently.     Assessment:  Diverticulitis complicated by an abscess  Plan:  I have taken the dressing off of and around the drain.  I have cleaned the drain site and showed the patient how to strip the drain.  I have advised the patient she may take a shower with the drain in place.  I would like to leave the drain in for 2 more weeks and for her to complete her Augmentin treatment.  I have advised the patient she will need to have a colon resection of the diseased portion of the bowel.  I have also discussed trying to do a 1 stage procedure rather than a two-stage procedure.  Therefore we will continue with the drain and antibiotics and I will see her back in 2 weeks.  At that time we will decide on further treatment options stage I or stage II treatment.    Katey Villagomez MD  General, Minimally Invasive and Endoscopic Surgery  [facility] [place] [id number] [facility] [address] [place] [address]    [address]  [place], [place] [id number] [place] [id number]    P: [phone]  F: [phone]    Cc:  Violeta Hammond MD

## 2021-08-12 ENCOUNTER — OFFICE VISIT (OUTPATIENT)
Dept: ORTHOPEDIC SURGERY | Facility: CLINIC | Age: 66
End: 2021-08-12

## 2021-08-12 VITALS — HEIGHT: 68 IN | WEIGHT: 197 LBS | BODY MASS INDEX: 29.86 KG/M2

## 2021-08-12 DIAGNOSIS — Z96.652 S/P TOTAL KNEE ARTHROPLASTY, LEFT: ICD-10-CM

## 2021-08-12 DIAGNOSIS — G89.29 CHRONIC PAIN OF BOTH KNEES: Primary | ICD-10-CM

## 2021-08-12 DIAGNOSIS — Z96.651 STATUS POST TOTAL RIGHT KNEE REPLACEMENT: ICD-10-CM

## 2021-08-12 DIAGNOSIS — M25.562 CHRONIC PAIN OF BOTH KNEES: Primary | ICD-10-CM

## 2021-08-12 DIAGNOSIS — M25.561 CHRONIC PAIN OF BOTH KNEES: Primary | ICD-10-CM

## 2021-08-12 PROCEDURE — 99212 OFFICE O/P EST SF 10 MIN: CPT | Performed by: ORTHOPAEDIC SURGERY

## 2021-08-12 PROCEDURE — 73562 X-RAY EXAM OF KNEE 3: CPT | Performed by: ORTHOPAEDIC SURGERY

## 2021-08-12 RX ORDER — VENLAFAXINE HYDROCHLORIDE 150 MG/1
150 CAPSULE, EXTENDED RELEASE ORAL DAILY
COMMUNITY
Start: 2021-07-11

## 2021-08-12 RX ORDER — BUSPIRONE HYDROCHLORIDE 15 MG/1
15 TABLET ORAL 2 TIMES DAILY
COMMUNITY
Start: 2021-05-29

## 2021-08-12 RX ORDER — NEBIVOLOL HYDROCHLORIDE 20 MG/1
20 TABLET ORAL DAILY
COMMUNITY
Start: 2021-05-30 | End: 2021-09-22

## 2021-08-12 RX ORDER — POTASSIUM CHLORIDE 750 MG/1
TABLET, FILM COATED, EXTENDED RELEASE ORAL
COMMUNITY
Start: 2021-05-29

## 2021-08-12 NOTE — PROGRESS NOTES
Subjective:     Patient ID: Nitish Barfield is a 66 y.o. female.    Chief Complaint:  s/p right total knee arthroplasty, DOS 10/17/2018,  S/p left total knee arthroplasty 4/11/2018  History of Present Illness  Nitish Barfield returns to clinic today for evaluation of bilateral knee.  The patient reports no current symptoms of pain or discomfort of the knee.     The patient confirms she was diagnosed with diverticulitis 2 weeks ago. She has had a prior biopsy on the right anterior knee, that area is well healed. The patient reports deep pain of her hip due to arthritis and avascular necrosis of the hip. She confirms attending physical therapy for a year regarding hip pain.      Social History     Occupational History   • Not on file   Tobacco Use   • Smoking status: Never Smoker   • Smokeless tobacco: Never Used   Vaping Use   • Vaping Use: Never used   Substance and Sexual Activity   • Alcohol use: Yes     Comment: glass of rum nightly   • Drug use: No   • Sexual activity: Not Currently     Partners: Male     Birth control/protection: Surgical     Comment: JUAN DANIEL with OC      Past Medical History:   Diagnosis Date   • Allergy    • Anxiety    • Arthritis     RA, FINGERS, TOES   • Bleeding esophageal ulcer     history of   • Depression    • Esophageal reflux    • Fibroid    • GERD (gastroesophageal reflux disease)    • History of transfusion    • Hypertension    • Hypertension    • Osteoarthritis of right knee     sched TKA   • RA (rheumatoid arthritis) (CMS/[facility])      Past Surgical History:   Procedure Laterality Date   • COLONOSCOPY     • ENDOSCOPY     • ESOPHAGOSCOPY / EGD      cauterize esophageal bleeding ulcers   • HYSTERECTOMY      JUAN DANIEL with OC for fibroids age 36   • KNEE SURGERY Left     ARTHROSCOPY   • TOTAL KNEE ARTHROPLASTY Left 4/11/2018    Procedure: TOTAL KNEE ARTHROPLASTY AND ALL ASSOCIATED PROCEDURES;  Surgeon: Costa Zuñiga MD;  Location: [facility];  Service: Orthopedics   • TOTAL KNEE ARTHROPLASTY Right  "10/17/2018    Procedure: TOTAL KNEE ARTHROPLASTY AND ALL ASSOCIATED PROCEDURES;  Surgeon: Costa Zuñiga MD;  Location: [facility];  Service: Orthopedics   • WISDOM TOOTH EXTRACTION         Family History   Problem Relation Age of Onset   • Cancer Mother         bone   • Breast cancer Mother    • Other Father         cardiac failure   • Heart disease Father    • Deep vein thrombosis Brother    • Malig Hyperthermia Neg Hx    • Ovarian cancer Neg Hx    • Uterine cancer Neg Hx          Review of Systems        Objective:  Vitals:    08/12/21 0750   Weight: 89.4 kg (197 lb)   Height: 172.7 cm (68\")         08/12/21  0750   Weight: 89.4 kg (197 lb)     Body mass index is 29.95 kg/m².  General: No acute distress.  Resp: normal respiratory effort  Skin: no rashes or wounds; normal turgor  Psych: mood and affect appropriate; recent and remote memory intact          Ortho Exam     Bilateral knees- midline incisions well healed, ROM 0-130, 4+/5 strength. Stable to varus/valgus stress at 0/30 degrees  Positive sensation bilateral feet    Imaging:  Bilateral Knee X-Ray  Indication: s/p total knee     AP, Lateral, and [place] views    Findings:  Total knee arthroplasty components are in stable position with acceptable overall alignment, no evidence of periprosthetic fracture, loosening, osteolysis.  Mild reactive bone formation noted particular along medial tibial plateau margin of implants bilaterally with some evidence of radiodensity noted also along lateral aspect of tibial implant    X-rays of the bilateral knees obtained and reviewed today show no abnormalities.     Compared to prior postoperative x-rays    Assessment:        1. Chronic pain of both knees    2. S/P total knee arthroplasty, left    3. Status post total right knee replacement           Plan:          1. Discussed treatment options at length with patient at today's visit. Once the patient has completed surgical treatment for colon, if she is still " experiencing hip pain she may call me to schedule a follow up and we will obtain new x-rays for further evaluation.  2. Follow up: The patient will follow up with me as needed      Nitish Barfield was in agreement with plan and had all questions answered.     Orders:  Orders Placed This Encounter   Procedures   • XR Knee 3 View Bilateral       Medications:  No orders of the defined types were placed in this encounter.      Followup:  Return if symptoms worsen or fail to improve.    Diagnoses and all orders for this visit:    1. Chronic pain of both knees (Primary)  -     XR Knee 3 View Bilateral    2. S/P total knee arthroplasty, left    3. Status post total right knee replacement          Dictated utilizing Dragon dictation     Transcribed from ambient dictation for Costa Zuñiga MD by Jeannie Galvez.  08/12/21   22:52 EDT    I have personally performed the services described in this document as transcribed by the above individual, and it is both accurate and complete.  Costa Zuñiga MD  8/17/2021  07:27 EDT

## 2021-08-18 ENCOUNTER — OFFICE VISIT (OUTPATIENT)
Dept: SURGERY | Facility: CLINIC | Age: 66
End: 2021-08-18

## 2021-08-18 ENCOUNTER — TELEPHONE (OUTPATIENT)
Dept: SURGERY | Facility: CLINIC | Age: 66
End: 2021-08-18

## 2021-08-18 VITALS
BODY MASS INDEX: 28.67 KG/M2 | RESPIRATION RATE: 18 BRPM | HEIGHT: 68 IN | SYSTOLIC BLOOD PRESSURE: 120 MMHG | WEIGHT: 189.2 LBS | TEMPERATURE: 97.2 F | OXYGEN SATURATION: 96 % | DIASTOLIC BLOOD PRESSURE: 82 MMHG | HEART RATE: 82 BPM

## 2021-08-18 DIAGNOSIS — K57.92 DIVERTICULITIS: Primary | ICD-10-CM

## 2021-08-18 PROCEDURE — 99213 OFFICE O/P EST LOW 20 MIN: CPT | Performed by: SURGERY

## 2021-08-18 NOTE — PROGRESS NOTES
Chief complaint: Left lower quadrant abdominal pain    Patient is a 66 y.o. female is an established patient who is presenting with complicated diverticulitis with rupture and an abscess.  Patient has had a CT-guided drain placed x2 weeks and continues to have purulent drainage of approximately 20 to 40 cc a day.  Patient denies fever, chills, nausea or vomiting.  Patient has no appetite or energy.  Patient has completed her antibiotics.  Patient has not been changing the dressing over the drain.  Patient is accompanied by her daughter.  Her daughter is concerned because her mother is losing weight and has no appetite.  Patient's family does not feel she is getting better but feels she is getting worse.    Past Medical History:   Diagnosis Date   • Allergy    • Anxiety    • Arthritis     RA, FINGERS, TOES   • Bleeding esophageal ulcer     history of   • Depression    • Esophageal reflux    • Fibroid    • GERD (gastroesophageal reflux disease)    • History of transfusion    • Hypertension    • Hypertension    • Osteoarthritis of right knee     sched TKA   • RA (rheumatoid arthritis) (CMS/[facility])      Past Surgical History:   Procedure Laterality Date   • COLONOSCOPY     • ENDOSCOPY     • ESOPHAGOSCOPY / EGD      cauterize esophageal bleeding ulcers   • HYSTERECTOMY      JUAN DANIEL with OC for fibroids age 36   • KNEE SURGERY Left     ARTHROSCOPY   • TOTAL KNEE ARTHROPLASTY Left 4/11/2018    Procedure: TOTAL KNEE ARTHROPLASTY AND ALL ASSOCIATED PROCEDURES;  Surgeon: Costa Zuñiga MD;  Location:  [facility];  Service: Orthopedics   • TOTAL KNEE ARTHROPLASTY Right 10/17/2018    Procedure: TOTAL KNEE ARTHROPLASTY AND ALL ASSOCIATED PROCEDURES;  Surgeon: Costa Zuñiga MD;  Location:  [facility];  Service: Orthopedics   • WISDOM TOOTH EXTRACTION       Family History   Problem Relation Age of Onset   • Cancer Mother         bone   • Breast cancer Mother    • Other Father         cardiac failure   • Heart disease Father    •  Deep vein thrombosis Brother    • Malkaylan Hyperthermia Neg Hx    • Ovarian cancer Neg Hx    • Uterine cancer Neg Hx      Social History     Tobacco Use   • Smoking status: Never Smoker   • Smokeless tobacco: Never Used   Vaping Use   • Vaping Use: Never used   Substance Use Topics   • Alcohol use: Yes     Comment: glass of rum nightly   • Drug use: No     Allergies   Allergen Reactions   • Amoxicillin Diarrhea   • Augmentin [Amoxicillin-Pot Clavulanate] Nausea Only and Nausea And Vomiting   • Clavulanic Acid Diarrhea   • Methotrexate Diarrhea, Nausea And Vomiting, Myalgia and Unknown - High Severity     Stomach Pain, Blisters, hair loss      Other reaction(s): Stomach Pain   • Oxycodone Nausea Only       Current Outpatient Medications:   •  allopurinol (ZYLOPRIM) 100 MG tablet, Take 100 mg by mouth Daily., Disp: , Rfl: 0  •  ALPRAZolam (XANAX) 0.5 MG tablet, Take 0.5 mg by mouth Every 8 (Eight) Hours As Needed for Anxiety (1-2 tabs)., Disp: , Rfl:   •  Bystolic 20 MG tablet, , Disp: , Rfl:   •  Calcium Carbonate-Vitamin D 600-200 MG-UNIT capsule, take 1 po daily, Disp: , Rfl:   •  EDARBI 80 MG tablet tablet, Take 80 mg by mouth Every Morning., Disp: , Rfl: 3  •  folic acid (FOLVITE) 1 MG tablet, Take 1 tablet by mouth Daily., Disp: 30 tablet, Rfl: 3  •  lansoprazole (PREVACID) 30 MG capsule, Take 30 mg by mouth Daily., Disp: , Rfl:   •  leflunomide (ARAVA) 20 MG tablet, Take 1 tablet by mouth once daily, Disp: , Rfl:   •  ondansetron (ZOFRAN) 4 MG tablet, Take 1 tablet by mouth Every 8 (Eight) Hours As Needed for Nausea or Vomiting., Disp: 20 tablet, Rfl: 0  •  potassium chloride (K-DUR,KLOR-CON) 10 MEQ CR tablet, Take 2 tablets by mouth Daily., Disp: 30 tablet, Rfl: 3  •  rosuvastatin (CRESTOR) 5 MG tablet, Take 5 mg by mouth Daily., Disp: , Rfl:   •  venlafaxine XR (EFFEXOR-XR) 150 MG 24 hr capsule, , Disp: , Rfl:   •  vitamin D (ERGOCALCIFEROL) 48695 units capsule capsule, Take 50,000 Units by mouth Every 7 (Seven)  "Days. EVERY WEDNESDAY, Disp: , Rfl:   •  Vortioxetine HBr (TRINTELLIX) 20 MG tablet, Take 20 mg by mouth Every Morning., Disp: , Rfl:   •  amoxicillin-clavulanate (Augmentin) 875-125 MG per tablet, Take 1 tablet by mouth 2 (Two) Times a Day., Disp: 10 tablet, Rfl: 0  •  busPIRone (BUSPAR) 10 MG tablet, Take 20 mg by mouth 2 (Two) Times a Day., Disp: , Rfl:   •  busPIRone (BUSPAR) 15 MG tablet, , Disp: , Rfl:   •  chlorthalidone (HYGROTEN) 50 MG tablet, Take 50 mg by mouth Daily., Disp: , Rfl: 4  •  COVID-19 mRNA Virus Vaccine (MODERNA COVID-19 VACCINE IM), Inject  into the appropriate muscle as directed by prescriber., Disp: , Rfl:   •  nebivolol (BYSTOLIC) 10 MG tablet, Take 10 mg by mouth Daily., Disp: , Rfl:   •  oxyCODONE-acetaminophen (PERCOCET) 5-325 MG per tablet, Take 2 tablets by mouth Every 6 (Six) Hours., Disp: , Rfl:   •  potassium chloride 10 MEQ CR tablet, , Disp: , Rfl:     Review of Systems  See previous note  Vitals:    08/18/21 1108   BP: 120/82   BP Location: Left arm   Patient Position: Sitting   Cuff Size: Adult   Pulse: 82   Resp: 18   Temp: 97.2 °F (36.2 °C)   TempSrc: Temporal   SpO2: 96%   Weight: 85.8 kg (189 lb 3.2 oz)   Height: 172.7 cm (67.99\")       Physical Exam  General/physcological:   Alert and oriented x3, in no acute distress  HEENT: Normal cephalic, atraumatic, PERRLA, EOMI, sclera anicteric, moist mucous membranes, neck is supple, no JVD, no carotid bruits, no thyromegaly no adenopathy  Respiratory: CTA and percussion  CVA: RRR, normal S1-S2, no murmurs, no gallops or rubs  GI: Positive BS, soft, nondistended, nontender, no rebound, no guarding, no hernias, no organomegaly and no masses  STAR drainage is purulent, I have removed the dressing which is saturated, the skin under the dressing is red and has a raised papillary appearing infection like yeast and smells like yeast  Musculoskeletal: Moves all 4 ext, no clubbing, no cyanosis or edema  Neurovascular: Grossly " intact  Debilities: none  Emotional behavior: appropriate     Patient does not use tobacco products currently.     Assessment:  Diverticulitis complicated with perforation and walled off abscess, CT-guided drain the continues to drain  Topical skin yeast infection around the drain    Plan:  I have reviewed the patient's x-rays, labs and care plan extensively with the daughter and the patient.  I have given them both patient pamphlet information on diverticulitis and surgical options.  I will obtain a CBC, BMP and a repeat CT scan.  Unless the patient is running a fever or elevated white count she does not require any further antibiotics.  I will check the CT scan of the abdomen and pelvis to see if there is any undrained fluid collections and for the adequacy of draining the current abscess.  I have advised the patient and her daughter if she is declining and not improving we will need to proceed with surgical option of a two-stage procedure with a colostomy and 6 months later a colostomy reversal.  I am in hopes that we will be able to proceed with a 1 stage surgical procedure with the STAR drain in place.  I have spent 45 minutes with the family educating them on the disease process, how to take care of the drain, treatment for the skin infection  Patient needs to change the dressing twice daily dry to dry and apply Monistat to topically to the skin.    Katey Villagomez MD  General, Minimally Invasive and Endoscopic Surgery  [facility]      [address] [facility] [address] [place] [address]    [address]  [place] [address]               [place] [address]    P: [phone]  F: [phone]    Cc:  Violeta Hammond MD

## 2021-08-19 ENCOUNTER — LAB (OUTPATIENT)
Dept: LAB | Facility: HOSPITAL | Age: 66
End: 2021-08-19

## 2021-08-19 DIAGNOSIS — K57.92 DIVERTICULITIS: ICD-10-CM

## 2021-08-19 LAB
ANION GAP SERPL CALCULATED.3IONS-SCNC: 12.4 MMOL/L (ref 5–15)
BASOPHILS # BLD AUTO: 0.07 10*3/MM3 (ref 0–0.2)
BASOPHILS NFR BLD AUTO: 0.4 % (ref 0–1.5)
BUN SERPL-MCNC: 16 MG/DL (ref 8–23)
BUN/CREAT SERPL: 17.4 (ref 7–25)
CALCIUM SPEC-SCNC: 8.7 MG/DL (ref 8.6–10.5)
CHLORIDE SERPL-SCNC: 105 MMOL/L (ref 98–107)
CO2 SERPL-SCNC: 22.6 MMOL/L (ref 22–29)
CREAT SERPL-MCNC: 0.92 MG/DL (ref 0.57–1)
DEPRECATED RDW RBC AUTO: 46.5 FL (ref 37–54)
EOSINOPHIL # BLD AUTO: 0.13 10*3/MM3 (ref 0–0.4)
EOSINOPHIL NFR BLD AUTO: 0.8 % (ref 0.3–6.2)
ERYTHROCYTE [DISTWIDTH] IN BLOOD BY AUTOMATED COUNT: 14.9 % (ref 12.3–15.4)
GFR SERPL CREATININE-BSD FRML MDRD: 61 ML/MIN/1.73
GLUCOSE SERPL-MCNC: 99 MG/DL (ref 65–99)
HCT VFR BLD AUTO: 30.6 % (ref 34–46.6)
HGB BLD-MCNC: 9.3 G/DL (ref 12–15.9)
IMM GRANULOCYTES # BLD AUTO: 0.08 10*3/MM3 (ref 0–0.05)
IMM GRANULOCYTES NFR BLD AUTO: 0.5 % (ref 0–0.5)
LYMPHOCYTES # BLD AUTO: 2.18 10*3/MM3 (ref 0.7–3.1)
LYMPHOCYTES NFR BLD AUTO: 13.2 % (ref 19.6–45.3)
MCH RBC QN AUTO: 26.1 PG (ref 26.6–33)
MCHC RBC AUTO-ENTMCNC: 30.4 G/DL (ref 31.5–35.7)
MCV RBC AUTO: 86 FL (ref 79–97)
MONOCYTES # BLD AUTO: 1.41 10*3/MM3 (ref 0.1–0.9)
MONOCYTES NFR BLD AUTO: 8.5 % (ref 5–12)
NEUTROPHILS NFR BLD AUTO: 12.66 10*3/MM3 (ref 1.7–7)
NEUTROPHILS NFR BLD AUTO: 76.6 % (ref 42.7–76)
NRBC BLD AUTO-RTO: 0 /100 WBC (ref 0–0.2)
PLATELET # BLD AUTO: 872 10*3/MM3 (ref 140–450)
PMV BLD AUTO: 9.1 FL (ref 6–12)
POTASSIUM SERPL-SCNC: 3.4 MMOL/L (ref 3.5–5.2)
RBC # BLD AUTO: 3.56 10*6/MM3 (ref 3.77–5.28)
SODIUM SERPL-SCNC: 140 MMOL/L (ref 136–145)
WBC # BLD AUTO: 16.53 10*3/MM3 (ref 3.4–10.8)

## 2021-08-19 PROCEDURE — 85025 COMPLETE CBC W/AUTO DIFF WBC: CPT

## 2021-08-19 PROCEDURE — 80048 BASIC METABOLIC PNL TOTAL CA: CPT

## 2021-08-19 PROCEDURE — 36415 COLL VENOUS BLD VENIPUNCTURE: CPT

## 2021-08-24 ENCOUNTER — HOSPITAL ENCOUNTER (OUTPATIENT)
Dept: CT IMAGING | Facility: HOSPITAL | Age: 66
Discharge: HOME | End: 2021-08-24
Admitting: SURGERY

## 2021-08-24 DIAGNOSIS — K57.92 DIVERTICULITIS: ICD-10-CM

## 2021-08-24 PROCEDURE — 74177 CT ABD & PELVIS W/CONTRAST: CPT

## 2021-08-24 PROCEDURE — 0 DIATRIZOATE MEGLUMINE & SODIUM PER 1 ML: Performed by: SURGERY

## 2021-08-24 PROCEDURE — 0 IOPAMIDOL PER 1 ML: Performed by: SURGERY

## 2021-08-24 RX ADMIN — DIATRIZOATE MEGLUMINE AND DIATRIZOATE SODIUM 30 ML: 600; 100 SOLUTION ORAL; RECTAL at 09:30

## 2021-08-24 RX ADMIN — IOPAMIDOL 100 ML: 755 INJECTION, SOLUTION INTRAVENOUS at 13:15

## 2021-08-27 RX ORDER — CLINDAMYCIN HYDROCHLORIDE 300 MG/1
300 CAPSULE ORAL 3 TIMES DAILY
Qty: 30 CAPSULE | Refills: 0 | Status: SHIPPED | OUTPATIENT
Start: 2021-08-27 | End: 2021-09-22

## 2021-09-01 ENCOUNTER — OFFICE VISIT (OUTPATIENT)
Dept: SURGERY | Facility: CLINIC | Age: 66
End: 2021-09-01

## 2021-09-01 ENCOUNTER — PREP FOR SURGERY (OUTPATIENT)
Dept: OTHER | Facility: HOSPITAL | Age: 66
End: 2021-09-01

## 2021-09-01 VITALS
DIASTOLIC BLOOD PRESSURE: 80 MMHG | TEMPERATURE: 97.4 F | WEIGHT: 188.2 LBS | HEIGHT: 72 IN | HEART RATE: 95 BPM | OXYGEN SATURATION: 96 % | RESPIRATION RATE: 18 BRPM | SYSTOLIC BLOOD PRESSURE: 120 MMHG | BODY MASS INDEX: 25.49 KG/M2

## 2021-09-01 DIAGNOSIS — K57.92 DIVERTICULITIS: Primary | ICD-10-CM

## 2021-09-01 DIAGNOSIS — K57.80 DIVERTICULITIS OF INTESTINE WITH ABSCESS, UNSPECIFIED BLEEDING STATUS, UNSPECIFIED PART OF INTESTINAL TRACT: Primary | ICD-10-CM

## 2021-09-01 PROBLEM — Z87.19 HISTORY OF DIVERTICULITIS OF COLON: Status: ACTIVE | Noted: 2021-09-01

## 2021-09-01 PROCEDURE — 99213 OFFICE O/P EST LOW 20 MIN: CPT | Performed by: SURGERY

## 2021-09-01 RX ORDER — ALVIMOPAN 12 MG/1
12 CAPSULE ORAL ONCE
Status: CANCELLED | OUTPATIENT
Start: 2021-09-01 | End: 2021-09-01

## 2021-09-01 RX ORDER — ACETAMINOPHEN 500 MG
1000 TABLET ORAL ONCE
Status: CANCELLED | OUTPATIENT
Start: 2021-09-01 | End: 2021-09-01

## 2021-09-01 RX ORDER — CELECOXIB 100 MG/1
200 CAPSULE ORAL ONCE
Status: CANCELLED | OUTPATIENT
Start: 2021-09-01 | End: 2021-09-01

## 2021-09-01 RX ORDER — ONDANSETRON 2 MG/ML
4 INJECTION INTRAMUSCULAR; INTRAVENOUS EVERY 6 HOURS PRN
Status: CANCELLED | OUTPATIENT
Start: 2021-09-01

## 2021-09-01 NOTE — PROGRESS NOTES
Chief complaint: Diverticulitis with abscess    Patient is a 66 y.o. female who presented to the emergency room with a large abscess from diverticulitis in her left lower quadrant.  Patient was transferred to [facility] for radiological drain placement.  Patient has had a follow-up CT scan which reveals an ongoing fistulous communication that is being drained by the CT-guided drain.  However, patient does not seem to be improving and the drainage does not seem to be decreasing.  Patient has no fever or chills.  Patient has been complaining of some nausea and diarrhea.  However, patient has been able to maintain her weight.    Past Medical History:   Diagnosis Date   • Allergy    • Anxiety    • Arthritis     RA, FINGERS, TOES   • Bleeding esophageal ulcer     history of   • Depression    • Esophageal reflux    • Fibroid    • GERD (gastroesophageal reflux disease)    • History of transfusion    • Hypertension    • Hypertension    • Osteoarthritis of right knee     sched TKA   • RA (rheumatoid arthritis) (CMS/[facility])      Past Surgical History:   Procedure Laterality Date   • COLONOSCOPY     • ENDOSCOPY     • ESOPHAGOSCOPY / EGD      cauterize esophageal bleeding ulcers   • HYSTERECTOMY      JUAN DANIEL with OC for fibroids age 36   • KNEE SURGERY Left     ARTHROSCOPY   • TOTAL KNEE ARTHROPLASTY Left 4/11/2018    Procedure: TOTAL KNEE ARTHROPLASTY AND ALL ASSOCIATED PROCEDURES;  Surgeon: Costa Zuñiga MD;  Location: [facility] OR;  Service: Orthopedics   • TOTAL KNEE ARTHROPLASTY Right 10/17/2018    Procedure: TOTAL KNEE ARTHROPLASTY AND ALL ASSOCIATED PROCEDURES;  Surgeon: Costa Zuñiga MD;  Location: [facility] OR;  Service: Orthopedics   • WISDOM TOOTH EXTRACTION       Family History   Problem Relation Age of Onset   • Cancer Mother         bone   • Breast cancer Mother    • Other Father         cardiac failure   • Heart disease Father    • Deep vein thrombosis Brother    • Malig Hyperthermia Neg Hx    • Ovarian cancer Neg Hx     • Uterine cancer Neg Hx      Social History     Tobacco Use   • Smoking status: Never Smoker   • Smokeless tobacco: Never Used   Vaping Use   • Vaping Use: Never used   Substance Use Topics   • Alcohol use: Yes     Comment: glass of rum nightly   • Drug use: No     Allergies   Allergen Reactions   • Amoxicillin Diarrhea   • Augmentin [Amoxicillin-Pot Clavulanate] Nausea Only and Nausea And Vomiting   • Clavulanic Acid Diarrhea   • Methotrexate Diarrhea, Nausea And Vomiting, Myalgia and Unknown - High Severity     Stomach Pain, Blisters, hair loss      Other reaction(s): Stomach Pain   • Oxycodone Nausea Only       Current Outpatient Medications:   •  allopurinol (ZYLOPRIM) 100 MG tablet, Take 100 mg by mouth Daily., Disp: , Rfl: 0  •  ALPRAZolam (XANAX) 0.5 MG tablet, Take 0.5 mg by mouth Every 8 (Eight) Hours As Needed for Anxiety (1-2 tabs)., Disp: , Rfl:   •  Bystolic 20 MG tablet, , Disp: , Rfl:   •  Calcium Carbonate-Vitamin D 600-200 MG-UNIT capsule, take 1 po daily, Disp: , Rfl:   •  EDARBI 80 MG tablet tablet, Take 80 mg by mouth Every Morning., Disp: , Rfl: 3  •  folic acid (FOLVITE) 1 MG tablet, Take 1 tablet by mouth Daily., Disp: 30 tablet, Rfl: 3  •  lansoprazole (PREVACID) 30 MG capsule, Take 30 mg by mouth Daily., Disp: , Rfl:   •  nebivolol (BYSTOLIC) 10 MG tablet, Take 10 mg by mouth Daily., Disp: , Rfl:   •  ondansetron (ZOFRAN) 4 MG tablet, Take 1 tablet by mouth Every 8 (Eight) Hours As Needed for Nausea or Vomiting., Disp: 20 tablet, Rfl: 0  •  potassium chloride (K-DUR,KLOR-CON) 10 MEQ CR tablet, Take 2 tablets by mouth Daily., Disp: 30 tablet, Rfl: 3  •  rosuvastatin (CRESTOR) 5 MG tablet, Take 5 mg by mouth Daily., Disp: , Rfl:   •  venlafaxine XR (EFFEXOR-XR) 150 MG 24 hr capsule, , Disp: , Rfl:   •  vitamin D (ERGOCALCIFEROL) 46552 units capsule capsule, Take 50,000 Units by mouth Every 7 (Seven) Days. EVERY WEDNESDAY, Disp: , Rfl:   •  Vortioxetine HBr (TRINTELLIX) 20 MG tablet, Take 20  "mg by mouth Every Morning., Disp: , Rfl:   •  amoxicillin-clavulanate (Augmentin) 875-125 MG per tablet, Take 1 tablet by mouth 2 (Two) Times a Day., Disp: 10 tablet, Rfl: 0  •  busPIRone (BUSPAR) 10 MG tablet, Take 20 mg by mouth 2 (Two) Times a Day., Disp: , Rfl:   •  busPIRone (BUSPAR) 15 MG tablet, , Disp: , Rfl:   •  chlorthalidone (HYGROTEN) 50 MG tablet, Take 50 mg by mouth Daily., Disp: , Rfl: 4  •  clindamycin (Cleocin) 300 MG capsule, Take 1 capsule by mouth 3 (Three) Times a Day., Disp: 30 capsule, Rfl: 0  •  COVID-19 mRNA Virus Vaccine (MODERNA COVID-19 VACCINE IM), Inject  into the appropriate muscle as directed by prescriber., Disp: , Rfl:   •  leflunomide (ARAVA) 20 MG tablet, Take 1 tablet by mouth once daily, Disp: , Rfl:   •  oxyCODONE-acetaminophen (PERCOCET) 5-325 MG per tablet, Take 2 tablets by mouth Every 6 (Six) Hours., Disp: , Rfl:   •  potassium chloride 10 MEQ CR tablet, , Disp: , Rfl:     Review of Systems  General: Patient reports during good health  Eyes: No eye problems  Ears, nose, mouth and throat: No rhinitis, no hearing problems, no chronic cough  Cardiovascular/heart: Denies palpitations, syncope or chest pain  Respiratory/lung: Denies shortness of breath, hemoptysis, dyspnea on exertion   Genital/urinary: No frequency, hematuria or dysuria  Hematological/lymphatic: Denies anemia or other problems  Musculoskeletal: Positive joint pain  Skin: No psoriasis or other skin issues  Neurological: No seizures or other neurological problems  Psychiatric: Anxiety and depression  Endocrine: Negative  Gastro-intestinal: No constipation, no diarrhea, no melena, no hematochezia  Vitals:    09/01/21 1000   BP: 120/80   BP Location: Left arm   Patient Position: Sitting   Cuff Size: Adult   Pulse: 95   Resp: 18   Temp: 97.4 °F (36.3 °C)   TempSrc: Temporal   SpO2: 96%   Weight: 85.4 kg (188 lb 3.2 oz)   Height: 189 cm (74.41\")       Physical Exam  General/physcological:   Alert and oriented x3, in " no acute distress  HEENT: Normal cephalic, atraumatic, PERRLA, EOMI, sclera anicteric, moist mucous membranes, neck is supple, no JVD, no carotid bruits, no thyromegaly no adenopathy  Respiratory: CTA and percussion  CVA: RRR, normal S1-S2, no murmurs, no gallops or rubs  GI: Positive BS, soft, nondistended, nontender, no rebound, no guarding, no hernias, no organomegaly and no masses  STAR drain is purulent  Musculoskeletal: Moves all 4 ext, no clubbing, no cyanosis or edema  Neurovascular: Grossly intact  Debilities: none  Emotional behavior: appropriate       Assessment:  Chronic diverticulitis with a walled off abscess  Plan:  Patient does not appear to be making much progress.  I have recommended that she undergo surgical intervention with removal of the sigmoid colon and possible colostomy.  I have discussed this procedure in detail with the patient and her daughter who has accompanied her.  I have discussed the risks, benefits and alternatives.  I have discussed the risk of anesthesia, bleeding, infection, possible colostomy, possible open, DVT, surrounding organ injuries, ureter injury, anastomotic leaks and stenosis.  Patient daughter understands these risks, benefits and alternatives and wishes to proceed.  I have her scheduled at their earliest convenience.    Katey Villagomez MD  General, Minimally Invasive and Endoscopic Surgery  [facility]      [address] [facility] [address] [place] [address]   [address]    [address]  [place], [place] [id number] [place] [id number]    P: [phone]  F: [phone]    Cc:  Violeta Hammond MD

## 2021-09-02 ENCOUNTER — TRANSCRIBE ORDERS (OUTPATIENT)
Dept: ADMINISTRATIVE | Facility: HOSPITAL | Age: 66
End: 2021-09-02

## 2021-09-02 DIAGNOSIS — U07.1 COVID-19: Primary | ICD-10-CM

## 2021-09-22 ENCOUNTER — PRE-ADMISSION TESTING (OUTPATIENT)
Dept: PREADMISSION TESTING | Facility: HOSPITAL | Age: 66
End: 2021-09-22

## 2021-09-22 VITALS
HEIGHT: 68 IN | RESPIRATION RATE: 16 BRPM | HEART RATE: 95 BPM | BODY MASS INDEX: 27.58 KG/M2 | WEIGHT: 182 LBS | SYSTOLIC BLOOD PRESSURE: 105 MMHG | DIASTOLIC BLOOD PRESSURE: 52 MMHG

## 2021-09-22 LAB
ANION GAP SERPL CALCULATED.3IONS-SCNC: 10.4 MMOL/L (ref 5–15)
BUN SERPL-MCNC: 14 MG/DL (ref 8–23)
BUN/CREAT SERPL: 15.1 (ref 7–25)
CALCIUM SPEC-SCNC: 9 MG/DL (ref 8.6–10.5)
CHLORIDE SERPL-SCNC: 104 MMOL/L (ref 98–107)
CO2 SERPL-SCNC: 22.6 MMOL/L (ref 22–29)
CREAT SERPL-MCNC: 0.93 MG/DL (ref 0.57–1)
DEPRECATED RDW RBC AUTO: 55.2 FL (ref 37–54)
ERYTHROCYTE [DISTWIDTH] IN BLOOD BY AUTOMATED COUNT: 17.5 % (ref 12.3–15.4)
GFR SERPL CREATININE-BSD FRML MDRD: 60 ML/MIN/1.73
GLUCOSE SERPL-MCNC: 125 MG/DL (ref 65–99)
HCT VFR BLD AUTO: 29.9 % (ref 34–46.6)
HGB BLD-MCNC: 8.8 G/DL (ref 12–15.9)
MCH RBC QN AUTO: 25.2 PG (ref 26.6–33)
MCHC RBC AUTO-ENTMCNC: 29.4 G/DL (ref 31.5–35.7)
MCV RBC AUTO: 85.7 FL (ref 79–97)
PLATELET # BLD AUTO: 915 10*3/MM3 (ref 140–450)
PMV BLD AUTO: 8.4 FL (ref 6–12)
POTASSIUM SERPL-SCNC: 3.3 MMOL/L (ref 3.5–5.2)
RBC # BLD AUTO: 3.49 10*6/MM3 (ref 3.77–5.28)
SODIUM SERPL-SCNC: 137 MMOL/L (ref 136–145)
WBC # BLD AUTO: 15.71 10*3/MM3 (ref 3.4–10.8)

## 2021-09-22 PROCEDURE — 85027 COMPLETE CBC AUTOMATED: CPT | Performed by: SURGERY

## 2021-09-22 PROCEDURE — 80048 BASIC METABOLIC PNL TOTAL CA: CPT | Performed by: SURGERY

## 2021-09-22 PROCEDURE — 36415 COLL VENOUS BLD VENIPUNCTURE: CPT

## 2021-09-22 NOTE — PAT
Pt here for PAT visit.  Pre-op tests completed, chg soap given, and instructions reviewed.  Instructed clears until 2 hrs prior to arrival time, voiced understanding. ERAS booklet reviewed, Gatorade and instructions reviewed, covid 9/28

## 2021-09-22 NOTE — DISCHARGE INSTRUCTIONS
PRE-ADMISSION TESTING INSTRUCTIONS FOR ADULTS    Take these medications the morning of surgery with a small sip of water: Xanax, Venlafaxine, Trintellex, Edarbi, bystolic       No aspirin, advil, aleve, ibuprofen, naproxen, diet pills, decongestants, or herbal/vitamins for a week prior to surgery.    General Instructions:    • Do not eat solid food after midnight the night before surgery.  No gum, mints, or hard candy after midnight the night before surgery.  • You may drink clear liquids the day of surgery up until 2 hours before your arrival time.  • Clear liquids are liquids you can see through. Nothing RED in color.    Plain water    Sports drinks  Sodas     Gelatin (Jell-O)  Fruit juices without pulp such as white grape juice and apple juice  Popsicles that contain no fruit or yogurt  Tea or coffee (no cream or milk added)    • It is beneficial for you to have a clear drink that contains carbohydrates just before you leave your house and before your fasting time begins.  We suggest a 20 ounce bottle of Gatorade or Powerade for non-diabetic patients or a 20 ounce bottle of G2 or Powerade Zero for diabetic patients.     • Patients who avoid smoking, chewing tobacco and alcohol for 4 weeks prior to surgery have a reduced risk of post-operative complications.  If at all possible, quit smoking as many days before surgery as you can.    • Do not smoke, use chewing tobacco or drink alcohol the day of surgery    • Bring your C-PAP/ BI-PAP machine if you use one.  • Wear clean comfortable clothes and socks.  • Do not wear contact lenses, lotion, deodorant, or make-up.  Bring a case for your glasses if applicable. You may brush your teeth the morning of surgery.  • You may wear dentures/partials, do not put adhesive/glue on them.    • Leave all other jewelry and valuables at home.      Preventing a Surgical Site Infection:    • Shower the night before and on the morning of surgery using the chlorhexidine soap you were  given.  Use a clean washcloth with the soap.  Place clean sheets on your bed after showering the night before surgery. Do not use the CHG soap on your hair, face, or private areas. Wash your body gently for five (5) minutes. Do not scrub your skin.  Dry with a clean towel and dress in clean clothing.    • Do not shave the surgical area for 10 days-2 weeks prior to surgery  because the razor can irritate skin and make it easier to develop an infection.  • Make sure you, your family, and all healthcare providers clean their hands with soap and water or an alcohol based hand  before caring for you or your wound.      Day of surgery:    Your surgeon’s office will advise you of your arrival time for the day of surgery.    Upon arrival, a Pre-op nurse and Anesthesia provider will review your health history, obtain vital signs, and answer questions you may have.  The only belongings needed at this time will be your home medications and if applicable your C-PAP/BI-PAP machine.  If you are staying overnight your family can leave the rest of your belongings in the car and bring them to your room later.  A Pre-op nurse will start an IV and you may receive medication in preparation for surgery, including something to help you relax.  Your family will be able to see you in the Pre-op area.  While you are in surgery your family should notify the waiting room  if they leave the waiting room area and provide a contact phone number.    IF you have any questions, you can call the Pre-Admission Department at [phone] or your surgeon's office.  Notify your surgeon if  you become sick, have a fever, productive cough, or cannot be here the day of surgery    Please be aware that surgery does come with discomfort.  We want to make every effort to control your discomfort so please discuss any uncontrolled symptoms with your nurse.   Your doctor will most likely have prescribed pain medications.      If you are  going home after surgery, you will receive individualized written care instructions before being discharged.  A responsible adult (over the age of 18) must drive you to and from the hospital on the day of your surgery and stay with you for 24 hours after anesthesia.    If you are staying overnight following surgery, you will be transported to your hospital room following the recovery period.  [facility] has all private rooms.    You may receive a survey regarding the care you received. Your feedback is very important and will be used to collect the necessary data to help us to continue to provide excellent care.     Deductibles and co-payments are collected on the day of service. Please be prepared to pay the required co-pay, deductible or deposit on the day of service as defined by your plan.

## 2021-09-28 ENCOUNTER — LAB (OUTPATIENT)
Dept: LAB | Facility: HOSPITAL | Age: 66
End: 2021-09-28

## 2021-09-28 ENCOUNTER — ANESTHESIA EVENT (OUTPATIENT)
Dept: PERIOP | Facility: HOSPITAL | Age: 66
End: 2021-09-28

## 2021-09-28 DIAGNOSIS — U07.1 COVID-19: ICD-10-CM

## 2021-09-28 LAB — SARS-COV-2 RNA PNL SPEC NAA+PROBE: NOT DETECTED

## 2021-09-28 PROCEDURE — C9803 HOPD COVID-19 SPEC COLLECT: HCPCS

## 2021-09-28 PROCEDURE — 87635 SARS-COV-2 COVID-19 AMP PRB: CPT | Performed by: OBSTETRICS & GYNECOLOGY

## 2021-09-30 ENCOUNTER — ANESTHESIA (OUTPATIENT)
Dept: PERIOP | Facility: HOSPITAL | Age: 66
End: 2021-09-30

## 2021-09-30 ENCOUNTER — HOSPITAL ENCOUNTER (INPATIENT)
Facility: HOSPITAL | Age: 66
LOS: 6 days | Discharge: HOME | End: 2021-10-06
Attending: SURGERY | Admitting: SURGERY

## 2021-09-30 DIAGNOSIS — K57.80 DIVERTICULITIS OF INTESTINE WITH ABSCESS, UNSPECIFIED BLEEDING STATUS, UNSPECIFIED PART OF INTESTINAL TRACT: ICD-10-CM

## 2021-09-30 LAB
ABO GROUP BLD: NORMAL
ALBUMIN SERPL-MCNC: 2.4 G/DL (ref 3.5–5.2)
ALBUMIN/GLOB SERPL: 0.8 G/DL
ALP SERPL-CCNC: 145 U/L (ref 39–117)
ALT SERPL W P-5'-P-CCNC: 5 U/L (ref 1–33)
ANION GAP SERPL CALCULATED.3IONS-SCNC: 12.5 MMOL/L (ref 5–15)
AST SERPL-CCNC: 7 U/L (ref 1–32)
BASOPHILS # BLD AUTO: 0.04 10*3/MM3 (ref 0–0.2)
BASOPHILS NFR BLD AUTO: 0.2 % (ref 0–1.5)
BILIRUB SERPL-MCNC: 0.2 MG/DL (ref 0–1.2)
BLD GP AB SCN SERPL QL: NEGATIVE
BUN SERPL-MCNC: 12 MG/DL (ref 8–23)
BUN/CREAT SERPL: 9.3 (ref 7–25)
CALCIUM SPEC-SCNC: 7.8 MG/DL (ref 8.6–10.5)
CHLORIDE SERPL-SCNC: 102 MMOL/L (ref 98–107)
CO2 SERPL-SCNC: 22.5 MMOL/L (ref 22–29)
CREAT SERPL-MCNC: 1.29 MG/DL (ref 0.57–1)
DEPRECATED RDW RBC AUTO: 58.3 FL (ref 37–54)
EOSINOPHIL # BLD AUTO: 0.07 10*3/MM3 (ref 0–0.4)
EOSINOPHIL NFR BLD AUTO: 0.4 % (ref 0.3–6.2)
ERYTHROCYTE [DISTWIDTH] IN BLOOD BY AUTOMATED COUNT: 18.4 % (ref 12.3–15.4)
GFR SERPL CREATININE-BSD FRML MDRD: 41 ML/MIN/1.73
GLOBULIN UR ELPH-MCNC: 3 GM/DL
GLUCOSE SERPL-MCNC: 157 MG/DL (ref 65–99)
HCT VFR BLD AUTO: 27.1 % (ref 34–46.6)
HGB BLD-MCNC: 8 G/DL (ref 12–15.9)
IMM GRANULOCYTES # BLD AUTO: 0.08 10*3/MM3 (ref 0–0.05)
IMM GRANULOCYTES NFR BLD AUTO: 0.4 % (ref 0–0.5)
LYMPHOCYTES # BLD AUTO: 0.48 10*3/MM3 (ref 0.7–3.1)
LYMPHOCYTES NFR BLD AUTO: 2.5 % (ref 19.6–45.3)
MAGNESIUM SERPL-MCNC: 1.8 MG/DL (ref 1.6–2.4)
MCH RBC QN AUTO: 25.6 PG (ref 26.6–33)
MCHC RBC AUTO-ENTMCNC: 29.5 G/DL (ref 31.5–35.7)
MCV RBC AUTO: 86.9 FL (ref 79–97)
MONOCYTES # BLD AUTO: 0.84 10*3/MM3 (ref 0.1–0.9)
MONOCYTES NFR BLD AUTO: 4.3 % (ref 5–12)
NEUTROPHILS NFR BLD AUTO: 17.95 10*3/MM3 (ref 1.7–7)
NEUTROPHILS NFR BLD AUTO: 92.2 % (ref 42.7–76)
NRBC BLD AUTO-RTO: 0 /100 WBC (ref 0–0.2)
PLATELET # BLD AUTO: 836 10*3/MM3 (ref 140–450)
PMV BLD AUTO: 8.7 FL (ref 6–12)
POTASSIUM SERPL-SCNC: 2.9 MMOL/L (ref 3.5–5.2)
POTASSIUM SERPL-SCNC: 3.9 MMOL/L (ref 3.5–5.2)
PROT SERPL-MCNC: 5.4 G/DL (ref 6–8.5)
RBC # BLD AUTO: 3.12 10*6/MM3 (ref 3.77–5.28)
RH BLD: POSITIVE
SODIUM SERPL-SCNC: 137 MMOL/L (ref 136–145)
T&S EXPIRATION DATE: NORMAL
WBC # BLD AUTO: 19.46 10*3/MM3 (ref 3.4–10.8)

## 2021-09-30 PROCEDURE — 86923 COMPATIBILITY TEST ELECTRIC: CPT

## 2021-09-30 PROCEDURE — C1889 IMPLANT/INSERT DEVICE, NOC: HCPCS | Performed by: SURGERY

## 2021-09-30 PROCEDURE — 25010000002 PIPERACILLIN SOD-TAZOBACTAM PER 1 G: Performed by: SURGERY

## 2021-09-30 PROCEDURE — P9041 ALBUMIN (HUMAN),5%, 50ML: HCPCS | Performed by: NURSE ANESTHETIST, CERTIFIED REGISTERED

## 2021-09-30 PROCEDURE — 25010000002 ERTAPENEM PER 500 MG: Performed by: SURGERY

## 2021-09-30 PROCEDURE — 80053 COMPREHEN METABOLIC PANEL: CPT | Performed by: SURGERY

## 2021-09-30 PROCEDURE — 86901 BLOOD TYPING SEROLOGIC RH(D): CPT | Performed by: NURSE ANESTHETIST, CERTIFIED REGISTERED

## 2021-09-30 PROCEDURE — 99024 POSTOP FOLLOW-UP VISIT: CPT | Performed by: SURGERY

## 2021-09-30 PROCEDURE — 25010000002 ONDANSETRON PER 1 MG: Performed by: NURSE ANESTHETIST, CERTIFIED REGISTERED

## 2021-09-30 PROCEDURE — 25010000002 DEXAMETHASONE PER 1 MG: Performed by: NURSE ANESTHETIST, CERTIFIED REGISTERED

## 2021-09-30 PROCEDURE — 85025 COMPLETE CBC W/AUTO DIFF WBC: CPT | Performed by: SURGERY

## 2021-09-30 PROCEDURE — 25010000002 MIDAZOLAM PER 1MG: Performed by: NURSE ANESTHETIST, CERTIFIED REGISTERED

## 2021-09-30 PROCEDURE — 94799 UNLISTED PULMONARY SVC/PX: CPT

## 2021-09-30 PROCEDURE — 25010000002 SUCCINYLCHOLINE PER 20 MG: Performed by: NURSE ANESTHETIST, CERTIFIED REGISTERED

## 2021-09-30 PROCEDURE — 86900 BLOOD TYPING SEROLOGIC ABO: CPT | Performed by: NURSE ANESTHETIST, CERTIFIED REGISTERED

## 2021-09-30 PROCEDURE — 44213 LAP MOBIL SPLENIC FL ADD-ON: CPT | Performed by: SURGERY

## 2021-09-30 PROCEDURE — 88307 TISSUE EXAM BY PATHOLOGIST: CPT | Performed by: SURGERY

## 2021-09-30 PROCEDURE — 25010000002 ALBUMIN HUMAN 5% PER 50 ML: Performed by: NURSE ANESTHETIST, CERTIFIED REGISTERED

## 2021-09-30 PROCEDURE — 44207 L COLECTOMY/COLOPROCTOSTOMY: CPT | Performed by: SURGERY

## 2021-09-30 PROCEDURE — 86850 RBC ANTIBODY SCREEN: CPT | Performed by: NURSE ANESTHETIST, CERTIFIED REGISTERED

## 2021-09-30 PROCEDURE — 84132 ASSAY OF SERUM POTASSIUM: CPT | Performed by: NURSE ANESTHETIST, CERTIFIED REGISTERED

## 2021-09-30 PROCEDURE — 83735 ASSAY OF MAGNESIUM: CPT | Performed by: SURGERY

## 2021-09-30 PROCEDURE — 25010000002 FENTANYL CITRATE (PF) 50 MCG/ML SOLUTION: Performed by: NURSE ANESTHETIST, CERTIFIED REGISTERED

## 2021-09-30 PROCEDURE — 25010000002 ONDANSETRON PER 1 MG: Performed by: SURGERY

## 2021-09-30 PROCEDURE — 25010000002 PROPOFOL 10 MG/ML EMULSION: Performed by: NURSE ANESTHETIST, CERTIFIED REGISTERED

## 2021-09-30 PROCEDURE — 25010000002 PHENYLEPHRINE 10 MG/ML SOLUTION: Performed by: NURSE ANESTHETIST, CERTIFIED REGISTERED

## 2021-09-30 PROCEDURE — 25010000002 HYDROMORPHONE 1 MG/ML SOLUTION: Performed by: NURSE ANESTHETIST, CERTIFIED REGISTERED

## 2021-09-30 PROCEDURE — 0DBN4ZZ EXCISION OF SIGMOID COLON, PERCUTANEOUS ENDOSCOPIC APPROACH: ICD-10-PCS | Performed by: SURGERY

## 2021-09-30 DEVICE — ENDOPATH ECHELON ENDOSCOPIC LINEAR CUTTER RELOADS, WHITE, 60MM
Type: IMPLANTABLE DEVICE | Site: SIGMOID COLON | Status: FUNCTIONAL
Brand: ECHELON ENDOPATH

## 2021-09-30 DEVICE — ENDOPATH ECHELON ENDOSCOPIC LINEAR CUTTER RELOADS, BLUE, 60MM
Type: IMPLANTABLE DEVICE | Site: SIGMOID COLON | Status: FUNCTIONAL
Brand: ECHELON ENDOPATH

## 2021-09-30 DEVICE — CLIP LIGAT VASC HORIZON TI MD/LG GRN 6CT: Type: IMPLANTABLE DEVICE | Site: ABDOMEN | Status: FUNCTIONAL

## 2021-09-30 DEVICE — PROXIMATE RELOADABLE LINEAR CUTTER WITH SAFETY LOCK-OUT, 75MM
Type: IMPLANTABLE DEVICE | Site: SIGMOID COLON | Status: FUNCTIONAL
Brand: PROXIMATE

## 2021-09-30 DEVICE — CURVED INTRALUMINAL STAPLER (ILS) 24 TITANIUM ADJUSTABLE HEIGHT STAPLES: Type: IMPLANTABLE DEVICE | Site: SIGMOID COLON | Status: FUNCTIONAL

## 2021-09-30 RX ORDER — BUPIVACAINE HYDROCHLORIDE 2.5 MG/ML
INJECTION, SOLUTION EPIDURAL; INFILTRATION; INTRACAUDAL
Status: COMPLETED | OUTPATIENT
Start: 2021-09-30 | End: 2021-09-30

## 2021-09-30 RX ORDER — DEXMEDETOMIDINE HYDROCHLORIDE 100 UG/ML
INJECTION, SOLUTION INTRAVENOUS AS NEEDED
Status: DISCONTINUED | OUTPATIENT
Start: 2021-09-30 | End: 2021-09-30 | Stop reason: SURG

## 2021-09-30 RX ORDER — CELECOXIB 100 MG/1
200 CAPSULE ORAL ONCE
Status: COMPLETED | OUTPATIENT
Start: 2021-09-30 | End: 2021-09-30

## 2021-09-30 RX ORDER — SODIUM CHLORIDE 9 MG/ML
40 INJECTION, SOLUTION INTRAVENOUS AS NEEDED
Status: DISCONTINUED | OUTPATIENT
Start: 2021-09-30 | End: 2021-09-30 | Stop reason: HOSPADM

## 2021-09-30 RX ORDER — MAGNESIUM SULFATE HEPTAHYDRATE 40 MG/ML
4 INJECTION, SOLUTION INTRAVENOUS AS NEEDED
Status: DISCONTINUED | OUTPATIENT
Start: 2021-09-30 | End: 2021-10-06 | Stop reason: HOSPADM

## 2021-09-30 RX ORDER — ONDANSETRON 2 MG/ML
4 INJECTION INTRAMUSCULAR; INTRAVENOUS EVERY 6 HOURS PRN
Status: DISCONTINUED | OUTPATIENT
Start: 2021-09-30 | End: 2021-10-06 | Stop reason: HOSPADM

## 2021-09-30 RX ORDER — ONDANSETRON 2 MG/ML
4 INJECTION INTRAMUSCULAR; INTRAVENOUS EVERY 6 HOURS PRN
Status: DISCONTINUED | OUTPATIENT
Start: 2021-09-30 | End: 2021-09-30 | Stop reason: HOSPADM

## 2021-09-30 RX ORDER — MAGNESIUM SULFATE HEPTAHYDRATE 40 MG/ML
2 INJECTION, SOLUTION INTRAVENOUS AS NEEDED
Status: DISCONTINUED | OUTPATIENT
Start: 2021-09-30 | End: 2021-10-06 | Stop reason: HOSPADM

## 2021-09-30 RX ORDER — PREGABALIN 50 MG/1
100 CAPSULE ORAL 3 TIMES DAILY
Status: DISPENSED | OUTPATIENT
Start: 2021-09-30 | End: 2021-10-03

## 2021-09-30 RX ORDER — ROCURONIUM BROMIDE 10 MG/ML
INJECTION, SOLUTION INTRAVENOUS AS NEEDED
Status: DISCONTINUED | OUTPATIENT
Start: 2021-09-30 | End: 2021-09-30 | Stop reason: SURG

## 2021-09-30 RX ORDER — HYDROMORPHONE HCL 110MG/55ML
0.5 PATIENT CONTROLLED ANALGESIA SYRINGE INTRAVENOUS
Status: DISCONTINUED | OUTPATIENT
Start: 2021-09-30 | End: 2021-10-06 | Stop reason: HOSPADM

## 2021-09-30 RX ORDER — BUSPIRONE HYDROCHLORIDE 15 MG/1
15 TABLET ORAL 2 TIMES DAILY
Status: DISCONTINUED | OUTPATIENT
Start: 2021-09-30 | End: 2021-10-06 | Stop reason: HOSPADM

## 2021-09-30 RX ORDER — OXYCODONE AND ACETAMINOPHEN 10; 325 MG/1; MG/1
1 TABLET ORAL EVERY 4 HOURS PRN
Status: DISCONTINUED | OUTPATIENT
Start: 2021-09-30 | End: 2021-10-06 | Stop reason: HOSPADM

## 2021-09-30 RX ORDER — SODIUM CHLORIDE 0.9 % (FLUSH) 0.9 %
10 SYRINGE (ML) INJECTION AS NEEDED
Status: DISCONTINUED | OUTPATIENT
Start: 2021-09-30 | End: 2021-09-30 | Stop reason: HOSPADM

## 2021-09-30 RX ORDER — DEXAMETHASONE SODIUM PHOSPHATE 4 MG/ML
8 INJECTION, SOLUTION INTRA-ARTICULAR; INTRALESIONAL; INTRAMUSCULAR; INTRAVENOUS; SOFT TISSUE ONCE
Status: COMPLETED | OUTPATIENT
Start: 2021-09-30 | End: 2021-09-30

## 2021-09-30 RX ORDER — POTASSIUM CHLORIDE 20 MEQ/1
20 TABLET, EXTENDED RELEASE ORAL DAILY
Status: DISCONTINUED | OUTPATIENT
Start: 2021-09-30 | End: 2021-09-30

## 2021-09-30 RX ORDER — ALBUMIN, HUMAN INJ 5% 5 %
SOLUTION INTRAVENOUS CONTINUOUS PRN
Status: DISCONTINUED | OUTPATIENT
Start: 2021-09-30 | End: 2021-09-30 | Stop reason: SURG

## 2021-09-30 RX ORDER — ONDANSETRON 2 MG/ML
4 INJECTION INTRAMUSCULAR; INTRAVENOUS ONCE AS NEEDED
Status: DISCONTINUED | OUTPATIENT
Start: 2021-09-30 | End: 2021-09-30 | Stop reason: HOSPADM

## 2021-09-30 RX ORDER — MAGNESIUM HYDROXIDE 1200 MG/15ML
LIQUID ORAL AS NEEDED
Status: DISCONTINUED | OUTPATIENT
Start: 2021-09-30 | End: 2021-09-30 | Stop reason: HOSPADM

## 2021-09-30 RX ORDER — SODIUM CHLORIDE, SODIUM LACTATE, POTASSIUM CHLORIDE, CALCIUM CHLORIDE 600; 310; 30; 20 MG/100ML; MG/100ML; MG/100ML; MG/100ML
9 INJECTION, SOLUTION INTRAVENOUS CONTINUOUS PRN
Status: DISCONTINUED | OUTPATIENT
Start: 2021-09-30 | End: 2021-10-06 | Stop reason: HOSPADM

## 2021-09-30 RX ORDER — PROPOFOL 10 MG/ML
VIAL (ML) INTRAVENOUS AS NEEDED
Status: DISCONTINUED | OUTPATIENT
Start: 2021-09-30 | End: 2021-09-30 | Stop reason: SURG

## 2021-09-30 RX ORDER — ROSUVASTATIN CALCIUM 5 MG/1
5 TABLET, COATED ORAL DAILY
Status: DISCONTINUED | OUTPATIENT
Start: 2021-09-30 | End: 2021-10-06 | Stop reason: HOSPADM

## 2021-09-30 RX ORDER — GABAPENTIN 300 MG/1
300 CAPSULE ORAL EVERY 8 HOURS SCHEDULED
Status: COMPLETED | OUTPATIENT
Start: 2021-09-30 | End: 2021-10-03

## 2021-09-30 RX ORDER — SUCCINYLCHOLINE CHLORIDE 20 MG/ML
INJECTION INTRAMUSCULAR; INTRAVENOUS AS NEEDED
Status: DISCONTINUED | OUTPATIENT
Start: 2021-09-30 | End: 2021-09-30 | Stop reason: SURG

## 2021-09-30 RX ORDER — SODIUM CHLORIDE 9 MG/ML
40 INJECTION, SOLUTION INTRAVENOUS AS NEEDED
Status: DISCONTINUED | OUTPATIENT
Start: 2021-09-30 | End: 2021-10-06 | Stop reason: HOSPADM

## 2021-09-30 RX ORDER — VENLAFAXINE HYDROCHLORIDE 150 MG/1
150 CAPSULE, EXTENDED RELEASE ORAL DAILY
Status: DISCONTINUED | OUTPATIENT
Start: 2021-09-30 | End: 2021-10-06 | Stop reason: HOSPADM

## 2021-09-30 RX ORDER — EPHEDRINE SULFATE 50 MG/ML
INJECTION, SOLUTION INTRAVENOUS AS NEEDED
Status: DISCONTINUED | OUTPATIENT
Start: 2021-09-30 | End: 2021-09-30 | Stop reason: SURG

## 2021-09-30 RX ORDER — POTASSIUM CHLORIDE 20 MEQ/1
40 TABLET, EXTENDED RELEASE ORAL AS NEEDED
Status: DISCONTINUED | OUTPATIENT
Start: 2021-09-30 | End: 2021-10-06 | Stop reason: HOSPADM

## 2021-09-30 RX ORDER — ACETAMINOPHEN 325 MG/1
650 TABLET ORAL EVERY 4 HOURS PRN
Status: DISCONTINUED | OUTPATIENT
Start: 2021-09-30 | End: 2021-10-06 | Stop reason: HOSPADM

## 2021-09-30 RX ORDER — FENTANYL CITRATE 50 UG/ML
INJECTION, SOLUTION INTRAMUSCULAR; INTRAVENOUS AS NEEDED
Status: DISCONTINUED | OUTPATIENT
Start: 2021-09-30 | End: 2021-09-30 | Stop reason: SURG

## 2021-09-30 RX ORDER — MIDAZOLAM HYDROCHLORIDE 2 MG/2ML
0.5 INJECTION, SOLUTION INTRAMUSCULAR; INTRAVENOUS
Status: DISCONTINUED | OUTPATIENT
Start: 2021-09-30 | End: 2021-09-30 | Stop reason: HOSPADM

## 2021-09-30 RX ORDER — LIDOCAINE HYDROCHLORIDE 10 MG/ML
0.5 INJECTION, SOLUTION EPIDURAL; INFILTRATION; INTRACAUDAL; PERINEURAL ONCE AS NEEDED
Status: DISCONTINUED | OUTPATIENT
Start: 2021-09-30 | End: 2021-09-30 | Stop reason: HOSPADM

## 2021-09-30 RX ORDER — PIPERACILLIN SODIUM, TAZOBACTAM SODIUM 3; .375 G/15ML; G/15ML
INJECTION, POWDER, LYOPHILIZED, FOR SOLUTION INTRAVENOUS
Status: DISPENSED
Start: 2021-09-30 | End: 2021-10-01

## 2021-09-30 RX ORDER — PHENYLEPHRINE HYDROCHLORIDE 10 MG/ML
INJECTION INTRAVENOUS AS NEEDED
Status: DISCONTINUED | OUTPATIENT
Start: 2021-09-30 | End: 2021-09-30 | Stop reason: SURG

## 2021-09-30 RX ORDER — GLYCOPYRROLATE 0.2 MG/ML
INJECTION INTRAMUSCULAR; INTRAVENOUS AS NEEDED
Status: DISCONTINUED | OUTPATIENT
Start: 2021-09-30 | End: 2021-09-30 | Stop reason: SURG

## 2021-09-30 RX ORDER — FAMOTIDINE 10 MG/ML
20 INJECTION, SOLUTION INTRAVENOUS
Status: COMPLETED | OUTPATIENT
Start: 2021-09-30 | End: 2021-09-30

## 2021-09-30 RX ORDER — ALLOPURINOL 100 MG/1
100 TABLET ORAL DAILY
Status: DISCONTINUED | OUTPATIENT
Start: 2021-09-30 | End: 2021-10-06 | Stop reason: HOSPADM

## 2021-09-30 RX ORDER — NEBIVOLOL 5 MG/1
10 TABLET ORAL DAILY
Status: DISCONTINUED | OUTPATIENT
Start: 2021-09-30 | End: 2021-10-06 | Stop reason: HOSPADM

## 2021-09-30 RX ORDER — SODIUM CHLORIDE 0.9 % (FLUSH) 0.9 %
3-10 SYRINGE (ML) INJECTION AS NEEDED
Status: DISCONTINUED | OUTPATIENT
Start: 2021-09-30 | End: 2021-10-06 | Stop reason: HOSPADM

## 2021-09-30 RX ORDER — HYDROCODONE BITARTRATE AND ACETAMINOPHEN 5; 325 MG/1; MG/1
1 TABLET ORAL EVERY 4 HOURS PRN
Status: DISCONTINUED | OUTPATIENT
Start: 2021-09-30 | End: 2021-10-06 | Stop reason: HOSPADM

## 2021-09-30 RX ORDER — SODIUM CHLORIDE 9 MG/ML
INJECTION, SOLUTION INTRAVENOUS
Status: DISPENSED
Start: 2021-09-30 | End: 2021-10-01

## 2021-09-30 RX ORDER — ONDANSETRON 2 MG/ML
4 INJECTION INTRAMUSCULAR; INTRAVENOUS ONCE AS NEEDED
Status: COMPLETED | OUTPATIENT
Start: 2021-09-30 | End: 2021-09-30

## 2021-09-30 RX ORDER — DEXTROSE, SODIUM CHLORIDE, AND POTASSIUM CHLORIDE 5; .45; .15 G/100ML; G/100ML; G/100ML
100 INJECTION INTRAVENOUS CONTINUOUS
Status: DISCONTINUED | OUTPATIENT
Start: 2021-09-30 | End: 2021-10-01

## 2021-09-30 RX ORDER — ACETAMINOPHEN 500 MG
500 TABLET ORAL EVERY 6 HOURS
Status: COMPLETED | OUTPATIENT
Start: 2021-09-30 | End: 2021-10-02

## 2021-09-30 RX ORDER — ALPRAZOLAM 0.25 MG/1
0.5 TABLET ORAL EVERY 8 HOURS PRN
Status: DISCONTINUED | OUTPATIENT
Start: 2021-09-30 | End: 2021-10-06 | Stop reason: HOSPADM

## 2021-09-30 RX ORDER — SODIUM CHLORIDE, SODIUM LACTATE, POTASSIUM CHLORIDE, CALCIUM CHLORIDE 600; 310; 30; 20 MG/100ML; MG/100ML; MG/100ML; MG/100ML
INJECTION, SOLUTION INTRAVENOUS CONTINUOUS PRN
Status: DISCONTINUED | OUTPATIENT
Start: 2021-09-30 | End: 2021-09-30 | Stop reason: SURG

## 2021-09-30 RX ORDER — OXYCODONE AND ACETAMINOPHEN 7.5; 325 MG/1; MG/1
1 TABLET ORAL ONCE AS NEEDED
Status: DISCONTINUED | OUTPATIENT
Start: 2021-09-30 | End: 2021-09-30 | Stop reason: HOSPADM

## 2021-09-30 RX ORDER — VALSARTAN 80 MG/1
80 TABLET ORAL
Status: CANCELLED | OUTPATIENT
Start: 2021-09-30

## 2021-09-30 RX ORDER — OXYCODONE HYDROCHLORIDE AND ACETAMINOPHEN 5; 325 MG/1; MG/1
1 TABLET ORAL ONCE AS NEEDED
Status: DISCONTINUED | OUTPATIENT
Start: 2021-09-30 | End: 2021-09-30 | Stop reason: HOSPADM

## 2021-09-30 RX ORDER — KETAMINE HYDROCHLORIDE 10 MG/ML
INJECTION INTRAMUSCULAR; INTRAVENOUS AS NEEDED
Status: DISCONTINUED | OUTPATIENT
Start: 2021-09-30 | End: 2021-09-30 | Stop reason: SURG

## 2021-09-30 RX ORDER — NALOXONE HCL 0.4 MG/ML
0.1 VIAL (ML) INJECTION
Status: DISCONTINUED | OUTPATIENT
Start: 2021-09-30 | End: 2021-10-06 | Stop reason: HOSPADM

## 2021-09-30 RX ORDER — ONDANSETRON 4 MG/1
4 TABLET, FILM COATED ORAL EVERY 6 HOURS PRN
Status: DISCONTINUED | OUTPATIENT
Start: 2021-09-30 | End: 2021-10-06 | Stop reason: HOSPADM

## 2021-09-30 RX ORDER — ALVIMOPAN 12 MG/1
12 CAPSULE ORAL ONCE
Status: COMPLETED | OUTPATIENT
Start: 2021-09-30 | End: 2021-09-30

## 2021-09-30 RX ORDER — SODIUM CHLORIDE 0.9 % (FLUSH) 0.9 %
3 SYRINGE (ML) INJECTION EVERY 12 HOURS SCHEDULED
Status: DISCONTINUED | OUTPATIENT
Start: 2021-09-30 | End: 2021-10-06 | Stop reason: HOSPADM

## 2021-09-30 RX ORDER — ACETAMINOPHEN 500 MG
1000 TABLET ORAL ONCE
Status: COMPLETED | OUTPATIENT
Start: 2021-09-30 | End: 2021-09-30

## 2021-09-30 RX ORDER — ACETAMINOPHEN 650 MG/1
650 SUPPOSITORY RECTAL EVERY 4 HOURS PRN
Status: DISCONTINUED | OUTPATIENT
Start: 2021-09-30 | End: 2021-10-06 | Stop reason: HOSPADM

## 2021-09-30 RX ORDER — GABAPENTIN 300 MG/1
300 CAPSULE ORAL ONCE
Status: COMPLETED | OUTPATIENT
Start: 2021-09-30 | End: 2021-09-30

## 2021-09-30 RX ORDER — SODIUM CHLORIDE 0.9 % (FLUSH) 0.9 %
10 SYRINGE (ML) INJECTION EVERY 12 HOURS SCHEDULED
Status: DISCONTINUED | OUTPATIENT
Start: 2021-09-30 | End: 2021-09-30 | Stop reason: HOSPADM

## 2021-09-30 RX ORDER — FENTANYL CITRATE 50 UG/ML
25 INJECTION, SOLUTION INTRAMUSCULAR; INTRAVENOUS
Status: DISCONTINUED | OUTPATIENT
Start: 2021-09-30 | End: 2021-09-30 | Stop reason: HOSPADM

## 2021-09-30 RX ORDER — LIDOCAINE HYDROCHLORIDE 20 MG/ML
INJECTION, SOLUTION INFILTRATION; PERINEURAL AS NEEDED
Status: DISCONTINUED | OUTPATIENT
Start: 2021-09-30 | End: 2021-09-30 | Stop reason: SURG

## 2021-09-30 RX ORDER — POTASSIUM CHLORIDE 1.5 G/1.77G
40 POWDER, FOR SOLUTION ORAL AS NEEDED
Status: DISCONTINUED | OUTPATIENT
Start: 2021-09-30 | End: 2021-10-06 | Stop reason: HOSPADM

## 2021-09-30 RX ORDER — ALVIMOPAN 12 MG/1
12 CAPSULE ORAL 2 TIMES DAILY
Status: DISCONTINUED | OUTPATIENT
Start: 2021-10-01 | End: 2021-10-01

## 2021-09-30 RX ADMIN — DEXAMETHASONE SODIUM PHOSPHATE 8 MG: 4 INJECTION, SOLUTION INTRAMUSCULAR; INTRAVENOUS at 12:21

## 2021-09-30 RX ADMIN — ROCURONIUM BROMIDE 10 MG: 10 INJECTION INTRAVENOUS at 14:37

## 2021-09-30 RX ADMIN — KETAMINE HYDROCHLORIDE 10 MG: 10 INJECTION, SOLUTION INTRAMUSCULAR; INTRAVENOUS at 14:03

## 2021-09-30 RX ADMIN — PHENYLEPHRINE HYDROCHLORIDE 100 MCG: 10 INJECTION INTRAVENOUS at 16:29

## 2021-09-30 RX ADMIN — DEXMEDETOMIDINE 25 MCG: 100 INJECTION, SOLUTION, CONCENTRATE INTRAVENOUS at 13:17

## 2021-09-30 RX ADMIN — EPHEDRINE SULFATE 5 MG: 50 INJECTION, SOLUTION INTRAVENOUS at 13:51

## 2021-09-30 RX ADMIN — ONDANSETRON 4 MG: 2 INJECTION INTRAMUSCULAR; INTRAVENOUS at 20:10

## 2021-09-30 RX ADMIN — HYDROMORPHONE HYDROCHLORIDE 0.5 MG: 1 INJECTION, SOLUTION INTRAMUSCULAR; INTRAVENOUS; SUBCUTANEOUS at 18:33

## 2021-09-30 RX ADMIN — BUPIVACAINE HYDROCHLORIDE 15 ML: 2.5 INJECTION, SOLUTION EPIDURAL; INFILTRATION; INTRACAUDAL; PERINEURAL at 13:01

## 2021-09-30 RX ADMIN — DEXMEDETOMIDINE 25 MCG: 100 INJECTION, SOLUTION, CONCENTRATE INTRAVENOUS at 13:01

## 2021-09-30 RX ADMIN — PHENYLEPHRINE HYDROCHLORIDE 100 MCG: 10 INJECTION INTRAVENOUS at 13:17

## 2021-09-30 RX ADMIN — PHENYLEPHRINE HYDROCHLORIDE 200 MCG: 10 INJECTION INTRAVENOUS at 13:51

## 2021-09-30 RX ADMIN — DEXMEDETOMIDINE 12 MCG: 100 INJECTION, SOLUTION, CONCENTRATE INTRAVENOUS at 14:47

## 2021-09-30 RX ADMIN — ROSUVASTATIN CALCIUM 5 MG: 5 TABLET, COATED ORAL at 22:19

## 2021-09-30 RX ADMIN — SUCCINYLCHOLINE CHLORIDE 100 MG: 20 INJECTION, SOLUTION INTRAMUSCULAR; INTRAVENOUS at 12:54

## 2021-09-30 RX ADMIN — ONDANSETRON 4 MG: 2 INJECTION INTRAMUSCULAR; INTRAVENOUS at 12:22

## 2021-09-30 RX ADMIN — ROCURONIUM BROMIDE 10 MG: 10 INJECTION INTRAVENOUS at 14:03

## 2021-09-30 RX ADMIN — ERTAPENEM SODIUM 1 G: 1 INJECTION, POWDER, LYOPHILIZED, FOR SOLUTION INTRAMUSCULAR; INTRAVENOUS at 13:04

## 2021-09-30 RX ADMIN — BUPIVACAINE HYDROCHLORIDE 15 ML: 2.5 INJECTION, SOLUTION EPIDURAL; INFILTRATION; INTRACAUDAL at 13:17

## 2021-09-30 RX ADMIN — VENLAFAXINE HYDROCHLORIDE 150 MG: 150 CAPSULE, EXTENDED RELEASE ORAL at 22:19

## 2021-09-30 RX ADMIN — KETAMINE HYDROCHLORIDE 25 MG: 10 INJECTION, SOLUTION INTRAMUSCULAR; INTRAVENOUS at 15:25

## 2021-09-30 RX ADMIN — PHENYLEPHRINE HYDROCHLORIDE 100 MCG: 10 INJECTION INTRAVENOUS at 13:48

## 2021-09-30 RX ADMIN — GLYCOPYRROLATE 0.2 MG: 0.2 INJECTION INTRAMUSCULAR; INTRAVENOUS at 13:54

## 2021-09-30 RX ADMIN — PHENYLEPHRINE HYDROCHLORIDE 200 MCG: 10 INJECTION INTRAVENOUS at 13:36

## 2021-09-30 RX ADMIN — GABAPENTIN 300 MG: 300 CAPSULE ORAL at 12:21

## 2021-09-30 RX ADMIN — ALBUMIN HUMAN: 0.05 INJECTION, SOLUTION INTRAVENOUS at 13:58

## 2021-09-30 RX ADMIN — PHENYLEPHRINE HYDROCHLORIDE 200 MCG: 10 INJECTION INTRAVENOUS at 13:28

## 2021-09-30 RX ADMIN — ROCURONIUM BROMIDE 30 MG: 10 INJECTION INTRAVENOUS at 13:04

## 2021-09-30 RX ADMIN — ALLOPURINOL 100 MG: 100 TABLET ORAL at 22:10

## 2021-09-30 RX ADMIN — SODIUM CHLORIDE, POTASSIUM CHLORIDE, SODIUM LACTATE AND CALCIUM CHLORIDE: 600; 310; 30; 20 INJECTION, SOLUTION INTRAVENOUS at 13:38

## 2021-09-30 RX ADMIN — EPHEDRINE SULFATE 10 MG: 50 INJECTION, SOLUTION INTRAVENOUS at 15:40

## 2021-09-30 RX ADMIN — DEXMEDETOMIDINE 12 MCG: 100 INJECTION, SOLUTION, CONCENTRATE INTRAVENOUS at 15:15

## 2021-09-30 RX ADMIN — KETAMINE HYDROCHLORIDE 10 MG: 10 INJECTION, SOLUTION INTRAMUSCULAR; INTRAVENOUS at 12:49

## 2021-09-30 RX ADMIN — EPHEDRINE SULFATE 5 MG: 50 INJECTION, SOLUTION INTRAVENOUS at 16:11

## 2021-09-30 RX ADMIN — DEXMEDETOMIDINE 25 MCG: 100 INJECTION, SOLUTION, CONCENTRATE INTRAVENOUS at 13:06

## 2021-09-30 RX ADMIN — HYDROMORPHONE HYDROCHLORIDE 0.5 MG: 1 INJECTION, SOLUTION INTRAMUSCULAR; INTRAVENOUS; SUBCUTANEOUS at 18:43

## 2021-09-30 RX ADMIN — EPHEDRINE SULFATE 5 MG: 50 INJECTION, SOLUTION INTRAVENOUS at 16:19

## 2021-09-30 RX ADMIN — ACETAMINOPHEN 1000 MG: 500 TABLET, FILM COATED ORAL at 11:49

## 2021-09-30 RX ADMIN — PROPOFOL 200 MG: 10 INJECTION, EMULSION INTRAVENOUS at 12:53

## 2021-09-30 RX ADMIN — BUSPIRONE HYDROCHLORIDE 15 MG: 15 TABLET ORAL at 22:10

## 2021-09-30 RX ADMIN — DEXMEDETOMIDINE 25 MCG: 100 INJECTION, SOLUTION, CONCENTRATE INTRAVENOUS at 13:20

## 2021-09-30 RX ADMIN — ALVIMOPAN 12 MG: 12 CAPSULE ORAL at 11:48

## 2021-09-30 RX ADMIN — ACETAMINOPHEN 500 MG: 500 TABLET, FILM COATED ORAL at 22:10

## 2021-09-30 RX ADMIN — KETAMINE HYDROCHLORIDE 15 MG: 10 INJECTION, SOLUTION INTRAMUSCULAR; INTRAVENOUS at 12:46

## 2021-09-30 RX ADMIN — CELECOXIB 200 MG: 100 CAPSULE ORAL at 11:48

## 2021-09-30 RX ADMIN — SODIUM CHLORIDE, POTASSIUM CHLORIDE, SODIUM LACTATE AND CALCIUM CHLORIDE 1000 ML: 600; 310; 30; 20 INJECTION, SOLUTION INTRAVENOUS at 23:45

## 2021-09-30 RX ADMIN — SODIUM CHLORIDE, POTASSIUM CHLORIDE, SODIUM LACTATE AND CALCIUM CHLORIDE: 600; 310; 30; 20 INJECTION, SOLUTION INTRAVENOUS at 12:39

## 2021-09-30 RX ADMIN — SODIUM CHLORIDE, PRESERVATIVE FREE 3 ML: 5 INJECTION INTRAVENOUS at 20:21

## 2021-09-30 RX ADMIN — POTASSIUM CHLORIDE 20 MEQ: 1500 TABLET, EXTENDED RELEASE ORAL at 22:10

## 2021-09-30 RX ADMIN — DEXMEDETOMIDINE 12 MCG: 100 INJECTION, SOLUTION, CONCENTRATE INTRAVENOUS at 15:56

## 2021-09-30 RX ADMIN — SODIUM CHLORIDE 500 ML: 9 INJECTION, SOLUTION INTRAVENOUS at 22:23

## 2021-09-30 RX ADMIN — FENTANYL CITRATE 50 MCG: 50 INJECTION INTRAMUSCULAR; INTRAVENOUS at 12:52

## 2021-09-30 RX ADMIN — BUPIVACAINE HYDROCHLORIDE 5 ML: 2.5 INJECTION, SOLUTION EPIDURAL; INFILTRATION; INTRACAUDAL at 13:20

## 2021-09-30 RX ADMIN — PHENYLEPHRINE HYDROCHLORIDE 200 MCG: 10 INJECTION INTRAVENOUS at 15:40

## 2021-09-30 RX ADMIN — PREGABALIN 100 MG: 50 CAPSULE ORAL at 22:18

## 2021-09-30 RX ADMIN — EPHEDRINE SULFATE 5 MG: 50 INJECTION, SOLUTION INTRAVENOUS at 16:29

## 2021-09-30 RX ADMIN — FENTANYL CITRATE 50 MCG: 50 INJECTION INTRAMUSCULAR; INTRAVENOUS at 12:44

## 2021-09-30 RX ADMIN — DEXTROSE MONOHYDRATE, SODIUM CHLORIDE, AND POTASSIUM CHLORIDE 100 ML/HR: 50; 4.5; 1.49 INJECTION, SOLUTION INTRAVENOUS at 20:12

## 2021-09-30 RX ADMIN — MIDAZOLAM HYDROCHLORIDE 2 MG: 1 INJECTION, SOLUTION INTRAMUSCULAR; INTRAVENOUS at 12:22

## 2021-09-30 RX ADMIN — FAMOTIDINE 20 MG: 10 INJECTION INTRAVENOUS at 12:22

## 2021-09-30 RX ADMIN — GABAPENTIN 300 MG: 300 CAPSULE ORAL at 22:10

## 2021-09-30 RX ADMIN — LIDOCAINE HYDROCHLORIDE 50 MG: 20 INJECTION, SOLUTION INFILTRATION; PERINEURAL at 12:51

## 2021-09-30 RX ADMIN — BUPIVACAINE HYDROCHLORIDE 15 ML: 2.5 INJECTION, SOLUTION EPIDURAL; INFILTRATION; INTRACAUDAL at 13:06

## 2021-09-30 NOTE — ANESTHESIA PREPROCEDURE EVALUATION
Anesthesia Evaluation     Patient summary reviewed and Nursing notes reviewed   no history of anesthetic complications:  NPO Solid Status: > 8 hours  NPO Liquid Status: > 2 hours           Airway   Mallampati: II  TM distance: >3 FB  Neck ROM: full  No difficulty expected  Dental    (+) poor dentition    Pulmonary     breath sounds clear to auscultation  Cardiovascular   Exercise tolerance: good (4-7 METS)    ECG reviewed  Patient on routine beta blocker and Beta blocker not taken-may be given intraoperatively  Rhythm: regular  Rate: normal    (+) hypertension well controlled 2 medications or greater, dysrhythmias (Rbbb), hyperlipidemia,     ROS comment: Narrative & Impression    HEART RATE= 86  bpm  RR Interval= 740  ms  AL Interval= 149  ms  P Horizontal Axis= -1  deg  P Front Axis= 11  deg  QRSD Interval= 129  ms  QT Interval= 398  ms  QRS Axis= 36  deg  T Wave Axis= -14  deg  - ABNORMAL ECG -  Sinus rhythm  Atrial premature complexes  Right bundle branch block  No change from prior tracing  Electronically Signed By: Gill Mccoy ([facility]) 23-Jul-2021 16:25:30  Date and Time of Study: 2021-07-23 14:28:21    Interpretation Summary    · Calculated left ventricular EF = 59.3% Estimated left ventricular EF was in agreement with the calculated left ventricular EF. Left ventricular systolic function is normal. Normal left ventricular cavity size noted. Left ventricular wall thickness is consistent with mild concentric hypertrophy. All left ventricular wall segments contract normally. Left ventricular diastolic function is consistent with (grade I) impaired relaxation.        Neuro/Psych  (+) headaches, dizziness/light headedness, weakness (generalized ), psychiatric history Anxiety and Depression,     GI/Hepatic/Renal/Endo    (+)  GERD well controlled, PUD, GI bleeding upper , renal disease CRI,     ROS Comment: Diverticulitis of large intestine with perforation and abscess    Musculoskeletal     Abdominal     Substance History   (+) alcohol use (stopped drinking pt states used to have two drinks per night ),      OB/GYN          Other   arthritis, autoimmune disease (RA) ,          Phys Exam Other: Multiple chips pt states none loose               Anesthesia Plan    ASA 3     general with block     intravenous induction     Anesthetic plan, all risks, benefits, and alternatives have been provided, discussed and informed consent has been obtained with: patient.  Use of blood products discussed with patient  Consented to blood products.

## 2021-09-30 NOTE — ANESTHESIA POSTPROCEDURE EVALUATION
Patient: Nitish Barfield    Procedure Summary     Date: 09/30/21 Room / Location:  [facility] 1 /  [facility]    Anesthesia Start: 1239 Anesthesia Stop: 1710    Procedure: COLON RESECTION LAPAROSCOPIC SIGMOID OR LOW ANTERIOR (Left Abdomen) Diagnosis:       Diverticulitis of intestine with abscess, unspecified bleeding status, unspecified part of intestinal tract      (Diverticulitis of intestine with abscess, unspecified bleeding status, unspecified part of intestinal tract [K57.80])    Surgeons: Katey Villagomez MD Provider: Case Allen CRNA    Anesthesia Type: general with block ASA Status: 3          Anesthesia Type: general with block    Vitals  Vitals Value Taken Time   BP 93/46 09/30/21 1815   Temp 96.4 °F (35.8 °C) 09/30/21 1800   Pulse 78 09/30/21 1820   Resp 20 09/30/21 1810   SpO2 97 % 09/30/21 1820   Vitals shown include unvalidated device data.        Post Anesthesia Care and Evaluation    Patient location during evaluation: bedside  Patient participation: complete - patient participated  Level of consciousness: awake and alert  Pain score: 0  Pain management: adequate  Airway patency: patent  Anesthetic complications: No anesthetic complications  PONV Status: none  Cardiovascular status: acceptable  Respiratory status: acceptable  Hydration status: acceptable  No anesthesia care post op

## 2021-09-30 NOTE — ANESTHESIA PROCEDURE NOTES
Peripheral Block      Patient reassessed immediately prior to procedure    Patient location during procedure: pre-op  Start time: 9/30/2021 1:02 PM  Stop time: 9/30/2021 1:06 PM  Reason for block: at surgeon's request and post-op pain management  Performed by  CRNA: Mckenzie Phan CRNA  Preanesthetic Checklist  Completed: patient identified, IV checked, site marked, risks and benefits discussed, surgical consent, monitors and equipment checked, pre-op evaluation and timeout performed  Prep:  Pt Position: supine  Sterile barriers:cap, gloves, mask and washed/disinfected hands  Prep: ChloraPrep  Patient monitoring: blood pressure monitoring, continuous pulse oximetry and EKG  Procedure  Sedation:yes  Performed under: general  Guidance:ultrasound guided  ULTRASOUND INTERPRETATION. Using ultrasound guidance a 21 G gauge needle was placed in close proximity to the nerve, at which point, under ultrasound guidance anesthetic was injected in the area of the nerve and spread of the anesthesia was seen on ultrasound in close proximity thereto.  There were no abnormalities seen on ultrasound; a digital image was taken; and the patient tolerated the procedure with no complications. Images:still images obtained, printed/placed on chart    Laterality:right  Block Type:TAP  Injection Technique:single-shot  Needle Type:echogenic  Needle Gauge:21 G  Resistance on Injection: none    Medications Used: bupivacaine PF (MARCAINE) injection 0.25%, 15 mL  Med admintered at 9/30/2021 1:06 PM      Medications  Comment:Midaxillary  25mcg/precedex    Post Assessment  Injection Assessment: negative aspiration for heme, no paresthesia on injection and incremental injection  Patient Tolerance:comfortable throughout block  Complications:no

## 2021-09-30 NOTE — ANESTHESIA PROCEDURE NOTES
Peripheral Block      Patient reassessed immediately prior to procedure    Patient location during procedure: pre-op  Reason for block: at surgeon's request and post-op pain management  Performed by  CRNA: Mckenzie Phan CRNA  Preanesthetic Checklist  Completed: patient identified, IV checked, site marked, risks and benefits discussed, surgical consent, monitors and equipment checked, pre-op evaluation and timeout performed  Prep:  Pt Position: supine  Sterile barriers:cap, gloves, mask and washed/disinfected hands  Prep: ChloraPrep  Patient monitoring: blood pressure monitoring, continuous pulse oximetry and EKG  Procedure  Sedation:yes  Performed under: general  Guidance:ultrasound guided  ULTRASOUND INTERPRETATION. Using ultrasound guidance a 21 G gauge needle was placed in close proximity to the nerve, at which point, under ultrasound guidance anesthetic was injected in the area of the nerve and spread of the anesthesia was seen on ultrasound in close proximity thereto.  There were no abnormalities seen on ultrasound; a digital image was taken; and the patient tolerated the procedure with no complications. Images:still images obtained, printed/placed on chart    Block Type:TAP  Injection Technique:single-shot  Needle Type:echogenic  Needle Gauge:21 G  Resistance on Injection: none    Medications Used: bupivacaine PF (MARCAINE) injection 0.25%, 5 mL  Med admintered at 9/30/2021 1:20 PM      Medications  Comment:Subcostal  25mcg precedex    Post Assessment  Injection Assessment: negative aspiration for heme, no paresthesia on injection and incremental injection  Patient Tolerance:comfortable throughout block  Complications:no

## 2021-09-30 NOTE — ANESTHESIA PROCEDURE NOTES
Peripheral Block      Patient reassessed immediately prior to procedure    Patient location during procedure: OR  Start time: 9/30/2021 12:55 PM  Stop time: 9/30/2021 1:01 PM  Reason for block: at surgeon's request and post-op pain management  Performed by  CRNA: Mckenzie Phan CRNA  Preanesthetic Checklist  Completed: patient identified, IV checked, site marked, risks and benefits discussed, surgical consent, monitors and equipment checked, pre-op evaluation and timeout performed  Prep:  Pt Position: supine  Sterile barriers:cap, gloves, mask and washed/disinfected hands  Prep: ChloraPrep  Patient monitoring: blood pressure monitoring, continuous pulse oximetry and EKG  Procedure  Sedation:yes  Performed under: general  Guidance:ultrasound guided  ULTRASOUND INTERPRETATION. Using ultrasound guidance a 21 G gauge needle was placed in close proximity to the nerve, at which point, under ultrasound guidance anesthetic was injected in the area of the nerve and spread of the anesthesia was seen on ultrasound in close proximity thereto.  There were no abnormalities seen on ultrasound; a digital image was taken; and the patient tolerated the procedure with no complications. Images:still images obtained, printed/placed on chart    Laterality:right  Block Type:TAP  Injection Technique:single-shot  Needle Type:echogenic  Needle Gauge:21 G  Resistance on Injection: none    Medications Used: bupivacaine PF (MARCAINE) injection 0.25%, 15 mL  Med admintered at 9/30/2021 1:01 PM      Medications  Comment:Subcostal  25mcg precedex    Post Assessment  Injection Assessment: negative aspiration for heme, no paresthesia on injection and incremental injection  Patient Tolerance:comfortable throughout block  Complications:no

## 2021-09-30 NOTE — ANESTHESIA PROCEDURE NOTES
Airway  Date/Time: 9/30/2021 12:55 PM    General Information and Staff    CRNA: Mckenzie Phan CRNA    Indications and Patient Condition  Indications for airway management: airway protection    Preoxygenated: yes  Mask difficulty assessment: 1 - vent by mask    Final Airway Details  Final airway type: endotracheal airway      Successful airway: ETT    Successful intubation technique: video laryngoscopy  Facilitating devices/methods: intubating stylet  Blade: Rodriguez  Blade size: 3  ETT size (mm): 7.0  Cormack-Lehane Classification: grade I - full view of glottis  Placement verified by: chest auscultation and capnometry   Measured from: lips  ETT/EBT  to lips (cm): 21  Number of attempts at approach: 1  Assessment: lips, teeth, and gum same as pre-op

## 2021-09-30 NOTE — ANESTHESIA PROCEDURE NOTES
Peripheral Block      Patient reassessed immediately prior to procedure    Patient location during procedure: OR  Start time: 9/30/2021 1:07 PM  Stop time: 9/30/2021 1:17 PM  Reason for block: at surgeon's request and post-op pain management  Performed by  CRNA: Mckenzie Phan CRNA  Preanesthetic Checklist  Completed: patient identified, IV checked, site marked, risks and benefits discussed, surgical consent, monitors and equipment checked, pre-op evaluation and timeout performed  Prep:  Pt Position: supine  Sterile barriers:cap, gloves, mask and washed/disinfected hands  Prep: ChloraPrep  Patient monitoring: blood pressure monitoring, continuous pulse oximetry and EKG  Procedure  Sedation:yes  Performed under: general  Guidance:ultrasound guided  ULTRASOUND INTERPRETATION. Using ultrasound guidance a 21 G gauge needle was placed in close proximity to the nerve, at which point, under ultrasound guidance anesthetic was injected in the area of the nerve and spread of the anesthesia was seen on ultrasound in close proximity thereto.  There were no abnormalities seen on ultrasound; a digital image was taken; and the patient tolerated the procedure with no complications. Images:still images obtained, printed/placed on chart    Laterality:left  Block Type:TAP  Injection Technique:single-shot  Needle Type:echogenic  Needle Gauge:21 G  Resistance on Injection: none    Medications Used: bupivacaine PF (MARCAINE) injection 0.25%, 15 mL  Med admintered at 9/30/2021 1:17 PM      Medications  Comment:Midaxillary  25mcg precedex    Post Assessment  Injection Assessment: negative aspiration for heme, no paresthesia on injection and incremental injection  Patient Tolerance:comfortable throughout block  Complications:no

## 2021-10-01 LAB
ANION GAP SERPL CALCULATED.3IONS-SCNC: 14.5 MMOL/L (ref 5–15)
BASOPHILS # BLD AUTO: 0.02 10*3/MM3 (ref 0–0.2)
BASOPHILS NFR BLD AUTO: 0.1 % (ref 0–1.5)
BUN SERPL-MCNC: 14 MG/DL (ref 8–23)
BUN/CREAT SERPL: 7.9 (ref 7–25)
CALCIUM SPEC-SCNC: 7.8 MG/DL (ref 8.6–10.5)
CHLORIDE SERPL-SCNC: 100 MMOL/L (ref 98–107)
CO2 SERPL-SCNC: 19.5 MMOL/L (ref 22–29)
CREAT SERPL-MCNC: 1.77 MG/DL (ref 0.57–1)
DEPRECATED RDW RBC AUTO: 56 FL (ref 37–54)
EOSINOPHIL # BLD AUTO: 0 10*3/MM3 (ref 0–0.4)
EOSINOPHIL NFR BLD AUTO: 0 % (ref 0.3–6.2)
ERYTHROCYTE [DISTWIDTH] IN BLOOD BY AUTOMATED COUNT: 18.3 % (ref 12.3–15.4)
GFR SERPL CREATININE-BSD FRML MDRD: 29 ML/MIN/1.73
GLUCOSE SERPL-MCNC: 157 MG/DL (ref 65–99)
HCT VFR BLD AUTO: 23.1 % (ref 34–46.6)
HCT VFR BLD AUTO: 32.8 % (ref 34–46.6)
HGB BLD-MCNC: 10.3 G/DL (ref 12–15.9)
HGB BLD-MCNC: 6.9 G/DL (ref 12–15.9)
IMM GRANULOCYTES # BLD AUTO: 0.06 10*3/MM3 (ref 0–0.05)
IMM GRANULOCYTES NFR BLD AUTO: 0.3 % (ref 0–0.5)
LYMPHOCYTES # BLD AUTO: 1.02 10*3/MM3 (ref 0.7–3.1)
LYMPHOCYTES NFR BLD AUTO: 5.8 % (ref 19.6–45.3)
MAGNESIUM SERPL-MCNC: 1.7 MG/DL (ref 1.6–2.4)
MCH RBC QN AUTO: 25.6 PG (ref 26.6–33)
MCHC RBC AUTO-ENTMCNC: 29.9 G/DL (ref 31.5–35.7)
MCV RBC AUTO: 85.6 FL (ref 79–97)
MONOCYTES # BLD AUTO: 0.82 10*3/MM3 (ref 0.1–0.9)
MONOCYTES NFR BLD AUTO: 4.6 % (ref 5–12)
NEUTROPHILS NFR BLD AUTO: 15.75 10*3/MM3 (ref 1.7–7)
NEUTROPHILS NFR BLD AUTO: 89.2 % (ref 42.7–76)
NRBC BLD AUTO-RTO: 0 /100 WBC (ref 0–0.2)
PLATELET # BLD AUTO: 583 10*3/MM3 (ref 140–450)
PMV BLD AUTO: 8.8 FL (ref 6–12)
POTASSIUM SERPL-SCNC: 3.4 MMOL/L (ref 3.5–5.2)
POTASSIUM SERPL-SCNC: 3.6 MMOL/L (ref 3.5–5.2)
RBC # BLD AUTO: 2.7 10*6/MM3 (ref 3.77–5.28)
SODIUM SERPL-SCNC: 134 MMOL/L (ref 136–145)
SODIUM UR-SCNC: <20 MMOL/L
WBC # BLD AUTO: 17.67 10*3/MM3 (ref 3.4–10.8)

## 2021-10-01 PROCEDURE — 25010000002 PIPERACILLIN SOD-TAZOBACTAM PER 1 G: Performed by: SURGERY

## 2021-10-01 PROCEDURE — 84132 ASSAY OF SERUM POTASSIUM: CPT | Performed by: SURGERY

## 2021-10-01 PROCEDURE — 30233N1 TRANSFUSION OF NONAUTOLOGOUS RED BLOOD CELLS INTO PERIPHERAL VEIN, PERCUTANEOUS APPROACH: ICD-10-PCS | Performed by: SURGERY

## 2021-10-01 PROCEDURE — 85025 COMPLETE CBC W/AUTO DIFF WBC: CPT | Performed by: SURGERY

## 2021-10-01 PROCEDURE — 85014 HEMATOCRIT: CPT | Performed by: SURGERY

## 2021-10-01 PROCEDURE — P9016 RBC LEUKOCYTES REDUCED: HCPCS

## 2021-10-01 PROCEDURE — 85018 HEMOGLOBIN: CPT | Performed by: SURGERY

## 2021-10-01 PROCEDURE — 36430 TRANSFUSION BLD/BLD COMPNT: CPT

## 2021-10-01 PROCEDURE — 84300 ASSAY OF URINE SODIUM: CPT | Performed by: INTERNAL MEDICINE

## 2021-10-01 PROCEDURE — 94799 UNLISTED PULMONARY SVC/PX: CPT

## 2021-10-01 PROCEDURE — 86900 BLOOD TYPING SEROLOGIC ABO: CPT

## 2021-10-01 PROCEDURE — 99024 POSTOP FOLLOW-UP VISIT: CPT | Performed by: SURGERY

## 2021-10-01 PROCEDURE — 80048 BASIC METABOLIC PNL TOTAL CA: CPT | Performed by: SURGERY

## 2021-10-01 PROCEDURE — 83735 ASSAY OF MAGNESIUM: CPT | Performed by: SURGERY

## 2021-10-01 RX ORDER — SODIUM CHLORIDE 9 MG/ML
INJECTION, SOLUTION INTRAVENOUS
Status: DISPENSED
Start: 2021-10-01 | End: 2021-10-01

## 2021-10-01 RX ORDER — PIPERACILLIN SODIUM, TAZOBACTAM SODIUM 3; .375 G/15ML; G/15ML
INJECTION, POWDER, LYOPHILIZED, FOR SOLUTION INTRAVENOUS
Status: DISPENSED
Start: 2021-10-01 | End: 2021-10-01

## 2021-10-01 RX ORDER — SODIUM CHLORIDE 9 MG/ML
125 INJECTION, SOLUTION INTRAVENOUS CONTINUOUS
Status: DISCONTINUED | OUTPATIENT
Start: 2021-10-01 | End: 2021-10-02

## 2021-10-01 RX ADMIN — SODIUM CHLORIDE, PRESERVATIVE FREE 3 ML: 5 INJECTION INTRAVENOUS at 09:02

## 2021-10-01 RX ADMIN — ALLOPURINOL 100 MG: 100 TABLET ORAL at 09:00

## 2021-10-01 RX ADMIN — PREGABALIN 100 MG: 50 CAPSULE ORAL at 20:57

## 2021-10-01 RX ADMIN — SODIUM CHLORIDE, POTASSIUM CHLORIDE, SODIUM LACTATE AND CALCIUM CHLORIDE 500 ML: 600; 310; 30; 20 INJECTION, SOLUTION INTRAVENOUS at 01:53

## 2021-10-01 RX ADMIN — SODIUM CHLORIDE 3.38 G: 900 INJECTION, SOLUTION INTRAVENOUS at 10:35

## 2021-10-01 RX ADMIN — BUSPIRONE HYDROCHLORIDE 15 MG: 15 TABLET ORAL at 09:00

## 2021-10-01 RX ADMIN — SODIUM CHLORIDE 1000 ML: 9 INJECTION, SOLUTION INTRAVENOUS at 10:23

## 2021-10-01 RX ADMIN — ACETAMINOPHEN 500 MG: 500 TABLET, FILM COATED ORAL at 04:07

## 2021-10-01 RX ADMIN — HYDROCODONE BITARTRATE AND ACETAMINOPHEN 1 TABLET: 5; 325 TABLET ORAL at 20:58

## 2021-10-01 RX ADMIN — ACETAMINOPHEN 500 MG: 500 TABLET, FILM COATED ORAL at 09:00

## 2021-10-01 RX ADMIN — SODIUM CHLORIDE 40 ML: 900 INJECTION, SOLUTION INTRAVENOUS at 03:48

## 2021-10-01 RX ADMIN — BUSPIRONE HYDROCHLORIDE 15 MG: 15 TABLET ORAL at 20:58

## 2021-10-01 RX ADMIN — ACETAMINOPHEN 500 MG: 500 TABLET, FILM COATED ORAL at 20:58

## 2021-10-01 RX ADMIN — SODIUM CHLORIDE 3.38 G: 900 INJECTION, SOLUTION INTRAVENOUS at 17:15

## 2021-10-01 RX ADMIN — SODIUM CHLORIDE 125 ML/HR: 9 INJECTION, SOLUTION INTRAVENOUS at 15:38

## 2021-10-01 RX ADMIN — SODIUM CHLORIDE, PRESERVATIVE FREE 3 ML: 5 INJECTION INTRAVENOUS at 20:58

## 2021-10-01 RX ADMIN — ROSUVASTATIN CALCIUM 5 MG: 5 TABLET, COATED ORAL at 09:00

## 2021-10-01 RX ADMIN — GABAPENTIN 300 MG: 300 CAPSULE ORAL at 13:24

## 2021-10-01 RX ADMIN — ACETAMINOPHEN 500 MG: 500 TABLET, FILM COATED ORAL at 13:28

## 2021-10-01 RX ADMIN — POTASSIUM CHLORIDE 40 MEQ: 1500 TABLET, EXTENDED RELEASE ORAL at 22:22

## 2021-10-01 RX ADMIN — NEBIVOLOL HYDROCHLORIDE 10 MG: 5 TABLET ORAL at 09:03

## 2021-10-01 RX ADMIN — PREGABALIN 100 MG: 50 CAPSULE ORAL at 15:38

## 2021-10-01 RX ADMIN — SODIUM CHLORIDE 3.38 G: 900 INJECTION, SOLUTION INTRAVENOUS at 04:09

## 2021-10-01 RX ADMIN — ALPRAZOLAM 0.5 MG: 0.25 TABLET ORAL at 04:07

## 2021-10-01 RX ADMIN — GABAPENTIN 300 MG: 300 CAPSULE ORAL at 21:19

## 2021-10-01 RX ADMIN — GABAPENTIN 300 MG: 300 CAPSULE ORAL at 05:26

## 2021-10-01 RX ADMIN — DEXTROSE MONOHYDRATE, SODIUM CHLORIDE, AND POTASSIUM CHLORIDE 100 ML/HR: 50; 4.5; 1.49 INJECTION, SOLUTION INTRAVENOUS at 09:02

## 2021-10-01 RX ADMIN — SODIUM CHLORIDE 1000 ML: 9 INJECTION, SOLUTION INTRAVENOUS at 14:25

## 2021-10-01 NOTE — PROGRESS NOTES
Patient: Nitish Barfield  Procedure(s):  COLON RESECTION LAPAROSCOPIC SIGMOID OR LOW ANTERIOR  Anesthesia type: general with block    Patient location: [facility] Surgical Floor  Last vitals:   Vitals:    10/01/21 0958   BP: 119/64   Pulse: 91   Resp: 18   Temp: 97.6 °F (36.4 °C)   SpO2: 99%     Level of consciousness: awake, alert and oriented    Post-anesthesia pain: adequate analgesia  Airway patency: patent  Respiratory: unassisted  Cardiovascular: stable and blood pressure at baseline  Hydration: euvolemic     Anesthetic complications: no - pt has unit of blood transfusing currently. Sitting up in chair talking with family. Pain is controlled. No c/o n/v at this time.

## 2021-10-01 NOTE — PLAN OF CARE
Goal Outcome Evaluation:  Plan of Care Reviewed With: patient        Progress: no change  Outcome Summary: vss. pt remains on RA n/c. Clear liquid diet in place. bolus of NS 1000 x2 due to low urine output. MD aware. Nephrology consulted. 2 units of PRBCs transfused (2nd unit finished this a.m.) repeat H&H shows hgb increase to 10.3. MD notified. STAR drain with 70 output, Akins with 25cc output at this time. pt incont of bowel. no complaint of pain at this time. no other complaints.

## 2021-10-01 NOTE — PLAN OF CARE
Goal Outcome Evaluation:  Plan of Care Reviewed With: patient           Outcome Summary: Arrived to unit at approx. 1930 from OR. O2 1L n/c. Remains on clear liquid diet. Continue IV fluids as ordered. Bolus fluids as ordered. Held Bystolic this shift. H&H called to MD. Inititiated PRBC's as ordered. Tolerating well. 50 ml from STAR drain. 15 ml from ng catheter. Have been communicating with MD during the shift. MD gave order to leave in ng catheter for now to maintain accurate output.

## 2021-10-01 NOTE — CONSULTS
[facility] CONSULT NOTE    Referring Provider: Katey Noland   Reason for Consultation: Acute kidney injury    Chief complaint .  Abdominal pain    History of present illness:    Patient is 66 years old  female who was admitted with abdominal pain and abscess from diverticulitis left lower quadrant.  Patient underwent laparoscopic LAR.  Her creatinine was 1.29 admission but increased to 1.7 mg/DL which prompted renal consult.  Patient is poor historian.  She is complaining of nausea but no vomiting.  Denies any abdominal pain now.  Patient has Akins catheter in place but had minimal urine output overnight.  Her blood pressure has been running low.  Also her hemoglobin was 6.9 and she received PRBC transfusion today.      History  Past Medical History:   Diagnosis Date   • Allergy    • Anxiety    • Arthritis     RA, FINGERS, TOES   • Bleeding esophageal ulcer     history of   • Depression    • Diverticulitis    • Esophageal reflux    • Fibroid    • GERD (gastroesophageal reflux disease)    • History of transfusion    • Hypertension    • Hypertension    • Osteoarthritis of right knee     sched TKA   • RA (rheumatoid arthritis) (HCC)      Past Surgical History:   Procedure Laterality Date   • COLON RESECTION Left 9/30/2021    Procedure: COLON RESECTION LAPAROSCOPIC SIGMOID OR LOW ANTERIOR;  Surgeon: Katey Villagomez MD;  Location: [facility];  Service: General;  Laterality: Left;   • COLONOSCOPY     • ENDOSCOPY     • ESOPHAGOSCOPY / EGD      cauterize esophageal bleeding ulcers   • HYSTERECTOMY      JUAN DANIEL with OC for fibroids age 36   • KNEE SURGERY Left     ARTHROSCOPY   • TOTAL KNEE ARTHROPLASTY Left 4/11/2018    Procedure: TOTAL KNEE ARTHROPLASTY AND ALL ASSOCIATED PROCEDURES;  Surgeon: Costa Zuñiga MD;  Location: [facility];  Service: Orthopedics   • TOTAL KNEE ARTHROPLASTY Right 10/17/2018    Procedure: TOTAL KNEE ARTHROPLASTY AND ALL ASSOCIATED PROCEDURES;  Surgeon: Costa Zuñiga  MD;  Location[facility];  Service: Orthopedics   • WISDOM TOOTH EXTRACTION       Social History     Tobacco Use   • Smoking status: Never Smoker   • Smokeless tobacco: Never Used   Vaping Use   • Vaping Use: Never used   Substance Use Topics   • Alcohol use: Not Currently   • Drug use: No     Family History   Problem Relation Age of Onset   • Cancer Mother         bone   • Breast cancer Mother    • Other Father         cardiac failure   • Heart disease Father    • Deep vein thrombosis Brother    • Malig Hyperthermia Neg Hx    • Ovarian cancer Neg Hx    • Uterine cancer Neg Hx        Review of Systems  ROS  All systems reviewed, negative except as mentioned in HPI  Objective     Vital Signs  Temp:  [95.9 °F (35.5 °C)-98 °F (36.7 °C)] 97.6 °F (36.4 °C)  Heart Rate:  [72-94] 91  Resp:  [13-23] 18  BP: ()/(46-91) 119/64    I/O this shift:  In: 1837.9 [P.O.:240; I.V.:950; Blood:647.9]  Out: 95 [Urine:25; Drains:70]  I/O last 3 completed shifts:  In: 4616 [P.O.:700; I.V.:1000; Blood:366; IV Piggyback:2550]  Out: 515 [Urine:165; Drains:150; Blood:200]    Physical Exam:  Physical Exam    General Appearance: alert, oriented x 3, no acute distress   Skin: warm and dry  HEENT: oral mucosa normal, nonicteric sclera  Neck: supple, no JVD  Lungs: CTA  Heart: RRR, normal S1 and S2  Abdomen: soft, mild tenderness.  Drain+  : no palpable bladder  Extremities: no edema, cyanosis or clubbing  Neuro: normal speech and mental status     Results Review:   I reviewed the patient's new clinical results.    Lab Results   Component Value Date    CALCIUM 7.8 (L) 10/01/2021     Results from last 7 days   Lab Units 10/01/21  1037 10/01/21  0210 09/30/21  1819 09/30/21  1819   MAGNESIUM mg/dL  --  1.7  --  1.8   SODIUM mmol/L 134*  --   --  137   POTASSIUM mmol/L 3.6 3.4*  --  2.9*   CHLORIDE mmol/L 100  --   --  102   CO2 mmol/L 19.5*  --   --  22.5   BUN mg/dL 14  --   --  12   CREATININE mg/dL 1.77*  --   --  1.29*   GLUCOSE mg/dL  157*  --    < > 157*   CALCIUM mg/dL 7.8*  --   --  7.8*   WBC 10*3/mm3  --  17.67*  --  19.46*   HEMOGLOBIN g/dL  --  6.9*  --  8.0*   PLATELETS 10*3/mm3  --  583*  --  836*   ALT (SGPT) U/L  --   --   --  5   AST (SGOT) U/L  --   --   --  7    < > = values in this interval not displayed.     No results found for: CKTOTAL, CKMB, CKMBINDEX, TROPONINI, TROPONINT  Estimated Creatinine Clearance: 33.6 mL/min (A) (by C-G formula based on SCr of 1.77 mg/dL (H)).  Lab Results   Component Value Date    URICACID 3.8 02/09/2021       Brief Urine Lab Results     None          Prior to Admission medications    Medication Sig Start Date End Date Taking? Authorizing Provider   allopurinol (ZYLOPRIM) 100 MG tablet Take 100 mg by mouth Daily. 7/16/18   Iram Jones MD   ALPRAZolam (XANAX) 0.5 MG tablet Take 0.5 mg by mouth Every 8 (Eight) Hours As Needed for Anxiety (1-2 tabs).    Iram Jones MD   busPIRone (BUSPAR) 15 MG tablet Take 15 mg by mouth 2 (Two) Times a Day. 5/29/21   Iram Jones MD   Calcium Carbonate-Vitamin D 600-200 MG-UNIT capsule take 1 po daily    Iram Jones MD   COVID-19 mRNA Virus Vaccine (MODERNA COVID-19 VACCINE IM) Inject  into the appropriate muscle as directed by prescriber.    Iram Jones MD   EDARBI 80 MG tablet tablet Take 80 mg by mouth Every Morning. 7/8/17   Iram Jones MD   folic acid (FOLVITE) 1 MG tablet Take 1 tablet by mouth Daily. 7/28/21   Sigifredo Gusman MD   lansoprazole (PREVACID) 30 MG capsule Take 30 mg by mouth Daily.    Iram Jones MD   leflunomide (ARAVA) 20 MG tablet Take 1 tablet by mouth once daily 4/22/21   Iram Jones MD   nebivolol (BYSTOLIC) 10 MG tablet Take 10 mg by mouth Daily.    Iarm Jones MD   ondansetron (ZOFRAN) 4 MG tablet Take 1 tablet by mouth Every 8 (Eight) Hours As Needed for Nausea or Vomiting. 11/30/18   Costa Zuñiga MD   oxyCODONE-acetaminophen (PERCOCET) 5-325 MG  per tablet Take 2 tablets by mouth Every 6 (Six) Hours.    Iram Jones MD   potassium chloride (K-DUR,KLOR-CON) 10 MEQ CR tablet Take 2 tablets by mouth Daily. 7/27/21   Sigifredo Gusman MD   potassium chloride 10 MEQ CR tablet  5/29/21   Iram Jones MD   rosuvastatin (CRESTOR) 5 MG tablet Take 5 mg by mouth Daily. 3/31/21 3/31/22  Iram Jones MD   venlafaxine XR (EFFEXOR-XR) 150 MG 24 hr capsule Take 150 mg by mouth Daily. 7/11/21   Iram Jones MD   vitamin D (ERGOCALCIFEROL) 55348 units capsule capsule Take 50,000 Units by mouth Every 7 (Seven) Days. EVERY WEDNESDAY    Iram Jones MD   Vortioxetine HBr (TRINTELLIX) 20 MG tablet Take 20 mg by mouth Every Morning.    Iram Jones MD       acetaminophen, 500 mg, Oral, Q6H  allopurinol, 100 mg, Oral, Daily  busPIRone, 15 mg, Oral, BID  gabapentin, 300 mg, Oral, Q8H  nebivolol, 10 mg, Oral, Daily  piperacillin-tazobactam, , ,   piperacillin-tazobactam, 3.375 g, Intravenous, Q8H  pregabalin, 100 mg, Oral, TID  rosuvastatin, 5 mg, Oral, Daily  sodium chloride, 1,000 mL, Intravenous, Once  sodium chloride, 3 mL, Intravenous, Q12H  sodium chloride, , ,   venlafaxine XR, 150 mg, Oral, Daily      dextrose 5 % and sodium chloride 0.45 % with KCl 20 mEq/L, 100 mL/hr, Last Rate: 100 mL/hr (10/01/21 0902)  lactated ringers, 9 mL/hr, Last Rate: 9 mL/hr (09/30/21 1239)        Assessment/Plan       1.  Acute kidney injury  Seems to be ATN due to sepsis and hypotension  I agree with IV fluid resuscitation at this time  Normal saline fluid bolus has been ordered.  Continue IV hydration with normal saline after that.  I agree holding ARB.  I would decrease Bystolic dose also to prevent hypotension  Ordering urinalysis and urine sodium    2.  History of hypertension  Patient has been intermittently hypotensive since admission.  I agree stopping Edarbi.  Decrease Bystolic dose also    3.  Hypokalemia  Better after oral  potassium supplement  Monitor electrolytes    4.  Diverticulitis with abscess  S/p laparoscopic LAR  Patient on IV antibiotics  Continue supportive care    5.  Anemia  Seems acute on chronic  Hemoglobin was 6.9 this morning.  Patient received PRBC transfusion    I discussed the patients findings and my recommendations with patient and nursing staff    Sandeep Del Rio MD  10/01/21  13:50 EDT

## 2021-10-01 NOTE — PROGRESS NOTES
Chief Complaint: POD # 1    Subjective   Patient denies any pain, she is concerned about low urine, no nausea or vomiting  Objective     Vital signs in last 24 hours:  Temp:  [95.9 °F (35.5 °C)-98.2 °F (36.8 °C)] 98 °F (36.7 °C)  Heart Rate:  [] 94  Resp:  [13-40] 18  BP: ()/(46-91) 129/84    Intake/Output last 3 shifts:  I/O last 3 completed shifts:  In: 4616 [P.O.:700; I.V.:1000; Blood:366; IV Piggyback:2550]  Out: 515 [Urine:165; Drains:150; Blood:200]    Intake/Output this shift:  I/O this shift:  In: 1490 [P.O.:240; I.V.:950; Blood:300]  Out: -     Physical Exam:  Respiratory: CTA, good inspiratory effort  CV: RRR  Abd: Hypoactive BS, soft, ND, usual post-op tenderness, no rebound, no guarding, incision C/D/I  STAR serosanguineous 150 cc  Results from last 7 days   Lab Units 10/01/21  0210   WBC 10*3/mm3 17.67*   HEMOGLOBIN g/dL 6.9*   HEMATOCRIT % 23.1*   PLATELETS 10*3/mm3 583*     Results from last 7 days   Lab Units 10/01/21  0210 09/30/21  1819 09/30/21  1819   SODIUM mmol/L  --   --  137   POTASSIUM mmol/L 3.4*   < > 2.9*   CHLORIDE mmol/L  --   --  102   CO2 mmol/L  --   --  22.5   BUN mg/dL  --   --  12   CREATININE mg/dL  --   --  1.29*   GLUCOSE mg/dL  --   --  157*   CALCIUM mg/dL  --   --  7.8*    < > = values in this interval not displayed.         Assessment/Plan   S/P laparoscopic LAR  Evacuation of chronic walled off abscess -continue IV antibiotics and drain  Acute surgical on chronic anemia -transfused 2 units of packed red blood cells  Acute on chronic DIMPLE -consult nephrology, bolus IV fluids, transfused 2 units of packed red blood cells  Expected postoperative ileus -await bowel function      Katey Villagomez MD  General, Minimally Invasive and Endoscopic Surgery  [facility]    [address]   [address]    [address]  [address] [address]    P: [phone]  F: [phone]    Cc:  Violeta Hammond MD

## 2021-10-01 NOTE — CASE MANAGEMENT/SOCIAL WORK
Continued Stay Note  [facility]     Patient Name: Nitish Barfield  MRN: [id number]  Today's Date: 10/1/2021    Admit Date: 9/30/2021    Discharge Plan     Row Name 10/01/21 1457       Plan    Plan  plan home w     Provided Post Acute Provider List?  N/A    Provided Post Acute Provider Quality & Resource List?  N/A    Patient/Family in Agreement with Plan  yes    Plan Comments  Spoke with patient in room, she is sitting up in recliner. Permission to speak with  and daughter present. Face sheet verified. IMM explained, signed and copy declined.Patient lives in a home with her  and is independent of ADLs. She denies use of DME. She states she has access to a , wr Chair & BSC. She has used HH in the past, but she cannot recall the agency. She has not used inpatient rehab previously. She does not have a living will and is not interested in creating one. She uses [facility]. Some medications can be expensive, CM will check for any coupons that may assist with expenses. She plans to return home with her  and family to assist if needed. Chart review of PTA medications - CM does not have any coupons for medications listed. Patient indicated she has used [facility] in the past to discount her medications. Encouraged her to discuss difficulty affording specific medicines with her PCP to obtain samples or switch to cheaper medications.        Discharge Codes    No documentation.             Paul Amezcua RN

## 2021-10-02 LAB
ALBUMIN SERPL-MCNC: 1.9 G/DL (ref 3.5–5.2)
ANION GAP SERPL CALCULATED.3IONS-SCNC: 13.1 MMOL/L (ref 5–15)
ATMOSPHERIC PRESS: 740 MMHG
BASE EXCESS BLDV CALC-SCNC: -7.5 MMOL/L (ref 0–2)
BASOPHILS # BLD AUTO: 0.04 10*3/MM3 (ref 0–0.2)
BASOPHILS NFR BLD AUTO: 0.2 % (ref 0–1.5)
BDY SITE: ABNORMAL
BH BB BLOOD EXPIRATION DATE: NORMAL
BH BB BLOOD EXPIRATION DATE: NORMAL
BH BB BLOOD TYPE BARCODE: 7300
BH BB BLOOD TYPE BARCODE: 7300
BH BB DISPENSE STATUS: NORMAL
BH BB DISPENSE STATUS: NORMAL
BH BB PRODUCT CODE: NORMAL
BH BB PRODUCT CODE: NORMAL
BH BB UNIT NUMBER: NORMAL
BH BB UNIT NUMBER: NORMAL
BODY TEMPERATURE: 37 C
BUN SERPL-MCNC: 17 MG/DL (ref 8–23)
BUN/CREAT SERPL: 8.8 (ref 7–25)
CALCIUM SPEC-SCNC: 7.5 MG/DL (ref 8.6–10.5)
CHLORIDE SERPL-SCNC: 101 MMOL/L (ref 98–107)
CO2 SERPL-SCNC: 14.9 MMOL/L (ref 22–29)
CREAT SERPL-MCNC: 1.94 MG/DL (ref 0.57–1)
CROSSMATCH INTERPRETATION: NORMAL
CROSSMATCH INTERPRETATION: NORMAL
DEPRECATED RDW RBC AUTO: 53.6 FL (ref 37–54)
EOSINOPHIL # BLD AUTO: 0 10*3/MM3 (ref 0–0.4)
EOSINOPHIL NFR BLD AUTO: 0 % (ref 0.3–6.2)
ERYTHROCYTE [DISTWIDTH] IN BLOOD BY AUTOMATED COUNT: 17 % (ref 12.3–15.4)
GFR SERPL CREATININE-BSD FRML MDRD: 26 ML/MIN/1.73
GLUCOSE SERPL-MCNC: 79 MG/DL (ref 65–99)
HCO3 BLDV-SCNC: 19.3 MMOL/L (ref 22–28)
HCT VFR BLD AUTO: 33 % (ref 34–46.6)
HGB BLD-MCNC: 10.4 G/DL (ref 12–15.9)
HGB BLDA-MCNC: 12.4 G/DL (ref 13.5–17.5)
IMM GRANULOCYTES # BLD AUTO: 0.17 10*3/MM3 (ref 0–0.05)
IMM GRANULOCYTES NFR BLD AUTO: 0.8 % (ref 0–0.5)
LYMPHOCYTES # BLD AUTO: 2.32 10*3/MM3 (ref 0.7–3.1)
LYMPHOCYTES NFR BLD AUTO: 11 % (ref 19.6–45.3)
Lab: ABNORMAL
MAGNESIUM SERPL-MCNC: 1.8 MG/DL (ref 1.6–2.4)
MCH RBC QN AUTO: 27.2 PG (ref 26.6–33)
MCHC RBC AUTO-ENTMCNC: 31.5 G/DL (ref 31.5–35.7)
MCV RBC AUTO: 86.4 FL (ref 79–97)
MODALITY: ABNORMAL
MONOCYTES # BLD AUTO: 1.55 10*3/MM3 (ref 0.1–0.9)
MONOCYTES NFR BLD AUTO: 7.4 % (ref 5–12)
NEUTROPHILS NFR BLD AUTO: 16.93 10*3/MM3 (ref 1.7–7)
NEUTROPHILS NFR BLD AUTO: 80.6 % (ref 42.7–76)
NRBC BLD AUTO-RTO: 0 /100 WBC (ref 0–0.2)
PCO2 BLDV: 43.2 MM HG (ref 41–51)
PH BLDV: 7.26 PH UNITS (ref 7.32–7.42)
PHOSPHATE SERPL-MCNC: 4.4 MG/DL (ref 2.5–4.5)
PLATELET # BLD AUTO: 670 10*3/MM3 (ref 140–450)
PMV BLD AUTO: 8.7 FL (ref 6–12)
PO2 BLDV: 21.6 MM HG (ref 27–53)
POTASSIUM SERPL-SCNC: 4.1 MMOL/L (ref 3.5–5.2)
RBC # BLD AUTO: 3.82 10*6/MM3 (ref 3.77–5.28)
SAO2 % BLDCOV: 30.8 % (ref 45–75)
SODIUM SERPL-SCNC: 129 MMOL/L (ref 136–145)
UNIT  ABO: NORMAL
UNIT  ABO: NORMAL
UNIT  RH: NORMAL
UNIT  RH: NORMAL
VENTILATOR MODE: ABNORMAL
WBC # BLD AUTO: 21.01 10*3/MM3 (ref 3.4–10.8)

## 2021-10-02 PROCEDURE — 83735 ASSAY OF MAGNESIUM: CPT | Performed by: SURGERY

## 2021-10-02 PROCEDURE — 25010000003 MAGNESIUM SULFATE 4 GM/100ML SOLUTION: Performed by: SURGERY

## 2021-10-02 PROCEDURE — 25010000002 CEFTRIAXONE SODIUM-DEXTROSE 1-3.74 GM-%(50ML) RECONSTITUTED SOLUTION: Performed by: SURGERY

## 2021-10-02 PROCEDURE — 82803 BLOOD GASES ANY COMBINATION: CPT

## 2021-10-02 PROCEDURE — 85025 COMPLETE CBC W/AUTO DIFF WBC: CPT | Performed by: SURGERY

## 2021-10-02 PROCEDURE — 80069 RENAL FUNCTION PANEL: CPT | Performed by: INTERNAL MEDICINE

## 2021-10-02 PROCEDURE — 99024 POSTOP FOLLOW-UP VISIT: CPT | Performed by: SURGERY

## 2021-10-02 PROCEDURE — 25010000002 PIPERACILLIN SOD-TAZOBACTAM PER 1 G: Performed by: SURGERY

## 2021-10-02 RX ORDER — PANTOPRAZOLE SODIUM 40 MG/1
40 TABLET, DELAYED RELEASE ORAL NIGHTLY
Status: DISCONTINUED | OUTPATIENT
Start: 2021-10-02 | End: 2021-10-06 | Stop reason: HOSPADM

## 2021-10-02 RX ORDER — CEFTRIAXONE 1 G/50ML
1 INJECTION, SOLUTION INTRAVENOUS EVERY 24 HOURS
Status: DISCONTINUED | OUTPATIENT
Start: 2021-10-02 | End: 2021-10-06

## 2021-10-02 RX ADMIN — SODIUM CHLORIDE 125 ML/HR: 9 INJECTION, SOLUTION INTRAVENOUS at 01:25

## 2021-10-02 RX ADMIN — PREGABALIN 100 MG: 50 CAPSULE ORAL at 16:08

## 2021-10-02 RX ADMIN — NEBIVOLOL HYDROCHLORIDE 10 MG: 5 TABLET ORAL at 09:18

## 2021-10-02 RX ADMIN — SODIUM CHLORIDE 125 ML/HR: 9 INJECTION, SOLUTION INTRAVENOUS at 09:17

## 2021-10-02 RX ADMIN — BUSPIRONE HYDROCHLORIDE 15 MG: 15 TABLET ORAL at 21:25

## 2021-10-02 RX ADMIN — BUSPIRONE HYDROCHLORIDE 15 MG: 15 TABLET ORAL at 09:18

## 2021-10-02 RX ADMIN — VENLAFAXINE HYDROCHLORIDE 150 MG: 150 CAPSULE, EXTENDED RELEASE ORAL at 09:18

## 2021-10-02 RX ADMIN — SODIUM CHLORIDE 3.38 G: 900 INJECTION, SOLUTION INTRAVENOUS at 09:19

## 2021-10-02 RX ADMIN — ACETAMINOPHEN 500 MG: 500 TABLET, FILM COATED ORAL at 14:07

## 2021-10-02 RX ADMIN — GABAPENTIN 300 MG: 300 CAPSULE ORAL at 05:56

## 2021-10-02 RX ADMIN — GABAPENTIN 300 MG: 300 CAPSULE ORAL at 14:07

## 2021-10-02 RX ADMIN — CEFTRIAXONE 1 G: 1 INJECTION, SOLUTION INTRAVENOUS at 12:21

## 2021-10-02 RX ADMIN — GABAPENTIN 300 MG: 300 CAPSULE ORAL at 22:53

## 2021-10-02 RX ADMIN — SODIUM CHLORIDE 3.38 G: 900 INJECTION, SOLUTION INTRAVENOUS at 01:26

## 2021-10-02 RX ADMIN — SODIUM CHLORIDE, PRESERVATIVE FREE 3 ML: 5 INJECTION INTRAVENOUS at 21:24

## 2021-10-02 RX ADMIN — HYDROCODONE BITARTRATE AND ACETAMINOPHEN 1 TABLET: 5; 325 TABLET ORAL at 09:17

## 2021-10-02 RX ADMIN — PREGABALIN 100 MG: 50 CAPSULE ORAL at 09:18

## 2021-10-02 RX ADMIN — MAGNESIUM SULFATE HEPTAHYDRATE 4 G: 40 INJECTION, SOLUTION INTRAVENOUS at 09:18

## 2021-10-02 RX ADMIN — PANTOPRAZOLE SODIUM 40 MG: 40 TABLET, DELAYED RELEASE ORAL at 21:25

## 2021-10-02 RX ADMIN — ACETAMINOPHEN 500 MG: 500 TABLET, FILM COATED ORAL at 01:59

## 2021-10-02 RX ADMIN — ALLOPURINOL 100 MG: 100 TABLET ORAL at 09:18

## 2021-10-02 RX ADMIN — ROSUVASTATIN CALCIUM 5 MG: 5 TABLET, COATED ORAL at 09:18

## 2021-10-02 RX ADMIN — ACETAMINOPHEN 500 MG: 500 TABLET, FILM COATED ORAL at 09:18

## 2021-10-02 RX ADMIN — PREGABALIN 100 MG: 50 CAPSULE ORAL at 21:25

## 2021-10-02 NOTE — PROGRESS NOTES
Chief Complaint: POD # 2    Subjective   Pt sitting up in chair, no N&V, tolerating liquids, bm x 2, pain controlled   Objective     Vital signs in last 24 hours:  Temp:  [96.8 °F (36 °C)-97.7 °F (36.5 °C)] 97.4 °F (36.3 °C)  Heart Rate:  [59-74] 59  Resp:  [18-19] 18  BP: (135-150)/(82-95) 144/82    Intake/Output last 3 shifts:  I/O last 3 completed shifts:  In: 9196.8 [P.O.:2260; I.V.:2672.9; Blood:1013.9; IV Piggyback:3250]  Out: 630 [Urine:415; Drains:215]    Intake/Output this shift:  I/O this shift:  In: 1463.3 [P.O.:480; I.V.:983.3]  Out: 30 [Drains:30]    Physical Exam:  Respiratory: CTA, good inspiratory effort  CV: RRR  Abd: + BS, soft, ND, usual post-op tenderness, no rebound, no guarding, incision C/D/I, STAR serosanguinous 155cc  Ext: no edema  Results from last 7 days   Lab Units 10/02/21  0356   WBC 10*3/mm3 21.01*   HEMOGLOBIN g/dL 10.4*   HEMATOCRIT % 33.0*   PLATELETS 10*3/mm3 670*     Results from last 7 days   Lab Units 10/02/21  0356   SODIUM mmol/L 129*   POTASSIUM mmol/L 4.1   CHLORIDE mmol/L 101   CO2 mmol/L 14.9*   BUN mg/dL 17   CREATININE mg/dL 1.94*   GLUCOSE mg/dL 79   CALCIUM mg/dL 7.5*       Assessment/Plan   S/P Lap LAR - for diverticulitis with an abscess  Leukocytosis - continue antibiotics and drain   Acute on chronic anemia - post tranfusion 2 units PRBC - H/H stable, no evidence of active bleeding   Acute on chronic DIMPLE - nephrology following, U/O has increased   Ileus - resolving advance diet     Katey Villagomez MD  General, Minimally Invasive and Endoscopic Surgery  [facility]    [address] [facility] [address] [facility] [address]    [address]  [address] [address]    P: [phone]  F: [phone]    Cc:  Violeta Hammond MD

## 2021-10-02 NOTE — PROGRESS NOTES
Chart reviewed.  Patient blood pressure seems to be improving.  Tolerating liquid per chart review.  Oligoanuric yesterday but seems that her urine output is picking up today as she had 1000 cc of urine recorded in her chart so far today.  Continues to have minimal oxygen requirement.   Labs reviewed creatinine continues to increase in a pattern consistent with ATN.  Mild hyponatremia: We will check today  Low bicarbonate: Check ABGs  Continue IV fluids at the current rate.

## 2021-10-02 NOTE — PLAN OF CARE
Goal Outcome Evaluation:  Plan of Care Reviewed With: patient        Progress: improving  Outcome Summary: STAR drain in placewith serosang drainage; kevin D&I

## 2021-10-02 NOTE — PLAN OF CARE
Goal Outcome Evaluation:  Plan of Care Reviewed With: patient        Progress: improving  Outcome Summary: IVFs stopped; Zosyn D/C'd; started on IV Rocephin; diet advanced to full liquids; incontinent of bowels today (liquid); ng in place; norco given x1 this am (refused later); tolerated up in chair most of shift; bilateral hands & ft cold & cyanotic late afternoon; venous blood gases ordered by nephrology; Mag 1.8 (4 gms mag IV given as per protocol); labs in am

## 2021-10-03 LAB
ALBUMIN SERPL-MCNC: 1.9 G/DL (ref 3.5–5.2)
ANION GAP SERPL CALCULATED.3IONS-SCNC: 10.2 MMOL/L (ref 5–15)
BASOPHILS # BLD AUTO: 0.05 10*3/MM3 (ref 0–0.2)
BASOPHILS NFR BLD AUTO: 0.3 % (ref 0–1.5)
BUN SERPL-MCNC: 18 MG/DL (ref 8–23)
BUN/CREAT SERPL: 9.7 (ref 7–25)
CALCIUM SPEC-SCNC: 7.8 MG/DL (ref 8.6–10.5)
CHLORIDE SERPL-SCNC: 110 MMOL/L (ref 98–107)
CO2 SERPL-SCNC: 18.8 MMOL/L (ref 22–29)
CREAT SERPL-MCNC: 1.86 MG/DL (ref 0.57–1)
DEPRECATED RDW RBC AUTO: 55.9 FL (ref 37–54)
EOSINOPHIL # BLD AUTO: 0.19 10*3/MM3 (ref 0–0.4)
EOSINOPHIL NFR BLD AUTO: 1.2 % (ref 0.3–6.2)
ERYTHROCYTE [DISTWIDTH] IN BLOOD BY AUTOMATED COUNT: 17.7 % (ref 12.3–15.4)
GFR SERPL CREATININE-BSD FRML MDRD: 27 ML/MIN/1.73
GLUCOSE SERPL-MCNC: 63 MG/DL (ref 65–99)
HCT VFR BLD AUTO: 38 % (ref 34–46.6)
HGB BLD-MCNC: 11.9 G/DL (ref 12–15.9)
IMM GRANULOCYTES # BLD AUTO: 0.13 10*3/MM3 (ref 0–0.05)
IMM GRANULOCYTES NFR BLD AUTO: 0.8 % (ref 0–0.5)
LYMPHOCYTES # BLD AUTO: 2.04 10*3/MM3 (ref 0.7–3.1)
LYMPHOCYTES NFR BLD AUTO: 12.8 % (ref 19.6–45.3)
MAGNESIUM SERPL-MCNC: 2.5 MG/DL (ref 1.6–2.4)
MCH RBC QN AUTO: 27.1 PG (ref 26.6–33)
MCHC RBC AUTO-ENTMCNC: 31.3 G/DL (ref 31.5–35.7)
MCV RBC AUTO: 86.6 FL (ref 79–97)
MONOCYTES # BLD AUTO: 1.26 10*3/MM3 (ref 0.1–0.9)
MONOCYTES NFR BLD AUTO: 7.9 % (ref 5–12)
NEUTROPHILS NFR BLD AUTO: 12.28 10*3/MM3 (ref 1.7–7)
NEUTROPHILS NFR BLD AUTO: 77 % (ref 42.7–76)
NRBC BLD AUTO-RTO: 0 /100 WBC (ref 0–0.2)
PHOSPHATE SERPL-MCNC: 4.4 MG/DL (ref 2.5–4.5)
PLATELET # BLD AUTO: 765 10*3/MM3 (ref 140–450)
PMV BLD AUTO: 8.8 FL (ref 6–12)
POTASSIUM SERPL-SCNC: 4.2 MMOL/L (ref 3.5–5.2)
RBC # BLD AUTO: 4.39 10*6/MM3 (ref 3.77–5.28)
SODIUM SERPL-SCNC: 139 MMOL/L (ref 136–145)
WBC # BLD AUTO: 15.95 10*3/MM3 (ref 3.4–10.8)

## 2021-10-03 PROCEDURE — 85025 COMPLETE CBC W/AUTO DIFF WBC: CPT | Performed by: SURGERY

## 2021-10-03 PROCEDURE — 80069 RENAL FUNCTION PANEL: CPT | Performed by: HOSPITALIST

## 2021-10-03 PROCEDURE — 97161 PT EVAL LOW COMPLEX 20 MIN: CPT

## 2021-10-03 PROCEDURE — 83735 ASSAY OF MAGNESIUM: CPT | Performed by: SURGERY

## 2021-10-03 PROCEDURE — 94799 UNLISTED PULMONARY SVC/PX: CPT

## 2021-10-03 PROCEDURE — 25010000002 CEFTRIAXONE SODIUM-DEXTROSE 1-3.74 GM-%(50ML) RECONSTITUTED SOLUTION: Performed by: SURGERY

## 2021-10-03 RX ADMIN — METRONIDAZOLE 500 MG: 500 INJECTION, SOLUTION INTRAVENOUS at 20:56

## 2021-10-03 RX ADMIN — VENLAFAXINE HYDROCHLORIDE 150 MG: 150 CAPSULE, EXTENDED RELEASE ORAL at 08:15

## 2021-10-03 RX ADMIN — BUSPIRONE HYDROCHLORIDE 15 MG: 15 TABLET ORAL at 08:14

## 2021-10-03 RX ADMIN — ROSUVASTATIN CALCIUM 5 MG: 5 TABLET, COATED ORAL at 08:14

## 2021-10-03 RX ADMIN — BUSPIRONE HYDROCHLORIDE 15 MG: 15 TABLET ORAL at 20:56

## 2021-10-03 RX ADMIN — SODIUM CHLORIDE, PRESERVATIVE FREE 3 ML: 5 INJECTION INTRAVENOUS at 08:15

## 2021-10-03 RX ADMIN — PREGABALIN 100 MG: 50 CAPSULE ORAL at 15:38

## 2021-10-03 RX ADMIN — METRONIDAZOLE 500 MG: 500 INJECTION, SOLUTION INTRAVENOUS at 13:46

## 2021-10-03 RX ADMIN — ALLOPURINOL 100 MG: 100 TABLET ORAL at 08:14

## 2021-10-03 RX ADMIN — GABAPENTIN 300 MG: 300 CAPSULE ORAL at 06:23

## 2021-10-03 RX ADMIN — PANTOPRAZOLE SODIUM 40 MG: 40 TABLET, DELAYED RELEASE ORAL at 20:56

## 2021-10-03 RX ADMIN — PREGABALIN 100 MG: 50 CAPSULE ORAL at 08:14

## 2021-10-03 RX ADMIN — OXYCODONE HYDROCHLORIDE AND ACETAMINOPHEN 1 TABLET: 10; 325 TABLET ORAL at 07:29

## 2021-10-03 RX ADMIN — NEBIVOLOL HYDROCHLORIDE 10 MG: 5 TABLET ORAL at 08:14

## 2021-10-03 RX ADMIN — SODIUM CHLORIDE, PRESERVATIVE FREE 3 ML: 5 INJECTION INTRAVENOUS at 20:56

## 2021-10-03 RX ADMIN — CEFTRIAXONE 1 G: 1 INJECTION, SOLUTION INTRAVENOUS at 12:23

## 2021-10-03 RX ADMIN — GABAPENTIN 300 MG: 300 CAPSULE ORAL at 13:46

## 2021-10-03 NOTE — CASE MANAGEMENT/SOCIAL WORK
Continued Stay Note  W[facility]     Patient Name: Nitish Barfield  MRN: [id number]  Today's Date: 10/3/2021    Admit Date: 9/30/2021    Discharge Plan     Row Name 10/03/21 1404       Plan    Plan Comments  I spoke with the patient in her room.  She was sitting up in the chair.  We discussed her discharge plan and she stated that she will discharge home with family. She denied any further needs at this time.        Discharge Codes    No documentation.             Kareen Steele RN

## 2021-10-03 NOTE — PROGRESS NOTES
Surgery Progress Note   Chief Complaint: Postop day 3    Subjective     Interval History:     Nitish has tolerated her full liquid diet and moves her bowels.  Her pain is well controlled.  Her creatinine is still elevated.  Her white count is still elevated.  Her Zosyn was stopped and Rocephin was started.  She has no nausea.      Objective     Vital Signs  Temp:  [97.8 °F (36.6 °C)-97.9 °F (36.6 °C)] 97.9 °F (36.6 °C)  Heart Rate:  [72-74] 72  Resp:  [18] 18  BP: (142-146)/(90) 142/90  Body mass index is 26.52 kg/m².    Intake/Output Summary (Last 24 hours) at 10/3/2021 1303  Last data filed at 10/3/2021 1300  Gross per 24 hour   Intake 2294.58 ml   Output 3830 ml   Net -1535.42 ml     I/O this shift:  In: 240 [P.O.:240]  Out: 40 [Drains:40]       Physical Exam:   General: patient awake, alert and cooperative   Abdomen: soft, good bowel sounds   STAR output is serosanguineous   Wounds are without signs of infection   Extremities: no rash or edema   Neurologic: Normal mood and behavior     Results Review:     I reviewed the patient's new clinical results.      WBC No results found for: WBCS   HGB Hemoglobin   Date Value Ref Range Status   10/03/2021 11.9 (L) 12.0 - 15.9 g/dL Final   10/02/2021 10.4 (L) 12.0 - 15.9 g/dL Final   10/01/2021 10.3 (L) 12.0 - 15.9 g/dL Final   10/01/2021 6.9 (C) 12.0 - 15.9 g/dL Final   09/30/2021 8.0 (L) 12.0 - 15.9 g/dL Final      HCT Hematocrit   Date Value Ref Range Status   10/03/2021 38.0 34.0 - 46.6 % Final   10/02/2021 33.0 (L) 34.0 - 46.6 % Final   10/01/2021 32.8 (L) 34.0 - 46.6 % Final   10/01/2021 23.1 (L) 34.0 - 46.6 % Final   09/30/2021 27.1 (L) 34.0 - 46.6 % Final      Platlets No results found for: LABPLAT     PT/INR:  No results found for: PROTIME/No results found for: INR    Sodium Sodium   Date Value Ref Range Status   10/03/2021 139 136 - 145 mmol/L Final   10/02/2021 129 (L) 136 - 145 mmol/L Final   10/01/2021 134 (L) 136 - 145 mmol/L Final   09/30/2021 137 136  - 145 mmol/L Final      Potassium Potassium   Date Value Ref Range Status   10/03/2021 4.2 3.5 - 5.2 mmol/L Final   10/02/2021 4.1 3.5 - 5.2 mmol/L Final   10/01/2021 3.6 3.5 - 5.2 mmol/L Final   10/01/2021 3.4 (L) 3.5 - 5.2 mmol/L Final   09/30/2021 2.9 (L) 3.5 - 5.2 mmol/L Final      Chloride Chloride   Date Value Ref Range Status   10/03/2021 110 (H) 98 - 107 mmol/L Final   10/02/2021 101 98 - 107 mmol/L Final   10/01/2021 100 98 - 107 mmol/L Final   09/30/2021 102 98 - 107 mmol/L Final      Bicarbonate No results found for: PLASMABICARB   BUN BUN   Date Value Ref Range Status   10/03/2021 18 8 - 23 mg/dL Final   10/02/2021 17 8 - 23 mg/dL Final   10/01/2021 14 8 - 23 mg/dL Final   09/30/2021 12 8 - 23 mg/dL Final      Creatinine Creatinine   Date Value Ref Range Status   10/03/2021 1.86 (H) 0.57 - 1.00 mg/dL Final   10/02/2021 1.94 (H) 0.57 - 1.00 mg/dL Final   10/01/2021 1.77 (H) 0.57 - 1.00 mg/dL Final   09/30/2021 1.29 (H) 0.57 - 1.00 mg/dL Final      Calcium Calcium   Date Value Ref Range Status   10/03/2021 7.8 (L) 8.6 - 10.5 mg/dL Final   10/02/2021 7.5 (L) 8.6 - 10.5 mg/dL Final   10/01/2021 7.8 (L) 8.6 - 10.5 mg/dL Final   09/30/2021 7.8 (L) 8.6 - 10.5 mg/dL Final      Magnesium  AST  ALT  Bilirubin, Total  AlkPhos  Albumin    Amylase  Lipase    Radiology: Magnesium   Date Value Ref Range Status   10/03/2021 2.5 (H) 1.6 - 2.4 mg/dL Final   10/02/2021 1.8 1.6 - 2.4 mg/dL Final   10/01/2021 1.7 1.6 - 2.4 mg/dL Final   09/30/2021 1.8 1.6 - 2.4 mg/dL Final     No components found for: AST.*  No components found for: ALT.*  No results found for: BILIRUBIN    No components found for: ALKPHOS.*  No components found for: ALBUMIN.*      No components found for: AMYLASE.*  No components found for: LIPASE.*            Imaging Results (Most Recent)     Procedure Component Value Units Date/Time    US Sonosite Portable [[id number]] Resulted: 09/30/21 1220     Updated: 09/30/21 1220    Narrative:      This procedure was  auto-finalized with no dictation required.             lactated ringers, 9 mL/hr, Last Rate: 9 mL/hr (09/30/21 1239)          Assessment/Plan     Patient Active Problem List   Diagnosis Code   • Anxiety F41.9   • Depression F32.A   • Gastroesophageal reflux disease with esophagitis K21.00   • Generalized anxiety disorder F41.1   • Hyperlipidemia E78.5   • Hypertension I10   • Impaired glucose tolerance R73.02   • Renal insufficiency N28.9   • Status post total right knee replacement Z96.651   • Cyst of ovary N83.209   • S/P total knee arthroplasty, left Z96.652   • RBBB I45.10   • Diverticulitis of large intestine with perforation and abscess K57.20   • RA (rheumatoid arthritis) (HCC) M06.9   • Folate deficiency anemia D52.9   • Diverticulitis of large intestine with abscess K57.20   • Hypokalemia E87.6   • History of diverticulitis of colon Z87.19       Postop day 3  --I will advance her to a regular diet  --We will continue her IV fluids  --I will add Flagyl to help cover for anaerobes      Jasmyn Hall DO  10/03/21  13:03 EDT

## 2021-10-03 NOTE — PLAN OF CARE
Detail Level: Detailed Goal Outcome Evaluation:  Plan of Care Reviewed With: patient        Progress: no change  Outcome Summary: STAR drain with serosang fluid - VSS - very tired, fell asleep between care - BUE tremors - cold, cyanotic extremities - ng catheter in place with clear urine   Add 76443 Cpt? (Important Note: In 2017 The Use Of 50825 Is Being Tracked By Cms To Determine Future Global Period Reimbursement For Global Periods): no

## 2021-10-03 NOTE — THERAPY EVALUATION
Acute Care - Physical Therapy Initial Evaluation  WYATT Newberry     Patient Name: Nitish Barfield  : 1955  MRN: [id number]  Today's Date: 10/3/2021   Onset of Illness/Injury or Date of Surgery: 21  Visit Dx:     ICD-10-CM ICD-9-CM   1. Diverticulitis of intestine with abscess, unspecified bleeding status, unspecified part of intestinal tract  K57.80 562.11     569.5     Patient Active Problem List   Diagnosis   • Anxiety   • Depression   • Gastroesophageal reflux disease with esophagitis   • Generalized anxiety disorder   • Hyperlipidemia   • Hypertension   • Impaired glucose tolerance   • Renal insufficiency   • Status post total right knee replacement   • Cyst of ovary   • S/P total knee arthroplasty, left   • RBBB   • Diverticulitis of large intestine with perforation and abscess   • RA (rheumatoid arthritis) (HCC)   • Folate deficiency anemia   • Diverticulitis of large intestine with abscess   • Hypokalemia   • History of diverticulitis of colon     Past Medical History:   Diagnosis Date   • Allergy    • Anxiety    • Arthritis     RA, FINGERS, TOES   • Bleeding esophageal ulcer     history of   • Depression    • Diverticulitis    • Esophageal reflux    • Fibroid    • GERD (gastroesophageal reflux disease)    • History of transfusion    • Hypertension    • Hypertension    • Osteoarthritis of right knee     sched TKA   • RA (rheumatoid arthritis) ([facility])      Past Surgical History:   Procedure Laterality Date   • COLON RESECTION Left 2021    Procedure: COLON RESECTION LAPAROSCOPIC SIGMOID OR LOW ANTERIOR;  Surgeon: Katey Villagomez MD;  Location: [facility];  Service: General;  Laterality: Left;   • COLONOSCOPY     • ENDOSCOPY     • ESOPHAGOSCOPY / EGD      cauterize esophageal bleeding ulcers   • HYSTERECTOMY      JUAN DANIEL with OC for fibroids age 36   • KNEE SURGERY Left     ARTHROSCOPY   • TOTAL KNEE ARTHROPLASTY Left 2018    Procedure: TOTAL KNEE ARTHROPLASTY AND ALL ASSOCIATED PROCEDURES;   Surgeon: Costa Zuñiga MD;  Location: [facility] OR;  Service: Orthopedics   • TOTAL KNEE ARTHROPLASTY Right 10/17/2018    Procedure: TOTAL KNEE ARTHROPLASTY AND ALL ASSOCIATED PROCEDURES;  Surgeon: Costa Zuñiga MD;  Location:  [facility] OR;  Service: Orthopedics   • WISDOM TOOTH EXTRACTION          PT Assessment (last 12 hours)      PT Evaluation and Treatment     Row Name 10/03/21 0900          Physical Therapy Time and Intention    Subjective Information  complains of;pain  -AS     Document Type  evaluation  -AS     Mode of Treatment  physical therapy  -AS     Patient Effort  good  -AS     Symptoms Noted During/After Treatment  increased pain  -AS     Row Name 10/03/21 0900          General Information    Patient Profile Reviewed  yes  -AS     Onset of Illness/Injury or Date of Surgery  09/30/21  -AS     Patient Observations  alert;cooperative;agree to therapy  -AS     Prior Level of Function  independent:  -AS     Equipment Currently Used at Home  none  -AS     Equipment Issued to Patient  walker, front wheeled  -AS     Risks Reviewed  patient:;LOB;nausea/vomiting;dizziness;increased discomfort  -AS     Benefits Reviewed  patient:;improve function;increase independence;increase strength;increase balance;decrease pain;decrease risk of DVT  -AS     Row Name 10/03/21 0900          Previous Level of Function/Home Environm    BADLs, Premorbid Functional Level  independent  -AS     IADLs, Premorbid Functional Level  independent  -AS     Household Ambulation, Premorbid Functional Level  independent  -AS     Community Ambulation, Premorbid Functional Level  independent  -AS     Row Name 10/03/21 0900          Living Environment    Current Living Arrangements  home/apartment/condo  -AS     Home Accessibility  stairs to enter home 5  -AS     Lives With  spouse  -AS     Row Name 10/03/21 0900          Home Main Entrance    Number of Stairs, Main Entrance  five  -AS     Row Name 10/03/21 0900          Home Use of  Assistive/Adaptive Equipment    Equipment Currently Used at Home  none  -AS     Row Name 10/03/21 0900          Sensory    Hearing Status  WNL  -AS     Row Name 10/03/21 0900          Vision Assessment/Intervention    Visual Impairment/Limitations  WNL  -AS     Row Name 10/03/21 0900          Cognition    Affect/Mental Status (Cognitive)  WNL  -AS     Row Name 10/03/21 0900          Pain Scale: Word Pre/Post-Treatment    Pain: Word Scale, Pretreatment  4 - moderate pain  -AS     Row Name 10/03/21 0900          Range of Motion Comprehensive    General Range of Motion  bilateral lower extremity ROM WFL  -AS     [facility] 10/03/21 0900          Bed Mobility    Bed Mobility  supine-sit  -AS     Supine-Sit [place] (Bed Mobility)  supervision  -AS     Row Name 10/03/21 0900          Transfers    Transfers  sit-stand transfer;stand-sit transfer  -AS     Sit-Stand [place] (Transfers)  contact guard  -AS     Stand-Sit [place] (Transfers)  contact guard  -AS     Row Name 10/03/21 0900          Sit-Stand Transfer    Assistive Device (Sit-Stand Transfers)  walker, front-wheeled  -AS     Row Name 10/03/21 0900          Stand-Sit Transfer    Assistive Device (Stand-Sit Transfers)  walker, front-wheeled  -AS     Row Name 10/03/21 0900          Gait/Stairs (Locomotion)    [place] Level (Gait)  minimum assist (75% patient effort)  -AS     Assistive Device (Gait)  walker, front-wheeled  -AS     Distance in Feet (Gait)  5  -AS     Row Name 10/03/21 0900          Balance    Balance Assessment  standing static balance  -AS     Static Standing Balance  mild impairment  -AS     Row Name             Wound 09/30/21 1400 lower abdomen Incision    Wound - Properties Group Placement Date: 09/30/21  -DANNY Placement Time: 1400  -DANNY Present on Hospital Admission: N  -DANNY Orientation: lower  -DANNY Location: abdomen  -DANNY Primary Wound Type: Incision  -DANNY    Retired Wound - Properties Group Date first assessed: 09/30/21  -DANNY Time first  assessed: 1400  -DANNY Present on Hospital Admission: N  -DANNY Location: abdomen  -DANNY Primary Wound Type: Incision  -DANNY    Row Name 10/03/21 0900          Plan of Care Review    Plan of Care Reviewed With  patient  -AS     Outcome Summary  PT Evaluation complete this am. Patient supine in bed upon entry to room and agrees to work with PT. Patient performed supine to sitting EOB with supervision but did report increased pain around her incision. Patient stood with CGA and ambulated 5 feet with Min A and the use of a FWW. Patient was unsteady with her gait and reported that she felt very weak since her surgery on 9-30-21. She states she has not been out of bed much and has not walked. She sat in chair with CGA. Plan to follow up with patient tomorrow.  -AS     Row Name 10/03/21 0900          Physical Therapy Goals    Transfer Goal Selection (PT)  transfer, PT goal 1  -AS     Gait Training Goal Selection (PT)  gait training, PT goal 1  -AS     Row Name 10/03/21 0900          Transfer Goal 1 (PT)    Activity/Assistive Device (Transfer Goal 1, PT)  transfers, all  -AS     [place] Level/Cues Needed (Transfer Goal 1, PT)  independent  -AS     Time Frame (Transfer Goal 1, PT)  short term goal (STG)  -AS     Row Name 10/03/21 0900          Gait Training Goal 1 (PT)    Activity/Assistive Device (Gait Training Goal 1, PT)  gait (walking locomotion)  -AS     [place] Level (Gait Training Goal 1, PT)  standby assist  -AS     Distance (Gait Training Goal 1, PT)  50 feet  -AS     Time Frame (Gait Training Goal 1, PT)  long term goal (LTG)  -AS     Row Name 10/03/21 0900          Positioning and Restraints    Pre-Treatment Position  in bed  -AS     Post Treatment Position  chair  -AS     In Chair  notified nsg;reclined;call light within reach;encouraged to call for assist  -AS     Row Name 10/03/21 0900          Therapy Plan Review/Discharge Plan (PT)    Therapy Plan Review (PT)  evaluation/treatment results reviewed;care  plan/treatment goals reviewed;risks/benefits reviewed;current/potential barriers reviewed;participants voiced agreement with care plan;participants included;patient  -AS       User Key  (r) = Recorded By, (t) = Taken By, (c) = Cosigned By    Initials Name Provider Type    Mckenzie Pineda RN Registered Nurse    AS Seven Yu, PT Physical Therapist        Physical Therapy Education                 Title: PT OT SLP Therapies (Done)     Topic: Physical Therapy (Done)     Point: Mobility training (Done)     Learning Progress Summary           Patient Acceptance, E,TB, VU,DU by AS at 10/3/2021 0942                   Point: Home exercise program (Done)     Learning Progress Summary           Patient Acceptance, E,TB, VU,DU by AS at 10/3/2021 0942                               User Key     Initials Effective Dates Name Provider Type Discipline    AS 06/16/21 -  Seven Yu, PT Physical Therapist PT              PT Recommendation and Plan  Anticipated Discharge Disposition (PT): home with home health  Plan of Care Reviewed With: patient  Outcome Summary: PT Evaluation complete this am. Patient supine in bed upon entry to room and agrees to work with PT. Patient performed supine to sitting EOB with supervision but did report increased pain around her incision. Patient stood with CGA and ambulated 5 feet with Min A and the use of a FWW. Patient was unsteady with her gait and reported that she felt very weak since her surgery on 9-30-21. She states she has not been out of bed much and has not walked. She sat in chair with CGA. Plan to follow up with patient tomorrow.  Outcome Measures     Row Name 10/03/21 0900             How much help from another person do you currently need...    Turning from your back to your side while in flat bed without using bedrails?  4  -AS      Moving from lying on back to sitting on the side of a flat bed without bedrails?  4  -AS      Moving to and from a bed to a chair  (including a wheelchair)?  3  -AS      Standing up from a chair using your arms (e.g., wheelchair, bedside chair)?  3  -AS      Climbing 3-5 steps with a railing?  2  -AS      To walk in hospital room?  3  -AS      AM-PAC 6 Clicks Score (PT)  19  -AS         Functional Assessment    Outcome Measure Options  AM-PAC 6 Clicks Basic Mobility (PT)  -AS        User Key  (r) = Recorded By, (t) = Taken By, (c) = Cosigned By    Initials Name Provider Type    AS Seven Yu, PT Physical Therapist           Time Calculation:   PT Charges     Row Name 10/03/21 0943             Time Calculation    Start Time  0855  -AS      Stop Time  0912  -AS      Time Calculation (min)  17 min  -AS        User Key  (r) = Recorded By, (t) = Taken By, (c) = Cosigned By    Initials Name Provider Type    AS Seven Yu, PT Physical Therapist        Therapy Charges for Today     Code Description Service Date Service Provider Modifiers Qty    44430186974 HC PT EVAL LOW COMPLEXITY 1 10/3/2021 Seven Yu, PT GP 1          PT G-Codes  Outcome Measure Options: AM-PAC 6 Clicks Basic Mobility (PT)  AM-PAC 6 Clicks Score (PT): 19    Seven Yu, JASON  10/3/2021

## 2021-10-03 NOTE — PLAN OF CARE
Goal Outcome Evaluation:  Plan of Care Reviewed With: patient           Outcome Summary: PT Evaluation complete this am. Patient supine in bed upon entry to room and agrees to work with PT. Patient performed supine to sitting EOB with supervision but did report increased pain around her incision. Patient stood with CGA and ambulated 5 feet with Min A and the use of a FWW. Patient was unsteady with her gait and reported that she felt very weak since her surgery on 9-30-21. She states she has not been out of bed much and has not walked. She sat in chair with CGA. Plan to follow up with patient tomorrow.

## 2021-10-03 NOTE — PLAN OF CARE
Goal Outcome Evaluation:  Plan of Care Reviewed With: patient        Progress: improving  Outcome Summary: Medicated x1for abd pain; STAR richard with serous drainage; unsteady on feet; P.T consult for eval & tx; diet advanced to Reg didet; remains saline locked; Rocephin given; Flagyl IV q 8 hrs started; labs in am

## 2021-10-04 LAB
ANION GAP SERPL CALCULATED.3IONS-SCNC: 10.3 MMOL/L (ref 5–15)
BASOPHILS # BLD AUTO: 0.06 10*3/MM3 (ref 0–0.2)
BASOPHILS NFR BLD AUTO: 0.4 % (ref 0–1.5)
BUN SERPL-MCNC: 20 MG/DL (ref 8–23)
BUN/CREAT SERPL: 13.3 (ref 7–25)
CALCIUM SPEC-SCNC: 7.9 MG/DL (ref 8.6–10.5)
CHLORIDE SERPL-SCNC: 110 MMOL/L (ref 98–107)
CO2 SERPL-SCNC: 18.7 MMOL/L (ref 22–29)
CREAT SERPL-MCNC: 1.5 MG/DL (ref 0.57–1)
DEPRECATED RDW RBC AUTO: 56.9 FL (ref 37–54)
EOSINOPHIL # BLD AUTO: 0.46 10*3/MM3 (ref 0–0.4)
EOSINOPHIL NFR BLD AUTO: 3.2 % (ref 0.3–6.2)
ERYTHROCYTE [DISTWIDTH] IN BLOOD BY AUTOMATED COUNT: 18.1 % (ref 12.3–15.4)
GFR SERPL CREATININE-BSD FRML MDRD: 35 ML/MIN/1.73
GLUCOSE SERPL-MCNC: 71 MG/DL (ref 65–99)
HCT VFR BLD AUTO: 38.9 % (ref 34–46.6)
HGB BLD-MCNC: 12 G/DL (ref 12–15.9)
IMM GRANULOCYTES # BLD AUTO: 0.1 10*3/MM3 (ref 0–0.05)
IMM GRANULOCYTES NFR BLD AUTO: 0.7 % (ref 0–0.5)
LAB AP CASE REPORT: NORMAL
LYMPHOCYTES # BLD AUTO: 1.68 10*3/MM3 (ref 0.7–3.1)
LYMPHOCYTES NFR BLD AUTO: 11.8 % (ref 19.6–45.3)
MCH RBC QN AUTO: 27 PG (ref 26.6–33)
MCHC RBC AUTO-ENTMCNC: 30.8 G/DL (ref 31.5–35.7)
MCV RBC AUTO: 87.6 FL (ref 79–97)
MONOCYTES # BLD AUTO: 1.12 10*3/MM3 (ref 0.1–0.9)
MONOCYTES NFR BLD AUTO: 7.9 % (ref 5–12)
NEUTROPHILS NFR BLD AUTO: 10.78 10*3/MM3 (ref 1.7–7)
NEUTROPHILS NFR BLD AUTO: 76 % (ref 42.7–76)
NRBC BLD AUTO-RTO: 0 /100 WBC (ref 0–0.2)
PATH REPORT.FINAL DX SPEC: NORMAL
PATH REPORT.GROSS SPEC: NORMAL
PLATELET # BLD AUTO: 581 10*3/MM3 (ref 140–450)
PMV BLD AUTO: 8.8 FL (ref 6–12)
POTASSIUM SERPL-SCNC: 4.1 MMOL/L (ref 3.5–5.2)
RBC # BLD AUTO: 4.44 10*6/MM3 (ref 3.77–5.28)
SODIUM SERPL-SCNC: 139 MMOL/L (ref 136–145)
WBC # BLD AUTO: 14.2 10*3/MM3 (ref 3.4–10.8)

## 2021-10-04 PROCEDURE — 80048 BASIC METABOLIC PNL TOTAL CA: CPT | Performed by: SURGERY

## 2021-10-04 PROCEDURE — 97116 GAIT TRAINING THERAPY: CPT

## 2021-10-04 PROCEDURE — 94799 UNLISTED PULMONARY SVC/PX: CPT

## 2021-10-04 PROCEDURE — 85025 COMPLETE CBC W/AUTO DIFF WBC: CPT | Performed by: SURGERY

## 2021-10-04 PROCEDURE — 25010000002 CEFTRIAXONE SODIUM-DEXTROSE 1-3.74 GM-%(50ML) RECONSTITUTED SOLUTION: Performed by: SURGERY

## 2021-10-04 PROCEDURE — 99024 POSTOP FOLLOW-UP VISIT: CPT | Performed by: SURGERY

## 2021-10-04 RX ADMIN — METRONIDAZOLE 500 MG: 500 INJECTION, SOLUTION INTRAVENOUS at 21:02

## 2021-10-04 RX ADMIN — SODIUM CHLORIDE, PRESERVATIVE FREE 3 ML: 5 INJECTION INTRAVENOUS at 08:54

## 2021-10-04 RX ADMIN — ALLOPURINOL 100 MG: 100 TABLET ORAL at 08:53

## 2021-10-04 RX ADMIN — ROSUVASTATIN CALCIUM 5 MG: 5 TABLET, COATED ORAL at 08:54

## 2021-10-04 RX ADMIN — METRONIDAZOLE 500 MG: 500 INJECTION, SOLUTION INTRAVENOUS at 05:24

## 2021-10-04 RX ADMIN — NEBIVOLOL HYDROCHLORIDE 10 MG: 5 TABLET ORAL at 08:53

## 2021-10-04 RX ADMIN — VENLAFAXINE HYDROCHLORIDE 150 MG: 150 CAPSULE, EXTENDED RELEASE ORAL at 08:53

## 2021-10-04 RX ADMIN — BUSPIRONE HYDROCHLORIDE 15 MG: 15 TABLET ORAL at 08:54

## 2021-10-04 RX ADMIN — HYDROCODONE BITARTRATE AND ACETAMINOPHEN 1 TABLET: 5; 325 TABLET ORAL at 21:02

## 2021-10-04 RX ADMIN — SODIUM CHLORIDE, PRESERVATIVE FREE 3 ML: 5 INJECTION INTRAVENOUS at 21:02

## 2021-10-04 RX ADMIN — PANTOPRAZOLE SODIUM 40 MG: 40 TABLET, DELAYED RELEASE ORAL at 21:01

## 2021-10-04 RX ADMIN — METRONIDAZOLE 500 MG: 500 INJECTION, SOLUTION INTRAVENOUS at 14:33

## 2021-10-04 RX ADMIN — HYDROCODONE BITARTRATE AND ACETAMINOPHEN 1 TABLET: 5; 325 TABLET ORAL at 09:10

## 2021-10-04 RX ADMIN — CEFTRIAXONE 1 G: 1 INJECTION, SOLUTION INTRAVENOUS at 11:31

## 2021-10-04 RX ADMIN — BUSPIRONE HYDROCHLORIDE 15 MG: 15 TABLET ORAL at 21:01

## 2021-10-04 NOTE — PROGRESS NOTES
[facility] Progress Note      Patient Name: Nitish Barfield  : 1955  MRN: [id number]  Primary Care Physician:  Violeta Hammond MD  Date of admission: 2021    Subjective     Interval History:   No abdominal pain if she remains still  No shortness of breath on room air  Bowels working; appetite is good  Requesting removal of Akins catheter    Review of Systems:   As noted above    Objective     Vitals:   Temp:  [97.5 °F (36.4 °C)-98.1 °F (36.7 °C)] 98.1 °F (36.7 °C)  Heart Rate:  [71-79] 78  Resp:  [12-19] 19  BP: (111-157)/(49-95) 147/81    Intake/Output Summary (Last 24 hours) at 10/4/2021 1216  Last data filed at 10/4/2021 1131  Gross per 24 hour   Intake 1600 ml   Output 1365 ml   Net 235 ml       Physical Exam:    General Appearance: alert, oriented x 3, NAD  Skin: warm and dry  HEENT: oral mucosa normal, nonicteric sclera, AT/NC  Neck: supple, no JVD  Lungs: CTA, not labored on room air  Heart: RRR, normal S1 and S2  Abdomen: soft, + drain, appropriately tender, distended  : no palpable bladder; Akins catheter anchored  Extremities: no significant edema  Neuro: normal speech and mental status     Scheduled Meds:     allopurinol, 100 mg, Oral, Daily  busPIRone, 15 mg, Oral, BID  cefTRIAXone, 1 g, Intravenous, Q24H  metroNIDAZOLE, 500 mg, Intravenous, Q8H  nebivolol, 10 mg, Oral, Daily  pantoprazole, 40 mg, Oral, Nightly  rosuvastatin, 5 mg, Oral, Daily  sodium chloride, 3 mL, Intravenous, Q12H  venlafaxine XR, 150 mg, Oral, Daily      IV Meds:   lactated ringers, 9 mL/hr, Last Rate: 9 mL/hr (21 1239)        Results Reviewed:   I have personally reviewed the results from the time of this admission to 10/4/2021 12:16 EDT     Results from last 7 days   Lab Units 10/04/21  0718 10/03/21  0404 10/02/21  0356 10/01/21  0210 21  1819   SODIUM mmol/L 139 139 129*   < > 137   POTASSIUM mmol/L 4.1 4.2 4.1   < > 2.9*   CHLORIDE mmol/L 110* 110* 101   < > 102   CO2 mmol/L  18.7* 18.8* 14.9*   < > 22.5   BUN mg/dL 20 18 17   < > 12   CREATININE mg/dL 1.50* 1.86* 1.94*   < > 1.29*   CALCIUM mg/dL 7.9* 7.8* 7.5*   < > 7.8*   BILIRUBIN mg/dL  --   --   --   --  0.2   ALK PHOS U/L  --   --   --   --  145*   ALT (SGPT) U/L  --   --   --   --  5   AST (SGOT) U/L  --   --   --   --  7   GLUCOSE mg/dL 71 63* 79   < > 157*    < > = values in this interval not displayed.       Estimated Creatinine Clearance: 39.7 mL/min (A) (by C-G formula based on SCr of 1.5 mg/dL (H)).    Results from last 7 days   Lab Units 10/03/21  0404 10/02/21  0356 10/01/21  0210   MAGNESIUM mg/dL 2.5* 1.8 1.7   PHOSPHORUS mg/dL 4.4 4.4  --              Results from last 7 days   Lab Units 10/04/21  0718 10/03/21  0518 10/02/21  0356 10/01/21  1355 10/01/21  0210 09/30/21  1819 09/30/21  1819   WBC 10*3/mm3 14.20* 15.95* 21.01*  --  17.67*  --  19.46*   HEMOGLOBIN g/dL 12.0 11.9* 10.4* 10.3* 6.9*   < > 8.0*   PLATELETS 10*3/mm3 581* 765* 670*  --  583*  --  836*    < > = values in this interval not displayed.             Assessment / Plan     ASSESSMENT:  1.  HUSEYIN, nonoliguric, improving:  prerenal due to sepsis syndrome, hypotension, and volume sequestration in the setting of intra-abdominal process.  Potassium and anion gap are normal.  Volume status appropriate.  Urine is sodium-avid  2.  Diverticulitis with abscess, s/p laparoscopic LAR with splenic flexure mobilization on 9/30  3.  Thrombocytosis  4.  Hypertension  5.  RA on leflunomide    PLAN:  1.  Discontinue Akins catheter  2.  No need for IVF since she is eating and drinking well  3.  Surveillance labs    Thank you for involving us in the care of Nitish Barfield.  Please feel free to call with any questions.    Jose Carlos Adams MD  10/04/21  12:16 EDT    [facility]  [phone]      Much of this encounter note is an electronic transcription/translation of spoken language to printed text. The electronic translation of spoken language  may permit erroneous, or at times, nonsensical words or phrases to be inadvertently transcribed; Although I have reviewed the note for such errors, some may still exist.

## 2021-10-04 NOTE — PLAN OF CARE
"Goal Outcome Evaluation:  Plan of Care Reviewed With: patient        Progress: no change  Outcome Summary: Pt up to chair today, needs encouragement to ambulate more.  Family express concerns about patient seeming depressed, not acting like herself. Pt told PT that she if feeling depressed, but when asked by this RN, patient only reported \"feeling weak from having surgery.\" Discuss the importance of ambulation. F/C removed, patient due to void at this time. Instruct patient to notify staff of needing to void. STAR noted w/ serous sang return noted in bulb. Pt on room air. All care explained. All questions answered, pt verb understanding.  "

## 2021-10-04 NOTE — PLAN OF CARE
"Goal Outcome Evaluation:              Outcome Summary: PT: Patient performs sit to stand with CGA and gait 2x40 feet with CGA and verbal cues for proper distance from device.  Patient requires seated rest break due to reported fatigue.  Patient with flat affect at times and during mobility reports \"I think I am just depressed and need to work through it.\"  Discussed with RN.  Will continue to follow.  "

## 2021-10-04 NOTE — CASE MANAGEMENT/SOCIAL WORK
Continued Stay Note  WYATT Newberry     Patient Name: Nitish Barfield  MRN: [id number]  Today's Date: 10/4/2021    Admit Date: 9/30/2021    Discharge Plan     Row Name 10/04/21 1304       Plan    Plan  Home with     Patient/Family in Agreement with Plan  yes    Plan Comments  Followed up with patient today to review discharge plans and permission was given to speak with  and daughter present. Patient is sitting up in the chair finishing her lunch and is now on regular diet. She states that she worked with PT once today and tolerated without difficulty. Patient states she has a bath bench at home that she uses and does not think she will need any other equipment at discharge. Patient states she plans on returning home with her  and voiced no discharge needs at this time. Patient and family had no other questions or concerns regarding discharge plans. CM will continue to follow for needs.        Discharge Codes    No documentation.             Homa Walter RN

## 2021-10-04 NOTE — THERAPY TREATMENT NOTE
Acute Care - Physical Therapy Treatment Note   Kim Harris     Patient Name: Nitish Barfield  : 1955  MRN: [id number]  Today's Date: 10/4/2021   Onset of Illness/Injury or Date of Surgery: 21  Visit Dx:     ICD-10-CM ICD-9-CM   1. Diverticulitis of intestine with abscess, unspecified bleeding status, unspecified part of intestinal tract  K57.80 562.11     569.5     Patient Active Problem List   Diagnosis   • Anxiety   • Depression   • Gastroesophageal reflux disease with esophagitis   • Generalized anxiety disorder   • Hyperlipidemia   • Hypertension   • Impaired glucose tolerance   • Renal insufficiency   • Status post total right knee replacement   • Cyst of ovary   • S/P total knee arthroplasty, left   • RBBB   • Diverticulitis of large intestine with perforation and abscess   • RA (rheumatoid arthritis) (HCC)   • Folate deficiency anemia   • Diverticulitis of large intestine with abscess   • Hypokalemia   • History of diverticulitis of colon     Past Medical History:   Diagnosis Date   • Allergy    • Anxiety    • Arthritis     RA, FINGERS, TOES   • Bleeding esophageal ulcer     history of   • Depression    • Diverticulitis    • Esophageal reflux    • Fibroid    • GERD (gastroesophageal reflux disease)    • History of transfusion    • Hypertension    • Hypertension    • Osteoarthritis of right knee     sched TKA   • RA (rheumatoid arthritis) ([facility])      Past Surgical History:   Procedure Laterality Date   • COLON RESECTION Left 2021    Procedure: COLON RESECTION LAPAROSCOPIC SIGMOID OR LOW ANTERIOR;  Surgeon: Katey Villagomez MD;  Location: [facility];  Service: General;  Laterality: Left;   • COLONOSCOPY     • ENDOSCOPY     • ESOPHAGOSCOPY / EGD      cauterize esophageal bleeding ulcers   • HYSTERECTOMY      JUAN DANIEL with OC for fibroids age 36   • KNEE SURGERY Left     ARTHROSCOPY   • TOTAL KNEE ARTHROPLASTY Left 2018    Procedure: TOTAL KNEE ARTHROPLASTY AND ALL ASSOCIATED PROCEDURES;   "Surgeon: Costa Zuñiga MD;  Location:  [facility];  Service: Orthopedics   • TOTAL KNEE ARTHROPLASTY Right 10/17/2018    Procedure: TOTAL KNEE ARTHROPLASTY AND ALL ASSOCIATED PROCEDURES;  Surgeon: Costa Zuñiga MD;  Location:  [facility];  Service: Orthopedics   • WISDOM TOOTH EXTRACTION          PT Assessment (last 12 hours)      PT Evaluation and Treatment     Row Name 10/04/21 0924          Physical Therapy Time and Intention    Subjective Information  complains of;fatigue  -     Document Type  therapy note (daily note)  -     Mode of Treatment  physical therapy  -     Patient Effort  adequate  -     Symptoms Noted During/After Treatment  increased pain  -     Comment  pt c/o increased pain during transfers, does not rate  -     Row Name 10/04/21 0924          General Information    Patient Observations  alert;cooperative;agree to therapy  -     Patient/Family/Caregiver Comments/Observations  pt in bathroom with CNA, agrees to therapy upon exiting  -     Row Name 10/04/21 0924          Cognition    Affect/Mental Status (Cognitive)  flat/blunted affect;sad/depressed affect pt states \"I think I am just depressed\"  -     Personal Safety Interventions  gait belt;nonskid shoes/slippers when out of bed  -     Row Name 10/04/21 0924          Pain Scale: Word Pre/Post-Treatment    Pre/Posttreatment Pain Comment  pt reports increased pain with mobility, does not rate  -     Row Name 10/04/21 0924          Bed Mobility    Comment (Bed Mobility)  deferred up in chair  -     Row Name 10/04/21 0924          Transfers    Transfers  sit-stand transfer;stand-sit transfer  -     Sit-Stand [place] (Transfers)  contact guard;verbal cues  -     Stand-Sit [place] (Transfers)  contact guard;verbal cues  -     Row Name 10/04/21 0924          Sit-Stand Transfer    Assistive Device (Sit-Stand Transfers)  walker, front-wheeled  -     Row Name 10/04/21 0924          Stand-Sit Transfer    " "Assistive Device (Stand-Sit Transfers)  walker, front-wheeled  -     Row Name 10/04/21 0924          Gait/Stairs (Locomotion)    [place] Level (Gait)  contact guard;verbal cues  -     Assistive Device (Gait)  walker, front-wheeled  -JW     Distance in Feet (Gait)  2x40 feet with seated rest break  -     Pattern (Gait)  swing-through  -JW     Deviations/Abnormal Patterns (Gait)  base of support, narrow  -JW     Bilateral Gait Deviations  forward flexed posture  -JW     Comment (Gait/Stairs)  pt requires verbal cues for proper distance from device, as patient propels walker too far anteriorly.  Patient requires verbal cues for safety during direction changes.  rest break required due to fatigue  -     Row Name 10/04/21 0924          Safety Issues, Functional Mobility    Safety Issues Affecting Function (Mobility)  positioning of assistive device  -JW     Row Name             Wound 09/30/21 1400 lower abdomen Incision    Wound - Properties Group Placement Date: 09/30/21  -DANNY Placement Time: 1400  -DANNY Present on Hospital Admission: N  -DANNY Orientation: lower  -DANNY Location: abdomen  -DANNY Primary Wound Type: Incision  -DANNY    Retired Wound - Properties Group Date first assessed: 09/30/21  -DANNY Time first assessed: 1400  -DANNY Present on Hospital Admission: N  -DANNY Location: abdomen  -DANNY Primary Wound Type: Incision  -DANNY    Row Name 10/04/21 0924          Plan of Care Review    Outcome Summary  PT: Patient performs sit to stand with CGA and gait 2x40 feet with CGA and verbal cues for proper distance from device.  Patient requires seated rest break due to reported fatigue.  Patient with flat affect at times and during mobility reports \"I think I am just depressed and need to work through it.\"  Discussed with RN.  Will continue to follow.  -     Row Name 10/04/21 0924          Positioning and Restraints    Pre-Treatment Position  bathroom  -     Post Treatment Position  chair  -JW     In Chair  reclined;call light " "within reach;encouraged to call for assist;notified nsg;with family/caregiver  -     Row Name 10/04/21 0924          Progress Summary (PT)    Progress Toward Functional Goals (PT)  progress toward functional goals is gradual  -       User Key  (r) = Recorded By, (t) = Taken By, (c) = Cosigned By    Initials Name Provider Type    Mckenzie Pineda, RN Registered Nurse    Joan Healy, PT Physical Therapist        Physical Therapy Education                 Title: PT OT SLP Therapies (Done)     Topic: Physical Therapy (Done)     Point: Mobility training (Done)     Learning Progress Summary           Patient Acceptance, E,TB, VU by  at 10/4/2021 1128    Acceptance, E,TB, VU,DU by AS at 10/3/2021 0942                   Point: Home exercise program (Done)     Learning Progress Summary           Patient Acceptance, E,TB, VU,DU by AS at 10/3/2021 0942                               User Key     Initials Effective Dates Name Provider Type Discipline     06/16/21 -  Joan Diaz, PT Physical Therapist PT    AS 06/16/21 -  Seven Yu, PT Physical Therapist PT              PT Recommendation and Plan  Anticipated Discharge Disposition (PT): home with home health  Progress Summary (PT)  Progress Toward Functional Goals (PT): progress toward functional goals is gradual  Outcome Summary: PT: Patient performs sit to stand with CGA and gait 2x40 feet with CGA and verbal cues for proper distance from device.  Patient requires seated rest break due to reported fatigue.  Patient with flat affect at times and during mobility reports \"I think I am just depressed and need to work through it.\"  Discussed with RN.  Will continue to follow.  Outcome Measures     Row Name 10/04/21 0924 10/03/21 0900          How much help from another person do you currently need...    Turning from your back to your side while in flat bed without using bedrails?  3  -JW  4  -AS     Moving from lying on back to sitting on the side " of a flat bed without bedrails?  3  -JW  4  -AS     Moving to and from a bed to a chair (including a wheelchair)?  3  -JW  3  -AS     Standing up from a chair using your arms (e.g., wheelchair, bedside chair)?  3  -JW  3  -AS     Climbing 3-5 steps with a railing?  2  -JW  2  -AS     To walk in hospital room?  3  -JW  3  -AS     AM-PAC 6 Clicks Score (PT)  17  -JW  19  -AS        Functional Assessment    Outcome Measure Options  AM-PAC 6 Clicks Basic Mobility (PT)  -JW  AM-PAC 6 Clicks Basic Mobility (PT)  -AS       User Key  (r) = Recorded By, (t) = Taken By, (c) = Cosigned By    Initials Name Provider Type    Joan Healy, PT Physical Therapist    AS Seven Yu, PT Physical Therapist           Time Calculation:   PT Charges     Row Name 10/04/21 1128             Time Calculation    Start Time  0924  -      Stop Time  0940  -      Time Calculation (min)  16 min  -JW      PT Received On  10/04/21  -      PT - Next Appointment  10/05/21  -         Timed Charges    79068 - Gait Training Minutes   16  -         Total Minutes    Timed Charges Total Minutes  16  -JW       Total Minutes  16  -JW        User Key  (r) = Recorded By, (t) = Taken By, (c) = Cosigned By    Initials Name Provider Type    Joan Healy, PT Physical Therapist        Therapy Charges for Today     Code Description Service Date Service Provider Modifiers Qty    82184407827 HC GAIT TRAINING EA 15 MIN 10/4/2021 Joan Diaz, PT GP 1          PT G-Codes  Outcome Measure Options: AM-PAC 6 Clicks Basic Mobility (PT)  AM-PAC 6 Clicks Score (PT): 17    Joan Diaz PT  10/4/2021

## 2021-10-04 NOTE — PROGRESS NOTES
Chief Complaint: POD # 4    Subjective   Pt tolerating reg diet, physical therapy working with ambulation, good urine output, no N&V, + BMs    Objective     Vital signs in last 24 hours:  Temp:  [97.5 °F (36.4 °C)-98.1 °F (36.7 °C)] 97.8 °F (36.6 °C)  Heart Rate:  [71-78] 71  Resp:  [12-19] 16  BP: (111-168)/(78-95) 168/90    Intake/Output last 3 shifts:  I/O last 3 completed shifts:  In: 1070 [P.O.:720; IV Piggyback:350]  Out: 4165 [Urine:3875; Drains:290]    Intake/Output this shift:  I/O this shift:  In: 1110 [P.O.:960; IV Piggyback:150]  Out: 330 [Urine:300; Drains:30]    Physical Exam:  Respiratory: CTA, good inspiratory effort  CV: RRR  Abd: + BS, soft, ND, usual post-op tenderness, no rebound, no guarding, incision C/D/I,   STAR Serous 150cc   Ext: + edema     Results from last 7 days   Lab Units 10/04/21  0718   WBC 10*3/mm3 14.20*   HEMOGLOBIN g/dL 12.0   HEMATOCRIT % 38.9   PLATELETS 10*3/mm3 581*     Results from last 7 days   Lab Units 10/04/21  0718   SODIUM mmol/L 139   POTASSIUM mmol/L 4.1   CHLORIDE mmol/L 110*   CO2 mmol/L 18.7*   BUN mg/dL 20   CREATININE mg/dL 1.50*   GLUCOSE mg/dL 71   CALCIUM mg/dL 7.9*       Assessment/Plan   S/P  Lap LAR - for diverticulitis with an abscess  Leukocytosis - continue antibiotics and drain   Acute on chronic anemia - post tranfusion 2 units PRBC - H/H stable, no evidence of active bleeding   Acute on chronic DIMPLE - nephrology following, U/O has increased, d/c ng  Ileus - resolved tolerating regular diet, d/c IVFs   PT - continues to work with patient     Katey Villagomez MD  General, Minimally Invasive and Endoscopic Surgery  [facility]    [address]   [address]    [address]  [place] [address]               [place] [address]    P: [phone]  F: [phone]    Cc:  Violeta Hammond MD

## 2021-10-04 NOTE — PLAN OF CARE
Goal Outcome Evaluation:  Plan of Care Reviewed With: patient        Progress: no change  Outcome Summary: No c/o pain - STAR drain with serosan drainage - ng cath - PT working with pt - tolerating reg diet - SL w/exception of IV antibiotics - slept through shift, only woke to answer questions - VSS

## 2021-10-05 LAB
ALBUMIN SERPL-MCNC: 1.9 G/DL (ref 3.5–5.2)
ALBUMIN/GLOB SERPL: 0.7 G/DL
ALP SERPL-CCNC: 148 U/L (ref 39–117)
ALT SERPL W P-5'-P-CCNC: 8 U/L (ref 1–33)
ANION GAP SERPL CALCULATED.3IONS-SCNC: 6.9 MMOL/L (ref 5–15)
AST SERPL-CCNC: 6 U/L (ref 1–32)
BASOPHILS # BLD AUTO: 0.05 10*3/MM3 (ref 0–0.2)
BASOPHILS NFR BLD AUTO: 0.4 % (ref 0–1.5)
BILIRUB SERPL-MCNC: 0.2 MG/DL (ref 0–1.2)
BUN SERPL-MCNC: 16 MG/DL (ref 8–23)
BUN/CREAT SERPL: 15.4 (ref 7–25)
CALCIUM SPEC-SCNC: 7.8 MG/DL (ref 8.6–10.5)
CHLORIDE SERPL-SCNC: 110 MMOL/L (ref 98–107)
CO2 SERPL-SCNC: 20.1 MMOL/L (ref 22–29)
CREAT SERPL-MCNC: 1.04 MG/DL (ref 0.57–1)
DEPRECATED RDW RBC AUTO: 56.2 FL (ref 37–54)
EOSINOPHIL # BLD AUTO: 0.52 10*3/MM3 (ref 0–0.4)
EOSINOPHIL NFR BLD AUTO: 4 % (ref 0.3–6.2)
ERYTHROCYTE [DISTWIDTH] IN BLOOD BY AUTOMATED COUNT: 17.7 % (ref 12.3–15.4)
GFR SERPL CREATININE-BSD FRML MDRD: 53 ML/MIN/1.73
GLOBULIN UR ELPH-MCNC: 2.6 GM/DL
GLUCOSE SERPL-MCNC: 88 MG/DL (ref 65–99)
HCT VFR BLD AUTO: 34.5 % (ref 34–46.6)
HGB BLD-MCNC: 10.7 G/DL (ref 12–15.9)
IMM GRANULOCYTES # BLD AUTO: 0.06 10*3/MM3 (ref 0–0.05)
IMM GRANULOCYTES NFR BLD AUTO: 0.5 % (ref 0–0.5)
LYMPHOCYTES # BLD AUTO: 1.84 10*3/MM3 (ref 0.7–3.1)
LYMPHOCYTES NFR BLD AUTO: 14.1 % (ref 19.6–45.3)
MCH RBC QN AUTO: 26.8 PG (ref 26.6–33)
MCHC RBC AUTO-ENTMCNC: 31 G/DL (ref 31.5–35.7)
MCV RBC AUTO: 86.5 FL (ref 79–97)
MONOCYTES # BLD AUTO: 1.18 10*3/MM3 (ref 0.1–0.9)
MONOCYTES NFR BLD AUTO: 9.1 % (ref 5–12)
NEUTROPHILS NFR BLD AUTO: 71.9 % (ref 42.7–76)
NEUTROPHILS NFR BLD AUTO: 9.38 10*3/MM3 (ref 1.7–7)
PHOSPHATE SERPL-MCNC: 3 MG/DL (ref 2.5–4.5)
PLATELET # BLD AUTO: 584 10*3/MM3 (ref 140–450)
PMV BLD AUTO: 8.7 FL (ref 6–12)
POTASSIUM SERPL-SCNC: 3.7 MMOL/L (ref 3.5–5.2)
PROT SERPL-MCNC: 4.5 G/DL (ref 6–8.5)
RBC # BLD AUTO: 3.99 10*6/MM3 (ref 3.77–5.28)
SODIUM SERPL-SCNC: 137 MMOL/L (ref 136–145)
WBC # BLD AUTO: 13.03 10*3/MM3 (ref 3.4–10.8)

## 2021-10-05 PROCEDURE — 94799 UNLISTED PULMONARY SVC/PX: CPT

## 2021-10-05 PROCEDURE — 99024 POSTOP FOLLOW-UP VISIT: CPT | Performed by: SURGERY

## 2021-10-05 PROCEDURE — 85025 COMPLETE CBC W/AUTO DIFF WBC: CPT | Performed by: SURGERY

## 2021-10-05 PROCEDURE — 84100 ASSAY OF PHOSPHORUS: CPT | Performed by: INTERNAL MEDICINE

## 2021-10-05 PROCEDURE — 80053 COMPREHEN METABOLIC PANEL: CPT | Performed by: SURGERY

## 2021-10-05 PROCEDURE — 97116 GAIT TRAINING THERAPY: CPT

## 2021-10-05 PROCEDURE — 25010000002 CEFTRIAXONE SODIUM-DEXTROSE 1-3.74 GM-%(50ML) RECONSTITUTED SOLUTION: Performed by: SURGERY

## 2021-10-05 RX ADMIN — SODIUM CHLORIDE, PRESERVATIVE FREE 3 ML: 5 INJECTION INTRAVENOUS at 08:32

## 2021-10-05 RX ADMIN — ROSUVASTATIN CALCIUM 5 MG: 5 TABLET, COATED ORAL at 08:32

## 2021-10-05 RX ADMIN — BUSPIRONE HYDROCHLORIDE 15 MG: 15 TABLET ORAL at 20:44

## 2021-10-05 RX ADMIN — PANTOPRAZOLE SODIUM 40 MG: 40 TABLET, DELAYED RELEASE ORAL at 20:45

## 2021-10-05 RX ADMIN — CEFTRIAXONE 1 G: 1 INJECTION, SOLUTION INTRAVENOUS at 11:56

## 2021-10-05 RX ADMIN — SODIUM CHLORIDE, PRESERVATIVE FREE 3 ML: 5 INJECTION INTRAVENOUS at 21:01

## 2021-10-05 RX ADMIN — HYDROCODONE BITARTRATE AND ACETAMINOPHEN 1 TABLET: 5; 325 TABLET ORAL at 13:36

## 2021-10-05 RX ADMIN — NEBIVOLOL HYDROCHLORIDE 10 MG: 5 TABLET ORAL at 08:32

## 2021-10-05 RX ADMIN — HYDROCODONE BITARTRATE AND ACETAMINOPHEN 1 TABLET: 5; 325 TABLET ORAL at 03:02

## 2021-10-05 RX ADMIN — VENLAFAXINE HYDROCHLORIDE 150 MG: 150 CAPSULE, EXTENDED RELEASE ORAL at 08:32

## 2021-10-05 RX ADMIN — METRONIDAZOLE 500 MG: 500 INJECTION, SOLUTION INTRAVENOUS at 21:01

## 2021-10-05 RX ADMIN — BUSPIRONE HYDROCHLORIDE 15 MG: 15 TABLET ORAL at 08:32

## 2021-10-05 RX ADMIN — ALLOPURINOL 100 MG: 100 TABLET ORAL at 08:32

## 2021-10-05 RX ADMIN — METRONIDAZOLE 500 MG: 500 INJECTION, SOLUTION INTRAVENOUS at 05:44

## 2021-10-05 RX ADMIN — METRONIDAZOLE 500 MG: 500 INJECTION, SOLUTION INTRAVENOUS at 13:36

## 2021-10-05 NOTE — PLAN OF CARE
Goal Outcome Evaluation:  Plan of Care Reviewed With: patient        Progress: improving  Outcome Summary: more alert and active tonight - ambulating to bathroom - pain pills given for abd pain with relief - room air - pt questions when her arthritis meds will start again

## 2021-10-05 NOTE — PROGRESS NOTES
Adult Nutrition  Assessment/PES    Patient Name:  Nitish Barfield  YOB: 1955  MRN: [id number]  Admit Date:  9/30/2021    Assessment Date:  10/5/2021    Comments:  Tolerating diet well. Provided [facility] information on Divirticular dz.  Encouraged transition to high fiber with plenty of fluids once healed.  Expect compliance.     Reason for Assessment     Row Name 10/05/21 1048          Reason for Assessment    Reason For Assessment  per organizational policy LOS     Diagnosis  gastrointestinal disease;infection/sepsis Divirticulitis, abscess s/p LAP LAR, chronic anemia         Nutrition/Diet History     Row Name 10/05/21 1048          Nutrition/Diet History    Typical Food/Fluid Intake  Pt brighter today and eating breakfast, up in chair at 2nd visit. NKFA. Denies issue chew/swallow. Pt reports this is new and asked about salads and probiotics.         Anthropometrics     Row Name 10/05/21 1050          Anthropometrics    Weight  -- 140.8#        Body Mass Index (BMI)    BMI Assessment  BMI 25-29.9: overweight         Labs/Tests/Procedures/Meds     Row Name 10/05/21 1102          Labs/Procedures/Meds    Lab Results Reviewed  reviewed     Lab Results Comments  creat 1.04 H        Diagnostic Tests/Procedures    Diagnostic Test/Procedure Reviewed  reviewed        Medications    Pertinent Medications Reviewed  reviewed           Estimated/Assessed Needs     Row Name 10/05/21 1102          Estimated/Assessed Needs    Additional Documentation  Calorie Requirements (Group);Fluid Requirements (Group);Protein Requirements (Group)        Calorie Requirements    Estimated Calorie Need Method  Villalba-St Jeor     Estimated Calorie Requirement Comment  1462 kcal ( mifflin 1.2 )        Protein Requirements    Est Protein Requirement Amount (gms/kg)  1.0 gm protein 79 gm pro        Fluid Requirements    Estimated Fluid Requirement Method  RDA Method 1462 ml         Nutrition Prescription Ordered     Row Name 10/05/21 0255  "         Nutrition Prescription PO    Current PO Diet  Regular         Evaluation of Received Nutrient/Fluid Intake     Row Name 10/05/21 1103          Fluid Intake Evaluation    Oral Fluid (mL)  1040 ave x 3, 71%        PO Evaluation    Number of Meals  6     % PO Intake  88               Problem/Interventions:  Problem 1     Row Name 10/05/21 1103          Nutrition Diagnoses Problem 1    Problem 1  Nutrition Appropriate for Condition at this Time               Intervention Goal     Row Name 10/05/21 1103          Intervention Goal    General  Meet nutritional needs for age/condition     PO  Maintain intake;PO intake (%)     PO Intake %  75 % or greater     Weight  No significant weight loss         Nutrition Intervention     Row Name 10/05/21 1104          Nutrition Intervention    RD/Tech Action  Interview for preference;Encourage intake;Follow Tx progress           Education/Evaluation     Row Name 10/05/21 1104          Education    Education  Education topics;Advised regarding habits/behavior;Provided education regarding Edu on diviritcular dz. Edu on no restrictions ordered. Edu typically acute flare \"itis\" avoid lots of fiber/nuts/seeds. During normal state increase to high fiber diet with lots of water. Choose whole grains, fresh fruit/veggies. Defer probiotic to MD.     Provided education regarding  Medical diagnosis explained pre/probiotics can be beneficial for GI health if ok w MD ( pt asked)     Education Topics  Fiber NIH Divirticular Dz info provided at bedside        Monitor/Evaluation    Monitor  Per protocol;I&O;PO intake;Pertinent labs;Weight;Symptoms     Education Follow-up  Other (comment) pt asked appropriate questions & verbalized understanding           Electronically signed by:  Barbi Barragan RD  10/05/21 11:07 EDT  "

## 2021-10-05 NOTE — PROGRESS NOTES
[facility] Progress Note      Patient Name: Nitish Barfield  : 1955  MRN: [id number]  Primary Care Physician:  Violeta Hammond MD  Date of admission: 2021    Subjective     Interval History:     Patient sitting comfortably.  No abdominal pain, nausea or vomiting, abdominal pain.  Appetite okay per patient      Review of Systems:   As noted above    Objective     Vitals:   Temp:  [97.6 °F (36.4 °C)-98.2 °F (36.8 °C)] 98.2 °F (36.8 °C)  Heart Rate:  [63-71] 63  Resp:  [14-16] 16  BP: (146-168)/(83-99) 150/83    Intake/Output Summary (Last 24 hours) at 10/5/2021 1423  Last data filed at 10/5/2021 1419  Gross per 24 hour   Intake 830 ml   Output 1145 ml   Net -315 ml       Physical Exam:    General Appearance: alert, oriented x 3, NAD  Skin: warm and dry  HEENT: oral mucosa normal, nonicteric sclera, AT/NC  Neck: supple, no JVD  Lungs: CTA, not labored on room air  Heart: RRR, normal S1 and S2  Abdomen: soft, + drain, appropriately tender, distended  : no palpable bladder; Akins catheter anchored  Extremities: no significant edema  Neuro: normal speech and mental status     Scheduled Meds:     allopurinol, 100 mg, Oral, Daily  busPIRone, 15 mg, Oral, BID  cefTRIAXone, 1 g, Intravenous, Q24H  metroNIDAZOLE, 500 mg, Intravenous, Q8H  nebivolol, 10 mg, Oral, Daily  pantoprazole, 40 mg, Oral, Nightly  rosuvastatin, 5 mg, Oral, Daily  sodium chloride, 3 mL, Intravenous, Q12H  venlafaxine XR, 150 mg, Oral, Daily      IV Meds:   lactated ringers, 9 mL/hr, Last Rate: 9 mL/hr (21 1239)        Results Reviewed:   I have personally reviewed the results from the time of this admission to 10/5/2021 14:23 EDT     Results from last 7 days   Lab Units 10/05/21  0609 10/04/21  0718 10/03/21  0404 10/01/21  0210 21  1819   SODIUM mmol/L 137 139 139   < > 137   POTASSIUM mmol/L 3.7 4.1 4.2   < > 2.9*   CHLORIDE mmol/L 110* 110* 110*   < > 102   CO2 mmol/L 20.1* 18.7* 18.8*   < > 22.5    BUN mg/dL 16 20 18   < > 12   CREATININE mg/dL 1.04* 1.50* 1.86*   < > 1.29*   CALCIUM mg/dL 7.8* 7.9* 7.8*   < > 7.8*   BILIRUBIN mg/dL 0.2  --   --   --  0.2   ALK PHOS U/L 148*  --   --   --  145*   ALT (SGPT) U/L 8  --   --   --  5   AST (SGOT) U/L 6  --   --   --  7   GLUCOSE mg/dL 88 71 63*   < > 157*    < > = values in this interval not displayed.       Estimated Creatinine Clearance: 57.2 mL/min (A) (by C-G formula based on SCr of 1.04 mg/dL (H)).    Results from last 7 days   Lab Units 10/05/21  0609 10/03/21  0404 10/02/21  0356 10/01/21  0210   MAGNESIUM mg/dL  --  2.5* 1.8 1.7   PHOSPHORUS mg/dL 3.0 4.4 4.4  --              Results from last 7 days   Lab Units 10/05/21  0609 10/04/21  0718 10/03/21  0518 10/02/21  0356 10/01/21  1355 10/01/21  0210 10/01/21  0210   WBC 10*3/mm3 13.03* 14.20* 15.95* 21.01*  --   --  17.67*   HEMOGLOBIN g/dL 10.7* 12.0 11.9* 10.4* 10.3*   < > 6.9*   PLATELETS 10*3/mm3 584* 581* 765* 670*  --   --  583*    < > = values in this interval not displayed.             Assessment / Plan     ASSESSMENT:  1.  HUSEYIN, nonoliguric, improving:  prerenal due to sepsis syndrome, hypotension, and volume sequestration in the setting of intra-abdominal process.  Electrolytes, volume status appropriate.    2.  Diverticulitis with abscess, s/p laparoscopic LAR with splenic flexure mobilization on 9/30  3.  Thrombocytosis  4.  Hypertension  5.  RA on leflunomide    PLAN:  Patient is doing function improving.  Off IV fluids.  Good oral intake  I will sign off.  Please call for any question    Thank you for involving us in the care of Nitish Barfield.  Please feel free to call with any questions.    Sandeep Del Rio MD  10/05/21  14:23 EDT    [facility]  [phone]      Much of this encounter note is an electronic transcription/translation of spoken language to printed text. The electronic translation of spoken language may permit erroneous, or at times, nonsensical words or  phrases to be inadvertently transcribed; Although I have reviewed the note for such errors, some may still exist.

## 2021-10-05 NOTE — THERAPY TREATMENT NOTE
Acute Care - Physical Therapy Treatment Note   Kim Harris     Patient Name: Nitish Barfield  : 1955  MRN: [id number]  Today's Date: 10/5/2021   Onset of Illness/Injury or Date of Surgery: 21  Visit Dx:     ICD-10-CM ICD-9-CM   1. Diverticulitis of intestine with abscess, unspecified bleeding status, unspecified part of intestinal tract  K57.80 562.11     569.5     Patient Active Problem List   Diagnosis   • Anxiety   • Depression   • Gastroesophageal reflux disease with esophagitis   • Generalized anxiety disorder   • Hyperlipidemia   • Hypertension   • Impaired glucose tolerance   • Renal insufficiency   • Status post total right knee replacement   • Cyst of ovary   • S/P total knee arthroplasty, left   • RBBB   • Diverticulitis of large intestine with perforation and abscess   • RA (rheumatoid arthritis) (HCC)   • Folate deficiency anemia   • Diverticulitis of large intestine with abscess   • Hypokalemia   • History of diverticulitis of colon     Past Medical History:   Diagnosis Date   • Allergy    • Anxiety    • Arthritis     RA, FINGERS, TOES   • Bleeding esophageal ulcer     history of   • Depression    • Diverticulitis    • Esophageal reflux    • Fibroid    • GERD (gastroesophageal reflux disease)    • History of transfusion    • Hypertension    • Hypertension    • Osteoarthritis of right knee     sched TKA   • RA (rheumatoid arthritis) ([facility])      Past Surgical History:   Procedure Laterality Date   • COLON RESECTION Left 2021    Procedure: COLON RESECTION LAPAROSCOPIC SIGMOID OR LOW ANTERIOR;  Surgeon: Katey Villagomez MD;  Location: [facility];  Service: General;  Laterality: Left;   • COLONOSCOPY     • ENDOSCOPY     • ESOPHAGOSCOPY / EGD      cauterize esophageal bleeding ulcers   • HYSTERECTOMY      JUAN DANIEL with OC for fibroids age 36   • KNEE SURGERY Left     ARTHROSCOPY   • TOTAL KNEE ARTHROPLASTY Left 2018    Procedure: TOTAL KNEE ARTHROPLASTY AND ALL ASSOCIATED PROCEDURES;   Surgeon: Costa Zuñiga MD;  Location:  [facility];  Service: Orthopedics   • TOTAL KNEE ARTHROPLASTY Right 10/17/2018    Procedure: TOTAL KNEE ARTHROPLASTY AND ALL ASSOCIATED PROCEDURES;  Surgeon: Costa Zuñiga MD;  Location:  [facility];  Service: Orthopedics   • WISDOM TOOTH EXTRACTION          PT Assessment (last 12 hours)      PT Evaluation and Treatment     Row Name 10/05/21 0903          Physical Therapy Time and Intention    Subjective Information  no complaints  -     Document Type  therapy note (daily note)  -     Mode of Treatment  physical therapy  -     Patient Effort  good  -     Symptoms Noted During/After Treatment  none  -     Row Name 10/05/21 0903          General Information    Patient Observations  alert;cooperative;agree to therapy  -     Patient/Family/Caregiver Comments/Observations  pt supine, agrees to therapy  -     Existing Precautions/Restrictions  fall  -     Barriers to Rehab  none identified  -     Row Name 10/05/21 0903          Cognition    Personal Safety Interventions  gait belt;nonskid shoes/slippers when out of bed  -     Row Name 10/05/21 0903          Pain Scale: Word Pre/Post-Treatment    Pre/Posttreatment Pain Comment  no c/o pain  -     Row Name 10/05/21 0903          Bed Mobility    Bed Mobility  supine-sit  -     Supine-Sit [place] (Bed Mobility)  standby assist  -     Assistive Device (Bed Mobility)  bed rails;head of bed elevated  -     Row Name 10/05/21 0903          Transfers    Transfers  sit-stand transfer;stand-sit transfer  -     Sit-Stand [place] (Transfers)  standby assist  -     Stand-Sit [place] (Transfers)  standby assist  -     Row Name 10/05/21 0903          Sit-Stand Transfer    Assistive Device (Sit-Stand Transfers)  walker, front-wheeled  -     Row Name 10/05/21 0903          Stand-Sit Transfer    Assistive Device (Stand-Sit Transfers)  walker, front-wheeled  -     Row Name 10/05/21 0903           Gait/Stairs (Locomotion)    [place] Level (Gait)  contact guard  -JW     Assistive Device (Gait)  walker, front-wheeled  -JW     Distance in Feet (Gait)  210  -JW     Pattern (Gait)  swing-through  -JW     Deviations/Abnormal Patterns (Gait)  base of support, narrow  -JW     Bilateral Gait Deviations  forward flexed posture  -JW     Comment (Gait/Stairs)  pt with no episodes of balance loss.  requires cues for decreased gait speed.  able to manage direction changes without balance loss  -     Row Name 10/05/21 0903          Safety Issues, Functional Mobility    Safety Issues Affecting Function (Mobility)  positioning of assistive device  -JW     Row Name             Wound 09/30/21 1400 lower abdomen Incision    Wound - Properties Group Placement Date: 09/30/21  -DANNY Placement Time: 1400  -DANNY Present on Hospital Admission: N  -DANNY Orientation: lower  -DANNY Location: abdomen  -DANNY Primary Wound Type: Incision  -DANNY    Retired Wound - Properties Group Date first assessed: 09/30/21  -DANNY Time first assessed: 1400  -DANNY Present on Hospital Admission: N  -DANNY Location: abdomen  -DANNY Primary Wound Type: Incision  -DANNY    Row Name 10/05/21 0903          Plan of Care Review    Outcome Summary  PT: Patient with much improved mobility today compared to previous session.  Patietn performs supine to sit transfer with SBA and sit to stand with SBA.  Patient performs gait x210 feet with CGA with use of rolling walker.  Patient with no episodes of balance loss and able to manage direction changes without difficulty.  Anticipate 1-2 additional visits to ensure consistency with mobility.  Continue to recommend ambulation with staff throughout the day as tolerated.  -     Row Name 10/05/21 0903          Positioning and Restraints    Pre-Treatment Position  in bed  -JW     Post Treatment Position  chair  -JW     In Chair  reclined;call light within reach;encouraged to call for assist;exit alarm on  -     Row Name 10/05/21 0903           Progress Summary (PT)    Progress Toward Functional Goals (PT)  progress toward functional goals is good  -       User Key  (r) = Recorded By, (t) = Taken By, (c) = Cosigned By    Initials Name Provider Type    Mckenzie Pineda, RN Registered Nurse    Joan Healy, PT Physical Therapist        Physical Therapy Education                 Title: PT OT SLP Therapies (Done)     Topic: Physical Therapy (Done)     Point: Mobility training (Done)     Learning Progress Summary           Patient Acceptance, E,TB, VU by  at 10/5/2021 1003    Acceptance, E,TB, VU by  at 10/4/2021 1128    Acceptance, E,TB, VU,DU by AS at 10/3/2021 0942                   Point: Home exercise program (Done)     Learning Progress Summary           Patient Acceptance, E,TB, VU,DU by AS at 10/3/2021 0942                               User Key     Initials Effective Dates Name Provider Type Discipline     06/16/21 -  Joan Diaz, PT Physical Therapist PT    AS 06/16/21 -  Seven Yu, PT Physical Therapist PT              PT Recommendation and Plan  Anticipated Discharge Disposition (PT): home with home health  Progress Summary (PT)  Progress Toward Functional Goals (PT): progress toward functional goals is good  Outcome Summary: PT: Patient with much improved mobility today compared to previous session.  Patietn performs supine to sit transfer with SBA and sit to stand with SBA.  Patient performs gait x210 feet with CGA with use of rolling walker.  Patient with no episodes of balance loss and able to manage direction changes without difficulty.  Anticipate 1-2 additional visits to ensure consistency with mobility.  Continue to recommend ambulation with staff throughout the day as tolerated.  Outcome Measures     Row Name 10/05/21 0903 10/04/21 0924 10/03/21 0900       How much help from another person do you currently need...    Turning from your back to your side while in flat bed without using bedrails?  3  -YVETTE  3   -JW  4  -AS    Moving from lying on back to sitting on the side of a flat bed without bedrails?  3  -JW  3  -JW  4  -AS    Moving to and from a bed to a chair (including a wheelchair)?  3  -JW  3  -JW  3  -AS    Standing up from a chair using your arms (e.g., wheelchair, bedside chair)?  3  -JW  3  -JW  3  -AS    Climbing 3-5 steps with a railing?  2  -JW  2  -JW  2  -AS    To walk in hospital room?  3  -JW  3  -JW  3  -AS    AM-PAC 6 Clicks Score (PT)  17  -JW  17  -JW  19  -AS       Functional Assessment    Outcome Measure Options  AM-PAC 6 Clicks Basic Mobility (PT)  -JW  AM-PAC 6 Clicks Basic Mobility (PT)  -JW  AM-PAC 6 Clicks Basic Mobility (PT)  -AS      User Key  (r) = Recorded By, (t) = Taken By, (c) = Cosigned By    Initials Name Provider Type    Joan Healy, PT Physical Therapist    AS Seven Yu, PT Physical Therapist           Time Calculation:   PT Charges     Row Name 10/05/21 1004             Time Calculation    Start Time  0903  -      Stop Time  0916  -JW      Time Calculation (min)  13 min  -JW      PT Received On  10/05/21  -JW      PT - Next Appointment  10/06/21  -         Timed Charges    26467 - Gait Training Minutes   13  -JW         Total Minutes    Timed Charges Total Minutes  13  -JW       Total Minutes  13  -JW        User Key  (r) = Recorded By, (t) = Taken By, (c) = Cosigned By    Initials Name Provider Type    Joan Healy, PT Physical Therapist        Therapy Charges for Today     Code Description Service Date Service Provider Modifiers Qty    30684317075 HC GAIT TRAINING EA 15 MIN 10/4/2021 Joan Diaz, PT GP 1    25462703864 HC GAIT TRAINING EA 15 MIN 10/5/2021 Joan Diaz, PT GP 1          PT G-Codes  Outcome Measure Options: AM-PAC 6 Clicks Basic Mobility (PT)  AM-PAC 6 Clicks Score (PT): 17    Joan Diaz PT  10/5/2021

## 2021-10-05 NOTE — PLAN OF CARE
Goal Outcome Evaluation:  Plan of Care Reviewed With: patient           Outcome Summary: Pt continues working with therapy on mobility, PT noted improvement in mobility. Pt reports pain that is controlled by PRN medication. Pt ambulating in triplett and room this shift. STAR drain remains in place. Pt resting comfortably at this time.

## 2021-10-05 NOTE — PLAN OF CARE
Goal Outcome Evaluation:              Outcome Summary: PT: Patient with much improved mobility today compared to previous session.  Patietn performs supine to sit transfer with SBA and sit to stand with SBA.  Patient performs gait x210 feet with CGA with use of rolling walker.  Patient with no episodes of balance loss and able to manage direction changes without difficulty.  Anticipate 1-2 additional visits to ensure consistency with mobility.  Continue to recommend ambulation with staff throughout the day as tolerated.

## 2021-10-06 VITALS
WEIGHT: 174.4 LBS | TEMPERATURE: 97 F | HEART RATE: 70 BPM | SYSTOLIC BLOOD PRESSURE: 117 MMHG | OXYGEN SATURATION: 100 % | DIASTOLIC BLOOD PRESSURE: 78 MMHG | RESPIRATION RATE: 18 BRPM | HEIGHT: 68 IN | BODY MASS INDEX: 26.43 KG/M2

## 2021-10-06 LAB
ANION GAP SERPL CALCULATED.3IONS-SCNC: 7.5 MMOL/L (ref 5–15)
BASOPHILS # BLD AUTO: 0.06 10*3/MM3 (ref 0–0.2)
BASOPHILS NFR BLD AUTO: 0.5 % (ref 0–1.5)
BUN SERPL-MCNC: 11 MG/DL (ref 8–23)
BUN/CREAT SERPL: 11.3 (ref 7–25)
CALCIUM SPEC-SCNC: 8.1 MG/DL (ref 8.6–10.5)
CHLORIDE SERPL-SCNC: 108 MMOL/L (ref 98–107)
CO2 SERPL-SCNC: 23.5 MMOL/L (ref 22–29)
CREAT SERPL-MCNC: 0.97 MG/DL (ref 0.57–1)
DEPRECATED RDW RBC AUTO: 57.1 FL (ref 37–54)
EOSINOPHIL # BLD AUTO: 0.43 10*3/MM3 (ref 0–0.4)
EOSINOPHIL NFR BLD AUTO: 3.2 % (ref 0.3–6.2)
ERYTHROCYTE [DISTWIDTH] IN BLOOD BY AUTOMATED COUNT: 17.9 % (ref 12.3–15.4)
GFR SERPL CREATININE-BSD FRML MDRD: 57 ML/MIN/1.73
GLUCOSE SERPL-MCNC: 113 MG/DL (ref 65–99)
HCT VFR BLD AUTO: 38.6 % (ref 34–46.6)
HGB BLD-MCNC: 11.8 G/DL (ref 12–15.9)
IMM GRANULOCYTES # BLD AUTO: 0.06 10*3/MM3 (ref 0–0.05)
IMM GRANULOCYTES NFR BLD AUTO: 0.5 % (ref 0–0.5)
LYMPHOCYTES # BLD AUTO: 1.91 10*3/MM3 (ref 0.7–3.1)
LYMPHOCYTES NFR BLD AUTO: 14.3 % (ref 19.6–45.3)
MCH RBC QN AUTO: 26.5 PG (ref 26.6–33)
MCHC RBC AUTO-ENTMCNC: 30.6 G/DL (ref 31.5–35.7)
MCV RBC AUTO: 86.7 FL (ref 79–97)
MONOCYTES # BLD AUTO: 0.87 10*3/MM3 (ref 0.1–0.9)
MONOCYTES NFR BLD AUTO: 6.5 % (ref 5–12)
NEUTROPHILS NFR BLD AUTO: 10 10*3/MM3 (ref 1.7–7)
NEUTROPHILS NFR BLD AUTO: 75 % (ref 42.7–76)
PLATELET # BLD AUTO: 627 10*3/MM3 (ref 140–450)
PMV BLD AUTO: 8.6 FL (ref 6–12)
POTASSIUM SERPL-SCNC: 3.8 MMOL/L (ref 3.5–5.2)
RBC # BLD AUTO: 4.45 10*6/MM3 (ref 3.77–5.28)
SODIUM SERPL-SCNC: 139 MMOL/L (ref 136–145)
WBC # BLD AUTO: 13.33 10*3/MM3 (ref 3.4–10.8)

## 2021-10-06 PROCEDURE — 25010000002 HYDRALAZINE PER 20 MG: Performed by: SURGERY

## 2021-10-06 PROCEDURE — 97116 GAIT TRAINING THERAPY: CPT

## 2021-10-06 PROCEDURE — 25010000002 CEFTRIAXONE SODIUM-DEXTROSE 1-3.74 GM-%(50ML) RECONSTITUTED SOLUTION: Performed by: SURGERY

## 2021-10-06 PROCEDURE — 85025 COMPLETE CBC W/AUTO DIFF WBC: CPT | Performed by: SURGERY

## 2021-10-06 PROCEDURE — 99024 POSTOP FOLLOW-UP VISIT: CPT | Performed by: SURGERY

## 2021-10-06 PROCEDURE — 80048 BASIC METABOLIC PNL TOTAL CA: CPT | Performed by: SURGERY

## 2021-10-06 RX ORDER — HYDROCODONE BITARTRATE AND ACETAMINOPHEN 5; 325 MG/1; MG/1
1 TABLET ORAL EVERY 4 HOURS PRN
Qty: 15 TABLET | Refills: 0 | Status: SHIPPED | OUTPATIENT
Start: 2021-10-06 | End: 2021-10-11

## 2021-10-06 RX ORDER — HYDRALAZINE HYDROCHLORIDE 20 MG/ML
25 INJECTION INTRAMUSCULAR; INTRAVENOUS EVERY 4 HOURS PRN
Status: DISCONTINUED | OUTPATIENT
Start: 2021-10-06 | End: 2021-10-06 | Stop reason: HOSPADM

## 2021-10-06 RX ORDER — AMOXICILLIN AND CLAVULANATE POTASSIUM 875; 125 MG/1; MG/1
1 TABLET, FILM COATED ORAL 2 TIMES DAILY
Qty: 28 TABLET | Refills: 0 | Status: SHIPPED | OUTPATIENT
Start: 2021-10-06

## 2021-10-06 RX ADMIN — HYDRALAZINE HYDROCHLORIDE 25 MG: 20 INJECTION INTRAMUSCULAR; INTRAVENOUS at 00:49

## 2021-10-06 RX ADMIN — ALLOPURINOL 100 MG: 100 TABLET ORAL at 08:31

## 2021-10-06 RX ADMIN — CEFTRIAXONE 1 G: 1 INJECTION, SOLUTION INTRAVENOUS at 11:41

## 2021-10-06 RX ADMIN — HYDROCODONE BITARTRATE AND ACETAMINOPHEN 1 TABLET: 5; 325 TABLET ORAL at 03:01

## 2021-10-06 RX ADMIN — NEBIVOLOL HYDROCHLORIDE 10 MG: 5 TABLET ORAL at 08:31

## 2021-10-06 RX ADMIN — BUSPIRONE HYDROCHLORIDE 15 MG: 15 TABLET ORAL at 08:31

## 2021-10-06 RX ADMIN — SODIUM CHLORIDE, PRESERVATIVE FREE 3 ML: 5 INJECTION INTRAVENOUS at 08:32

## 2021-10-06 RX ADMIN — VENLAFAXINE HYDROCHLORIDE 150 MG: 150 CAPSULE, EXTENDED RELEASE ORAL at 08:32

## 2021-10-06 RX ADMIN — ROSUVASTATIN CALCIUM 5 MG: 5 TABLET, COATED ORAL at 08:31

## 2021-10-06 RX ADMIN — METRONIDAZOLE 500 MG: 500 INJECTION, SOLUTION INTRAVENOUS at 06:02

## 2021-10-06 RX ADMIN — HYDROCODONE BITARTRATE AND ACETAMINOPHEN 1 TABLET: 5; 325 TABLET ORAL at 14:11

## 2021-10-06 NOTE — SIGNIFICANT NOTE
Patient blood pressure high 165/101, recheck  Again  165/95, Dr bains called , see new order,hydralazine PRN, cont to monitor.

## 2021-10-06 NOTE — PLAN OF CARE
Goal Outcome Evaluation:  Plan of Care Reviewed With: patient           Outcome Summary: pain controlled by oral pain med, vital signs stable, PRN pain med given, patient walk to the bathroom, STAR output 10, dressing intact, i/v antibiotics given ,cont to monitor

## 2021-10-06 NOTE — PLAN OF CARE
Goal Outcome Evaluation:              Outcome Summary: PT: Patient performs sit to/from stand with supervision and gait with rolling walker x250 feet with supervision.  Patient manages direction changes and obstacles without difficulty.  At this time, patient requires no physical assistance for any mobility and displays consistency with balance and mobility.  Recommend continued ambulation with nursing staff as tolerated, no further PT needs at this time.

## 2021-10-06 NOTE — PROGRESS NOTES
Chief Complaint: POD # 6    Subjective   Pt doing well, +BMs, no N&V, tolerating regular diet, pain controlled   Objective     Vital signs in last 24 hours:  Temp:  [97 °F (36.1 °C)-98.3 °F (36.8 °C)] 97 °F (36.1 °C)  Heart Rate:  [61-77] 70  Resp:  [16-18] 18  BP: (117-174)/() 117/78    Intake/Output last 3 shifts:  I/O last 3 completed shifts:  In: 730 [P.O.:480; IV Piggyback:250]  Out: 2250 [Urine:2025; Drains:215]    Intake/Output this shift:  I/O this shift:  In: 248 [P.O.:240; IV Piggyback:8]  Out: -     Physical Exam:  Respiratory: CTA, good inspiratory effort  CV: RRR  Abd: + BS, soft, ND, usual post-op tenderness, no rebound, no guarding, incisions look good, STAR 135cc serous fluid  Ext: + edema   Results from last 7 days   Lab Units 10/06/21  0911   WBC 10*3/mm3 13.33*   HEMOGLOBIN g/dL 11.8*   HEMATOCRIT % 38.6   PLATELETS 10*3/mm3 627*     Results from last 7 days   Lab Units 10/06/21  0911 10/05/21  0609 10/05/21  0609   SODIUM mmol/L 139   < > 137   POTASSIUM mmol/L 3.8   < > 3.7   CHLORIDE mmol/L 108*   < > 110*   CO2 mmol/L 23.5   < > 20.1*   BUN mg/dL 11   < > 16   CREATININE mg/dL 0.97   < > 1.04*   CALCIUM mg/dL 8.1*   < > 7.8*   BILIRUBIN mg/dL  --   --  0.2   ALK PHOS U/L  --   --  148*   ALT (SGPT) U/L  --   --  8   AST (SGOT) U/L  --   --  6   GLUCOSE mg/dL 113*   < > 88    < > = values in this interval not displayed.       Assessment/Plan   S/P Lap LAR - for diverticulitis with an abscess  Leukocytosis and sepsis - continue oral antibiotics and drain, patient improving  Acute on chronic anemia - post tranfusion 2 units PRBC - H/H stable, no evidence of active bleeding   Acute on chronic DIMPLE - nephrology signed off, cr normal, good u/o   Ileus - resolved   D/c home   F/u in 1 week    Katey Villagomez MD  General, Minimally Invasive and Endoscopic Surgery  [facility]    [address] [facility] [address] [place] [address]   [address]    [address]  [place] [address]                [place] [address]    P: [phone]  F: [phone]    Cc:  Violeta Hammond MD

## 2021-10-06 NOTE — THERAPY DISCHARGE NOTE
Acute Care - Physical Therapy Treatment Note/Discharge  [facility]     Patient Name: Nitish Barfield  : 1955  MRN: [id number]  Today's Date: 10/6/2021   Onset of Illness/Injury or Date of Surgery: 21            Admit Date: 2021    Visit Dx:    ICD-10-CM ICD-9-CM   1. Diverticulitis of intestine with abscess, unspecified bleeding status, unspecified part of intestinal tract  K57.80 562.11     569.5     Patient Active Problem List   Diagnosis   • Anxiety   • Depression   • Gastroesophageal reflux disease with esophagitis   • Generalized anxiety disorder   • Hyperlipidemia   • Hypertension   • Impaired glucose tolerance   • Renal insufficiency   • Status post total right knee replacement   • Cyst of ovary   • S/P total knee arthroplasty, left   • RBBB   • Diverticulitis of large intestine with perforation and abscess   • RA (rheumatoid arthritis) (HCC)   • Folate deficiency anemia   • Diverticulitis of large intestine with abscess   • Hypokalemia   • History of diverticulitis of colon     Past Medical History:   Diagnosis Date   • Allergy    • Anxiety    • Arthritis     RA, FINGERS, TOES   • Bleeding esophageal ulcer     history of   • Depression    • Diverticulitis    • Esophageal reflux    • Fibroid    • GERD (gastroesophageal reflux disease)    • History of transfusion    • Hypertension    • Hypertension    • Osteoarthritis of right knee     sched TKA   • RA (rheumatoid arthritis) (HCC)      Past Surgical History:   Procedure Laterality Date   • COLON RESECTION Left 2021    Procedure: COLON RESECTION LAPAROSCOPIC SIGMOID OR LOW ANTERIOR;  Surgeon: Katey Villagomez MD;  Location: [facility];  Service: General;  Laterality: Left;   • COLONOSCOPY     • ENDOSCOPY     • ESOPHAGOSCOPY / EGD      cauterize esophageal bleeding ulcers   • HYSTERECTOMY      JUAN DANIEL with OC for fibroids age 36   • KNEE SURGERY Left     ARTHROSCOPY   • TOTAL KNEE ARTHROPLASTY Left 2018    Procedure: TOTAL KNEE  ARTHROPLASTY AND ALL ASSOCIATED PROCEDURES;  Surgeon: Costa Zuñiga MD;  Location:  [facility];  Service: Orthopedics   • TOTAL KNEE ARTHROPLASTY Right 10/17/2018    Procedure: TOTAL KNEE ARTHROPLASTY AND ALL ASSOCIATED PROCEDURES;  Surgeon: Costa Zuñiga MD;  Location:  [facility];  Service: Orthopedics   • WISDOM TOOTH EXTRACTION            PT Assessment (last 12 hours)      PT Evaluation and Treatment     Row Name 10/06/21 0846          Physical Therapy Time and Intention    Subjective Information  complains of c/o chronic ankle pain  -     Document Type  discharge treatment  -     Mode of Treatment  physical therapy  -     Patient Effort  good  -     Row Name 10/06/21 0846          General Information    Patient Observations  alert;cooperative;agree to therapy  -     Patient/Family/Caregiver Comments/Observations  pt sitting in recliner, agrees to therapy  -     Existing Precautions/Restrictions  fall  -     Barriers to Rehab  none identified  -     Row Name 10/06/21 0846          Cognition    Personal Safety Interventions  gait belt;nonskid shoes/slippers when out of bed  -     Row Name 10/06/21 0846          Pain Scale: Word Pre/Post-Treatment    Pre/Posttreatment Pain Comment  c/o chronic ankle pain, does not rate  -     Row Name 10/06/21 0846          Bed Mobility    Comment (Bed Mobility)  deferred up in chair  -     Row Name 10/06/21 0846          Transfers    Transfers  sit-stand transfer;stand-sit transfer  -     Sit-Stand [place] (Transfers)  supervision  -     Stand-Sit [place] (Transfers)  supervision  -     Row Name 10/06/21 0846          Sit-Stand Transfer    Assistive Device (Sit-Stand Transfers)  walker, front-wheeled  -     Row Name 10/06/21 0846          Stand-Sit Transfer    Assistive Device (Stand-Sit Transfers)  walker, front-wheeled  -     Row Name 10/06/21 0846          Gait/Stairs (Locomotion)    [place] Level (Gait)  supervision  -      Assistive Device (Gait)  walker, front-wheeled  -JW     Distance in Feet (Gait)  250  -JW     Pattern (Gait)  swing-through  -JW     Deviations/Abnormal Patterns (Gait)  base of support, narrow  -JW     Bilateral Gait Deviations  forward flexed posture  -JW     Comment (Gait/Stairs)  pt able to manage walker around obstacles and direction changes, no episodes of balance loss  -JW     Row Name             Wound 09/30/21 1400 lower abdomen Incision    Wound - Properties Group Placement Date: 09/30/21  - Placement Time: 1400  -DANNY Present on Hospital Admission: N  -DANNY Orientation: lower  -DANNY Location: abdomen  -DANNY Primary Wound Type: Incision  -DANNY    Retired Wound - Properties Group Date first assessed: 09/30/21  -DANNY Time first assessed: 1400  -DANNY Present on Hospital Admission: N  -DANNY Location: abdomen  -DANNY Primary Wound Type: Incision  -DANNY    Row Name 10/06/21 0846          Plan of Care Review    Outcome Summary  PT: Patient performs sit to/from stand with supervision and gait with rolling walker x250 feet with supervision.  Patient manages direction changes and obstacles without difficulty.  At this time, patient requires no physical assistance for any mobility and displays consistency with balance and mobility.  Recommend continued ambulation with nursing staff as tolerated, no further PT needs at this time.  -     Row Name 10/06/21 0846          Positioning and Restraints    Pre-Treatment Position  sitting in chair/recliner  -     Post Treatment Position  chair  -JW     In Chair  reclined;call light within reach;encouraged to call for assist;exit alarm on  -     Row Name 10/06/21 0846          Progress Summary (PT)    Progress Toward Functional Goals (PT)  progress toward functional goals is good;prepare for discharge  -       User Key  (r) = Recorded By, (t) = Taken By, (c) = Cosigned By    Initials Name Provider Type    Mckenzie Pineda, RN Registered Nurse    Joan Healy, PT Physical  Therapist          Physical Therapy Education                 Title: PT OT SLP Therapies (Resolved)     Topic: Physical Therapy (Resolved)     Point: Mobility training (Resolved)     Learning Progress Summary           Patient Acceptance, E,TB, VU by  at 10/6/2021 0921    Acceptance, E,TB, VU by JW at 10/5/2021 1003    Acceptance, E,TB, VU by JW at 10/4/2021 1128    Acceptance, E,TB, VU,DU by AS at 10/3/2021 0942                   Point: Home exercise program (Resolved)     Learning Progress Summary           Patient Acceptance, E,TB, VU,DU by AS at 10/3/2021 0942                               User Key     Initials Effective Dates Name Provider Type Discipline     06/16/21 -  Joan Diaz, PT Physical Therapist PT    AS 06/16/21 -  Seven Yu, PT Physical Therapist PT                PT Recommendation and Plan  Anticipated Discharge Disposition (PT): home  Progress Summary (PT)  Progress Toward Functional Goals (PT): progress toward functional goals is good, prepare for discharge  Outcome Summary: PT: Patient performs sit to/from stand with supervision and gait with rolling walker x250 feet with supervision.  Patient manages direction changes and obstacles without difficulty.  At this time, patient requires no physical assistance for any mobility and displays consistency with balance and mobility.  Recommend continued ambulation with nursing staff as tolerated, no further PT needs at this time.    Outcome Measures     Row Name 10/06/21 0846 10/05/21 0903 10/04/21 0924       How much help from another person do you currently need...    Turning from your back to your side while in flat bed without using bedrails?  3  -JW  3  -JW  3  -JW    Moving from lying on back to sitting on the side of a flat bed without bedrails?  3  -JW  3  -JW  3  -JW    Moving to and from a bed to a chair (including a wheelchair)?  3  -JW  3  -JW  3  -JW    Standing up from a chair using your arms (e.g., wheelchair, bedside  chair)?  3  -JW  3  -JW  3  -JW    Climbing 3-5 steps with a railing?  3  -JW  2  -JW  2  -JW    To walk in hospital room?  3  -JW  3  -JW  3  -JW    AM-PAC 6 Clicks Score (PT)  18  -JW  17  -JW  17  -JW       Functional Assessment    Outcome Measure Options  AM-PAC 6 Clicks Basic Mobility (PT)  -JW  AM-PAC 6 Clicks Basic Mobility (PT)  -JW  AM-PAC 6 Clicks Basic Mobility (PT)  -JW      User Key  (r) = Recorded By, (t) = Taken By, (c) = Cosigned By    Initials Name Provider Type    Joan Healy, PT Physical Therapist           Time Calculation:   PT Charges     Row Name 10/06/21 0922             Time Calculation    Start Time  0846  -JW      Stop Time  0857  -JW      Time Calculation (min)  11 min  -JW      PT Received On  10/06/21  -JW         Timed Charges    53458 - Gait Training Minutes   11  -JW         Total Minutes    Timed Charges Total Minutes  11  -JW       Total Minutes  11  -JW        User Key  (r) = Recorded By, (t) = Taken By, (c) = Cosigned By    Initials Name Provider Type    Joan Healy, PT Physical Therapist        Therapy Charges for Today     Code Description Service Date Service Provider Modifiers Qty    96712354150 HC GAIT TRAINING EA 15 MIN 10/5/2021 Joan Diaz, PT GP 1    60278492135 HC GAIT TRAINING EA 15 MIN 10/6/2021 Joan Diaz, PT GP 1          PT G-Codes  Outcome Measure Options: AM-PAC 6 Clicks Basic Mobility (PT)  AM-PAC 6 Clicks Score (PT): 18    PT Discharge Summary  Anticipated Discharge Disposition (PT): home    Joan Diaz PT  10/6/2021

## 2021-10-06 NOTE — CASE MANAGEMENT/SOCIAL WORK
"Continued Stay Note  [facility]     Patient Name: Nitish Barfield  MRN: [id number]  Today's Date: 10/6/2021    Admit Date: 9/30/2021    Discharge Plan     Row Name 10/06/21 1248       Plan    Plan  Home with     Patient/Family in Agreement with Plan  yes    Plan Comments  Spoke with patient today. Patient is currently sitting up in the bed finishing a sandwich and voiced no complaints. She states she did well today with PT and they have signed off. Patient states \"I hope to go home today but Dr. Villagomez has not been in yet. Patient states her plans is to return home at discharge with her  and family to help as needed. Patient voiced no discharge needs at this time and had no other questions or concerns regarding discharge plans. CM will continue to follow for needs.        Discharge Codes    No documentation.             Homa Walter RN    "

## 2021-10-06 NOTE — DISCHARGE INSTRUCTIONS
COLON, GASTRIC OR SMALL BOWEL RESECTION  POST OP RECOMMENDATIONS  Dr. Villagomez  [phone]  ACTIVITIES:  1. Expect to rest most of the first week after discharge from hospital, but do get up several times daily to reduce the risk of getting a clot in your legs.  2. No strenuous activity or lifting over 10 lbs. for 6 weeks out from surgery.  Try to avoid squatting and deep bends for about a week.  3. No driving until seen at your post operative appointment.  You must be off pain meds 24 hours before driving after that visit unless otherwise instructed.  4. You can climb stairs, but minimize this and initially do one step at a time (both feet on one step rather than going up with each step.)    SYMPTOMS:  1. Avoiding constipation is important after this surgery as it is common when taking pain medication.  Over the counter laxatives, such as Miralax or Milk of Magniesum , can be used temporarily to avoid this.   2. Fatigue and decreased stamina is not unusual for about a week or so after surgery due to anesthesia.  Try to take walks and some mild activity between resting.  3. Shoulder pain is not unusual from the “gas” used in laparoscopy which will dissipate within 1-3 days.  If it does not, call your physician.  4. It is not unusual for your gastrointestinal system to take 1-2 months to get back to your normal routine and you may have long term changes towards looser bowel movements    WOUND SITE:  1. Dressings can be removed 7-10 days after procedure if desired.  The “tega-derm” may be removed in 3 days if instructed to.  2. Dressings may occasionally have spots of blood on them.  As long as it is dry, these do not need to be changed.  If it is soaked, then the dressing should be removed and a new dressing placed.  3. Skin irritation, redness or itching can prompt removal of the bandage earlier if present.  4. Steri-strips are to be left in place until they fall off on their own in 1-2 weeks.  If they are irritating  then they may be removed sooner.  5. May shower if a drain is in place.     6. Showering may occur while the “tega-derm” is in place.  If it is off, then you must wait 2 days after surgery before showering.    MEALS:  1. A soft diet is recommended for the first 1-2 days after discharge from the hospital.  A normal diet may then be started as tolerated after that.  2. Expect to eat less after this procedure and fill up somewhat faster.   3. Do NOT take pain pills on an empty stomach.          WORK:  1. In general, if you have a sedentary job, you can return to work in 3 weeks if done laparoscopically.  If lifting or exertion is required, it may be 6 weeks depending on your recovery.    2. Use discretion and remember that your stamina will be decreased post operatively.  3. Return to work notes can be provided at the time of your post-operative appointment.    FOLLOW UP:  1. Call and make a post-operative appointment for approximately 2 weeks after the procedure.    Katey Villagomez MD  General, Minimally Invasive and Endoscopic Surgery  [facility]    [facility] [address] [place] [address]    [address]  [place] [address]               [place] [address]    P: [phone]  F: [phone]    Cc:  Violeta Hammond MD

## 2021-10-06 NOTE — DISCHARGE SUMMARY
Admitting date: 9/30/2021    Discharging date: 10/6/2021    Admitting diagnosis: Diverticulitis with an abscess    Discharging diagnoses:   Diverticulitis with an abscess  Sepsis  Acute on chronic anemia  HUSEYIN    Admitting/discharging physician: Dr. Villagomez    Procedures:   Lap LAR - for diverticulitis with an abscess and drain placement intraoperatively    Hospital course:  This is a 66 y.o. female patient presented to ER on 7/23/21 with abdominal pain and a CT scan that revealed diverticulitis with a large walled off abscess. Patient was transferred to [facility] for CT guided drain placement. Patient was placed on long term antibiotics and drain care. She continued to have purulent drainage and a repeat CT scan on 8/24/21 revealed continued abscess and colon fistula communicating into the abscess.  Patient was able to undergo a bowel preparation.  She was admitted on 9/30/2021 and underwent a laparoscopic low anterior colon resection with splenic flexure mobilization and evacuation of a walled off abscess.  A 19 Nepali Eliezer drain was placed intraoperatively as well.  Postoperatively the patient appeared to be septic the first 24 to 48 hours and was placed on IV antibiotics, fluid resuscitated and supportive care.  Sepsis and acute on chronic anemia caused HUSEYIN.  This was treated with resuscitation blood transfusions as well as nephrology consult.  Patient was also found to have acute on chronic anemia transfused 2 units of packed red blood cells.  After transfusion there was no further decline in H&H.  Patient had an expected postoperative ileus that resolved on postop day 3.  On postop day 6, patient was tolerating a regular diet, HUSEYIN had resolved with a normal creatinine and creatinine clearance, sepsis had resolved, H&H was stable, STAR drain revealed no further evidence of an abscess.  However, patient's white count remained at 13 and therefore was discharged on Augmentin for 2 weeks and drain was left in place.   Patient was discharged home in stable condition on admitting medications, Augmentin and Percocet.  Patient was instructed to follow-up with me in 1 week for possible drain removal, no driving, may shower and no lifting greater than 10 pounds.    Katey Villagomez MD  General, Minimally Invasive and Endoscopic Surgery  [facility]    [address] [facility] [address] [place] [address]    [address]  [address] [address]    P: [phone]  F: [phone]    Cc:  Violeta Hammond MD

## 2021-10-06 NOTE — CASE MANAGEMENT/SOCIAL WORK
Case Management Discharge Note      Final Note: Discharged home.    Provided Post Acute Provider List?: N/A  Provided Post Acute Provider Quality & Resource List?: N/A    Selected Continued Care - Discharged on 10/6/2021 Admission date: 9/30/2021 - Discharge disposition: Home or Self Care    Destination    No services have been selected for the patient.              Durable Medical Equipment    No services have been selected for the patient.              Dialysis/Infusion    No services have been selected for the patient.              Home Medical Care    No services have been selected for the patient.              Therapy    No services have been selected for the patient.              Community Resources    No services have been selected for the patient.              Community & DME    No services have been selected for the patient.                       Final Discharge Disposition Code: 01 - home or self-care

## 2021-10-07 ENCOUNTER — READMISSION MANAGEMENT (OUTPATIENT)
Dept: CALL CENTER | Facility: HOSPITAL | Age: 66
End: 2021-10-07

## 2021-10-07 RX ORDER — OXYCODONE HYDROCHLORIDE AND ACETAMINOPHEN 5; 325 MG/1; MG/1
1 TABLET ORAL EVERY 4 HOURS PRN
Qty: 30 TABLET | Refills: 0 | Status: SHIPPED | OUTPATIENT
Start: 2021-10-07 | End: 2021-10-12

## 2021-10-07 NOTE — OUTREACH NOTE
Prep Survey      Responses   [facility]?  [place]   Is LACE score < 7 ?  No   Emergency Room discharge w/ pulse ox?  No   Eligibility  Readm Mgmt   Discharge diagnosis  Diverticulitis with an abscess   Does the patient have one of the following disease processes/diagnoses(primary or secondary)?  General Surgery   Does the patient have Home health ordered?  No   Is there a DME ordered?  No   Prep survey completed?  Yes          Zee Kimbrough RN

## 2021-10-12 ENCOUNTER — READMISSION MANAGEMENT (OUTPATIENT)
Dept: CALL CENTER | Facility: HOSPITAL | Age: 66
End: 2021-10-12

## 2021-10-12 ENCOUNTER — OFFICE VISIT (OUTPATIENT)
Dept: SURGERY | Facility: CLINIC | Age: 66
End: 2021-10-12

## 2021-10-12 VITALS
RESPIRATION RATE: 18 BRPM | BODY MASS INDEX: 26.52 KG/M2 | DIASTOLIC BLOOD PRESSURE: 82 MMHG | OXYGEN SATURATION: 98 % | HEIGHT: 68 IN | HEART RATE: 97 BPM | TEMPERATURE: 97.5 F | SYSTOLIC BLOOD PRESSURE: 140 MMHG

## 2021-10-12 DIAGNOSIS — Z09 FOLLOW UP: Primary | ICD-10-CM

## 2021-10-12 PROCEDURE — 99024 POSTOP FOLLOW-UP VISIT: CPT | Performed by: SURGERY

## 2021-10-12 RX ORDER — FUROSEMIDE 20 MG/1
20 TABLET ORAL DAILY
Qty: 30 TABLET | Refills: 11 | Status: SHIPPED | OUTPATIENT
Start: 2021-10-12 | End: 2022-10-12

## 2021-10-12 NOTE — PROGRESS NOTES
"Chief complaint:    Chief Complaint   Patient presents with   • Post-op     tube removal        History of Present Illness    This is Nitish Barfield 66 y.o. status post laparoscopic LAR for diverticulitis and a chronic abscess.  Patient reports she has no appetite and nothing taste right.  Patient denies fever, chills, nausea or vomiting.  Patient's pain is well-controlled.      The following portions of the patient's history were reviewed and updated as appropriate: allergies, current medications, past family history, past medical history, past social history, past surgical history and problem list.    Vitals:    10/12/21 1232   Height: 172.7 cm (67.99\")       Physical Exam  Gen.: Patient is alert and oriented ×3, no acute distress  HEENT: Normal cephalic, atraumatic, moist mucous membranes, anicteric  Her laparoscopic incisions are healing very well.  Previous CT-guided drain site is draining some purulent material from the subcutaneous tissue where there was a collection intraoperatively.  I have advised the patient that this is expected.  The STAR drain is serous without evidence of an abscess.  Patient has had less than 30 cc for more than 2 days in a row out of the STAR drain.    Assessment/plan:    S/P laparoscopic LAR with splenic flexure mobilization for chronic diverticulitis and chronic walled off abscess.  STAR drain is clear and minimum therefore I have removed it.  I have instructed the patient not lift greater than 10 pounds for total of 6 weeks from the time of surgery.   I have instructed the patient follow-up in 2 weeks.    Katey Villagomez MD  General, Minimally Invasive and Endoscopic Surgery  [facility]    [facility] [address]   [address]    [address]  [place] [address]               [place] [address]    P: [phone]  F: [phone]    Cc:  Violeta Hammond MD  "

## 2021-10-12 NOTE — OUTREACH NOTE
General Surgery Week 1 Survey      Responses   [facility] patient discharged from? [facility]   Does the patient have one of the following disease processes/diagnoses(primary or secondary)? General Surgery   Week 1 attempt successful? Yes   Call start time 1801   Call end time 1805   Discharge diagnosis Diverticulitis with an abscess   Meds reviewed with patient/caregiver? Yes   Is the patient having any side effects they believe may be caused by any medication additions or changes? No   Does the patient have all medications related to this admission filled (includes all antibiotics, pain medications, etc.) Yes   Is the patient taking all medications as directed (includes completed medication regime)? Yes   Does the patient have a follow up appointment scheduled with their surgeon? Yes   Has the patient kept scheduled appointments due by today? Yes   Has home health visited the patient within 72 hours of discharge? N/A   Psychosocial issues? No   Did the patient receive a copy of their discharge instructions? Yes   Nursing interventions Reviewed instructions with patient   What is the patient's perception of their health status since discharge? Improving   Nursing interventions Nurse provided patient education   Is the patient /caregiver able to teach back basic post-op care? Continue use of incentive spirometry at least 1 week post discharge,  Practice 'cough and deep breath',  Drive as instructed by MD in discharge instructions,  Take showers only when approved by MD-sponge bathe until then,  No tub bath, swimming, or hot tub until instructed by MD,  Keep incision areas clean,dry and protected,  Do not remove steri-strips,  Lifting as instructed by MD in discharge instructions  [uses incentive spirometer]   Is the patient/caregiver able to teach back signs and symptoms of incisional infection? Increased redness, swelling or pain at the incisonal site,  Increased drainage or bleeding,  Incisional warmth,  Pus  or odor from incision,  Fever   Is the patient/caregiver able to teach back steps to recovery at home? Set small, achievable goals for return to baseline health,  Rest and rebuild strength, gradually increase activity,  Eat a well-balance diet   If the patient is a current smoker, are they able to teach back resources for cessation? Not a smoker   Is the patient/caregiver able to teach back the hierarchy of who to call/visit for symptoms/problems? PCP, Specialist, Home health nurse, Urgent Care, ED, 911 Yes   Additional teach back comments drains d/c'd today, states having normal bowel function   Week 1 call completed? Yes          Leigha Abraham RN

## 2021-10-21 ENCOUNTER — READMISSION MANAGEMENT (OUTPATIENT)
Dept: CALL CENTER | Facility: HOSPITAL | Age: 66
End: 2021-10-21

## 2021-10-21 NOTE — OUTREACH NOTE
General Surgery Week 2 Survey      Responses   [facility] patient discharged from? [facility]   Does the patient have one of the following disease processes/diagnoses(primary or secondary)? General Surgery   Week 2 attempt successful? Yes   Call start time 1706   Call end time 1709   Discharge diagnosis Diverticulitis with an abscess, colon resection   Is patient permission given to speak with other caregiver? No   Meds reviewed with patient/caregiver? Yes   Is the patient taking all medications as directed (includes completed medication regime)? Yes   Does the patient have a follow up appointment scheduled with their surgeon? Yes   Has the patient kept scheduled appointments due by today? Yes   Has home health visited the patient within 72 hours of discharge? N/A   Psychosocial issues? No   Did the patient receive a copy of their discharge instructions? Yes   What is the patient's perception of their health status since discharge? Improving   Is the patient/caregiver able to teach back signs and symptoms of incisional infection? Increased redness, swelling or pain at the incisonal site,  Increased drainage or bleeding,  Incisional warmth,  Pus or odor from incision,  Fever  [Denies signs of infection. ]   Is the patient/caregiver able to teach back steps to recovery at home? Eat a well-balance diet   If the patient is a current smoker, are they able to teach back resources for cessation? Not a smoker   Is the patient/caregiver able to teach back the hierarchy of who to call/visit for symptoms/problems? PCP, Specialist, Home health nurse, Urgent Care, ED, 911 Yes   Week 2 call completed? Yes   Wrap up additional comments Patient reports doing fine,  tolerating food ok, passing bowel movement, denies any issues with wound.           Estella Barrera RN

## 2021-10-27 ENCOUNTER — OFFICE VISIT (OUTPATIENT)
Dept: SURGERY | Facility: CLINIC | Age: 66
End: 2021-10-27

## 2021-10-27 VITALS
OXYGEN SATURATION: 98 % | DIASTOLIC BLOOD PRESSURE: 78 MMHG | SYSTOLIC BLOOD PRESSURE: 126 MMHG | BODY MASS INDEX: 26.46 KG/M2 | RESPIRATION RATE: 18 BRPM | HEART RATE: 74 BPM | HEIGHT: 68 IN | TEMPERATURE: 97.4 F | WEIGHT: 174.6 LBS

## 2021-10-27 DIAGNOSIS — Z09 FOLLOW UP: Primary | ICD-10-CM

## 2021-10-27 PROBLEM — R06.00 DYSPNEA: Status: ACTIVE | Noted: 2021-01-08

## 2021-10-27 PROBLEM — I10 BENIGN ESSENTIAL HTN: Status: ACTIVE | Noted: 2017-02-10

## 2021-10-27 PROBLEM — E66.3 OVERWEIGHT (BMI 25.0-29.9): Status: ACTIVE | Noted: 2019-10-03

## 2021-10-27 PROBLEM — K57.20 COLONIC DIVERTICULAR ABSCESS: Status: ACTIVE | Noted: 2021-08-27

## 2021-10-27 PROBLEM — N94.89 ADNEXAL MASS: Status: ACTIVE | Noted: 2019-02-14

## 2021-10-27 PROBLEM — R91.1 NODULE OF UPPER LOBE OF LEFT LUNG: Status: ACTIVE | Noted: 2021-02-05

## 2021-10-27 PROBLEM — M10.9 GOUT: Status: ACTIVE | Noted: 2017-03-15

## 2021-10-27 PROBLEM — E55.9 VITAMIN D DEFICIENCY: Status: ACTIVE | Noted: 2017-02-10

## 2021-10-27 PROBLEM — M06.9 RHEUMATOID ARTHRITIS: Status: ACTIVE | Noted: 2020-02-07

## 2021-10-27 PROBLEM — K57.32 DIVERTICULITIS OF LARGE INTESTINE: Status: ACTIVE | Noted: 2021-07-23

## 2021-10-27 PROCEDURE — 99024 POSTOP FOLLOW-UP VISIT: CPT | Performed by: SURGERY

## 2021-10-27 NOTE — PROGRESS NOTES
"Chief complaint:    Chief Complaint   Patient presents with   • Post-op     colon resection        History of Present Illness    This is Nitish Barfield 66 y.o. status post laparoscopic LAR with splenic flexure mobilization for diverticulitis and abscess and is doing very well.  Patient denies fever, chills, nausea or vomiting.  Patient's pain is well-controlled.      The following portions of the patient's history were reviewed and updated as appropriate: allergies, current medications, past family history, past medical history, past social history, past surgical history and problem list.    Vitals:    10/27/21 1146   Weight: 79.2 kg (174 lb 9.6 oz)   Height: 172.7 cm (67.99\")       Physical Exam  Gen.: Patient is alert and oriented ×3, no acute distress  HEENT: Normal cephalic, atraumatic, moist mucous membranes, anicteric  Incision is well-healed without evidence of infection, herniation or seroma.  Patient is ambulating without assistance    Assessment/plan:    S/P laparoscopic LAR with splenic flexure mobilization for diverticulitis and abscess  Patient has completely recovered  I have instructed the patient follow-up as needed.    Katey Villagomez MD  General, Minimally Invasive and Endoscopic Surgery  [facility]    [address] [facility] [address] [facility]   [address]    [address]  [place] [id number]               [place] [address]    P: [phone]  F: [phone]    Cc:  Violeta Hammond MD  "

## 2021-10-28 ENCOUNTER — READMISSION MANAGEMENT (OUTPATIENT)
Dept: CALL CENTER | Facility: HOSPITAL | Age: 66
End: 2021-10-28

## 2021-10-28 NOTE — OUTREACH NOTE
General Surgery Week 3 Survey      Responses   [facility] patient discharged from? [place]   Does the patient have one of the following disease processes/diagnoses(primary or secondary)? General Surgery   Week 3 attempt successful? Yes   Call start time 1624   Call end time 1627   Discharge diagnosis Diverticulitis with an abscess, colon resection   Meds reviewed with patient/caregiver? Yes   Is the patient having any side effects they believe may be caused by any medication additions or changes? No   Does the patient have all medications related to this admission filled (includes all antibiotics, pain medications, etc.) Yes   Is the patient taking all medications as directed (includes completed medication regime)? Yes   Does the patient have a follow up appointment scheduled with their surgeon? Yes   Has the patient kept scheduled appointments due by today? Yes   Has home health visited the patient within 72 hours of discharge? N/A   Psychosocial issues? No   Did the patient receive a copy of their discharge instructions? Yes   Nursing interventions Reviewed instructions with patient   What is the patient's perception of their health status since discharge? Improving   Nursing interventions Nurse provided patient education   Is the patient /caregiver able to teach back basic post-op care? Keep incision areas clean,dry and protected   Is the patient/caregiver able to teach back signs and symptoms of incisional infection? Increased redness, swelling or pain at the incisonal site,  Increased drainage or bleeding,  Incisional warmth,  Pus or odor from incision,  Fever   Is the patient/caregiver able to teach back steps to recovery at home? Set small, achievable goals for return to baseline health,  Rest and rebuild strength, gradually increase activity,  Eat a well-balance diet   Is the patient/caregiver able to teach back the hierarchy of who to call/visit for symptoms/problems? PCP, Specialist, Home health  nurse, Urgent Care, ED, 911 Yes   Additional teach back comments states healing well, digestive system back to normal, just has some fatigue   Week 3 call completed? Yes   Revoked No further contact(revokes)-requires comment   Is the patient interested in additional calls from an ambulatory ?  NOTE:  applies to high risk patients requiring additional follow-up. No   Graduated/Revoked comments pt states goals met, back to normal, no more calls needed          Leigha Abraham RN

## 2022-01-19 ENCOUNTER — LAB (OUTPATIENT)
Dept: LAB | Facility: HOSPITAL | Age: 67
End: 2022-01-19

## 2022-01-19 ENCOUNTER — TRANSCRIBE ORDERS (OUTPATIENT)
Dept: ADMINISTRATIVE | Facility: HOSPITAL | Age: 67
End: 2022-01-19

## 2022-01-19 ENCOUNTER — HOSPITAL ENCOUNTER (OUTPATIENT)
Dept: CARDIOLOGY | Facility: HOSPITAL | Age: 67
Discharge: HOME | End: 2022-01-19

## 2022-01-19 DIAGNOSIS — M16.11 PRIMARY OSTEOARTHRITIS OF RIGHT HIP: ICD-10-CM

## 2022-01-19 DIAGNOSIS — M16.11 PRIMARY OSTEOARTHRITIS OF RIGHT HIP: Primary | ICD-10-CM

## 2022-01-19 LAB
ANION GAP SERPL CALCULATED.3IONS-SCNC: 8 MMOL/L (ref 5–15)
BASOPHILS # BLD AUTO: 0.05 10*3/MM3 (ref 0–0.2)
BASOPHILS NFR BLD AUTO: 0.8 % (ref 0–1.5)
BUN SERPL-MCNC: 13 MG/DL (ref 8–23)
BUN/CREAT SERPL: 17.1 (ref 7–25)
CALCIUM SPEC-SCNC: 8.8 MG/DL (ref 8.6–10.5)
CHLORIDE SERPL-SCNC: 105 MMOL/L (ref 98–107)
CO2 SERPL-SCNC: 28 MMOL/L (ref 22–29)
CREAT SERPL-MCNC: 0.76 MG/DL (ref 0.57–1)
DEPRECATED RDW RBC AUTO: 40.2 FL (ref 37–54)
EOSINOPHIL # BLD AUTO: 0.24 10*3/MM3 (ref 0–0.4)
EOSINOPHIL NFR BLD AUTO: 4 % (ref 0.3–6.2)
ERYTHROCYTE [DISTWIDTH] IN BLOOD BY AUTOMATED COUNT: 12.1 % (ref 12.3–15.4)
GFR SERPL CREATININE-BSD FRML MDRD: 76 ML/MIN/1.73
GLUCOSE SERPL-MCNC: 94 MG/DL (ref 65–99)
HCT VFR BLD AUTO: 39.6 % (ref 34–46.6)
HGB BLD-MCNC: 12.9 G/DL (ref 12–15.9)
IMM GRANULOCYTES # BLD AUTO: 0.01 10*3/MM3 (ref 0–0.05)
IMM GRANULOCYTES NFR BLD AUTO: 0.2 % (ref 0–0.5)
LYMPHOCYTES # BLD AUTO: 1.93 10*3/MM3 (ref 0.7–3.1)
LYMPHOCYTES NFR BLD AUTO: 32.1 % (ref 19.6–45.3)
MCH RBC QN AUTO: 29.6 PG (ref 26.6–33)
MCHC RBC AUTO-ENTMCNC: 32.6 G/DL (ref 31.5–35.7)
MCV RBC AUTO: 90.8 FL (ref 79–97)
MONOCYTES # BLD AUTO: 0.59 10*3/MM3 (ref 0.1–0.9)
MONOCYTES NFR BLD AUTO: 9.8 % (ref 5–12)
NEUTROPHILS NFR BLD AUTO: 3.19 10*3/MM3 (ref 1.7–7)
NEUTROPHILS NFR BLD AUTO: 53.1 % (ref 42.7–76)
NRBC BLD AUTO-RTO: 0 /100 WBC (ref 0–0.2)
PLATELET # BLD AUTO: 294 10*3/MM3 (ref 140–450)
PMV BLD AUTO: 10.7 FL (ref 6–12)
POTASSIUM SERPL-SCNC: 3.8 MMOL/L (ref 3.5–5.2)
QT INTERVAL: 426 MS
RBC # BLD AUTO: 4.36 10*6/MM3 (ref 3.77–5.28)
SODIUM SERPL-SCNC: 141 MMOL/L (ref 136–145)
WBC NRBC COR # BLD: 6.01 10*3/MM3 (ref 3.4–10.8)

## 2022-01-19 PROCEDURE — 93005 ELECTROCARDIOGRAM TRACING: CPT

## 2022-01-19 PROCEDURE — 36415 COLL VENOUS BLD VENIPUNCTURE: CPT

## 2022-01-19 PROCEDURE — 80048 BASIC METABOLIC PNL TOTAL CA: CPT

## 2022-01-19 PROCEDURE — 85025 COMPLETE CBC W/AUTO DIFF WBC: CPT

## 2022-01-19 PROCEDURE — 93010 ELECTROCARDIOGRAM REPORT: CPT | Performed by: INTERNAL MEDICINE

## 2022-07-12 ENCOUNTER — TRANSCRIBE ORDERS (OUTPATIENT)
Dept: ADMINISTRATIVE | Facility: HOSPITAL | Age: 67
End: 2022-07-12

## 2022-07-12 DIAGNOSIS — Z12.31 VISIT FOR SCREENING MAMMOGRAM: Primary | ICD-10-CM

## 2022-10-03 ENCOUNTER — TRANSCRIBE ORDERS (OUTPATIENT)
Dept: ADMINISTRATIVE | Facility: HOSPITAL | Age: 67
End: 2022-10-03

## 2022-10-03 ENCOUNTER — LAB (OUTPATIENT)
Dept: LAB | Facility: HOSPITAL | Age: 67
End: 2022-10-03

## 2022-10-03 DIAGNOSIS — M05.79 SEROPOSITIVE RHEUMATOID ARTHRITIS OF MULTIPLE SITES: ICD-10-CM

## 2022-10-03 DIAGNOSIS — Z79.899 ENCOUNTER FOR LONG-TERM (CURRENT) USE OF OTHER MEDICATIONS: ICD-10-CM

## 2022-10-03 DIAGNOSIS — M05.79 SEROPOSITIVE RHEUMATOID ARTHRITIS OF MULTIPLE SITES: Primary | ICD-10-CM

## 2022-10-03 LAB
ALBUMIN SERPL-MCNC: 3.7 G/DL (ref 3.5–5.2)
ALP SERPL-CCNC: 118 U/L (ref 39–117)
ALT SERPL W P-5'-P-CCNC: 10 U/L (ref 1–33)
AST SERPL-CCNC: 14 U/L (ref 1–32)
BASOPHILS # BLD AUTO: 0.05 10*3/MM3 (ref 0–0.2)
BASOPHILS NFR BLD AUTO: 0.7 % (ref 0–1.5)
BILIRUB CONJ SERPL-MCNC: <0.2 MG/DL (ref 0–0.3)
BILIRUB INDIRECT SERPL-MCNC: ABNORMAL MG/DL
BILIRUB SERPL-MCNC: 0.5 MG/DL (ref 0–1.2)
DEPRECATED RDW RBC AUTO: 49.3 FL (ref 37–54)
EOSINOPHIL # BLD AUTO: 0.26 10*3/MM3 (ref 0–0.4)
EOSINOPHIL NFR BLD AUTO: 3.5 % (ref 0.3–6.2)
ERYTHROCYTE [DISTWIDTH] IN BLOOD BY AUTOMATED COUNT: 14 % (ref 12.3–15.4)
HCT VFR BLD AUTO: 43.5 % (ref 34–46.6)
HGB BLD-MCNC: 13.4 G/DL (ref 12–15.9)
IMM GRANULOCYTES # BLD AUTO: 0.03 10*3/MM3 (ref 0–0.05)
IMM GRANULOCYTES NFR BLD AUTO: 0.4 % (ref 0–0.5)
LYMPHOCYTES # BLD AUTO: 2.29 10*3/MM3 (ref 0.7–3.1)
LYMPHOCYTES NFR BLD AUTO: 30.5 % (ref 19.6–45.3)
MCH RBC QN AUTO: 29.2 PG (ref 26.6–33)
MCHC RBC AUTO-ENTMCNC: 30.8 G/DL (ref 31.5–35.7)
MCV RBC AUTO: 94.8 FL (ref 79–97)
MONOCYTES # BLD AUTO: 0.69 10*3/MM3 (ref 0.1–0.9)
MONOCYTES NFR BLD AUTO: 9.2 % (ref 5–12)
NEUTROPHILS NFR BLD AUTO: 4.19 10*3/MM3 (ref 1.7–7)
NEUTROPHILS NFR BLD AUTO: 55.7 % (ref 42.7–76)
PLATELET # BLD AUTO: 314 10*3/MM3 (ref 140–450)
PMV BLD AUTO: 10.2 FL (ref 6–12)
PROT SERPL-MCNC: 6.5 G/DL (ref 6–8.5)
RBC # BLD AUTO: 4.59 10*6/MM3 (ref 3.77–5.28)
WBC NRBC COR # BLD: 7.51 10*3/MM3 (ref 3.4–10.8)

## 2022-10-03 PROCEDURE — 85025 COMPLETE CBC W/AUTO DIFF WBC: CPT

## 2022-10-03 PROCEDURE — 80076 HEPATIC FUNCTION PANEL: CPT

## 2022-10-03 PROCEDURE — 36415 COLL VENOUS BLD VENIPUNCTURE: CPT

## 2023-02-16 ENCOUNTER — TRANSCRIBE ORDERS (OUTPATIENT)
Dept: ADMINISTRATIVE | Facility: HOSPITAL | Age: 68
End: 2023-02-16
Payer: MEDICARE

## 2023-02-16 DIAGNOSIS — Z78.0 POST-MENOPAUSAL: Primary | ICD-10-CM

## 2023-02-23 ENCOUNTER — APPOINTMENT (OUTPATIENT)
Dept: BONE DENSITY | Facility: HOSPITAL | Age: 68
End: 2023-02-23
Payer: MEDICARE

## 2023-02-23 DIAGNOSIS — Z78.0 POST-MENOPAUSAL: ICD-10-CM

## 2023-02-23 PROCEDURE — 77080 DXA BONE DENSITY AXIAL: CPT

## 2023-09-19 ENCOUNTER — TRANSCRIBE ORDERS (OUTPATIENT)
Dept: ADMINISTRATIVE | Facility: HOSPITAL | Age: 68
End: 2023-09-19
Payer: MEDICARE

## 2023-09-19 DIAGNOSIS — Z12.31 SCREENING MAMMOGRAM FOR BREAST CANCER: Primary | ICD-10-CM

## 2023-10-04 ENCOUNTER — HOSPITAL ENCOUNTER (OUTPATIENT)
Dept: MAMMOGRAPHY | Facility: HOSPITAL | Age: 68
Discharge: HOME | End: 2023-10-04
Admitting: FAMILY MEDICINE
Payer: MEDICARE

## 2023-10-04 DIAGNOSIS — Z12.31 SCREENING MAMMOGRAM FOR BREAST CANCER: ICD-10-CM

## 2023-10-04 PROCEDURE — 77063 BREAST TOMOSYNTHESIS BI: CPT

## 2023-10-04 PROCEDURE — 77067 SCR MAMMO BI INCL CAD: CPT

## 2024-01-22 ENCOUNTER — TRANSCRIBE ORDERS (OUTPATIENT)
Dept: ADMINISTRATIVE | Facility: HOSPITAL | Age: 69
End: 2024-01-22
Payer: MEDICARE

## 2024-01-22 ENCOUNTER — LAB (OUTPATIENT)
Dept: LAB | Facility: HOSPITAL | Age: 69
End: 2024-01-22
Payer: MEDICARE

## 2024-01-22 DIAGNOSIS — Z79.899 ENCOUNTER FOR LONG-TERM (CURRENT) USE OF OTHER MEDICATIONS: ICD-10-CM

## 2024-01-22 DIAGNOSIS — M05.79 SEROPOSITIVE RHEUMATOID ARTHRITIS OF MULTIPLE SITES: ICD-10-CM

## 2024-01-22 DIAGNOSIS — M05.79 SEROPOSITIVE RHEUMATOID ARTHRITIS OF MULTIPLE SITES: Primary | ICD-10-CM

## 2024-01-22 LAB
ALBUMIN SERPL-MCNC: 3.6 G/DL (ref 3.5–5.2)
ALBUMIN/GLOB SERPL: 1.2 G/DL
ALP SERPL-CCNC: 120 U/L (ref 39–117)
ALT SERPL W P-5'-P-CCNC: 12 U/L (ref 1–33)
ANION GAP SERPL CALCULATED.3IONS-SCNC: 12.3 MMOL/L (ref 5–15)
AST SERPL-CCNC: 17 U/L (ref 1–32)
BASOPHILS # BLD AUTO: 0.07 10*3/MM3 (ref 0–0.2)
BASOPHILS NFR BLD AUTO: 0.9 % (ref 0–1.5)
BILIRUB SERPL-MCNC: 0.4 MG/DL (ref 0–1.2)
BUN SERPL-MCNC: 14 MG/DL (ref 8–23)
BUN/CREAT SERPL: 15.4 (ref 7–25)
CALCIUM SPEC-SCNC: 9.1 MG/DL (ref 8.6–10.5)
CHLORIDE SERPL-SCNC: 106 MMOL/L (ref 98–107)
CO2 SERPL-SCNC: 23.7 MMOL/L (ref 22–29)
CREAT SERPL-MCNC: 0.91 MG/DL (ref 0.57–1)
DEPRECATED RDW RBC AUTO: 44.7 FL (ref 37–54)
EGFRCR SERPLBLD CKD-EPI 2021: 68 ML/MIN/1.73
EOSINOPHIL # BLD AUTO: 0.29 10*3/MM3 (ref 0–0.4)
EOSINOPHIL NFR BLD AUTO: 3.8 % (ref 0.3–6.2)
ERYTHROCYTE [DISTWIDTH] IN BLOOD BY AUTOMATED COUNT: 13.5 % (ref 12.3–15.4)
GLOBULIN UR ELPH-MCNC: 2.9 GM/DL
GLUCOSE SERPL-MCNC: 112 MG/DL (ref 65–99)
HCT VFR BLD AUTO: 44.5 % (ref 34–46.6)
HGB BLD-MCNC: 14.2 G/DL (ref 12–15.9)
IMM GRANULOCYTES # BLD AUTO: 0.02 10*3/MM3 (ref 0–0.05)
IMM GRANULOCYTES NFR BLD AUTO: 0.3 % (ref 0–0.5)
LYMPHOCYTES # BLD AUTO: 2.43 10*3/MM3 (ref 0.7–3.1)
LYMPHOCYTES NFR BLD AUTO: 31.7 % (ref 19.6–45.3)
MCH RBC QN AUTO: 28.6 PG (ref 26.6–33)
MCHC RBC AUTO-ENTMCNC: 31.9 G/DL (ref 31.5–35.7)
MCV RBC AUTO: 89.7 FL (ref 79–97)
MONOCYTES # BLD AUTO: 0.73 10*3/MM3 (ref 0.1–0.9)
MONOCYTES NFR BLD AUTO: 9.5 % (ref 5–12)
NEUTROPHILS NFR BLD AUTO: 4.13 10*3/MM3 (ref 1.7–7)
NEUTROPHILS NFR BLD AUTO: 53.8 % (ref 42.7–76)
NRBC BLD AUTO-RTO: 0 /100 WBC (ref 0–0.2)
PLATELET # BLD AUTO: 272 10*3/MM3 (ref 140–450)
PMV BLD AUTO: 11.6 FL (ref 6–12)
POTASSIUM SERPL-SCNC: 3.8 MMOL/L (ref 3.5–5.2)
PROT SERPL-MCNC: 6.5 G/DL (ref 6–8.5)
RBC # BLD AUTO: 4.96 10*6/MM3 (ref 3.77–5.28)
SODIUM SERPL-SCNC: 142 MMOL/L (ref 136–145)
WBC NRBC COR # BLD AUTO: 7.67 10*3/MM3 (ref 3.4–10.8)

## 2024-01-22 PROCEDURE — 80053 COMPREHEN METABOLIC PANEL: CPT

## 2024-01-22 PROCEDURE — 85025 COMPLETE CBC W/AUTO DIFF WBC: CPT

## 2024-01-22 PROCEDURE — 36415 COLL VENOUS BLD VENIPUNCTURE: CPT

## 2024-07-15 ENCOUNTER — TRANSCRIBE ORDERS (OUTPATIENT)
Dept: ADMINISTRATIVE | Facility: HOSPITAL | Age: 69
End: 2024-07-15
Payer: MEDICARE

## 2024-07-15 ENCOUNTER — LAB (OUTPATIENT)
Dept: LAB | Facility: HOSPITAL | Age: 69
End: 2024-07-15
Payer: MEDICARE

## 2024-07-15 DIAGNOSIS — M05.79 SEROPOSITIVE RHEUMATOID ARTHRITIS OF MULTIPLE SITES: ICD-10-CM

## 2024-07-15 DIAGNOSIS — M05.79 SEROPOSITIVE RHEUMATOID ARTHRITIS OF MULTIPLE SITES: Primary | ICD-10-CM

## 2024-07-15 DIAGNOSIS — Z79.899 ENCOUNTER FOR LONG-TERM (CURRENT) USE OF OTHER MEDICATIONS: ICD-10-CM

## 2024-07-15 LAB
ALBUMIN SERPL-MCNC: 3.7 G/DL (ref 3.5–5.2)
ALP SERPL-CCNC: 127 U/L (ref 39–117)
ALT SERPL W P-5'-P-CCNC: 17 U/L (ref 1–33)
AST SERPL-CCNC: 24 U/L (ref 1–32)
BASOPHILS # BLD AUTO: 0.08 10*3/MM3 (ref 0–0.2)
BASOPHILS NFR BLD AUTO: 1.1 % (ref 0–1.5)
BILIRUB CONJ SERPL-MCNC: <0.2 MG/DL (ref 0–0.3)
BILIRUB INDIRECT SERPL-MCNC: ABNORMAL MG/DL
BILIRUB SERPL-MCNC: 0.4 MG/DL (ref 0–1.2)
DEPRECATED RDW RBC AUTO: 43.1 FL (ref 37–54)
EOSINOPHIL # BLD AUTO: 0.17 10*3/MM3 (ref 0–0.4)
EOSINOPHIL NFR BLD AUTO: 2.3 % (ref 0.3–6.2)
ERYTHROCYTE [DISTWIDTH] IN BLOOD BY AUTOMATED COUNT: 13.3 % (ref 12.3–15.4)
HCT VFR BLD AUTO: 45.6 % (ref 34–46.6)
HGB BLD-MCNC: 14.4 G/DL (ref 12–15.9)
IMM GRANULOCYTES # BLD AUTO: 0.05 10*3/MM3 (ref 0–0.05)
IMM GRANULOCYTES NFR BLD AUTO: 0.7 % (ref 0–0.5)
LYMPHOCYTES # BLD AUTO: 2.28 10*3/MM3 (ref 0.7–3.1)
LYMPHOCYTES NFR BLD AUTO: 30.7 % (ref 19.6–45.3)
MCH RBC QN AUTO: 28.4 PG (ref 26.6–33)
MCHC RBC AUTO-ENTMCNC: 31.6 G/DL (ref 31.5–35.7)
MCV RBC AUTO: 89.9 FL (ref 79–97)
MONOCYTES # BLD AUTO: 0.69 10*3/MM3 (ref 0.1–0.9)
MONOCYTES NFR BLD AUTO: 9.3 % (ref 5–12)
NEUTROPHILS NFR BLD AUTO: 4.15 10*3/MM3 (ref 1.7–7)
NEUTROPHILS NFR BLD AUTO: 55.9 % (ref 42.7–76)
NRBC BLD AUTO-RTO: 0 /100 WBC (ref 0–0.2)
PLATELET # BLD AUTO: 235 10*3/MM3 (ref 140–450)
PMV BLD AUTO: 11.7 FL (ref 6–12)
PROT SERPL-MCNC: 6.6 G/DL (ref 6–8.5)
RBC # BLD AUTO: 5.07 10*6/MM3 (ref 3.77–5.28)
WBC NRBC COR # BLD AUTO: 7.42 10*3/MM3 (ref 3.4–10.8)

## 2024-07-15 PROCEDURE — 36415 COLL VENOUS BLD VENIPUNCTURE: CPT

## 2024-07-15 PROCEDURE — 85025 COMPLETE CBC W/AUTO DIFF WBC: CPT

## 2024-07-15 PROCEDURE — 80076 HEPATIC FUNCTION PANEL: CPT

## 2024-09-23 ENCOUNTER — TRANSCRIBE ORDERS (OUTPATIENT)
Dept: MAMMOGRAPHY | Facility: HOSPITAL | Age: 69
End: 2024-09-23
Payer: MEDICARE

## 2024-09-23 DIAGNOSIS — Z12.31 ENCOUNTER FOR SCREENING MAMMOGRAM FOR MALIGNANT NEOPLASM OF BREAST: Primary | ICD-10-CM

## 2024-11-15 ENCOUNTER — HOSPITAL ENCOUNTER (OUTPATIENT)
Dept: MAMMOGRAPHY | Facility: HOSPITAL | Age: 69
Discharge: HOME | End: 2024-11-15
Admitting: FAMILY MEDICINE
Payer: MEDICARE

## 2024-11-15 DIAGNOSIS — Z12.31 ENCOUNTER FOR SCREENING MAMMOGRAM FOR MALIGNANT NEOPLASM OF BREAST: ICD-10-CM

## 2024-11-15 PROCEDURE — 77067 SCR MAMMO BI INCL CAD: CPT

## 2024-11-15 PROCEDURE — 77063 BREAST TOMOSYNTHESIS BI: CPT | Performed by: RADIOLOGY

## 2024-11-15 PROCEDURE — 77063 BREAST TOMOSYNTHESIS BI: CPT

## 2024-11-15 PROCEDURE — 77067 SCR MAMMO BI INCL CAD: CPT | Performed by: RADIOLOGY

## 2025-03-10 NOTE — PROGRESS NOTES
Chief Complaint: POD # 5    Subjective   Patient tolerating room given her diet, patient tolerating p.o. pain medicine, patient feels weak and unsteady on her feet, patient is urinating without difficulties  Objective     Vital signs in last 24 hours:  Temp:  [97.6 °F (36.4 °C)-98.2 °F (36.8 °C)] 98.2 °F (36.8 °C)  Heart Rate:  [63-71] 63  Resp:  [14-16] 16  BP: (146-168)/(83-99) 150/83    Intake/Output last 3 shifts:  I/O last 3 completed shifts:  In: 1750 [P.O.:1200; IV Piggyback:550]  Out: 1405 [Urine:1250; Drains:155]    Intake/Output this shift:  I/O this shift:  In: 50 [IV Piggyback:50]  Out: 320 [Urine:300; Drains:20]    Physical Exam:  Respiratory: CTA, good inspiratory effort  CV: RRR  Abd: + BS, soft, ND, usual post-op tenderness, no rebound, no guarding, incision C/D/I  Ext: + edema  Results from last 7 days   Lab Units 10/05/21  0609   WBC 10*3/mm3 13.03*   HEMOGLOBIN g/dL 10.7*   HEMATOCRIT % 34.5   PLATELETS 10*3/mm3 584*     Results from last 7 days   Lab Units 10/05/21  0609   SODIUM mmol/L 137   POTASSIUM mmol/L 3.7   CHLORIDE mmol/L 110*   CO2 mmol/L 20.1*   BUN mg/dL 16   CREATININE mg/dL 1.04*   GLUCOSE mg/dL 88   CALCIUM mg/dL 7.8*         Assessment/Plan   S/P Lap LAR - for diverticulitis with an abscess  Leukocytosis and sepsis - continue antibiotics and drain, patient improving  Acute on chronic anemia - post tranfusion 2 units PRBC - H/H stable, no evidence of active bleeding   Acute on chronic DIMPLE - nephrology following, U/O has increased, improving  Ileus - resolved   PT - continues to work with patient     Katey Villagomez MD  General, Minimally Invasive and Endoscopic Surgery  [facility]    [address] [facility] [address] [facility] [address]    [address]  [place] [id number]               [place] [address]    P: [phone]  F: [phone]    Cc:  Violeta Hammond MD     Detail Level: Zone Samples Given: [place] posay effaclar salicylic acid cleanser, [place]oche posay toleraine double repair moisturizer with uv, [place] posay cicaplast b5 balm

## (undated) DEVICE — ENDOPATH XCEL BLADELESS TROCARS WITH STABILITY SLEEVES: Brand: ENDOPATH XCEL

## (undated) DEVICE — IMMOB KN 3PNL DLX CANVS 22IN BLU

## (undated) DEVICE — IRRIGATOR BULB ASEPTO 60CC STRL

## (undated) DEVICE — BNDG ELAS CO-FLEX SLF ADHR 6IN 5YD LF STRL

## (undated) DEVICE — COLD THERAPY BLANKET: Brand: DEROYAL

## (undated) DEVICE — ENDOPATH XCEL UNIVERSAL TROCAR STABLILITY SLEEVES: Brand: ENDOPATH XCEL

## (undated) DEVICE — PIN DRL NOHEAD TROC 3.2X75MM BX/4

## (undated) DEVICE — CONTAINER,SPECIMEN,OR STERILE,4OZ: Brand: MEDLINE

## (undated) DEVICE — ENDOCUT SCISSOR TIP, DISPOSABLE: Brand: RENEW

## (undated) DEVICE — IRRIGATOR BULB 60CC

## (undated) DEVICE — GLV SURG SENSICARE MICRO PF LF 7.5 STRL

## (undated) DEVICE — GLV SURG SENSICARE W/ALOE PF LF 7.5 STRL

## (undated) DEVICE — GLV SURG SENSICARE MICRO PF LF 8 STRL

## (undated) DEVICE — SPNG GZ WOVN 4X4IN 12PLY 10/BX STRL

## (undated) DEVICE — SUT SILK 2/0 SH 30IN K833H

## (undated) DEVICE — ADHS SKIN PREMIERPRO EXOFIN TOPICAL HI/VISC .5ML

## (undated) DEVICE — DRAPE,REIN 53X77,STERILE: Brand: MEDLINE

## (undated) DEVICE — Device

## (undated) DEVICE — DRSNG TELFA PAD NONADH STR 1S 3X8IN

## (undated) DEVICE — DRP Z/FRICTION 10X16IN

## (undated) DEVICE — HOOD: Brand: FLYTE

## (undated) DEVICE — TRY SKINPREP DRYPREP

## (undated) DEVICE — ENDOPATH PNEUMONEEDLE INSUFFLATION NEEDLES WITH LUER LOCK CONNECTORS 120MM: Brand: ENDOPATH

## (undated) DEVICE — DECANT BG O JET

## (undated) DEVICE — TBG INSUFL W FLTR STRL

## (undated) DEVICE — COAXIAL HIGH FLOW TIP WITH SOFT SHIELD

## (undated) DEVICE — TOWEL,OR,DSP,ST,BLUE,STD,4/PK,20PK/CS: Brand: MEDLINE

## (undated) DEVICE — SUT PROLN 0 CT2 MONO 30IN 8412H

## (undated) DEVICE — ENSEAL LAPAROSCOPIC TISSUE SEALER G2 ARTICULATING CURVED JAW FOR USE WITH G2 GENERATOR 5MM DIAMETER 45CM SHAFT LENGTH: Brand: ENSEAL

## (undated) DEVICE — APPL CHLORAPREP W/TINT 26ML ORNG

## (undated) DEVICE — DISPOSABLE GRASPER CARTRIDGE: Brand: DIRECT DRIVE REPOSABLE GRASPERS

## (undated) DEVICE — LEGGINGS, PAIR, CLEAR, STERILE: Brand: MEDLINE

## (undated) DEVICE — JELLY,LUBE,STERILE,FLIP TOP,TUBE,4-OZ: Brand: MEDLINE

## (undated) DEVICE — MEDI-VAC YANK SUCT HNDL W/TPRD BULBOUS TIP: Brand: CARDINAL HEALTH

## (undated) DEVICE — TOTAL TRAY, 16FR 10ML SIL FOLEY, URN: Brand: MEDLINE

## (undated) DEVICE — SOL ISO/ALC RUB 70PCT 4OZ

## (undated) DEVICE — VISUALIZATION SYSTEM: Brand: CLEARIFY

## (undated) DEVICE — PK KN TOTL 90

## (undated) DEVICE — SUT VIC 0 CT1 36IN J946H

## (undated) DEVICE — COUNT NDL MAG FM/BLCK 40COUNT

## (undated) DEVICE — SUT SILK 2/0 TIES 18IN A185H

## (undated) DEVICE — DISPOSABLE TOURNIQUET CUFF SINGLE BLADDER, SINGLE PORT AND LUER LOCK CONNECTOR: Brand: COLOR CUFF

## (undated) DEVICE — FRAZIER SURGICAL SUCTION INSTRUMENT: Brand: ARGYLE

## (undated) DEVICE — MAT FLOOR QUICKWICK 56X28IN

## (undated) DEVICE — SCRW HEX PERSONA FML 2.5X25MM PK/2
Type: IMPLANTABLE DEVICE | Site: KNEE | Status: NON-FUNCTIONAL
Removed: 2018-10-17

## (undated) DEVICE — 10511 ROLL STOP SKIN MARKER; RULED CAP FINE TIP; W/ RULER: Brand: 10511 ROLL STOP SKIN MARKER; RULED CAP FINE TIP; W/ RULER

## (undated) DEVICE — SUT VIC 1 CTX 36IN J977H

## (undated) DEVICE — 3M™ STERI-DRAPE™ U-DRAPE 1015: Brand: STERI-DRAPE™

## (undated) DEVICE — GLV SURG NEOLON 2G PF LF 7.5 STRL

## (undated) DEVICE — SYR LUERLOK 20CC

## (undated) DEVICE — SCRW HEX CORT HD 3.5X38MM
Type: IMPLANTABLE DEVICE | Site: KNEE | Status: NON-FUNCTIONAL
Removed: 2018-10-17

## (undated) DEVICE — DUAL CUT SAGITTAL BLADE

## (undated) DEVICE — PK LAP GEN 90

## (undated) DEVICE — ANTIBACTERIAL UNDYED BRAIDED (POLYGLACTIN 910), SYNTHETIC ABSORBABLE SUTURE: Brand: COATED VICRYL

## (undated) DEVICE — WOUND RETRACTOR AND PROTECTOR: Brand: ALEXIS WOUND PROTECTOR-RETRACTOR

## (undated) DEVICE — NDL SPINE 18G 31/2IN PNK

## (undated) DEVICE — BOWL MIX QUICK VAC SNG

## (undated) DEVICE — PREP SOL POVIDONE/IODINE BT 4OZ

## (undated) DEVICE — RESERVOIR,SUCTION,100CC,SILICONE: Brand: MEDLINE

## (undated) DEVICE — CVR HNDL LT SURG ACCSSRY BLU STRL

## (undated) DEVICE — SYS SKIN CLS DERMABOND PRINEO W/22CM MESH TP

## (undated) DEVICE — SOL ANTISEP SCRB PVPI 7.5PCT 4OZ

## (undated) DEVICE — PAD,ABDOMINAL,8"X10",ST,LF: Brand: MEDLINE

## (undated) DEVICE — INTENDED FOR TISSUE SEPARATION, AND OTHER PROCEDURES THAT REQUIRE A SHARP SURGICAL BLADE TO PUNCTURE OR CUT.: Brand: BARD-PARKER ® STAINLESS STEEL BLADES

## (undated) DEVICE — SUT VIC 2/0 CT1 CR8 18IN J839D

## (undated) DEVICE — ECHELON FLEX 60 ARTICULATING ENDOSCOPIC LINEAR CUTTER (NO CARTRIDGE): Brand: ECHELON FLEX ENDOPATH

## (undated) DEVICE — SCRW HEX CORT HD 3.5X38MM
Type: IMPLANTABLE DEVICE | Site: PATELLA | Status: NON-FUNCTIONAL
Removed: 2018-04-11

## (undated) DEVICE — MAYO STAND COVER: Brand: CONVERTORS

## (undated) DEVICE — LAPAROSCOPIC SMOKE FILTRATION SYSTEM: Brand: PALL LAPAROSHIELD® PLUS LAPAROSCOPIC SMOKE FILTRATION SYSTEM

## (undated) DEVICE — SUCTION CANISTER, 1000CC,SAFELINER: Brand: DEROYAL

## (undated) DEVICE — TRAP FLD MINIVAC MEGADYNE 100ML

## (undated) DEVICE — 2, DISPOSABLE SUCTION/IRRIGATOR WITH DISPOSABLE TIP: Brand: STRYKEFLOW

## (undated) DEVICE — GLV SURG SENSICARE POLYISPRN W/ALOE PF LF 6.5 GRN STRL

## (undated) DEVICE — GLV SURG SENSICARE ORTHO PF LF 7 STRL

## (undated) DEVICE — PENCL E/S ULTRAVAC TELESCP NOSE HOLSTR 10FT

## (undated) DEVICE — MEDI-VAC YANKAUER SUCTION HANDLE W/BULBOUS TIP: Brand: CARDINAL HEALTH

## (undated) DEVICE — TUBING, SUCTION, 1/4" X 12', STRAIGHT: Brand: MEDLINE

## (undated) DEVICE — SUT VIC 0 CT1 CR8 18IN J840D

## (undated) DEVICE — GOWN ,SIRUS,NONREINFORCED SMALL: Brand: MEDLINE

## (undated) DEVICE — DRSNG TELFA ILND ADH 4X6IN

## (undated) DEVICE — PATIENT RETURN ELECTRODE, SINGLE-USE, CONTACT QUALITY MONITORING, ADULT, WITH 9FT CORD, FOR PATIENTS WEIGING OVER 33LBS. (15KG): Brand: MEGADYNE